# Patient Record
Sex: FEMALE | Race: WHITE | Employment: OTHER | URBAN - METROPOLITAN AREA
[De-identification: names, ages, dates, MRNs, and addresses within clinical notes are randomized per-mention and may not be internally consistent; named-entity substitution may affect disease eponyms.]

---

## 2018-11-13 ENCOUNTER — HOSPITAL ENCOUNTER (OUTPATIENT)
Dept: MAMMOGRAPHY | Age: 73
Discharge: HOME | End: 2018-11-13
Attending: INTERNAL MEDICINE
Payer: MEDICARE

## 2018-11-13 DIAGNOSIS — Z78.0 MENOPAUSE: ICD-10-CM

## 2018-11-13 PROCEDURE — 77080 DXA BONE DENSITY AXIAL: CPT

## 2018-11-14 NOTE — PROGRESS NOTES
Your bone density test showed mild thinning, but a low risk of fracturing a bone, thus not enough to start any bone forming medication. Good news!

## 2019-08-27 ENCOUNTER — HOSPITAL ENCOUNTER (OUTPATIENT)
Dept: LAB | Age: 74
Discharge: HOME | End: 2019-08-27
Payer: MEDICARE

## 2019-08-27 LAB — TSH SERPL DL<=0.005 MIU/L-ACNC: 1.13 UIU/ML (ref 0.36–3.74)

## 2019-08-27 PROCEDURE — 84443 ASSAY THYROID STIM HORMONE: CPT

## 2019-09-16 ENCOUNTER — HOSPITAL ENCOUNTER (OUTPATIENT)
Dept: MRI IMAGING | Age: 74
Discharge: HOME | End: 2019-09-16
Attending: INTERNAL MEDICINE
Payer: MEDICARE

## 2019-09-16 DIAGNOSIS — M54.17 LUMBOSACRAL RADICULOPATHY: ICD-10-CM

## 2019-09-16 DIAGNOSIS — R29.898 WEAKNESS OF FOOT, LEFT: ICD-10-CM

## 2019-09-16 PROCEDURE — 72148 MRI LUMBAR SPINE W/O DYE: CPT

## 2019-09-22 NOTE — PROGRESS NOTES
Your MRI does show degenerative changes in the bones and discs that DO put pressure on your nerves. This is likely the cause for the damage to the left 5th lumbar nerve that Dr Daniel Kevin saw on the nerve/muscle tests--this nerve goes from your low back to outer thigh and calf. The nerve/muscle tests also showed damage to the small nerves in both feet. We have already checked your blood for any cause for small nerve damage, and did not find any specific cause--so it appears you have the very common unexplained nerve damage. As for the L5 damage, this can be improved by physical therapy and spine injections. I'll put in a referral for PT, expect a call to schedule. If this isn't helpful, we can move on to a spine injection. Let me know if you have any questions, or if you prefer a different approach.

## 2019-10-03 ENCOUNTER — HOSPITAL ENCOUNTER (OUTPATIENT)
Dept: PHYSICAL THERAPY | Age: 74
Discharge: HOME | End: 2019-10-03
Attending: INTERNAL MEDICINE
Payer: MEDICARE

## 2019-10-03 DIAGNOSIS — M54.17 LUMBOSACRAL RADICULOPATHY: ICD-10-CM

## 2019-10-03 DIAGNOSIS — G62.9 SENSORY NEUROPATHY: ICD-10-CM

## 2019-10-03 PROCEDURE — 97140 MANUAL THERAPY 1/> REGIONS: CPT

## 2019-10-03 PROCEDURE — 97014 ELECTRIC STIMULATION THERAPY: CPT

## 2019-10-03 PROCEDURE — 97162 PT EVAL MOD COMPLEX 30 MIN: CPT

## 2019-10-03 NOTE — THERAPY EVALUATION
Marianela Mcrae  : 1945  [place]  [place] [address] [place] [address] at 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 Frequency/Duration: 2 times a week for 90 Day(s)    PAIN/SUBJECTIVE:   Initial:   numbness and occasional radiating pain down left leg 5/10 and underlying sensory neuropathy in background . Post Session:     numbness and occasional radiating pain down left leg 4/10 post treatment  and underlying sensory neuropathy in background . MEDICAL/REFERRING DIAGNOSIS:  Sensory neuropathy [G62.9]  Lumbosacral radiculopathy [M54.17]   DATE OF ONSET: 2019   REFERRING PHYSICIAN: Summer Estrella MD MD Orders: PT evaluation and treatment   Return MD Appointment:   Future Appointments   Date Time Provider Cynthia Bolden   10/7/2019  2:30 PM Jerman Mohan [facility] SFD   10/10/2019  1:45 PM Hugo Christy [facility] SFD   10/14/2019  3:15 PM Jerman Mohan [facility] SFDORPT D   10/16/2019  2:30 [facility]   2020  8:00 AM [facility] LAB [place] [address] [facility]   3/9/2020  9:20 AM Summer Estrella MD [facility]          INITIAL ASSESSMENT:  Ms. Dyanna Merlin    presents with decreased dynamic and static balance SLS associated with latent sensory neuropathy and distal LE weakness of hips and tibialis anterior.    There is a left superimposed L5 radiculopathy for the left LE that is transient for about 3 months. The patient has reduced anticipatory postural reactions of ankle, hip and stepping strategies as well as impaired autonomic postural reactions along with piriformis and left L5 lumbar radiculopathy. She may benefit from advanced pain management and TRUPTI for this issue concurrent with PT. Skilled PT is indicated for her functional mobility deficits. ?????? ? ? This section established at most recent assessment??????????      PROBLEM LIST (Impairments causing functional limitations):  1. Decreased Strength affecting function  2. Decreased ADL/Functional Activities  3. Decreased transfer abilities  4. Decreased Activity tolerance   5. Other Decrease Balance   6. Other Decreased LE sensation  7. Lumbar Radiculopathy   GOALS: (Goals have been discussed and agreed upon with patient.)  1. SHORT-TERM FUNCTIONAL GOALS: Time Frame: 45 days   2. The patient will be compliant with HEP  3. The patient will improve score on LEFS from 53/80 to 60/80  4. The patient will be able to perform SLS for >5 seconds bilaterally  5. The patient will increase stance time on L LE to improve gait speed focused on midstance progression  DISCHARGE GOALS: Time Frame: 90 day     1. The patient will be compliant with HEP  2. The patient will score on LEFS from 53/80 to 65/80 for increased LE function. 3. The patient will perform dynamic gait tasks in order to increase participation in community activities  4. The patient will present with typical pattern while ambulating on treadmill without UE support for balance for 1/2 mile to improve participation impact center at [facility].            REHABILITATION POTENTIAL FOR STATED GOALS: FAIR  PLAN OF CARE:  INTERVENTIONS PLANNED: (Benefits and precautions of physical therapy have been discussed with the patient.)  1. balance exercise  2. gait training  3. home exercise program (HEP)  4. neuromuscular re-education/strengthening  5. therapeutic activities  6. Unattended estim           OUTCOME MEASURE:   Tool Used: Lower Extremity Functional Scale (LEFS)  Score:  Initial:  53/80 Most Recent: X/80 (Date: -- )   Interpretation of Score: 20 questions each scored on a 5 point scale with 0 representing \"extreme difficulty or unable to perform\" and 4 representing \"no difficulty\". The lower the score, the greater the functional disability. 80/80 represents no disability. Minimal detectable change is 9 points. MEDICAL NECESSITY:   · Patient demonstrates fair rehab potential due to higher previous functional level. REASON FOR SERVICES/OTHER COMMENTS:  · Patient continues to require skilled intervention due to decreased mobility, pain and transfers. .  Total Duration:  PT Patient Time In/Time Out  Time In: 1576  Time Out: 4472    Rehabilitation Potential For Stated Goals: Fair  Regarding Rangel Killian's therapy, I certify that the treatment plan above will be carried out by a therapist or under their direction. Thank you for this referral,  Savanna Vale, PT     Referring Physician Signature: Sarahy Holt MD _______________________________ Date _____________            HISTORY:   History of Injury/Illness (Reason for Referral):    PER PT:   She states she has latent sensory distal neuropathy greater than one year, now with left sided L5 radiculopathy confirmed by MRI for foraminal stenosis. May need TRUPTI left but is not interested in lumbar surgery. She reports decreased SLS but is still able to ambulate and perform therapeutic exercise. She is concerned about the lumbar radiculopathy. There is loss of sensation, (+) MRI and (+) NCV and EMG. We contacted Jose Suárez today regarding tandem PT and spine injections per research:  Synergy of the two are most effective. She states she has learned to live with sensory neuropathy but cannot bear the lumbar radiculopathy, but is not interested in lumbar surgery.        PER MD H&P  : \"Your MRI does show degenerative changes in the bones and discs that DO put pressure on your nerves. This is likely the cause for the damage to the left 5th lumbar nerve that Dr Mela Grajeda saw on the nerve/muscle tests--this nerve goes from your low back to outer thigh and calf. The nerve/muscle tests also showed damage to the small nerves in both feet. We have already checked your blood for any cause for small nerve damage, and did not find any specific cause--so it appears you have the very common unexplained nerve damage. As for the L5 damage, this can be improved by physical therapy and spine injections. I'll put in a referral for PT, expect a call to schedule. If this isn't helpful, we can move on to a spine injection. Let me know if you have any questions, or if you prefer a different approach. \"    PER  MRI :      IMPRESSION  IMPRESSION:   1.  Multilevel disc herniations as described above. Mild central canal stenosis  is seen at L4-5. Additional mild neural foraminal stenosis is seen at L3-4, and  L4-5 as described above. Sensory neuropathy, lumbar radiculopathy  New concern  Around April or so, 2018, burning in feet, painful for covers to touch toes, followed by a more persistent numbness and tingling in both 4-5 toes. 8/18 b12 8/18 181, mma 437 hi, rpr neg, spe/paula nl  4/19 taking b12, >2000, hep panel neg, metal screen neg, margareth neg  Now has burning in left toes 3-4-5 and right toes 4-5  Tomeka causes confusion at 600 mg, and sedation on 300 mg, can't wake up in am when takes at 8 pm, or even at 6 pm, and no effect at 150 mg.  9/19 feels fine in day when up, but when lies down, notes pain in left thigh, lateral foot, burning in both lateral toes, worse on right. Burning not noted in daytime if wears socks and tennis shoes, but can't stand sheet at night. No worse on B12 but no better.   B12 1877, on weekly b12 injection.     Back pain  Left lower back, just above waistline, now spreading to right side  No pain seated  Painful when lies down on side or back  Will finally resolve if she holds perfectly still  nontender to her touch  Will rad to thighs and calves, sensation of cramping, but no mm tightness, ppt by lying flat  No injury   Past Medical History/Comorbidities:   Ms. Krish Raymond  has a past medical history of Allergic eye reaction (6/19/2013), Arthritis, Claustrophobia, Elevated blood pressure (not hypertension) (4/30/2015), Environmental allergies, FH: breast cancer (6/19/2013), FH: colon cancer (6/19/2013), Hyperlipidemia (6/19/2013), Left shoulder pain (6/19/2013), Menopause (6/19/2013), Trigger finger (6/19/2013), Unspecified adverse effect of anesthesia, Vertigo (6/19/2013), and Vitamin D deficiency (8/9/2016). She also has no past medical history of Aneurysm ([address] Arrhythmia, Asthma, Autoimmune disease ([address] CAD (coronary artery disease), Cancer ([address] Chronic kidney disease, Chronic obstructive pulmonary disease ([address] Chronic pain, Coagulation disorder ([address] Diabetes ([address] Difficult intubation, GERD (gastroesophageal reflux disease), Heart failure ([address] Ill-defined condition, Liver disease, Malignant hyperthermia due to anesthesia, Morbid obesity ([address] Nausea & vomiting, Pseudocholinesterase deficiency, Psychiatric disorder, PUD (peptic ulcer disease), Seizures ([address] Stroke ([address] Thromboembolus ([address] Thyroid disease, or Unspecified sleep apnea. Ms. Krish Raymond  has a past surgical history that includes hx appendectomy; hx cholecystectomy; hx hysterectomy; hx tubal ligation (1971); hx gi (last 2012); and hx mohs procedure (1982). Social History/Living Environment:     lives with her spouse at home   Prior Level of Function/Work/Activity:  Had been independent with all functional mobility. Dominant Side:         RIGHT  Personal Factors:          Sex:  female        Age:  69 y.o.    Ambulatory/Rehab Services H2 Model Falls Risk Assessment   Risk Factors:       No Risk Factors Identified Ability to Rise from Chair:       (0)  Ability to rise in a single movement   Falls Prevention Plan:       No modifications necessary   Total: (5 or greater = High Risk): 0   ©2010 [facility] of [facility] Patent #[id number]. Federal Law prohibits the replication, distribution or use without written permission from [facility] of [employer]   Current Medications:       Current Outpatient Medications:     magnesium chloride (SLOW MAG) 71.5 mg tablet, Take 2-3 tab by mouth daily at bedtime, Disp: 30 Tab, Rfl: 0    ALPRAZolam (XANAX) 0.5 mg tablet, Take 1 to 2 tablets by mouth 15 minutes before your MRI, but only after you sign any important paperwork. You will need a  to take you home after the exam., Disp: 2 Tab, Rfl: 0    cyanocobalamin, vitamin B-12, 2,000 mcg tab, Take 1 Tab by mouth daily. , Disp: , Rfl:     TURMERIC PO, Take 500 mg by mouth., Disp: , Rfl:     syringe with needle, safety 5 mL 25 gauge x 5/8\" syrg, Use one daily or as directed, Disp: 50 Syringe, Rfl: 1    cholecalciferol (VITAMIN D3) 1,000 unit cap, Take 2 Caps by mouth daily. , Disp: , Rfl:    Date Last Reviewed:  10/3/2019   Number of Personal Factors/Comorbidities that affect the Plan of Care: 1-2: MODERATE COMPLEXITY   EXAMINATION:   GROSS PRESENTATION/POSTURE:   The patient presents with FHP and thoracic kyphosis  MENTAL STATUS:  A&O x 3   VISION:  WNL  PERCEPTION: WNL  FUNCTIONAL MOBILITY:  · Bed Mobility: WNL    Transfers:  Relatively normal   RANGE OF MOTION Left  Right Comments:   Lower Extremity  WNL WNL End range pain left hip and lumbar region with radiculopathy. L5 .                    STRENGTH Left Right Comments:   Lower Extremity            Hip flexors 4 4      Knee flexors 4 4      Knee extensors 4 4      DF 4 4      PF 3+ 4      SENSATION:   · Dermatomes- intact    QUALITY OF GAIT:  The patient presents with antalgic gait pattern.  Reduced SLS time on the R LE with L genus varum and L ankle supination lacking EV to pronate during midstance. The patient presents with decreased gait speed is 0.8 m/sec. BALANCE:  The patient present with impaired balance secondary to peripheral neuropathy   Palpation:   Left piriformis pain and tenderness with left LE guarding.    (+) GRACIELA       Body Structures Involved:  1. Bones  2. Joints  3. Muscles  4. Ligaments Body Functions Affected:  1. Neuromusculoskeletal  2. Movement Related  3. Skin Related  4. Metobolic/Endocrine Activities and Participation Affected:  1. General Tasks and Demands  2. Communication  3. Mobility  4. Self Care  5.  Community, Social and [facility] [address]   Number of elements (examined above) that affect the Plan of Care: 3: MODERATE COMPLEXITY   CLINICAL PRESENTATION:   Presentation: Evolving clinical presentation with changing clinical characteristics: MODERATE COMPLEXITY   CLINICAL DECISION MAKING:   Use of outcome tool(s) and clinical judgement create a POC that gives a: Questionable prediction of patient's progress: MODERATE COMPLEXITY

## 2019-10-03 NOTE — PROGRESS NOTES
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 Frequency/Duration: 2 times a week for 90 Day(s)    PAIN/SUBJECTIVE:   Initial:   numbness and occasional radiating pain down left leg 5/10 and underlying sensory neuropathy in background . Post Session:     numbness and occasional radiating pain down left leg 4/10 post treatment  and underlying sensory neuropathy in background . Pre-treatment Symptoms/Complaints:  Sensory neuropathy with superimposed L5 left radiculopathy, transient. Medications Last Reviewed:  10/3/2019  Updated Objective Findings:  See evaluation note from today  TREATMENT:     MANUAL THERAPY: (10 minutes): Joint mobilization and Soft tissue mobilization was utilized and necessary because of the patient's restricted joint motion, painful spasm, loss of articular motion and restricted motion of soft tissue. MODALITIES: (15 minutes):     Electrical Stimulation Therapy ( hertz IFC quadrapolar to the left piriformis region layered with moist hot pack to increase blood flow and modulate patient pain in left hip and LE.   ) was provided with intensity adjusted throughout treatment to patient tolerance. sidelying right side with pillow between knees.        [employer] Portal  Treatment/Session Summary: · Response to Treatment:  decreased radiating pain in right sidelying . .  · Communication/Consultation:  regarding sensory neuropathy and radiating pain and review of MRI of the LB region and EMG/NCV. Contacted MD office regarding pain management referral.  · Equipment provided today:  None today  · Recommendations/Intent for next treatment session: Next visit will focus on left piriformis and gluteal region with increased mobility to modulate radiating pain.      .    Total Treatment Billable Duration:  25 minutes  PT Patient Time In/Time Out  Time In: 1289  Time Out: [address], [place]    Future Appointments   Date Time Provider Cynthia Bolden   10/7/2019  2:30 PM Herberth Clements PTA [facility] SFD   10/10/2019  1:45 PM Marianela Moeller [facility] SFD   10/14/2019  3:15 PM Herberth Clements PTA SFDORPT SFD   10/16/2019  2:30 PM Luisa Mena, RAMIRO [facility] SFD   2/26/2020  8:00 AM [facility] LAB [facility]   3/9/2020  9:20 AM Ton Funes MD [facility]

## 2019-10-07 ENCOUNTER — HOSPITAL ENCOUNTER (OUTPATIENT)
Dept: PHYSICAL THERAPY | Age: 74
Discharge: HOME | End: 2019-10-07
Attending: INTERNAL MEDICINE
Payer: MEDICARE

## 2019-10-07 NOTE — PROGRESS NOTES
Adolfo Maegan  : 1945  [place]  Secondary: [address], 101 [facility]", [address]  [phone]   [phone]      10/7/2019  Patient called to cancel this appointment due getting stuck at the doctor's office. Will follow up with the patient at the next visit.   Marcial Matthews, PTA

## 2019-10-10 ENCOUNTER — HOSPITAL ENCOUNTER (OUTPATIENT)
Dept: PHYSICAL THERAPY | Age: 74
Discharge: HOME | End: 2019-10-10
Attending: INTERNAL MEDICINE
Payer: MEDICARE

## 2019-10-10 PROCEDURE — 97140 MANUAL THERAPY 1/> REGIONS: CPT

## 2019-10-10 PROCEDURE — 97110 THERAPEUTIC EXERCISES: CPT

## 2019-10-10 NOTE — PROGRESS NOTES
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 Frequency/Duration: 2 times a week for 90 Day(s)    PAIN/SUBJECTIVE:   Initial:   numbness and occasional radiating pain down left leg 5/10 and underlying sensory neuropathy in background . Post Session:     numbness and occasional radiating pain down left leg 4/10 post treatment  and underlying sensory neuropathy in background . Pre-treatment Symptoms/Complaints: Patient reports continued numbness/tingling down left LE from knee down. It feels like it wants to go up higher into my thigh at times. Patient states that following last visit she had increased pain in left lower leg and states she does not want to have the electrical stimulation treatment today. Patient states she had difficulty sleeping that night due to pain and tingling in left LE. Medications Last Reviewed:  10/10/2019  Updated Objective Findings:  None today. TREATMENT:     MANUAL THERAPY: ( 25 minutes): Joint mobilization and Soft tissue mobilization was utilized and necessary because of the patient's restricted joint motion, painful spasm, loss of articular motion and restricted motion of soft tissue.  Patient prone for STM to left more than right low back, upper glut, and piriformis to help decrease tightness and pain. MODALITIES: (10 minutes): ( Patient declined IFES today. ) Patient prone for ice pack to left low back, upper glut and piriformis to help decrease inflammation and pain. Therapeutic Exercise: ( 15 minutes):  Exercises per grid below to improve mobility, strength and balance. Required minimal verbal and tactile cues to promote proper body alignment, promote proper body posture, promote proper body mechanics and promote proper body breathing techniques. Progressed resistance, range, repetitions and complexity of movement as indicated. Date:  10/10/19 Date:   Date:     Activity/Exercise Parameters Parameters Parameters   Single knee to chest    3 x 15 second hold B     LTR 5 x 5 second hold B     Piriformis stretch 3 x 30 second hold                                            [employer] Portal  Treatment/Session Summary:    · Response to Treatment:  decreased tightness in low back and left buttocks. Patient toleranted new exercises well and reports she is doing some of these at her exercise class in sitting. .  · Communication/Consultation:  None today  · Equipment provided today:  None today  · Recommendations/Intent for next treatment session: Next visit will focus on left piriformis and gluteal region with increased mobility to modulate radiating pain.      .    Total Treatment Billable Duration:  40   minutes  PT Patient Time In/Time Out  Time In: 1345  Time Out: [address], PTA    Future Appointments   Date Time Provider Cynthia Bolden   10/14/2019  3:15 PM Charly Spencer PTA [facility] SFWIL   10/16/2019  2:30 PM Promise Wright PT [facility]   2/26/2020  8:00 AM [facility] LAB [facility]   3/9/2020  9:20 AM Brittany Nix MD [facility]

## 2019-10-14 ENCOUNTER — HOSPITAL ENCOUNTER (OUTPATIENT)
Dept: PHYSICAL THERAPY | Age: 74
Discharge: HOME | End: 2019-10-14
Attending: INTERNAL MEDICINE
Payer: MEDICARE

## 2019-10-14 NOTE — PROGRESS NOTES
[facility] [address] [facility]  : 1945  [place]  Secondary: [facility] [facility] at [facility] 68, 101 [facility], [address]  [phone]   [phone]      10/14/2019  Pt called in to state that she is having car problems, so she could not make it to today's appointment. Will follow up with patient at next visit.      Rachel Levy, SPTA  Felix Canada, PTA

## 2019-10-16 ENCOUNTER — HOSPITAL ENCOUNTER (OUTPATIENT)
Dept: PHYSICAL THERAPY | Age: 74
Discharge: HOME | End: 2019-10-16
Attending: INTERNAL MEDICINE
Payer: MEDICARE

## 2019-10-16 NOTE — THERAPY DISCHARGE
Gianna Montano has been seen in physical therapy from 10/3/2019  to 10/10/2019 for 2 visits. Treatment has been discontinued at this time due to patient reporting irritable, severe, and inflammatory pain symptoms post activities. Some goals were not met due to high level of irritability. Recommend return to MD and consideration of further pain modulation including possible TRUPTI. Thank you for this referral.     Alexis Hoover, PT, DPT, OCS.

## 2019-12-18 ENCOUNTER — HOSPITAL ENCOUNTER (OUTPATIENT)
Dept: LAB | Age: 74
Discharge: HOME | End: 2019-12-18

## 2019-12-18 PROCEDURE — 88305 TISSUE EXAM BY PATHOLOGIST: CPT

## 2020-09-09 PROBLEM — E53.8 B12 DEFICIENCY: Status: ACTIVE | Noted: 2020-09-09

## 2020-09-09 PROBLEM — G60.9 IDIOPATHIC PERIPHERAL NEUROPATHY: Status: ACTIVE | Noted: 2020-09-09

## 2020-09-09 PROBLEM — M85.89 OSTEOPENIA OF MULTIPLE SITES: Status: ACTIVE | Noted: 2020-09-09

## 2020-09-29 LAB — MAMMOGRAPHY, EXTERNAL: NORMAL

## 2020-12-02 ENCOUNTER — HOSPITAL ENCOUNTER (OUTPATIENT)
Dept: MRI IMAGING | Age: 75
Discharge: HOME | End: 2020-12-02
Attending: PHYSICIAN ASSISTANT
Payer: MEDICARE

## 2020-12-02 PROCEDURE — 70553 MRI BRAIN STEM W/O & W/DYE: CPT

## 2020-12-02 PROCEDURE — 74011250636 HC RX REV CODE- 250/636: Performed by: PHYSICIAN ASSISTANT

## 2020-12-02 PROCEDURE — A9575 INJ GADOTERATE MEGLUMI 0.1ML: HCPCS | Performed by: PHYSICIAN ASSISTANT

## 2020-12-02 RX ORDER — GADOTERATE MEGLUMINE 376.9 MG/ML
12 INJECTION INTRAVENOUS
Status: COMPLETED | OUTPATIENT
Start: 2020-12-02 | End: 2020-12-02

## 2020-12-02 RX ORDER — SODIUM CHLORIDE 0.9 % (FLUSH) 0.9 %
10 SYRINGE (ML) INJECTION
Status: COMPLETED | OUTPATIENT
Start: 2020-12-02 | End: 2020-12-02

## 2020-12-02 RX ADMIN — GADOTERATE MEGLUMINE 12 ML: 376.9 INJECTION INTRAVENOUS at 14:15

## 2020-12-02 RX ADMIN — Medication 10 ML: at 14:15

## 2020-12-29 ENCOUNTER — HOSPITAL ENCOUNTER (OUTPATIENT)
Dept: MRI IMAGING | Age: 75
Discharge: HOME | End: 2020-12-29
Attending: INTERNAL MEDICINE
Payer: MEDICARE

## 2020-12-29 DIAGNOSIS — M54.2 NECK PAIN: ICD-10-CM

## 2020-12-29 DIAGNOSIS — R93.7 ABNORMAL MRI, CERVICAL SPINE: ICD-10-CM

## 2020-12-29 PROCEDURE — 74011250636 HC RX REV CODE- 250/636: Performed by: INTERNAL MEDICINE

## 2020-12-29 PROCEDURE — 72156 MRI NECK SPINE W/O & W/DYE: CPT

## 2020-12-29 PROCEDURE — A9575 INJ GADOTERATE MEGLUMI 0.1ML: HCPCS | Performed by: INTERNAL MEDICINE

## 2020-12-29 RX ORDER — GADOTERATE MEGLUMINE 376.9 MG/ML
13 INJECTION INTRAVENOUS
Status: COMPLETED | OUTPATIENT
Start: 2020-12-29 | End: 2020-12-29

## 2020-12-29 RX ORDER — SODIUM CHLORIDE 0.9 % (FLUSH) 0.9 %
10 SYRINGE (ML) INJECTION
Status: COMPLETED | OUTPATIENT
Start: 2020-12-29 | End: 2020-12-29

## 2020-12-29 RX ADMIN — GADOTERATE MEGLUMINE 13 ML: 376.9 INJECTION INTRAVENOUS at 16:31

## 2020-12-29 RX ADMIN — Medication 10 ML: at 16:31

## 2021-03-19 ENCOUNTER — HOSPITAL ENCOUNTER (OUTPATIENT)
Dept: PHYSICAL THERAPY | Age: 76
Discharge: HOME | End: 2021-03-19
Payer: MEDICARE

## 2021-03-19 PROCEDURE — 97140 MANUAL THERAPY 1/> REGIONS: CPT

## 2021-03-19 PROCEDURE — 97110 THERAPEUTIC EXERCISES: CPT

## 2021-03-19 PROCEDURE — 97161 PT EVAL LOW COMPLEX 20 MIN: CPT

## 2021-03-19 NOTE — THERAPY EVALUATION
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 Frequency/Duration: 2 times a week for 60 Day(s) MEDICAL/REFERRING DIAGNOSIS:  Cervicalgia [M54.2]  Spondylosis without myelopathy or radiculopathy, cervical region [M47.812]  Connective tissue and disc stenosis of intervertebral foramina of upper extremity [M99.77]   DATE OF ONSET: Patient reports her neck pain starting back in 2020 after doing a lot of mulch/ yard work. REFERRING PHYSICIAN: Chris Herrera MD MD Orders: Evaluate and Treat   Return MD Appointment: none scheduled     INITIAL ASSESSMENT:  Ms. Edel Vinson is a 77 y.o. female presenting to physical therapy with complaints of neck pain which began back in 2020. She reports her pain starting after working in her yard spreading mulch. Patient states the pain has gotten some better over time, but continues to be an annoyance and limits her ROM/ mobility. She was given some ROM exercises by the doctor and reports continuing with those daily. She was having some hearing problems and had an MRI for assessment where they looked at her cervical spine as well.  Per chart review the MRI demonstrated spondylosis and degenerative changes. She is scheduled to have another MRI in 6 months for re-assessment. She reports one fall about 6 months ago where she tripped over a curb at the grocery store, but no injury, no other falls, and no feelings of unsteadiness. Her main complaint with her neck pain is her loss of ROM and pain with cervical rotations and a few headaches a week. Patient is eager to improve her pain, ROM, and activity tolerance in order to return to her prior level of independence and function. Patient presents with increased pain, decreased strength, decreased ROM, decreased flexibility, impaired posture, impaired transfer ability, decreased activity tolerance, and overall impaired functional mobility. Patient is a good candidate for skilled physical therapy interventions to include manual therapy, therapeutic exercise, transfer training, postural re-education, body mechanics training, and pain modalities as needed. PROBLEM LIST (Impacting functional limitations):  1. Decreased Strength  2. Decreased ADL/Functional Activities  3. Decreased Transfer Abilities  4. Increased Pain  5. Decreased Activity Tolerance  6. Decreased Pacing Skills  7. Decreased Work Simplification/Energy Conservation Techniques  8. Decreased Flexibility/Joint Mobility INTERVENTIONS PLANNED: (Treatment may consist of any combination of the following)  1. Bed Mobility  2. Cold  3. Family Education  4. Heat  5. Home Exercise Program (HEP)  6. Manual Therapy  7. Neuromuscular Re-education/Strengthening  8. Range of Motion (ROM)  9. Therapeutic Activites  10. Therapeutic Exercise/Strengthening  11. Transfer Training  12. Ultrasound (US)     GOALS: (Goals have been discussed and agreed upon with patient.)  Short-Term Functional Goals: Time Frame: 3/19/2021 to 4/16/2021  1.  Patient demonstrates independence with home exercise program without verbal cueing provided by therapist.   2. Patient will report no more than 3/10 neck pain at rest in order to demonstrate improved self pain control and tolerance. 3. Patient will be educated in and demonstrate improved upright posture including decreased anterior head and thoracic kyphosis to decrease strain on the cervical spine and improve mobility. 4. Patient will improve bilateral cervical lateral flexion ROM to at least 25 degrees in order to demonstrate improve joint and tissue mobility. 5. Patient will report less than 2 headaches a week in order to demonstrate decreased tension from the cervical spine causes headaches. Discharge Goals: Time Frame: 3/19/2021 to 5/18/2021  1. Patient will improve bilateral cervical rotation ROM to at least 45 degrees with minimal to no complaints of pain in order to improve ability to look over shoulder while driving. 2. Patient will be able to sleep through the night without waking due to neck pain in order to improve sleeping pattern for overall health and wellness. 3. Patient will report 1 headache or less during the week in order to demonstrate decreased tension and return to normal headache frequency prior to neck pain. 4. Patient will be able to perform work inside and outside of her home with minimal to no complaints of neck pain in order to return to prior level of activities. 5. Patient will improve Neck Disability Index Score to 12/50 from 15/50. Outcome Measure: Tool Used: Neck Disability Index (NDI)  Score:  Initial: 15/50  Most Recent: X/50 (Date: -- )   Interpretation of Score: The Neck Disability Index is a revised form of the Oswestry Low Back Pain Index and is designed to measure the activities of daily living in person's with neck pain. Each section is scored on a 0-5 scale, 5 representing the greatest disability. The scores of each section are added together for a total score of 50.        Medical Necessity:   · Patient is expected to demonstrate progress in strength, range of motion, coordination and functional technique to improve safety during driving, lifting, reaching, and home activities. · Skilled intervention continues to be required due to increased pain with decreased ROM hindering independence and function. Reason for Services/Other Comments:  · Patient continues to require skilled intervention due to neck pain hindering return to prior level of function and impacting quality of life. Total Evaluation Duration: 25 minutes    Rehabilitation Potential For Stated Goals: Good  Regarding Severiano England Owen's therapy, I certify that the treatment plan above will be carried out by a therapist or under their direction. Thank you for this referral,  Lauren Rhodes PT     Referring Physician Signature: Tawanna Hawk MD              Date                     PAIN/SUBJECTIVE:    Initial: Pain Intensity 1: 5  Pain Location 1: Neck  Pain Orientation 1: Posterior, Left, Right  Post Session:  5/10    HISTORY:    History of Injury/Illness (Reason for Referral):  Ms. Angeline Umanzor is a 77 y.o. female presenting to physical therapy with complaints of neck pain which began back in August 2020. She reports her pain starting after working in her yard [employer]. Patient states the pain has gotten some better over time, but continues to be an annoyance and limits her ROM/ mobility. She was given some ROM exercises by the doctor and reports continuing with those daily. She was having some hearing problems and had an MRI for assessment where they looked at her cervical spine as well. Per chart review the MRI demonstrated spondylosis and degenerative changes. She is scheduled to have another MRI in 6 months for re-assessment. She reports one fall about 6 months ago where she tripped over a curb at the grocery store, but no injury, no other falls, and no feelings of unsteadiness. Her main complaint with her neck pain is her loss of ROM and pain with cervical rotations and a few headaches a week.  Patient is eager to improve her pain, ROM, and activity tolerance in order to return to her prior level of independence and function. Patient presents with increased pain, decreased strength, decreased ROM, decreased flexibility, impaired posture, impaired transfer ability, decreased activity tolerance, and overall impaired functional mobility. Past Medical History/Comorbidities:   Ms. Tk Whittaker  has a past medical history of Allergic eye reaction (6/19/2013), Arthritis, Claustrophobia, Elevated blood pressure (not hypertension) (4/30/2015), Environmental allergies, FH: breast cancer (6/19/2013), FH: colon cancer (6/19/2013), Hyperlipidemia (6/19/2013), Left shoulder pain (6/19/2013), Menopause (6/19/2013), Trigger finger (6/19/2013), Unspecified adverse effect of anesthesia, Vertigo (6/19/2013), and Vitamin D deficiency (8/9/2016). Ms. Tk Whittaker  has a past surgical history that includes hx appendectomy; hx cholecystectomy; hx hysterectomy; hx tubal ligation (1971); hx gi (last 2012); and hx mohs procedure (1982). Social History/Living Environment:     Patient lives in a private residence with her . Prior Level of Function/Work/Activity:  Retired. Dominant Side:         RIGHT    Ambulatory/Rehab Services H2 Model Falls Risk Assessment    Risk Factors:       No Risk Factors Identified Ability to Rise from Chair:       (1)  Pushes up, successful in one attempt    Falls Prevention Plan:       No modifications necessary    Total: (5 or greater = High Risk): 1    ©2010 [facility] [employer]. All Rights Reserved. [facility] Patent #[id number]. Federal Law prohibits the replication, distribution or use without written permission from [facility] [employer]    Current Medications:        Current Outpatient Medications:     MAGNESIUM PO, Take 3 Tabs by mouth daily. , Disp: , Rfl:     cholecalciferol, vitamin D3, (VITAMIN D3 PO), Take 1 Tab by mouth daily. , Disp: , Rfl:     melatonin 3 mg tablet, Take 3 mg by mouth nightly., Disp: , Rfl:     cyanocobalamin, vitamin B-12, 1,000 mcg cap, Take 1 Tab by mouth daily. , Disp: , Rfl:     TURMERIC PO, Take 500 mg by mouth., Disp: , Rfl:     Date Last Reviewed:  3/19/2021    Number of Personal Factors/Comorbidities that affect the Plan of Care:  Patient is in good health overall with minimal co-morbidities 1-2: MODERATE COMPLEXITY    EXAMINATION:    Patient denies any changes in vision, dizziness, vertigo, nausea, drop attacks, black outs, tinnitus, dysphagia, dysarthria, LE symptoms or bowel/bladder dysfunction. Observation/Orthostatic Postural Assessment:          Patient with significant forward head posturing, forward and rounded shoulders, flattened lumbar lordosis. Palpation:          Significant tenderness to palpation of cervical musculature, especially bilateral suboccipitals, R paraspinals, and R upper trapezius insertion. Significant tightness and pain with palpation of lateral cervical spine and side glides. Prominent lower cervical spinous processes. Significant tightness in bilateral cervical suboccipitals. Vertebral-Basilar Screen: Positional Provocation testing is negative per ROM allowance. ROM:          Pain reported in R side of neck with R cervical rotation and L side of neck with L cervical rotation  AROM/ PROM Degrees   Cervical Flexion 75   Cervical Extension 20   Right Rotation 40   Left Rotation 35   Right Lateral Flexion 17   Left Lateral Flexion 22     Strength:          Gross bilateral UE strength 4+/5 to 5/5. Deep cervical flexor strength grossly decreased per posturing and transfers. Special Tests:          Ligament stress tests performed through upper cervical spine for transverse ligament including Sharp-Poli test is negative, and Wales-Axis shear test is negative. Wales-Axis side flexion test for integrity of alar ligament is negative. Spurling test was not tested. Cervical distraction \"feels good\" with anterior head posturing, but causes discomfort when put in a more neutral spinal posturing.      Passive Accessory Motion:         Significant limitation with cervical side glides both directions, worse L to R with more tenderness reported on L. Significant limitations with posterior to anterior mobilizations of lower cervical spine.  Neurological Screen:              Myotomes: Key muscle strength testing through bilateral UE is WFL.   Dermatomes: Sensation to light touch for bilateral UE is intact from C4 to T2.   Reflexes: Biceps (C5): 1+ bilaterally         Brachioradialis (C6): 1+ bilaterally        Triceps (C7): 1+ bilaterally  Functional Mobility:         Patient reports 1-2 headaches a week coming from her neck up the back of her head. Waking 2-3 times a night with neck pain. Increased pain with decreased activity tolerance with housework, yard work, and overhead reach.  Balance:          Sitting and standing balance grossly intact.      Body Structures Involved:  1. Bones  2. Joints  3. Muscles  4. Ligaments Body Functions Affected:  1. Sensory/Pain  2. Neuromusculoskeletal  3. Movement Related Activities and Participation Affected:  1. General Tasks and Demands  2. Mobility  3. Self Care  4. Domestic Life  5. Interpersonal Interactions and Relationships  6. Community, Social and Civic Life    Number of elements (examined above) that affect the Plan of Care: 1-2: LOW COMPLEXITY    CLINICAL PRESENTATION:    Presentation:   Stable and uncomplicated: LOW COMPLEXITY    CLINICAL DECISION MAKING:    Use of outcome tool(s) and clinical judgement create a POC that gives a: Questionable prediction of patient's progress: MODERATE COMPLEXITY

## 2021-03-19 NOTE — PROGRESS NOTES
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 Frequency/Duration: 2 times a week for 60 Day(s) MEDICAL/REFERRING DIAGNOSIS:  Cervicalgia [M54.2]  Spondylosis without myelopathy or radiculopathy, cervical region [M47.812]  Connective tissue and disc stenosis of intervertebral foramina of upper extremity [M99.77]   DATE OF ONSET: Patient reports her neck pain starting back in 2020 after doing a lot of mulch/ yard work. REFERRING PHYSICIAN: Tawanna Hawk MD MD Orders: Evaluate and Treat   Return MD Appointment: none scheduled     Pre-treatment Symptoms/Complaints:  Patient reports her neck starting to bother her after working in her yard back in 2020. Some improvements since then, but still having pain and limitations with cervical motions and activity tolerance.     Pain: Initial: Pain Intensity 1: 5  Pain Location 1: Neck  Pain Orientation 1: Posterior, Left, Right  Post Session:  5/10   Medications Last Reviewed:  3/19/2021  Updated Objective Findings:  See evaluation note from today   TREATMENT:   THERAPEUTIC EXERCISE: (10 minutes):  Exercises per grid below to improve mobility, strength, coordination and dynamic movement of neck - generalized to improve functional lifting, carrying, reaching and overhead activites. Required moderate visual, verbal and manual cues to promote proper body alignment, promote proper body posture and promote proper body mechanics. Progressed resistance, range, repetitions and complexity of movement as indicated. Date:  3/19/2021 Date:   Date:     Activity/Exercise Parameters Parameters Parameters   AROM cervical rotation X 10 bilaterally     AROM cervical lateral flexion X 10 bilaterally     Scapular retractions X 10     Backwards shoulder rolls X 10                         Time spent with patient reviewing proper muscle recruitment and technique with exercises. MANUAL THERAPY: (15 minutes): Joint mobilization and Soft tissue mobilization was utilized and necessary because of the patient's restricted joint motion, painful spasm, loss of articular motion and restricted motion of soft tissue   Gentle cervical traction to improve relaxation   Gentle suboccipital release to decrease spasms, tightness, and headache   Gentle cervical side glides bilaterally, grades II-III, to improve pain and mobility   Gentle trigger point release to R upper trapezius to decrease tightness and spasm    MODALITIES: (0 minutes):      none today     HEP: As above; handouts given to patient for all exercises. Treatment/Session Summary:    · Response to Treatment:  Patient with decreased tightness and reported feeling \"more loose\" at end of session, but no change in pain rating. Good understanding of HEP. Destinee Santiago · Communication/Consultation:  Spoke at length with patient about plan of care, progression of therapy session, and home program  · Equipment provided today:  None today  · Recommendations/Intent for next treatment session: Next visit will focus on pain control, manual therapy and modalities as needed, progression of postural education and exercises as tolerated.     Total Treatment Billable Duration: 48 minutes: 25 evaluation, 13 manual, 10 therapeutic exercise  PT Patient Time In/Time Out  Time In: 1005  Time Out: [address], PT

## 2021-03-23 ENCOUNTER — HOSPITAL ENCOUNTER (OUTPATIENT)
Dept: PHYSICAL THERAPY | Age: 76
Discharge: HOME | End: 2021-03-23
Payer: MEDICARE

## 2021-03-23 PROCEDURE — 97035 APP MDLTY 1+ULTRASOUND EA 15: CPT

## 2021-03-23 PROCEDURE — 97140 MANUAL THERAPY 1/> REGIONS: CPT

## 2021-03-23 NOTE — PROGRESS NOTES
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    Frequency/Duration: 2 times a week for 60 Day(s) MEDICAL/REFERRING DIAGNOSIS:  Cervicalgia [M54.2]  Spondylosis without myelopathy or radiculopathy, cervical region [M47.812]  Connective tissue and disc stenosis of intervertebral foramina of upper extremity [M99.77]   DATE OF ONSET: Patient reports her neck pain starting back in 2020 after doing a lot of mulch/ yard work.  REFERRING PHYSICIAN: Lonnie Walters MD MD Orders: Evaluate and Treat   Return MD Appointment: none scheduled     Pre-treatment Symptoms/Complaints:  Patient reports feeling good after the initial session, but having some increased pain doing her exercises last night and needing tylenol to get to sleep.    Pain: Initial: Pain Intensity 1: 6  Pain Location 1: Neck  Pain Orientation 1: Posterior, Left  Post Session:  2/10   Medications Last Reviewed:  3/23/2021  Updated Objective Findings:  trigger points in bilateral upper and middle trapezius today    TREATMENT:   THERAPEUTIC EXERCISE: (0 minutes):  Exercises per grid below to improve mobility, strength, coordination and dynamic movement of neck - generalized to  improve functional lifting, carrying, reaching and overhead activites. Required moderate visual, verbal and manual cues to promote proper body alignment, promote proper body posture and promote proper body mechanics. Progressed resistance, range, repetitions and complexity of movement as indicated. Date:  3/19/2021 Date:   Date:     Activity/Exercise Parameters Parameters Parameters   AROM cervical rotation X 10 bilaterally     AROM cervical lateral flexion X 10 bilaterally     Scapular retractions X 10     Backwards shoulder rolls X 10                         Time spent with patient reviewing proper muscle recruitment and technique with exercises. MANUAL THERAPY: (35 minutes): Joint mobilization and Soft tissue mobilization was utilized and necessary because of the patient's restricted joint motion, painful spasm, loss of articular motion and restricted motion of soft tissue   Gentle cervical traction to improve relaxation   Gentle suboccipital release to decrease spasms, tightness, and headache   Gentle cervical side glides bilaterally, grades II-III, to improve pain and mobility   Gentle trigger point release to R upper trapezius to decrease tightness and spasm   Seated soft tissue mobilization with deep pressure to bilateral upper trapezius, levator scapula, middle trapezius, and rhomboids    MODALITIES: (20 minutes): *  Ultrasound was used today secondary to the patient having tightened structures limiting joint motion that require an increase in extensibility. Ultrasound was used today to reduce pain, reduce spasm, reduce joint stiffness and increase muscle flexibility. Patient in sitting. Patient with heat packs to bilateral shoulders/ cervical region in sitting for 10 minutes at start of session. HEP: As above; handouts given to patient for all exercises. Treatment/Session Summary:    · Response to Treatment:  Patient with decreased tightness and pain at end of session.  Some limitations with manual cervical PROM due to pain. Plan to progress postural education and exercises next session as tolerated. · Communication/Consultation:  None today  · Equipment provided today:  None today  · Recommendations/Intent for next treatment session: Next visit will focus on pain control, manual therapy and modalities as needed, progression of postural education and exercises as tolerated.     Total Treatment Billable Duration:  45 minutes  PT Patient Time In/Time Out  Time In: 1327  Time Out: [address], PT

## 2021-03-26 ENCOUNTER — HOSPITAL ENCOUNTER (OUTPATIENT)
Dept: PHYSICAL THERAPY | Age: 76
Discharge: HOME | End: 2021-03-26
Payer: MEDICARE

## 2021-03-26 PROCEDURE — 97140 MANUAL THERAPY 1/> REGIONS: CPT

## 2021-03-26 PROCEDURE — 97035 APP MDLTY 1+ULTRASOUND EA 15: CPT

## 2021-03-26 NOTE — PROGRESS NOTES
Madison Christensen  : 1945  [place]  Secondary: [address] at [facility]  [address] [place], [address] [place] [address], [place], [address] [place] [address]  [phone]   [phone]      OUTPATIENT PHYSICAL THERAPY: Daily Treatment Note 3/26/2021    ICD-10: Treatment Diagnosis: cervicalgia (M54.2)                Treatment Diagnosis 2: spondylosis without myelopathy or radiculopathy, cervical region (M47.812)                Treatment Diagnosis 3: 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 Frequency/Duration: 2 times a week for 60 Day(s) MEDICAL/REFERRING DIAGNOSIS:  Cervicalgia [M54.2]  Spondylosis without myelopathy or radiculopathy, cervical region [M47.812]  Connective tissue and disc stenosis of intervertebral foramina of upper extremity [M99.77]   DATE OF ONSET: Patient reports her neck pain starting back in 2020 after doing a lot of mulch/ yard work. REFERRING PHYSICIAN: Jamie Fajardo MD MD Orders: Evaluate and Treat   Return MD Appointment: none scheduled     Pre-treatment Symptoms/Complaints:  Patient reports feeling much better after last session with less pain and tightness and sleeping much better. Pain: Initial: Pain Intensity 1: 4  Pain Location 1: Neck  Pain Orientation 1: Posterior, Left  Post Session:  2/10   Medications Last Reviewed:  3/26/2021  Updated Objective Findings:  Less tightness with manual therapy    TREATMENT:   THERAPEUTIC EXERCISE: (0 minutes):  Exercises per grid below to improve mobility, strength, coordination and dynamic movement of neck - generalized to improve functional lifting, carrying, reaching and overhead activites. Required moderate visual, verbal and manual cues to promote proper body alignment, promote proper body posture and promote proper body mechanics. Progressed resistance, range, repetitions and complexity of movement as indicated. Date:  3/19/2021 Date:   Date:     Activity/Exercise Parameters Parameters Parameters   AROM cervical rotation X 10 bilaterally     AROM cervical lateral flexion X 10 bilaterally     Scapular retractions X 10     Backwards shoulder rolls X 10                         Time spent with patient reviewing proper muscle recruitment and technique with exercises. MANUAL THERAPY: (35 minutes): Joint mobilization and Soft tissue mobilization was utilized and necessary because of the patient's restricted joint motion, painful spasm, loss of articular motion and restricted motion of soft tissue   Gentle cervical traction to improve relaxation   Gentle suboccipital release to decrease spasms, tightness, and headache   Gentle cervical side glides bilaterally, grades II-III, to improve pain and mobility   Gentle trigger point release to R upper trapezius to decrease tightness and spasm   Seated soft tissue mobilization with deep pressure to bilateral upper trapezius, levator scapula, middle trapezius, and rhomboids    MODALITIES: (20 minutes): *  Ultrasound was used today secondary to the patient having tightened structures limiting joint motion that require an increase in extensibility. Ultrasound was used today to reduce pain, reduce spasm, reduce joint stiffness and increase muscle flexibility. Patient in sitting. Patient with heat packs to bilateral shoulders/ cervical region in sitting for 10 minutes at start of session. HEP: As above; handouts given to patient for all exercises. Treatment/Session Summary:    · Response to Treatment:  Less tightness noted today. Good tolerance for treatment with minimal to no pain reported at end of session.  Advised patient we will progress postural exercises next session as tolerated. · Communication/Consultation:  None today  · Equipment provided today:  None today  · Recommendations/Intent for next treatment session: Next visit will focus on pain control, manual therapy and modalities as needed, progression of postural education and exercises as tolerated.     Total Treatment Billable Duration:  45 minutes  PT Patient Time In/Time Out  Time In: 0905  Time Out: 1000 [facility],

## 2021-03-31 ENCOUNTER — HOSPITAL ENCOUNTER (OUTPATIENT)
Dept: PHYSICAL THERAPY | Age: 76
Discharge: HOME | End: 2021-03-31
Payer: MEDICARE

## 2021-03-31 PROCEDURE — 97140 MANUAL THERAPY 1/> REGIONS: CPT

## 2021-03-31 PROCEDURE — 97035 APP MDLTY 1+ULTRASOUND EA 15: CPT

## 2021-03-31 NOTE — PROGRESS NOTES
Sushant Fox  : 1945  [place]  Secondary: [address] at 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 Frequency/Duration: 2 times a week for 60 Day(s) MEDICAL/REFERRING DIAGNOSIS:  Cervicalgia [M54.2]  Spondylosis without myelopathy or radiculopathy, cervical region [M47.812]  Connective tissue and disc stenosis of intervertebral foramina of upper extremity [M99.77]   DATE OF ONSET: Patient reports her neck pain starting back in 2020 after doing a lot of mulch/ yard work. REFERRING PHYSICIAN: Deborah Hitchcock MD MD Orders: Evaluate and Treat   Return MD Appointment: none scheduled     Pre-treatment Symptoms/Complaints:  Patient reports some aching and tightness in her neck today. States she over did yesterday with moving some stuff out of her uncles home.      Pain: Initial: Pain Intensity 1: 4  Pain Location 1: Neck  Pain Orientation 1: Posterior, Left  Post Session:  2/10   Medications Last Reviewed:  3/31/2021  Updated Objective Findings:  trigger points bilateral upper trapezius    TREATMENT:   THERAPEUTIC EXERCISE: (0 minutes):  Exercises per grid below to improve mobility, strength, coordination and dynamic movement of neck - generalized to improve functional lifting, carrying, reaching and overhead activites. Required moderate visual, verbal and manual cues to promote proper body alignment, promote proper body posture and promote proper body mechanics. Progressed resistance, range, repetitions and complexity of movement as indicated. Date:  3/19/2021 Date:   Date:     Activity/Exercise Parameters Parameters Parameters   AROM cervical rotation X 10 bilaterally     AROM cervical lateral flexion X 10 bilaterally     Scapular retractions X 10     Backwards shoulder rolls X 10                         Time spent with patient reviewing proper muscle recruitment and technique with exercises. MANUAL THERAPY: (35 minutes): Joint mobilization and Soft tissue mobilization was utilized and necessary because of the patient's restricted joint motion, painful spasm, loss of articular motion and restricted motion of soft tissue   Gentle cervical traction to improve relaxation   Gentle suboccipital release to decrease spasms, tightness, and headache   Gentle cervical side glides bilaterally, grades II-III, to improve pain and mobility   Gentle trigger point release to R upper trapezius to decrease tightness and spasm   Seated soft tissue mobilization with deep pressure to bilateral upper trapezius, levator scapula, middle trapezius, and rhomboids    MODALITIES: (20 minutes): *  Ultrasound was used today secondary to the patient having tightened structures limiting joint motion that require an increase in extensibility. Ultrasound was used today to reduce pain, reduce spasm, reduce joint stiffness and increase muscle flexibility. Patient in sitting. Patient with heat packs to bilateral shoulders/ cervical region in sitting for 10 minutes at start of session. HEP: As above; handouts given to patient for all exercises. Treatment/Session Summary:    · Response to Treatment:  Good tolerance for manual therapy today.  Progress postural exercises next session. .  · Communication/Consultation:  None today  · Equipment provided today:  None today  · Recommendations/Intent for next treatment session: Next visit will focus on pain control, manual therapy and modalities as needed, progression of postural education and exercises as tolerated.     Total Treatment Billable Duration:  45 minutes  PT Patient Time In/Time Out  Time In: 6038  Time Out: 5295 [facility]

## 2021-04-01 ENCOUNTER — HOSPITAL ENCOUNTER (OUTPATIENT)
Dept: PHYSICAL THERAPY | Age: 76
Discharge: HOME | End: 2021-04-01
Payer: MEDICARE

## 2021-04-01 PROCEDURE — 97140 MANUAL THERAPY 1/> REGIONS: CPT

## 2021-04-01 PROCEDURE — 97035 APP MDLTY 1+ULTRASOUND EA 15: CPT

## 2021-04-01 NOTE — PROGRESS NOTES
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 Frequency/Duration: 2 times a week for 60 Day(s) MEDICAL/REFERRING DIAGNOSIS:  Cervicalgia [M54.2]  Spondylosis without myelopathy or radiculopathy, cervical region [M47.812]  Connective tissue and disc stenosis of intervertebral foramina of upper extremity [M99.77]   DATE OF ONSET: Patient reports her neck pain starting back in 2020 after doing a lot of mulch/ yard work. REFERRING PHYSICIAN: Silvino Gil MD MD Orders: Evaluate and Treat   Return MD Appointment: none scheduled     Pre-treatment Symptoms/Complaints:  Patient reports feeling better after yesterday's session. Some tightness in her R suboccipitals and with R rotation driving to clinic today.     Pain: Initial: Pain Intensity 1: 2  Pain Location 1: Neck  Pain Orientation 1: Posterior  Post Session:  2/10   Medications Last Reviewed:  2021  Updated Objective Findings:  trigger points bilateral suboccipitals, right worse than L    TREATMENT:   THERAPEUTIC EXERCISE: (5 minutes):  Exercises per grid below to improve mobility, strength, coordination and dynamic movement of neck - generalized to improve functional lifting, carrying, reaching and overhead activites. Required moderate visual, verbal and manual cues to promote proper body alignment, promote proper body posture and promote proper body mechanics. Progressed resistance, range, repetitions and complexity of movement as indicated. Date:  3/19/2021 Date:  4/1/2021 Date:     Activity/Exercise Parameters Parameters Parameters   AROM cervical rotation X 10 bilaterally     AROM cervical lateral flexion X 10 bilaterally     Scapular retractions X 10 2 x 10    Backwards shoulder rolls X 10 2 x 10    Bilateral external rotation --- Please add next session    Bilateral horizontal abduction --- Please add next session    Chin tucks --- Please try supine next session      Time spent with patient reviewing proper muscle recruitment and technique with exercises. MANUAL THERAPY: (35 minutes): Joint mobilization and Soft tissue mobilization was utilized and necessary because of the patient's restricted joint motion, painful spasm, loss of articular motion and restricted motion of soft tissue   Gentle cervical traction to improve relaxation   Gentle suboccipital release to decrease spasms, tightness, and headache   Gentle cervical side glides bilaterally, grades II-III, to improve pain and mobility   Gentle trigger point release to R upper trapezius to decrease tightness and spasm   Seated soft tissue mobilization with deep pressure to bilateral upper trapezius, levator scapula, middle trapezius, and rhomboids    MODALITIES: (10 minutes): *  Ultrasound was used today secondary to the patient having tightened structures limiting joint motion that require an increase in extensibility. Ultrasound was used today to reduce pain, reduce spasm, reduce joint stiffness and increase muscle flexibility. Patient in sitting. HEP: As above; handouts given to patient for all exercises.     Treatment/Session Summary:    · Response to Treatment:  Patient with decreased muscle tightness and pain at end of session. Spoke with patient about plan to progress postural exercises in next few sessions. .  · Communication/Consultation:  None today  · Equipment provided today:  None today  · Recommendations/Intent for next treatment session: Next visit will focus on pain control, manual therapy and modalities as needed, progression of postural education and exercises as tolerated.     Total Treatment Billable Duration:  45 minutes  PT Patient Time In/Time Out  Time In: 0907  Time Out: 1000 [facility]

## 2021-04-02 ENCOUNTER — HOSPITAL ENCOUNTER (OUTPATIENT)
Dept: ULTRASOUND IMAGING | Age: 76
Discharge: HOME | End: 2021-04-02
Attending: INTERNAL MEDICINE
Payer: MEDICARE

## 2021-04-02 ENCOUNTER — HOSPITAL ENCOUNTER (OUTPATIENT)
Dept: MAMMOGRAPHY | Age: 76
Discharge: HOME | End: 2021-04-02
Attending: INTERNAL MEDICINE
Payer: MEDICARE

## 2021-04-02 DIAGNOSIS — Z78.0 POSTMENOPAUSAL: ICD-10-CM

## 2021-04-02 DIAGNOSIS — M85.80 OSTEOPENIA, UNSPECIFIED LOCATION: ICD-10-CM

## 2021-04-02 DIAGNOSIS — R09.89 BRUIT OF RIGHT CAROTID ARTERY: ICD-10-CM

## 2021-04-02 PROCEDURE — 77080 DXA BONE DENSITY AXIAL: CPT

## 2021-04-02 PROCEDURE — 93880 EXTRACRANIAL BILAT STUDY: CPT

## 2021-04-02 NOTE — PROGRESS NOTES
DEXA bone density scan shows weak bones or osteopenia that are getting worse with high risk of fracture. Recommend she begin either oral fosamax weekly if she has never tried it or Prolia injections every 6 months.  Please inform the patient

## 2021-04-02 NOTE — PROGRESS NOTES
I called and notified the pt of these results/recommendations. The pt verbalized understanding and would like to do the Prolia injection. An order has been placed and faxed to the [facility].

## 2021-04-07 ENCOUNTER — HOSPITAL ENCOUNTER (OUTPATIENT)
Dept: PHYSICAL THERAPY | Age: 76
Discharge: HOME | End: 2021-04-07
Payer: MEDICARE

## 2021-04-07 PROCEDURE — 97110 THERAPEUTIC EXERCISES: CPT

## 2021-04-07 PROCEDURE — 97140 MANUAL THERAPY 1/> REGIONS: CPT

## 2021-04-07 NOTE — PROGRESS NOTES
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 Frequency/Duration: 2 times a week for 60 Day(s) MEDICAL/REFERRING DIAGNOSIS:  Cervicalgia [M54.2]  Spondylosis without myelopathy or radiculopathy, cervical region [M47.812]  Connective tissue and disc stenosis of intervertebral foramina of upper extremity [M99.77]   DATE OF ONSET: Patient reports her neck pain starting back in 2020 after doing a lot of mulch/ yard work. REFERRING PHYSICIAN: Jamie Calhoun MD MD Orders: Evaluate and Treat   Return MD Appointment: none scheduled     Pre-treatment Symptoms/Complaints:  See Progress note dated 2021    Pain: Initial: Pain Intensity 1: 3  Pain Location 1: Neck  Pain Orientation 1: Lateral, Right  Post Session:  2/10   Medications Last Reviewed:  2021  Updated Objective Findings:  See Progress note dated 2021    TREATMENT:   THERAPEUTIC EXERCISE: (30 minutes):  Exercises per grid below to improve mobility, strength, coordination and dynamic movement of neck - generalized to improve functional lifting, carrying, reaching and overhead activites.   Required moderate visual, verbal and manual cues to promote proper body alignment, promote proper body posture and promote proper body mechanics. Progressed resistance, range, repetitions and complexity of movement as indicated. Date:  3/19/2021 Date:  4/1/2021 Date:  4/7/2021   Activity/Exercise Parameters Parameters Parameters   TRX shoulder walk thru Flexion, Horizontal Abduction   [id number] each   AROM cervical rotation X 10 bilaterally  X 15 bilaterally   AROM cervical lateral flexion X 10 bilaterally  X 15 bilaterally   Scapular retractions X 10 2 x 10 2x10   Backwards shoulder rolls X 10 2 x 10 2x10   Bilateral external rotation --- Please add next session Supine 2x10 yellow   Bilateral horizontal abduction --- Please add next session Supine 2x10 yellow   Chin tucks --- Please try supine next session Supine 15x   Open Book  R/L   10x each     Time spent with patient reviewing proper muscle recruitment and technique with exercises. MANUAL THERAPY: (23 minutes): Joint mobilization and Soft tissue mobilization was utilized and necessary because of the patient's restricted joint motion, painful spasm, loss of articular motion and restricted motion of soft tissue   Gentle cervical traction to improve relaxation   Gentle suboccipital release to decrease spasms, tightness, and headache   Gentle cervical side glides bilaterally, grades II-III, to improve pain and mobility   Gentle trigger point release to R upper trapezius to decrease tightness and spasm   Seated soft tissue mobilization with deep pressure to bilateral upper trapezius, levator scapula, middle trapezius, and rhomboids    MODALITIES: (0 minutes):      Not today      HEP: As above; handouts given to patient for all exercises. Treatment/Session Summary:    · Response to Treatment:  Good tolerance to increased postural exercises, reviewed posture with activity, pt required VC's for correct form.   · Communication/Consultation:  None today  · Equipment provided today:  None today  · Recommendations/Intent for next treatment session: Next visit will focus on pain control, manual therapy and modalities as needed, progression of postural education and exercises as tolerated.     Total Treatment Billable Duration:  53 minutes  PT Patient Time In/Time Out  Time In: 1058  Time Out: Edward 38, PT

## 2021-04-07 NOTE — PROGRESS NOTES
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 Frequency/Duration: 2 times a week for 60 Day(s) MEDICAL/REFERRING DIAGNOSIS:  Cervicalgia [M54.2]  Spondylosis without myelopathy or radiculopathy, cervical region [M47.812]  Connective tissue and disc stenosis of intervertebral foramina of upper extremity [M99.77]   DATE OF ONSET: Patient reports her neck pain starting back in 2020 after doing a lot of mulch/ yard work. REFERRING PHYSICIAN: Elba Perez MD MD Orders: Evaluate and Treat   Return MD Appointment: none scheduled     INITIAL ASSESSMENT:  Ms. Adin Mead is a 77 y.o. female presenting to physical therapy with complaints of neck pain which began back in 2020. She reports her pain starting after working in her yard spreading mulch. Patient states the pain has gotten some better over time, but continues to be an annoyance and limits her ROM/ mobility. She was given some ROM exercises by the doctor and reports continuing with those daily. She was having some hearing problems and had an MRI for assessment where they looked at her cervical spine as well.  Per chart review the MRI demonstrated spondylosis and degenerative changes. She is scheduled to have another MRI in 6 months for re-assessment. She reports one fall about 6 months ago where she tripped over a curb at the grocery store, but no injury, no other falls, and no feelings of unsteadiness. Her main complaint with her neck pain is her loss of ROM and pain with cervical rotations and a few headaches a week. Patient is eager to improve her pain, ROM, and activity tolerance in order to return to her prior level of independence and function. Patient presents with increased pain, decreased strength, decreased ROM, decreased flexibility, impaired posture, impaired transfer ability, decreased activity tolerance, and overall impaired functional mobility. Patient is a good candidate for skilled physical therapy interventions to include manual therapy, therapeutic exercise, transfer training, postural re-education, body mechanics training, and pain modalities as needed. PROGRESS NOTE (4/7/2021):  Patient has been seen for 6 sessions of physical therapy from 3/19/2021 to 4/7/2021. Patient reports feeling 10% better with mobility and pain. She remains limited with cervical ROM for driving Patient will benefit from continued skilled physical therapy to address remaining goals and deficits. PROBLEM LIST (Impacting functional limitations):  1. Decreased Strength  2. Decreased ADL/Functional Activities  3. Decreased Transfer Abilities  4. Increased Pain  5. Decreased Activity Tolerance  6. Decreased Pacing Skills  7. Decreased Work Simplification/Energy Conservation Techniques  8. Decreased Flexibility/Joint Mobility INTERVENTIONS PLANNED: (Treatment may consist of any combination of the following)  1. Bed Mobility  2. Cold  3. Family Education  4. Heat  5. Home Exercise Program (HEP)  6. Manual Therapy  7. Neuromuscular Re-education/Strengthening  8. Range of Motion (ROM)  9. Therapeutic Activites  10. Therapeutic Exercise/Strengthening  11. Transfer Training  12.  Ultrasound (US)     GOALS: (Goals have been discussed and agreed upon with patient.)  Short-Term Functional Goals: Time Frame: 3/19/2021 to 4/16/2021  1. Patient demonstrates independence with home exercise program without verbal cueing provided by therapist. Maya Ybarra MET 4/7/2021  2. Patient will report no more than 3/10 neck pain at rest in order to demonstrate improved self pain control and tolerance. GOAL MET 4/7/2021  3. Patient will be educated in and demonstrate improved upright posture including decreased anterior head and thoracic kyphosis to decrease strain on the cervical spine and improve mobility. GOAL MET 4/7/2021  4. Patient will improve bilateral cervical lateral flexion ROM to at least 25 degrees in order to demonstrate improve joint and tissue mobility. 5. Patient will report less than 2 headaches a week in order to demonstrate decreased tension from the cervical spine causes headaches. GOAL MET 4/7/2021  Discharge Goals: Time Frame: 3/19/2021 to 5/18/2021  1. Patient will improve bilateral cervical rotation ROM to at least 45 degrees with minimal to no complaints of pain in order to improve ability to look over shoulder while driving. 2. Patient will be able to sleep through the night without waking due to neck pain in order to improve sleeping pattern for overall health and wellness. GOAL MET 4/7/2021  3. Patient will report 1 headache or less during the week in order to demonstrate decreased tension and return to normal headache frequency prior to neck pain. 4. Patient will be able to perform work inside and outside of her home with minimal to no complaints of neck pain in order to return to prior level of activities. 5. Patient will improve Neck Disability Index Score to 12/50 from 15/50. Outcome Measure: Tool Used: Neck Disability Index (NDI)  Score:  Initial: 15/50  Most Recent: X/50 (Date: -- )   Interpretation of Score:  The Neck Disability Index is a revised form of the Oswestry Low Back Pain Index and is designed to measure the activities of daily living in person's with neck pain. Each section is scored on a 0-5 scale, 5 representing the greatest disability. The scores of each section are added together for a total score of 50. Patient denies any changes in vision, dizziness, vertigo, nausea, drop attacks, black outs, tinnitus, dysphagia, dysarthria, LE symptoms or bowel/bladder dysfunction. Observation/Orthostatic Postural Assessment:          Patient with significant forward head posturing, forward and rounded shoulders, flattened lumbar lordosis. Palpation:          Significant tenderness to palpation of cervical musculature, especially bilateral suboccipitals, R paraspinals, and R upper trapezius insertion. Significant tightness and pain with palpation of lateral cervical spine and side glides. Prominent lower cervical spinous processes. Significant tightness in bilateral cervical suboccipitals. Vertebral-Basilar Screen: Positional Provocation testing is negative per ROM allowance. ROM:          Pain reported in R side of neck with R cervical rotation and L side of neck with L cervical rotation  AROM/ PROM Degrees Degrees 4/7/2021   Cervical Flexion 75 75   Cervical Extension 20 20   Right Rotation 40 22   Left Rotation 35 34   Right Lateral Flexion 17 15   Left Lateral Flexion 22 23     Strength:          Gross bilateral UE strength 4+/5 to 5/5. Deep cervical flexor strength grossly decreased per posturing and transfers. Special Tests:          Ligament stress tests performed through upper cervical spine for transverse ligament including Sharp-Poli test is negative, and [place]-Axis shear test is negative. [place]-Axis side flexion test for integrity of alar ligament is negative. Spurling test was not tested. Cervical distraction \"feels good\" with anterior head posturing, but causes discomfort when put in a more neutral spinal posturing.      Passive Accessory Motion: Significant limitation with cervical side glides both directions, worse L to R with more tenderness reported on L. Significant limitations with posterior to anterior mobilizations of lower cervical spine. Neurological Screen:              Myotomes: Key muscle strength testing through bilateral UE is [facility]. Dermatomes: Sensation to light touch for bilateral UE is intact from C4 to T2. Reflexes: Biceps (C5): 1+ bilaterally         Brachioradialis (C6): 1+ bilaterally        Triceps (C7): 1+ bilaterally  Functional Mobility:         Patient reports only one headache and no difficulty sleeping at night with neck pain. Increased pain mainly with driving now. Medical Necessity:   · Patient is expected to demonstrate progress in strength, range of motion, coordination and functional technique to improve safety during driving, lifting, reaching, and home activities. · Skilled intervention continues to be required due to increased pain with decreased ROM hindering independence and function. Reason for Services/Other Comments:  · Patient continues to require skilled intervention due to neck pain hindering return to prior level of function and impacting quality of life. Rehabilitation Potential For Stated Goals: Good  Regarding Heather Killian's therapy, I certify that the treatment plan above will be carried out by a therapist or under their direction. Thank you for this referral,  Sheron Calhoun PT     Referring Physician Signature:  Michael Barron MD   Signature not required

## 2021-04-08 ENCOUNTER — HOSPITAL ENCOUNTER (OUTPATIENT)
Dept: PHYSICAL THERAPY | Age: 76
Discharge: HOME | End: 2021-04-08
Payer: MEDICARE

## 2021-04-08 PROCEDURE — 97110 THERAPEUTIC EXERCISES: CPT

## 2021-04-08 PROCEDURE — 97140 MANUAL THERAPY 1/> REGIONS: CPT

## 2021-04-08 NOTE — PROGRESS NOTES
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 Frequency/Duration: 2 times a week for 60 Day(s) MEDICAL/REFERRING DIAGNOSIS:  Cervicalgia [M54.2]  Spondylosis without myelopathy or radiculopathy, cervical region [M47.812]  Connective tissue and disc stenosis of intervertebral foramina of upper extremity [M99.77]   DATE OF ONSET: Patient reports her neck pain starting back in 2020 after doing a lot of mulch/ yard work. REFERRING PHYSICIAN: Mishel Christina MD MD Orders: Evaluate and Treat   Return MD Appointment: none scheduled     Pre-treatment Symptoms/Complaints:  Pt. Reported being sore after last session. Pain: Initial: Pain Intensity 1: 6  Pain Location 1: Neck  Pain Orientation 1: Lateral, Right  Post Session:  2/10   Medications Last Reviewed:  2021  Updated Objective Findings:  right cervical spine musculature tight and sore    TREATMENT:   THERAPEUTIC EXERCISE: (30 minutes):  Exercises per grid below to improve mobility, strength, coordination and dynamic movement of neck - generalized to improve functional lifting, carrying, reaching and overhead activites.   Required moderate visual, verbal and manual cues to promote proper body alignment, promote proper body posture and promote proper body mechanics. Progressed resistance, range, repetitions and complexity of movement as indicated. Date:  4/8/21 Date:  4/1/2021 Date:  4/7/2021   Activity/Exercise Parameters Parameters Parameters   TRX shoulder walk thru Flexion, Horizontal Abduction X 10 reps 5 sec hold   [id number] each   AROM cervical rotation X 1 bilaterally  X 15 bilaterally   AROM cervical lateral flexion X 15 bilaterally  X 15 bilaterally   Scapular retractions 2X 10 2 x 10 2x10   Backwards shoulder rolls 2X 10 2 x 10 2x10   Bilateral external rotation X 15 reps yellow  Please add next session Supine 2x10 yellow   Bilateral horizontal abduction X 15 reps yellow Please add next session Supine 2x10 yellow   Chin tucks x 15 reps Please try supine next session Supine 15x   Open Book  R/L X 10 reps each  10x each     Time spent with patient reviewing proper muscle recruitment and technique with exercises. MANUAL THERAPY: (23 minutes): Joint mobilization and Soft tissue mobilization was utilized and necessary because of the patient's restricted joint motion, painful spasm, loss of articular motion and restricted motion of soft tissue   Gentle cervical traction to improve relaxatio   Gentle trigger point release to R upper trapezius to decrease tightness and spasm   Seated soft tissue mobilization with deep pressure to bilateral upper trapezius, levator scapula, middle trapezius, and rhomboids    MODALITIES: (16  minutes):      Pt. received moist hot pack x 8 mins initially and cold pack x 8 mins at the end session      HEP: As above; handouts given to patient for all exercises. Treatment/Session Summary:    · Response to Treatment:  Good tolerance to increased postural exercises, reviewed posture with activity, pt required VC's for correct form.   · Communication/Consultation:  None today  · Equipment provided today:  None today  · Recommendations/Intent for next treatment session: Next visit will focus on pain control, manual therapy and modalities as needed, progression of postural education and exercises as tolerated.     Total Treatment Billable Duration:  53 minutes  PT Patient Time In/Time Out  Time In: 6480  Time Out: 1409 [facility]

## 2021-04-09 ENCOUNTER — HOSPITAL ENCOUNTER (OUTPATIENT)
Dept: PHYSICAL THERAPY | Age: 76
Discharge: HOME | End: 2021-04-09
Payer: MEDICARE

## 2021-04-14 ENCOUNTER — HOSPITAL ENCOUNTER (OUTPATIENT)
Dept: PHYSICAL THERAPY | Age: 76
Discharge: HOME | End: 2021-04-14
Payer: MEDICARE

## 2021-04-14 PROCEDURE — 97140 MANUAL THERAPY 1/> REGIONS: CPT

## 2021-04-14 PROCEDURE — 97110 THERAPEUTIC EXERCISES: CPT

## 2021-04-14 NOTE — PROGRESS NOTES
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 Frequency/Duration: 2 times a week for 60 Day(s) MEDICAL/REFERRING DIAGNOSIS:  Cervicalgia [M54.2]  Spondylosis without myelopathy or radiculopathy, cervical region [M47.812]  Connective tissue and disc stenosis of intervertebral foramina of upper extremity [M99.77]   DATE OF ONSET: Patient reports her neck pain starting back in 2020 after doing a lot of mulch/ yard work. REFERRING PHYSICIAN: Darell Cuello MD MD Orders: Evaluate and Treat   Return MD Appointment: none scheduled     Pre-treatment Symptoms/Complaints:  Patient reports that she slept wrong last night and her left side of her neck is really tight and bothering her today in addition today.     Pain: Initial: Pain Intensity 1: 4  Pain Location 1: Neck  Pain Orientation 1: Lateral, Left, Right  Post Session:  2/10   Medications Last Reviewed:  2021  Updated Objective Findings:  Palpation: Bilateral Upper Trapezius, Levator Scapula, Suboccipitals, Cervical/Thoracic Paraspinal    TREATMENT:   THERAPEUTIC EXERCISE: (30 minutes):  Exercises per grid below to improve mobility, strength, coordination and dynamic movement of neck - generalized to improve functional lifting, carrying, reaching and overhead activites. Required moderate visual, verbal and manual cues to promote proper body alignment, promote proper body posture and promote proper body mechanics. Progressed resistance, range, repetitions and complexity of movement as indicated. Date:  4/8/21 Date:  4/14/2021 Date:  4/7/2021   Activity/Exercise Parameters Parameters Parameters   TRX  X 10 reps 5 sec hold  Flexion 3 x 10     Latissimus Dorsi 3 x 10 [id number] each   AROM cervical rotation X 1 bilaterally Supine 2 x 10 X 15 bilaterally   AROM cervical lateral flexion X 15 bilaterally  X 15 bilaterally   Scapular retractions 2X 10  2x10   Backwards shoulder rolls 2X 10  2x10   Bilateral external rotation X 15 reps yellow  Supine 2x10 yellow Supine 2x10 yellow   Bilateral horizontal abduction X 15 reps yellow Supine 2x10 yellow Supine 2x10 yellow   Chin tucks x 15 reps Supine 2 x 10 Supine 15x   Open Book  R/L X 10 reps each  10x each   Rows  3 x 10 Red    Shoulder Extension  3 x 10 Red                  Time spent with patient reviewing proper muscle recruitment and technique with exercises. MANUAL THERAPY: (23 minutes): Joint mobilization and Soft tissue mobilization was utilized and necessary because of the patient's restricted joint motion, painful spasm, loss of articular motion and restricted motion of soft tissue   Gentle cervical traction to improve relaxatio   Gentle trigger point release to right and left upper trapezius to decrease tightness and spasm   Seated soft tissue mobilization with deep pressure to bilateral upper trapezius, levator scapula, middle trapezius, and rhomboids    MODALITIES: (0 minutes):      None Today      HEP: As above; handouts given to patient for all exercises. Treatment/Session Summary:    · Response to Treatment:  Patient reported decreased tightness and pain with treatment. She demonstrated significant soft tissue tightness. She continues to have forward head posture.  She has significant difficulty with ability to achieve correct chin tuck both from a flexibility standpoint and motor control standpoint. She would benefit from continued progression of mobility, flexibility, and strength, coupled with postural awareness to progress functionally. · Communication/Consultation:  None today  · Equipment provided today:  None today  · Recommendations/Intent for next treatment session: Next visit will focus on pain control, manual therapy and modalities as needed, progression of postural education and exercises as tolerated.     Total Treatment Billable Duration:  53 minutes  PT Patient Time In/Time Out  Time In: 1122  Time Out: 7954 Fidel Elam, PT

## 2021-04-15 ENCOUNTER — HOSPITAL ENCOUNTER (OUTPATIENT)
Dept: PHYSICAL THERAPY | Age: 76
Discharge: HOME | End: 2021-04-15
Payer: MEDICARE

## 2021-04-15 PROCEDURE — 97110 THERAPEUTIC EXERCISES: CPT

## 2021-04-15 PROCEDURE — 97140 MANUAL THERAPY 1/> REGIONS: CPT

## 2021-04-15 NOTE — PROGRESS NOTES
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 Frequency/Duration: 2 times a week for 60 Day(s) MEDICAL/REFERRING DIAGNOSIS:  Cervicalgia [M54.2]  Spondylosis without myelopathy or radiculopathy, cervical region [M47.812]  Connective tissue and disc stenosis of intervertebral foramina of upper extremity [M99.77]   DATE OF ONSET: Patient reports her neck pain starting back in 2020 after doing a lot of mulch/ yard work. REFERRING PHYSICIAN: Luis Garcia MD MD Orders: Evaluate and Treat   Return MD Appointment: none scheduled     Pre-treatment Symptoms/Complaints:  Patient reports feeling pain primarily on R side of neck today, pt felt pain when turning to look over shoulder while driving. Pain: Initial: Pain Intensity 1: 5  Pain Location 1: Neck  Pain Orientation 1: Lateral, Right  Post Session:  -2/10   Medications Last Reviewed:  4/15/2021  Updated Objective Findings:  none today   TREATMENT:   THERAPEUTIC EXERCISE: (15 minutes):  Exercises per grid below to improve mobility, strength, coordination and dynamic movement of neck - generalized to improve functional lifting, carrying, reaching and overhead activites.   Required moderate visual, verbal and manual cues to promote proper body alignment, promote proper body posture and promote proper body mechanics. Progressed resistance, range, repetitions and complexity of movement as indicated. Date:  4/8/21 Date:  4/14/2021 Date:  4/7/2021 Date:  4/15/2021   Activity/Exercise Parameters Parameters Parameters    TRX  X 10 reps 5 sec hold  Flexion 3 x 10     Latissimus Dorsi 3 x 10 [id number] each Not today. AROM cervical rotation X 1 bilaterally Supine 2 x 10 X 15 bilaterally x15 bilaterally, supine. AROM cervical lateral flexion X 15 bilaterally  X 15 bilaterally x15 bilaterally   Scapular retractions 2X 10  2x10 x15   Backwards shoulder rolls 2X 10  2x10 x15   Bilateral external rotation X 15 reps yellow  Supine 2x10 yellow Supine 2x10 yellow    Bilateral horizontal abduction X 15 reps yellow Supine 2x10 yellow Supine 2x10 yellow    Chin tucks x 15 reps Supine 2 x 10 Supine 15x Supine 2x10   Open Book  R/L X 10 reps each  10x each 10x each   Rows  3 x 10 Red     Shoulder Extension  3 x 10 Red     Pec stretch    Supine, 3x30s   Thoracic extension SB    Seated stability ball behind back, 2x10     Time spent with patient reviewing proper muscle recruitment and technique with exercises. MANUAL THERAPY: (38 minutes): Joint mobilization and Soft tissue mobilization was utilized and necessary because of the patient's restricted joint motion, painful spasm, loss of articular motion and restricted motion of soft tissue   Gentle cervical traction to improve relaxation   Gentle trigger point release to right and left upper trapezius to decrease tightness and spasm   Supine soft tissue mobilization to bilateral upper trapezius, levator scapula, middle trapezius, and rhomboids.  UPA mobs R and L side to lower cervical/CT junction to address observed hypomobility.  PROM Rotation R and L to improve mobility.  Manual stretching B UT/LS muscle groups to address observed muscle tightness. MODALITIES: (8 minutes):       *  Hot Pack Therapy in order to relieve muscle spasm and increase mobility. .      HEP: As above; handouts given to patient for all exercises. Treatment/Session Summary:    · Response to Treatment:  Patient tolerated new exercises well, increased C spine active rotation observed post manual interventions. · Communication/Consultation:  None today  · Equipment provided today:  None today  · Recommendations/Intent for next treatment session: Next visit will focus on pain control, manual therapy and modalities as needed, progression of postural education and exercises as tolerated, include thoracic mobility exercise to address upper T spine hypomobility.     Total Treatment Billable Duration:  53 minutes  PT Patient Time In/Time Out  Time In: 1232  Time Out: 31 [facility] Chelsie, RAMIRO

## 2021-04-19 ENCOUNTER — HOSPITAL ENCOUNTER (OUTPATIENT)
Dept: PHYSICAL THERAPY | Age: 76
Discharge: HOME | End: 2021-04-19
Payer: MEDICARE

## 2021-04-19 PROCEDURE — 97110 THERAPEUTIC EXERCISES: CPT

## 2021-04-19 PROCEDURE — 97140 MANUAL THERAPY 1/> REGIONS: CPT

## 2021-04-19 NOTE — PROGRESS NOTES
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 Frequency/Duration: 2 times a week for 60 Day(s) MEDICAL/REFERRING DIAGNOSIS:  Cervicalgia [M54.2]  Spondylosis without myelopathy or radiculopathy, cervical region [M47.812]  Connective tissue and disc stenosis of intervertebral foramina of upper extremity [M99.77]   DATE OF ONSET: Patient reports her neck pain starting back in 2020 after doing a lot of mulch/ yard work. REFERRING PHYSICIAN: Elba Perez MD MD Orders: Evaluate and Treat   Return MD Appointment: none scheduled     INITIAL ASSESSMENT:  Ms. Adin Mead is a 77 y.o. female presenting to physical therapy with complaints of neck pain which began back in 2020. She reports her pain starting after working in her yard spreading mulch. Patient states the pain has gotten some better over time, but continues to be an annoyance and limits her ROM/ mobility. She was given some ROM exercises by the doctor and reports continuing with those daily. She was having some hearing problems and had an MRI for assessment where they looked at her cervical spine as well.  Per chart review the MRI demonstrated spondylosis and degenerative changes. She is scheduled to have another MRI in 6 months for re-assessment. She reports one fall about 6 months ago where she tripped over a curb at the grocery store, but no injury, no other falls, and no feelings of unsteadiness. Her main complaint with her neck pain is her loss of ROM and pain with cervical rotations and a few headaches a week. Patient is eager to improve her pain, ROM, and activity tolerance in order to return to her prior level of independence and function. Patient presents with increased pain, decreased strength, decreased ROM, decreased flexibility, impaired posture, impaired transfer ability, decreased activity tolerance, and overall impaired functional mobility. Patient is a good candidate for skilled physical therapy interventions to include manual therapy, therapeutic exercise, transfer training, postural re-education, body mechanics training, and pain modalities as needed. PROGRESS NOTE (4/19/2021):  Patient has been seen for 10 sessions of physical therapy from 3/19/2021 to 4/19/2021. Patient verbalized feeling significantly improved since starting PT treatment; pt reports feeling 50-75% better with mobility and pain. All short term PT goals have been met at this time and 3/5 long term goals have been met. Pt continues to demonstrate limitation primarily with cervical rotation R>L making checking blind spots while driving difficult. Patient will benefit from continued skilled physical therapy to address remaining goals and deficits. PROBLEM LIST (Impacting functional limitations):  1. Decreased Strength  2. Decreased ADL/Functional Activities  3. Decreased Transfer Abilities  4. Increased Pain  5. Decreased Activity Tolerance  6. Decreased Pacing Skills  7. Decreased Work Simplification/Energy Conservation Techniques  8. Decreased Flexibility/Joint Mobility INTERVENTIONS PLANNED: (Treatment may consist of any combination of the following)  1. Bed Mobility  2. Cold  3.  Family Education  4. Heat  5. Home Exercise Program (HEP)  6. Manual Therapy  7. Neuromuscular Re-education/Strengthening  8. Range of Motion (ROM)  9. Therapeutic Activites  10. Therapeutic Exercise/Strengthening  11. Transfer Training  12. Ultrasound (US)     GOALS: (Goals have been discussed and agreed upon with patient.)  Short-Term Functional Goals: Time Frame: 3/19/2021 to 4/16/2021  1. Patient demonstrates independence with home exercise program without verbal cueing provided by therapist. Beth Lion MET 4/7/2021  2. Patient will report no more than 3/10 neck pain at rest in order to demonstrate improved self pain control and tolerance. GOAL MET 4/7/2021  3. Patient will be educated in and demonstrate improved upright posture including decreased anterior head and thoracic kyphosis to decrease strain on the cervical spine and improve mobility. GOAL MET 4/7/2021  4. Patient will improve bilateral cervical lateral flexion ROM to at least 25 degrees in order to demonstrate improve joint and tissue mobility. GOAL MET 4/19/2021  5. Patient will report less than 2 headaches a week in order to demonstrate decreased tension from the cervical spine causes headaches. GOAL MET 4/7/2021  Discharge Goals: Time Frame: 3/19/2021 to 5/18/2021  9. Patient will improve bilateral cervical rotation ROM to at least 45 degrees with minimal to no complaints of pain in order to improve ability to look over shoulder while driving. ONGOING   10. Patient will be able to sleep through the night without waking due to neck pain in order to improve sleeping pattern for overall health and wellness. GOAL MET 4/7/2021  1. Patient will report 1 headache or less during the week in order to demonstrate decreased tension and return to normal headache frequency prior to neck pain. GOAL MET 4/19/21  2. Patient will be able to perform work inside and outside of her home with minimal to no complaints of neck pain in order to return to prior level of activities. ONGOING  3. Patient will improve Neck Disability Index Score to 12/50 from 15/50. GOAL MET 4/19/2021    Outcome Measure: Tool Used: Neck Disability Index (NDI)  Score:  Initial: 15/50  Most Recent: 11/50 (Date:4/19/2021)   Interpretation of Score: The Neck Disability Index is a revised form of the Oswestry Low Back Pain Index and is designed to measure the activities of daily living in person's with neck pain. Each section is scored on a 0-5 scale, 5 representing the greatest disability. The scores of each section are added together for a total score of 50. Patient denies any changes in vision, dizziness, vertigo, nausea, drop attacks, black outs, tinnitus, dysphagia, dysarthria, LE symptoms or bowel/bladder dysfunction. Observation/Orthostatic Postural Assessment:          Patient with moderate forward head posturing and thoracic kyphosis. Palpation:          Mild to moderate tenderness to palpation of cervical musculature including B UT/LS R>L and suboccipitals muscles. Vertebral-Basilar Screen: Positional Provocation testing is negative per ROM allowance. ROM:          Pain reported in R side of neck with R cervical rotation and L side of neck with L cervical rotation  AROM/ PROM Degrees Degrees 4/7/2021 Degrees  4/19/21   Cervical Flexion 75 75 75   Cervical Extension 20 20 28   Right Rotation 40 22 32*   Left Rotation 35 34 46   Right Lateral Flexion 17 15 25   Left Lateral Flexion 22 23 26     Strength:          Gross bilateral UE strength 4+/5 to 5/5. Deep cervical flexor strength grossly decreased per posturing and transfers. Special Tests:          Ligament stress tests performed through upper cervical spine for transverse ligament including Sharp-Poli test is negative, and [place]-Axis shear test is negative. [place]-Axis side flexion test for integrity of alar ligament is negative. Spurling test was not tested.  Cervical distraction \"feels good\" with anterior head posturing, but causes discomfort when put in a more neutral spinal posturing. Passive Accessory Motion:         C spine: Moderate to severe hypomobility lower cervical spine C5-7 and CT junction. T spine: Severe hypomobility upper thoracic spine T1-5. Neurological Screen:              Myotomes: Key muscle strength testing through bilateral UE is [facility]. Dermatomes: Sensation to light touch for bilateral UE is intact from C4 to T2. Reflexes: Biceps (C5): 1+ bilaterally         Brachioradialis (C6): 1+ bilaterally        Triceps (C7): 1+ bilaterally  Functional Mobility:         No HA symptoms reported in previous 1 week. Medical Necessity:   · Patient is expected to demonstrate progress in strength, range of motion, coordination and functional technique to improve safety during driving, lifting, reaching, and home activities. · Skilled intervention continues to be required due to increased pain with decreased ROM hindering independence and function. Reason for Services/Other Comments:  · Patient continues to require skilled intervention due to neck pain hindering return to prior level of function and impacting quality of life. Rehabilitation Potential For Stated Goals: Good  Regarding Arthur Killian's therapy, I certify that the treatment plan above will be carried out by a therapist or under their direction. Thank you for this referral,  Philipp Ríos PT     Referring Physician Signature:  Lizzy Gonzalez MD   Signature not required

## 2021-04-21 ENCOUNTER — HOSPITAL ENCOUNTER (OUTPATIENT)
Dept: PHYSICAL THERAPY | Age: 76
Discharge: HOME | End: 2021-04-21
Payer: MEDICARE

## 2021-04-21 PROCEDURE — 97110 THERAPEUTIC EXERCISES: CPT

## 2021-04-21 PROCEDURE — 97140 MANUAL THERAPY 1/> REGIONS: CPT

## 2021-04-21 NOTE — PROGRESS NOTES
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 Frequency/Duration: 2 times a week for 60 Day(s) MEDICAL/REFERRING DIAGNOSIS:  Cervicalgia [M54.2]  Spondylosis without myelopathy or radiculopathy, cervical region [M47.812]  Connective tissue and disc stenosis of intervertebral foramina of upper extremity [M99.77]   DATE OF ONSET: Patient reports her neck pain starting back in 2020 after doing a lot of mulch/ yard work. REFERRING PHYSICIAN: Denisha Soto MD MD Orders: Evaluate and Treat   Return MD Appointment: none scheduled     Pre-treatment Symptoms/Complaints:  Pt. States her neck always hurts but she keeps moving and exercising    Pain: Initial: Pain Intensity 1: 3  Pain Location 1: Neck  Pain Orientation 1: Left, Right  Post Session:  0/10   Medications Last Reviewed:  2021  Updated Objective Findings:  None Today    TREATMENT:   THERAPEUTIC EXERCISE: (30 minutes):  Exercises per grid below to improve mobility, strength, coordination and dynamic movement of neck - generalized to improve functional lifting, carrying, reaching and overhead activites.   Required moderate visual, verbal and manual cues to promote proper body alignment, promote proper body posture and promote proper body mechanics. Progressed resistance, range, repetitions and complexity of movement as indicated. Date:  4/21/21 Date:  4/1/2021 Date:  4/7/2021 Date:  4/19/2021   Activity/Exercise Parameters Parameters Parameters Parameters   TRX shoulder walk thru Flexion,  X 10 reps 5 sec flexion only   [id number] each --   AROM cervical rotation X 15 each  X 15 bilaterally Supine x15 each   AROM cervical lateral flexion 10 x 10 sec each  X 15 bilaterally --   Scapular retractions 3X 10 2 x 10 2x10 20x   Backwards shoulder rolls 3X 10 2 x 10 2x10 20x   Bilateral external rotation Red 3 x 10 Please add next session Supine 2x10 yellow Supine 20x, yellow   Bilateral horizontal abduction Red 3 x 10 Please add next session Supine 2x10 yellow Supine 20x, yellow   Chin tucks x 20 reps Please try supine next session Supine 15x Supine 15x   Open Book  R/L   10x each 15x each   Thoracic extension with SB At wall 1 x 20   x15      Time spent with patient reviewing proper muscle recruitment and technique with exercises. MANUAL THERAPY: (24 minutes): Joint mobilization and Soft tissue mobilization was utilized and necessary because of the patient's restricted joint motion, painful spasm, loss of articular motion and restricted motion of soft tissue   Gentle cervical traction to improve relaxation not today   Gentle trigger point release to L/R upper trapezius to decrease tightness and spasm seated   Soft tissue mobilization to bilateral upper trapezius, levator scapula, middle trapezius, and rhomboids seated   C6/7 and CT junction upglides R/L to address observed segmental hypomobility. not today    MODALITIES: (0  minutes): Moist heat for muscle relaxation, increased mobility, and to reduce pain. HEP: As above; handouts given to patient for all exercises. Treatment/Session Summary:    · Response to Treatment:  Improved exercise technique and decreased cervical pain after session.  Patient requires frequent cues for tecnique and sky with exercises  · Communication/Consultation:  None today  · Equipment provided today:  None today  · Recommendations/Intent for next treatment session: Next visit will focus on pain control, manual therapy and modalities as needed, progression of postural education and exercises as tolerated.     Total Treatment Billable Duration:  54 minutes  PT Patient Time In/Time Out  Time In: 0901  Time Out: 59 Pritesh Holguin, KAREN

## 2021-04-26 ENCOUNTER — HOSPITAL ENCOUNTER (OUTPATIENT)
Dept: PHYSICAL THERAPY | Age: 76
Discharge: HOME | End: 2021-04-26
Payer: MEDICARE

## 2021-04-26 NOTE — PROGRESS NOTES
Patient cancelled appointment due to going out of town for . Will be unable to attend therapy all week.               Maricruz Gupta, PTA

## 2021-04-28 ENCOUNTER — APPOINTMENT (OUTPATIENT)
Dept: PHYSICAL THERAPY | Age: 76
End: 2021-04-28
Payer: MEDICARE

## 2021-05-03 ENCOUNTER — HOSPITAL ENCOUNTER (OUTPATIENT)
Dept: PHYSICAL THERAPY | Age: 76
Discharge: HOME | End: 2021-05-03
Payer: MEDICARE

## 2021-05-03 PROCEDURE — 97140 MANUAL THERAPY 1/> REGIONS: CPT

## 2021-05-03 PROCEDURE — 97110 THERAPEUTIC EXERCISES: CPT

## 2021-05-03 NOTE — PROGRESS NOTES
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 Frequency/Duration: 2 times a week for 60 Day(s) MEDICAL/REFERRING DIAGNOSIS:  Cervicalgia [M54.2]  Spondylosis without myelopathy or radiculopathy, cervical region [M47.812]  Connective tissue and disc stenosis of intervertebral foramina of upper extremity [M99.77]   DATE OF ONSET: Patient reports her neck pain starting back in 2020 after doing a lot of mulch/ yard work. REFERRING PHYSICIAN: Kirit Alex MD MD Orders: Evaluate and Treat   Return MD Appointment: none scheduled     Pre-treatment Symptoms/Complaints:  Pt. Reports feeling better today. States going on trip to [facility] [place] seemed to help her relax. Pain: Initial: Pain Intensity 1: 2  Pain Location 1: Neck  Pain Orientation 1: Left, Right  Post Session:  0/10   Medications Last Reviewed:  5/3/2021  Updated Objective Findings:  Improved cevical ROM. Decreased spasms cervical region    TREATMENT:   THERAPEUTIC EXERCISE: (30 minutes):  Exercises per grid below to improve mobility, strength, coordination and dynamic movement of neck - generalized to improve functional lifting, carrying, reaching and overhead activites.   Required moderate visual, verbal and manual cues to promote proper body alignment, promote proper body posture and promote proper body mechanics. Progressed resistance, range, repetitions and complexity of movement as indicated. Date:  4/21/21 Date:  5-3-21 Date:  4/7/2021 Date:  4/19/2021   Activity/Exercise Parameters Parameters Parameters Parameters   TRX shoulder walk thru Flexion,  X 10 reps 5 sec flexion only   [id number] each --   AROM cervical rotation X 15 each 2 x 10 each X 15 bilaterally Supine x15 each   AROM cervical lateral flexion 10 x 10 sec each 2 x 10 each X 15 bilaterally --   Scapular retractions 3X 10 3 x 10 with heat 2x10 20x   Backwards shoulder rolls 3X 10 3 x 10 2x10 20x   Bilateral external rotation Red 3 x 10 Red 3 x 10 Supine 2x10 yellow Supine 20x, yellow   Bilateral horizontal abduction Red 3 x 10 Red 2 x 20 Supine 2x10 yellow Supine 20x, yellow   Chin tucks x 20 reps 2 x 10 Supine 15x Supine 15x   Open Book  R/L   10x each 15x each   Thoracic extension with SB At wall 1 x 20 Seated 2 x 10  x15      Time spent with patient reviewing proper muscle recruitment and technique with exercises. MANUAL THERAPY: (23 minutes): Joint mobilization and Soft tissue mobilization was utilized and necessary because of the patient's restricted joint motion, painful spasm, loss of articular motion and restricted motion of soft tissue   Gentle cervical traction to improve relaxation    Gentle trigger point release to L/R upper trapezius to decrease tightness and spasm seated   Soft tissue mobilization to bilateral upper trapezius, levator scapula, middle trapezius, and rhomboids seated   C6/7 and CT junction upglides R/L to address observed segmental hypomobility. not today    MODALITIES: (8  minutes): Moist heat for muscle relaxation, increased mobility, and to reduce pain. Performed some exercises with heat    HEP: As above; handouts given to patient for all exercises.     Treatment/Session Summary:    · Response to Treatment:  No pain after treatment. requires cues for exercise technique  · Communication/Consultation:  None today  · Equipment provided today:  None today  · Recommendations/Intent for next treatment session: Next visit will focus on pain control, manual therapy and modalities as needed, progression of postural education and exercises as tolerated.     Total Treatment Billable Duration:  53 minutes  PT Patient Time In/Time Out  Time In: 0900  Time Out: 380 [facility]

## 2021-05-05 ENCOUNTER — HOSPITAL ENCOUNTER (OUTPATIENT)
Dept: PHYSICAL THERAPY | Age: 76
Discharge: HOME | End: 2021-05-05
Payer: MEDICARE

## 2021-05-05 PROCEDURE — 97110 THERAPEUTIC EXERCISES: CPT

## 2021-05-05 PROCEDURE — 97140 MANUAL THERAPY 1/> REGIONS: CPT

## 2021-05-05 NOTE — PROGRESS NOTES
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 Frequency/Duration: 2 times a week for 60 Day(s) MEDICAL/REFERRING DIAGNOSIS:  Cervicalgia [M54.2]  Spondylosis without myelopathy or radiculopathy, cervical region [M47.812]  Connective tissue and disc stenosis of intervertebral foramina of upper extremity [M99.77]   DATE OF ONSET: Patient reports her neck pain starting back in 2020 after doing a lot of mulch/ yard work. REFERRING PHYSICIAN: Clinton Calix MD MD Orders: Evaluate and Treat   Return MD Appointment: none scheduled     Pre-treatment Symptoms/Complaints:  Patient reports that still has pain its about a 2/10. Pain: Initial: Pain Intensity 1: 2  Pain Location 1: Neck  Pain Orientation 1: Left, Right  Post Session:  0/10   Medications Last Reviewed:  2021  Updated Objective Findings:  Palpation: Bilateral Upper Trapezius Tightness, Tenderness, Trigger Points. TREATMENT:   THERAPEUTIC EXERCISE: (30 minutes):  Exercises per grid below to improve mobility, strength, coordination and dynamic movement of neck - generalized to improve functional lifting, carrying, reaching and overhead activites.   Required moderate visual, verbal and manual cues to promote proper body alignment, promote proper body posture and promote proper body mechanics. Progressed resistance, range, repetitions and complexity of movement as indicated. Date:  4/21/21 Date:  5-3-21 Date:  5/5/2021 Date:  4/19/2021   Activity/Exercise Parameters Parameters Parameters Parameters   TRX shoulder walk thru Flexion,  X 10 reps 5 sec flexion only    --   AROM cervical rotation X 15 each 2 x 10 each 2 x 10 each Supine x15 each   AROM cervical lateral flexion 10 x 10 sec each 2 x 10 each 2 x 10 each --   Scapular retractions 3X 10 3 x 10 with heat 3x10 20x   Backwards shoulder rolls 3X 10 3 x 10 2x10 20x   Bilateral external rotation Red 3 x 10 Red 3 x 10 Red 3 x 10 Supine 20x, yellow   Bilateral horizontal abduction Red 3 x 10 Red 2 x 20 Red 3 x 10 Supine 20x, yellow   Chin tucks x 20 reps 2 x 10 2 x 10 Supine 15x   Open Book  R/L    15x each   Thoracic extension with SB At wall 1 x 20 Seated 2 x 10  x15      Time spent with patient reviewing proper muscle recruitment and technique with exercises. MANUAL THERAPY: (25 minutes): Joint mobilization and Soft tissue mobilization was utilized and necessary because of the patient's restricted joint motion, painful spasm, loss of articular motion and restricted motion of soft tissue   Gentle cervical traction to improve relaxation    Gentle trigger point release to L/R upper trapezius to decrease tightness and spasm supine   Soft tissue mobilization to bilateral upper trapezius, levator scapula, middle trapezius, and rhomboids Supine    MODALITIES: (0 minutes):      None Today      HEP: As above; handouts given to patient for all exercises. Treatment/Session Summary:    · Response to Treatment:  Patient reported decreased tightness and pain with treatment. She has demonstrated centralization of symptoms. She continues to have significant soft tissue tightness and trigger points. She continues to have postural deficits.  She would benefit from continued progression of mobility and postural awareness/strength to facilitate progress back to prior level of function. · Communication/Consultation:  None today  · Equipment provided today:  None today  · Recommendations/Intent for next treatment session: Next visit will focus on pain control, manual therapy and modalities as needed, progression of postural education and exercises as tolerated.     Total Treatment Billable Duration:  55 minutes  PT Patient Time In/Time Out  Time In: 2762  Time Out: Gurdeep Wooten 87, PT

## 2021-05-06 ENCOUNTER — APPOINTMENT (OUTPATIENT)
Dept: PHYSICAL THERAPY | Age: 76
End: 2021-05-06
Payer: MEDICARE

## 2021-05-10 ENCOUNTER — HOSPITAL ENCOUNTER (OUTPATIENT)
Dept: PHYSICAL THERAPY | Age: 76
Discharge: HOME | End: 2021-05-10
Payer: MEDICARE

## 2021-05-10 PROCEDURE — 97110 THERAPEUTIC EXERCISES: CPT

## 2021-05-10 PROCEDURE — 97140 MANUAL THERAPY 1/> REGIONS: CPT

## 2021-05-10 NOTE — PROGRESS NOTES
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 Frequency/Duration: 2 times a week for 60 Day(s) MEDICAL/REFERRING DIAGNOSIS:  Cervicalgia [M54.2]  Spondylosis without myelopathy or radiculopathy, cervical region [M47.812]  Connective tissue and disc stenosis of intervertebral foramina of upper extremity [M99.77]   DATE OF ONSET: Patient reports her neck pain starting back in 2020 after doing a lot of mulch/ yard work. REFERRING PHYSICIAN: Kaleigh Davison MD MD Orders: Evaluate and Treat   Return MD Appointment: none scheduled     Pre-treatment Symptoms/Complaints:  Patient reports increased neck pain past few days. .    Pain: Initial: Pain Intensity 1: 4  Pain Location 1: Neck  Pain Orientation 1: Left, Right  Post Session:  2/10   Medications Last Reviewed:  5/10/2021  Updated Objective Findings:  Palpation: Bilateral Upper Trapezius Tightness, Tenderness, Trigger Points. TREATMENT:   THERAPEUTIC EXERCISE: (30 minutes):  Exercises per grid below to improve mobility, strength, coordination and dynamic movement of neck - generalized to improve functional lifting, carrying, reaching and overhead activites.   Required moderate visual, verbal and manual cues to promote proper body alignment, promote proper body posture and promote proper body mechanics. Progressed resistance, range, repetitions and complexity of movement as indicated. Date:  4/21/21 Date:  5-3-21 Date:  5/5/2021 Date:  5-10-21   Activity/Exercise Parameters Parameters Parameters Parameters   TRX shoulder walk thru Flexion,  X 10 reps 5 sec flexion only    --   AROM cervical rotation X 15 each 2 x 10 each 2 x 10 each 2 x 10   AROM cervical lateral flexion 10 x 10 sec each 2 x 10 each 2 x 10 each 2 x 10   Scapular retractions 3X 10 3 x 10 with heat 3x10 3 x 15   Backwards shoulder rolls 3X 10 3 x 10 2x10 3 x 15   Bilateral external rotation Red 3 x 10 Red 3 x 10 Red 3 x 10 Red 3 x 10   Bilateral horizontal abduction Red 3 x 10 Red 2 x 20 Red 3 x 10 Red 3 x 10   Chin tucks x 20 reps 2 x 10 2 x 10 2 x 10   Open Book  R/L       Thoracic extension with SB At wall 1 x 20 Seated 2 x 10       Time spent with patient reviewing proper muscle recruitment and technique with exercises. MANUAL THERAPY: (23 minutes): Joint mobilization and Soft tissue mobilization was utilized and necessary because of the patient's restricted joint motion, painful spasm, loss of articular motion and restricted motion of soft tissue   Gentle cervical traction to improve relaxation    Gentle trigger point release to L/R upper trapezius to decrease tightness and spasm seated   Soft tissue mobilization to bilateral upper trapezius, levator scapula, middle trapezius, and rhomboids seated    MODALITIES: (0 minutes):      None Today      HEP: As above; handouts given to patient for all exercises. Treatment/Session Summary:    · Response to Treatment:  Patient reports feeling significantly better after treatment. Continues to require verbal cues for proper technique with exercises.   · Communication/Consultation:  None today  · Equipment provided today:  None today  · Recommendations/Intent for next treatment session: Next visit will focus on pain control, manual therapy and modalities as needed, progression of postural education and exercises as tolerated.     Total Treatment Billable Duration:  53 minutes  PT Patient Time In/Time Out  Time In: 0800  Time Out: 0900  Davie Albert PTA

## 2021-05-12 ENCOUNTER — HOSPITAL ENCOUNTER (OUTPATIENT)
Dept: PHYSICAL THERAPY | Age: 76
Discharge: HOME | End: 2021-05-12
Payer: MEDICARE

## 2021-05-12 PROCEDURE — 97140 MANUAL THERAPY 1/> REGIONS: CPT

## 2021-05-12 PROCEDURE — 97110 THERAPEUTIC EXERCISES: CPT

## 2021-05-12 NOTE — PROGRESS NOTES
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 Frequency/Duration: 2 times a week for 60 Day(s) MEDICAL/REFERRING DIAGNOSIS:  Cervicalgia [M54.2]  Spondylosis without myelopathy or radiculopathy, cervical region [M47.812]  Connective tissue and disc stenosis of intervertebral foramina of upper extremity [M99.77]   DATE OF ONSET: Patient reports her neck pain starting back in 2020 after doing a lot of mulch/ yard work. REFERRING PHYSICIAN: Nat Schirmer, MD MD Orders: Evaluate and Treat   Return MD Appointment: none scheduled     Pre-treatment Symptoms/Complaints:  Patient reports that her neck hurts on and off and comes and goes. She reports having some pain today, just enough that she knows that it is there. Pain: Initial: Pain Intensity 1: 2  Pain Location 1: Neck  Pain Orientation 1: Left, Right  Post Session:  2/10   Medications Last Reviewed:  2021  Updated Objective Findings:  Palpation: Bilateral Upper Trapezius Tightness, Tenderness, Trigger Points.      TREATMENT:   THERAPEUTIC EXERCISE: (30 minutes):  Exercises per grid below to improve mobility, strength, coordination and dynamic movement of neck - generalized to improve functional lifting, carrying, reaching and overhead activites. Required moderate visual, verbal and manual cues to promote proper body alignment, promote proper body posture and promote proper body mechanics. Progressed resistance, range, repetitions and complexity of movement as indicated. Date:  5/12/2021 Date:  5-3-21 Date:  5/5/2021 Date:  5-10-21   Activity/Exercise Parameters Parameters Parameters Parameters   UBE 8 minutes, 4 minutes FWD, 4 minutes BWD   --   AROM cervical rotation 2 x 10 2 x 10 each 2 x 10 each 2 x 10   AROM cervical lateral flexion 2 x 10 2 x 10 each 2 x 10 each 2 x 10   Scapular retractions  3 x 10 with heat 3x10 3 x 15   Backwards shoulder rolls  3 x 10 2x10 3 x 15   Bilateral external rotation Red 3 x 10 Red 3 x 10 Red 3 x 10 Red 3 x 10   Bilateral horizontal abduction Red 3 x 10 Red 2 x 20 Red 3 x 10 Red 3 x 10   Chin tucks 2 x 10 2 x 10 2 x 10 2 x 10   Open Book  R/L 3 x 10 Each Side      Thoracic extension with SB  Seated 2 x 10     Trunk Rotation 3 x 10        Time spent with patient reviewing proper muscle recruitment and technique with exercises. MANUAL THERAPY: (25 minutes): Joint mobilization and Soft tissue mobilization was utilized and necessary because of the patient's restricted joint motion, painful spasm, loss of articular motion and restricted motion of soft tissue   Gentle cervical traction to improve relaxation    Gentle trigger point release to L/R upper trapezius to decrease tightness and spasm    Soft tissue mobilization to bilateral upper trapezius, levator scapula, middle trapezius, and rhomboids     MODALITIES: (0 minutes):      None Today      HEP: As above; handouts given to patient for all exercises. Treatment/Session Summary:    · Response to Treatment:  Patient continues to have right upper trapezius tightness, trigger points, tightness and severe tenderness noted right suboccipitals. Patient required verbal cues for posture, form, and mechanics.  Patient would benefit from continued progression of thoracic mobility, postural awareness and strength to continue return to all daily activities. · Communication/Consultation:  None today  · Equipment provided today:  None today  · Recommendations/Intent for next treatment session: Next visit will focus on pain control, manual therapy and modalities as needed, progression of postural education and exercises as tolerated.     Total Treatment Billable Duration:  55 minutes  PT Patient Time In/Time Out  Time In: 0900  Time Out: [address], PT

## 2021-05-17 ENCOUNTER — HOSPITAL ENCOUNTER (OUTPATIENT)
Dept: PHYSICAL THERAPY | Age: 76
Discharge: HOME | End: 2021-05-17
Payer: MEDICARE

## 2021-05-17 PROCEDURE — 97110 THERAPEUTIC EXERCISES: CPT

## 2021-05-17 PROCEDURE — 97140 MANUAL THERAPY 1/> REGIONS: CPT

## 2021-05-17 NOTE — PROGRESS NOTES
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 Frequency/Duration: 2 times a week for 60 Day(s) MEDICAL/REFERRING DIAGNOSIS:  Cervicalgia [M54.2]  Spondylosis without myelopathy or radiculopathy, cervical region [M47.812]  Connective tissue and disc stenosis of intervertebral foramina of upper extremity [M99.77]   DATE OF ONSET: Patient reports her neck pain starting back in 2020 after doing a lot of mulch/ yard work. REFERRING PHYSICIAN: Ligia Argueta MD MD Orders: Evaluate and Treat   Return MD Appointment: none scheduled     Pre-treatment Symptoms/Complaints:  Patient reports more left sided neck tightness this morning; no pain reported on right side, pt reports slept with head elevated . Pain: Initial: Pain Intensity 1: 4  Pain Location 1: Neck  Pain Orientation 1: Left  Post Session:  2/10   Medications Last Reviewed:  2021  Updated Objective Findings:  Palpation: Bilateral Upper Trapezius Tightness, Tenderness, Trigger Points. Improved post manual intervention left cervical rotation AROM. 1st rib elevated L side.    TREATMENT:   THERAPEUTIC EXERCISE: (25 minutes):  Exercises per grid below to improve mobility, strength, coordination and dynamic movement of neck - generalized to improve functional lifting, carrying, reaching and overhead activites. Required moderate visual, verbal and manual cues to promote proper body alignment, promote proper body posture and promote proper body mechanics. Progressed resistance, range, repetitions and complexity of movement as indicated. Date:  5/12/2021 Date:  5-17-21 Date:  5-10-21   Activity/Exercise Parameters Parameters Parameters   UBE 8 minutes, 4 minutes FWD, 4 minutes BWD -- --   AROM cervical rotation 2 x 10 2 x 10 each 2 x 10   AROM cervical lateral flexion 2 x 10 2 x 10 each 2 x 10   Scapular retractions  3 x 10 3 x 15   Backwards shoulder rolls  3 x 10 3 x 15   Bilateral external rotation Red 3 x 10 Red 3 x 10 Red 3 x 10   Bilateral horizontal abduction Red 3 x 10 -- Red 3 x 10   Thera band Row  Red 2x10    Chin tucks 2 x 10 3 x 10 2 x 10   Open Book  R/L 3 x 10 Each Side 3x10 each    Thoracic extension with SB  Seated 2 x 10    Trunk Rotation 3 x 10 --    Supine pec stretch  3x30 sec      Time spent with patient reviewing proper muscle recruitment and technique with exercises. MANUAL THERAPY: (28 minutes): Joint mobilization and Soft tissue mobilization was utilized and necessary because of the patient's restricted joint motion, painful spasm, loss of articular motion and restricted motion of soft tissue   Gentle cervical traction to improve relaxation    Gentle trigger point release to L/R upper trapezius to decrease tightness and spasm    Soft tissue mobilization to bilateral upper trapezius, levator scapula, middle trapezius ( not today), and rhomboids (not today), suboccipitals   Joint mobs: lower cervical spine upglides, 1st rib left side only. MODALITIES: (0 minutes):      None Today      HEP: As above; handouts given to patient for all exercises.     Treatment/Session Summary:    · Response to Treatment:  Patient demonstrates fair to good tolerance to manual intervention and therex program during today's visit. Active cervical rotation left side improved significantly post manual interventions. · Communication/Consultation:  None today  · Equipment provided today:  None today  · Recommendations/Intent for next treatment session: Next visit will focus on pain control, manual therapy and modalities as needed, progression of postural education and exercises as tolerated.     Total Treatment Billable Duration:  55 minutes  PT Patient Time In/Time Out  Time In: 1004  Time Out: [address], PT single

## 2021-05-18 ENCOUNTER — HOSPITAL ENCOUNTER (OUTPATIENT)
Dept: PHYSICAL THERAPY | Age: 76
Discharge: HOME | End: 2021-05-18
Payer: MEDICARE

## 2021-05-18 PROCEDURE — 97110 THERAPEUTIC EXERCISES: CPT

## 2021-05-18 PROCEDURE — 97140 MANUAL THERAPY 1/> REGIONS: CPT

## 2021-05-18 NOTE — PROGRESS NOTES
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 Frequency/Duration: 1-2 times a week for 4 weeks  MEDICAL/REFERRING DIAGNOSIS:  Cervicalgia [M54.2]  Spondylosis without myelopathy or radiculopathy, cervical region [M47.812]  Connective tissue and disc stenosis of intervertebral foramina of upper extremity [M99.77]   DATE OF ONSET: Patient reports her neck pain starting back in 2020 after doing a lot of mulch/ yard work. REFERRING PHYSICIAN: Isabella Burr MD MD Orders: Evaluate and Treat   Return MD Appointment: 2021     Pre-treatment Symptoms/Complaints:  Patient reports left sided neck pain is minimal today, increased right sided neck pain today unsure of cause. Pain: Initial: Pain Intensity 1: 2  Pain Location 1: Neck  Pain Orientation 1: Right  Post Session:  1/10   Medications Last Reviewed:  2021  Updated Objective Findings:  See recertification note dated 2021. TREATMENT:   THERAPEUTIC EXERCISE: (30 minutes):  Exercises per grid below to improve mobility, strength, coordination and dynamic movement of neck - generalized to improve functional lifting, carrying, reaching and overhead activites.   Required moderate visual, verbal and manual cues to promote proper body alignment, promote proper body posture and promote proper body mechanics. Progressed resistance, range, repetitions and complexity of movement as indicated. Date:  5/12/2021 Date:  5-17-21 Date:  5-18-21   Activity/Exercise Parameters Parameters Parameters   UBE 8 minutes, 4 minutes FWD, 4 minutes BWD -- --   AROM cervical rotation 2 x 10 2 x 10 each 3 x 10   AROM cervical lateral flexion 2 x 10 2 x 10 each 3 x 10   Scapular retractions  3 x 10 3 x 15   Backwards shoulder rolls  3 x 10 3 x 15   Bilateral external rotation Red 3 x 10 Red 3 x 10 Red 3 x 10   Bilateral horizontal abduction Red 3 x 10 -- Red 3 x 10   Thera band Row  Red 2x10 Red 2x10   Chin tucks 2 x 10 3 x 10 3 x 10   Open Book  R/L 3 x 10 Each Side 3 x 10 each 3 x 10 each   Thoracic extension with SB  Seated 2 x 10 3x10   Trunk Rotation 3 x 10 -- --   Supine pec stretch  3x30 sec [id number]   Snag rotation right with towel   x10     Time spent with patient reviewing proper muscle recruitment and technique with exercises. MANUAL THERAPY: (25 minutes): Joint mobilization and Soft tissue mobilization was utilized and necessary because of the patient's restricted joint motion, painful spasm, loss of articular motion and restricted motion of soft tissue   Gentle cervical traction to improve relaxation    Gentle trigger point release to L/R upper trapezius to decrease tightness and spasm    Soft tissue mobilization to bilateral upper trapezius, levator scapula, middle trapezius ( not today), and rhomboids (not today), suboccipitals   Joint mobs: lower cervical spine upglides, 1st rib left side only. MODALITIES: (0 minutes):      None Today      HEP: As above; handouts given to patient for all exercises. Treatment/Session Summary:    · Response to Treatment:  Improve tolerance to manual and therex interventions observed today compared to previous treatment. Verbal cues to reinforce upright seated posture during exercise performance. · Communication/Consultation:  None today  · Equipment provided today:  None today  · Recommendations/Intent for next treatment session: Next visit will focus on pain control, manual therapy and modalities as needed, progression of postural education and exercises as tolerated.     Total Treatment Billable Duration:  55 minutes  PT Patient Time In/Time Out  Time In: 0905  Time Out: [address] [facility],

## 2021-05-24 ENCOUNTER — HOSPITAL ENCOUNTER (OUTPATIENT)
Dept: PHYSICAL THERAPY | Age: 76
Discharge: HOME | End: 2021-05-24
Payer: MEDICARE

## 2021-05-24 PROCEDURE — 97140 MANUAL THERAPY 1/> REGIONS: CPT

## 2021-05-24 PROCEDURE — 97110 THERAPEUTIC EXERCISES: CPT

## 2021-05-24 NOTE — PROGRESS NOTES
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 Frequency/Duration: 1-2 times a week for 4 weeks  MEDICAL/REFERRING DIAGNOSIS:  Cervicalgia [M54.2]  Spondylosis without myelopathy or radiculopathy, cervical region [M47.812]  Connective tissue and disc stenosis of intervertebral foramina of upper extremity [M99.77]   DATE OF ONSET: Patient reports her neck pain starting back in 2020 after doing a lot of mulch/ yard work. REFERRING PHYSICIAN: Kirit Alex MD MD Orders: Evaluate and Treat   Return MD Appointment: 2021     Pre-treatment Symptoms/Complaints:  Patient reports \"I doing pretty good today\". Neck pain at start of treatment mild. Pain: Initial: Pain Intensity 1: 1  Pain Location 1: Neck  Pain Orientation 1: Right  Post Session:  0/10   Medications Last Reviewed:  2021  Updated Objective Findings:  Posture: fair, moderate thoracic kyphosis; pt able to partially correct with verbal cues. TREATMENT:   THERAPEUTIC EXERCISE: (30 minutes):  Exercises per grid below to improve mobility, strength, coordination and dynamic movement of neck - generalized to improve functional lifting, carrying, reaching and overhead activites.   Required moderate visual, verbal and manual cues to promote proper body alignment, promote proper body posture and promote proper body mechanics. Progressed resistance, range, repetitions and complexity of movement as indicated. Date:  5/24/2021 Date:  5-17-21 Date:  5-18-21   Activity/Exercise Parameters Parameters Parameters   UBE 8 minutes, 4 minutes FWD, 4 minutes BWD -- --   AROM cervical rotation 3 x 10 2 x 10 each 3 x 10   AROM cervical lateral flexion 3 x 10 2 x 10 each 3 x 10   Scapular retractions 3x10 3 x 10 3 x 15   Backwards shoulder rolls 3x10 3 x 10 3 x 15   Bilateral external rotation Red 2 x 10 Red 3 x 10 Red 3 x 10   Bilateral horizontal abduction Red 2 x 10 -- Red 3 x 10   Thera band Row Green 2x10 Red 2x10 Red 2x10   Chin tucks 2 x 10 3 x 10 3 x 10   Open Book  R/L 3 x 10 Each Side 3 x 10 each 3 x 10 each   Thoracic extension with SB Seated 3x10 Seated 2 x 10 3x10   Trunk Rotation -- -- --   Supine pec stretch [id number] 3x30 sec [id number]   Snag rotation right with towel x10  x10   Upper trap stretch 3x30 sec             Time spent with patient reviewing proper muscle recruitment and technique with exercises. MANUAL THERAPY: (25 minutes): Joint mobilization and Soft tissue mobilization was utilized and necessary because of the patient's restricted joint motion, painful spasm, loss of articular motion and restricted motion of soft tissue   Gentle cervical traction to improve relaxation     Gentle trigger point release to L/R upper trapezius to decrease tightness and spasm    Soft tissue mobilization to bilateral upper trapezius, levator scapula, middle trapezius, and rhomboids, suboccipitals   Joint mobs: lower cervical spine upglides, 1st rib bilateral.    MODALITIES: (0 minutes):      None Today      HEP: As above; handouts given to patient for all exercises. Treatment/Session Summary:    · Response to Treatment:  Good tolerance to manual and exercise interventions today.     · Communication/Consultation:  None today  · Equipment provided today:  None today  · Recommendations/Intent for next treatment session: Next visit will focus on pain control, manual therapy and modalities as needed, progression of postural education and exercises as tolerated.     Total Treatment Billable Duration:  55 minutes  PT Patient Time In/Time Out  Time In: 1230  Time Out: [address], PT

## 2021-05-26 ENCOUNTER — HOSPITAL ENCOUNTER (INPATIENT)
Age: 76
LOS: 5 days | Discharge: NURSING, REHAB OR LONG-TERM CARE | DRG: 522 | End: 2021-05-31
Attending: EMERGENCY MEDICINE | Admitting: INTERNAL MEDICINE
Payer: MEDICARE

## 2021-05-26 ENCOUNTER — ANESTHESIA (OUTPATIENT)
Dept: SURGERY | Age: 76
DRG: 522 | End: 2021-05-26
Payer: MEDICARE

## 2021-05-26 ENCOUNTER — APPOINTMENT (OUTPATIENT)
Dept: GENERAL RADIOLOGY | Age: 76
DRG: 522 | End: 2021-05-26
Attending: ORTHOPAEDIC SURGERY
Payer: MEDICARE

## 2021-05-26 ENCOUNTER — ANESTHESIA EVENT (OUTPATIENT)
Dept: SURGERY | Age: 76
DRG: 522 | End: 2021-05-26
Payer: MEDICARE

## 2021-05-26 ENCOUNTER — APPOINTMENT (OUTPATIENT)
Dept: GENERAL RADIOLOGY | Age: 76
DRG: 522 | End: 2021-05-26
Attending: EMERGENCY MEDICINE
Payer: MEDICARE

## 2021-05-26 DIAGNOSIS — S72.001A CLOSED DISPLACED FRACTURE OF RIGHT FEMORAL NECK (HCC): Primary | ICD-10-CM

## 2021-05-26 PROBLEM — S72.009A HIP FRACTURE REQUIRING OPERATIVE REPAIR (HCC): Status: ACTIVE | Noted: 2021-05-26

## 2021-05-26 LAB
ABO + RH BLD: NORMAL
ANION GAP SERPL CALC-SCNC: 8 MMOL/L (ref 7–16)
APTT PPP: 22.4 SEC (ref 24.1–35.1)
ATRIAL RATE: 86 BPM
BASOPHILS # BLD: 0.1 K/UL (ref 0–0.2)
BASOPHILS NFR BLD: 0 % (ref 0–2)
BLOOD GROUP ANTIBODIES SERPL: NORMAL
BUN SERPL-MCNC: 17 MG/DL (ref 8–23)
CALCIUM SERPL-MCNC: 8.9 MG/DL (ref 8.3–10.4)
CALCULATED P AXIS, ECG09: 83 DEGREES
CALCULATED R AXIS, ECG10: 55 DEGREES
CALCULATED T AXIS, ECG11: 67 DEGREES
CHLORIDE SERPL-SCNC: 106 MMOL/L (ref 98–107)
CO2 SERPL-SCNC: 25 MMOL/L (ref 21–32)
CREAT SERPL-MCNC: 0.76 MG/DL (ref 0.6–1)
DIAGNOSIS, 93000: NORMAL
DIFFERENTIAL METHOD BLD: ABNORMAL
EOSINOPHIL # BLD: 0.2 K/UL (ref 0–0.8)
EOSINOPHIL NFR BLD: 2 % (ref 0.5–7.8)
ERYTHROCYTE [DISTWIDTH] IN BLOOD BY AUTOMATED COUNT: 13.1 % (ref 11.9–14.6)
GLUCOSE SERPL-MCNC: 122 MG/DL (ref 65–100)
HCT VFR BLD AUTO: 37.1 % (ref 35.8–46.3)
HGB BLD-MCNC: 12.2 G/DL (ref 11.7–15.4)
IMM GRANULOCYTES # BLD AUTO: 0.1 K/UL (ref 0–0.5)
IMM GRANULOCYTES NFR BLD AUTO: 0 % (ref 0–5)
INR PPP: 1
LYMPHOCYTES # BLD: 1.8 K/UL (ref 0.5–4.6)
LYMPHOCYTES NFR BLD: 14 % (ref 13–44)
MCH RBC QN AUTO: 32 PG (ref 26.1–32.9)
MCHC RBC AUTO-ENTMCNC: 32.9 G/DL (ref 31.4–35)
MCV RBC AUTO: 97.4 FL (ref 79.6–97.8)
MONOCYTES # BLD: 0.7 K/UL (ref 0.1–1.3)
MONOCYTES NFR BLD: 6 % (ref 4–12)
NEUTS SEG # BLD: 9.6 K/UL (ref 1.7–8.2)
NEUTS SEG NFR BLD: 78 % (ref 43–78)
NRBC # BLD: 0 K/UL (ref 0–0.2)
P-R INTERVAL, ECG05: 184 MS
PLATELET # BLD AUTO: 260 K/UL (ref 150–450)
PMV BLD AUTO: 10.1 FL (ref 9.4–12.3)
POTASSIUM SERPL-SCNC: 4 MMOL/L (ref 3.5–5.1)
PROTHROMBIN TIME: 12.9 SEC (ref 12.5–14.7)
Q-T INTERVAL, ECG07: 346 MS
QRS DURATION, ECG06: 72 MS
QTC CALCULATION (BEZET), ECG08: 414 MS
RBC # BLD AUTO: 3.81 M/UL (ref 4.05–5.2)
SODIUM SERPL-SCNC: 139 MMOL/L (ref 136–145)
SPECIMEN EXP DATE BLD: NORMAL
VENTRICULAR RATE, ECG03: 86 BPM
WBC # BLD AUTO: 12.4 K/UL (ref 4.3–11.1)

## 2021-05-26 PROCEDURE — 74011000302 HC RX REV CODE- 302: Performed by: INTERNAL MEDICINE

## 2021-05-26 PROCEDURE — 0SRR0JZ REPLACEMENT OF RIGHT HIP JOINT, FEMORAL SURFACE WITH SYNTHETIC SUBSTITUTE, OPEN APPROACH: ICD-10-PCS | Performed by: ORTHOPAEDIC SURGERY

## 2021-05-26 PROCEDURE — 77030031139 HC SUT VCRL2 J&J -A: Performed by: ORTHOPAEDIC SURGERY

## 2021-05-26 PROCEDURE — 74011250636 HC RX REV CODE- 250/636: Performed by: INTERNAL MEDICINE

## 2021-05-26 PROCEDURE — 77030040361 HC SLV COMPR DVT MDII -B

## 2021-05-26 PROCEDURE — 74011250636 HC RX REV CODE- 250/636: Performed by: ORTHOPAEDIC SURGERY

## 2021-05-26 PROCEDURE — 2709999900 HC NON-CHARGEABLE SUPPLY: Performed by: ORTHOPAEDIC SURGERY

## 2021-05-26 PROCEDURE — 76060000033 HC ANESTHESIA 1 TO 1.5 HR: Performed by: ORTHOPAEDIC SURGERY

## 2021-05-26 PROCEDURE — 73502 X-RAY EXAM HIP UNI 2-3 VIEWS: CPT

## 2021-05-26 PROCEDURE — C1776 JOINT DEVICE (IMPLANTABLE): HCPCS | Performed by: ORTHOPAEDIC SURGERY

## 2021-05-26 PROCEDURE — 74011250637 HC RX REV CODE- 250/637: Performed by: INTERNAL MEDICINE

## 2021-05-26 PROCEDURE — 2709999900 HC NON-CHARGEABLE SUPPLY

## 2021-05-26 PROCEDURE — 77030006835 HC BLD SAW SAG STRY -B: Performed by: ORTHOPAEDIC SURGERY

## 2021-05-26 PROCEDURE — 77030018836 HC SOL IRR NACL ICUM -A: Performed by: ORTHOPAEDIC SURGERY

## 2021-05-26 PROCEDURE — 96375 TX/PRO/DX INJ NEW DRUG ADDON: CPT

## 2021-05-26 PROCEDURE — 74011000250 HC RX REV CODE- 250: Performed by: NURSE ANESTHETIST, CERTIFIED REGISTERED

## 2021-05-26 PROCEDURE — 96374 THER/PROPH/DIAG INJ IV PUSH: CPT

## 2021-05-26 PROCEDURE — 77030033067 HC SUT PDO STRATFX SPIR J&J -B: Performed by: ORTHOPAEDIC SURGERY

## 2021-05-26 PROCEDURE — 85025 COMPLETE CBC W/AUTO DIFF WBC: CPT

## 2021-05-26 PROCEDURE — 86580 TB INTRADERMAL TEST: CPT | Performed by: INTERNAL MEDICINE

## 2021-05-26 PROCEDURE — 86901 BLOOD TYPING SEROLOGIC RH(D): CPT

## 2021-05-26 PROCEDURE — 93005 ELECTROCARDIOGRAM TRACING: CPT | Performed by: EMERGENCY MEDICINE

## 2021-05-26 PROCEDURE — 77030017016 HC DSG ANTIMIC BARR2 S&N -B: Performed by: ORTHOPAEDIC SURGERY

## 2021-05-26 PROCEDURE — 76010000161 HC OR TIME 1 TO 1.5 HR INTENSV-TIER 1: Performed by: ORTHOPAEDIC SURGERY

## 2021-05-26 PROCEDURE — 77030018673: Performed by: ORTHOPAEDIC SURGERY

## 2021-05-26 PROCEDURE — 76210000006 HC OR PH I REC 0.5 TO 1 HR: Performed by: ORTHOPAEDIC SURGERY

## 2021-05-26 PROCEDURE — 74011250636 HC RX REV CODE- 250/636: Performed by: STUDENT IN AN ORGANIZED HEALTH CARE EDUCATION/TRAINING PROGRAM

## 2021-05-26 PROCEDURE — 77030013708 HC HNDPC SUC IRR PULS STRY –B: Performed by: ORTHOPAEDIC SURGERY

## 2021-05-26 PROCEDURE — 65270000029 HC RM PRIVATE

## 2021-05-26 PROCEDURE — 99232 SBSQ HOSP IP/OBS MODERATE 35: CPT | Performed by: ORTHOPAEDIC SURGERY

## 2021-05-26 PROCEDURE — 74011250637 HC RX REV CODE- 250/637: Performed by: ORTHOPAEDIC SURGERY

## 2021-05-26 PROCEDURE — 74011250636 HC RX REV CODE- 250/636: Performed by: ANESTHESIOLOGY

## 2021-05-26 PROCEDURE — 71045 X-RAY EXAM CHEST 1 VIEW: CPT

## 2021-05-26 PROCEDURE — 99284 EMERGENCY DEPT VISIT MOD MDM: CPT

## 2021-05-26 PROCEDURE — 74011250636 HC RX REV CODE- 250/636: Performed by: NURSE ANESTHETIST, CERTIFIED REGISTERED

## 2021-05-26 PROCEDURE — 74011000258 HC RX REV CODE- 258: Performed by: ORTHOPAEDIC SURGERY

## 2021-05-26 PROCEDURE — 27236 TREAT THIGH FRACTURE: CPT | Performed by: ORTHOPAEDIC SURGERY

## 2021-05-26 PROCEDURE — 73552 X-RAY EXAM OF FEMUR 2/>: CPT

## 2021-05-26 PROCEDURE — 77030008462 HC STPLR SKN PROX J&J -A: Performed by: ORTHOPAEDIC SURGERY

## 2021-05-26 PROCEDURE — 96376 TX/PRO/DX INJ SAME DRUG ADON: CPT

## 2021-05-26 PROCEDURE — 74011000250 HC RX REV CODE- 250: Performed by: STUDENT IN AN ORGANIZED HEALTH CARE EDUCATION/TRAINING PROGRAM

## 2021-05-26 PROCEDURE — 85610 PROTHROMBIN TIME: CPT

## 2021-05-26 PROCEDURE — 80048 BASIC METABOLIC PNL TOTAL CA: CPT

## 2021-05-26 PROCEDURE — 85730 THROMBOPLASTIN TIME PARTIAL: CPT

## 2021-05-26 PROCEDURE — 74011250636 HC RX REV CODE- 250/636: Performed by: EMERGENCY MEDICINE

## 2021-05-26 DEVICE — HIP H4 HEMI UNI BIPLR -- IMPL CAPPED H4: Type: IMPLANTABLE DEVICE | Status: FUNCTIONAL

## 2021-05-26 DEVICE — LOW FRICTION ION TREATMENT
Type: IMPLANTABLE DEVICE | Site: HIP | Status: FUNCTIONAL
Brand: C-TAPER HEAD

## 2021-05-26 DEVICE — 132 DEGREE CEMENTLESS HIP STEM
Type: IMPLANTABLE DEVICE | Site: HIP | Status: FUNCTIONAL
Brand: SECUR-FIT

## 2021-05-26 DEVICE — BIPOLAR COMPONENT
Type: IMPLANTABLE DEVICE | Site: HIP | Status: FUNCTIONAL
Brand: UHR

## 2021-05-26 RX ORDER — LIDOCAINE HYDROCHLORIDE 10 MG/ML
0.1 INJECTION INFILTRATION; PERINEURAL AS NEEDED
Status: DISCONTINUED | OUTPATIENT
Start: 2021-05-26 | End: 2021-05-26 | Stop reason: HOSPADM

## 2021-05-26 RX ORDER — ASPIRIN 325 MG
325 TABLET, DELAYED RELEASE (ENTERIC COATED) ORAL DAILY
Status: DISCONTINUED | OUTPATIENT
Start: 2021-05-27 | End: 2021-05-31 | Stop reason: HOSPADM

## 2021-05-26 RX ORDER — BUPIVACAINE HYDROCHLORIDE 7.5 MG/ML
INJECTION, SOLUTION INTRASPINAL AS NEEDED
Status: DISCONTINUED | OUTPATIENT
Start: 2021-05-26 | End: 2021-05-26 | Stop reason: HOSPADM

## 2021-05-26 RX ORDER — FENTANYL CITRATE 50 UG/ML
50 INJECTION, SOLUTION INTRAMUSCULAR; INTRAVENOUS ONCE
Status: COMPLETED | OUTPATIENT
Start: 2021-05-26 | End: 2021-05-26

## 2021-05-26 RX ORDER — SODIUM CHLORIDE 0.9 % (FLUSH) 0.9 %
5-40 SYRINGE (ML) INJECTION EVERY 8 HOURS
Status: DISCONTINUED | OUTPATIENT
Start: 2021-05-26 | End: 2021-05-27

## 2021-05-26 RX ORDER — CEFAZOLIN SODIUM/WATER 2 G/20 ML
2 SYRINGE (ML) INTRAVENOUS
Status: COMPLETED | OUTPATIENT
Start: 2021-05-26 | End: 2021-05-26

## 2021-05-26 RX ORDER — EPHEDRINE SULFATE/0.9% NACL/PF 50 MG/5 ML
SYRINGE (ML) INTRAVENOUS AS NEEDED
Status: DISCONTINUED | OUTPATIENT
Start: 2021-05-26 | End: 2021-05-26 | Stop reason: HOSPADM

## 2021-05-26 RX ORDER — FENTANYL CITRATE 50 UG/ML
100 INJECTION, SOLUTION INTRAMUSCULAR; INTRAVENOUS ONCE
Status: DISCONTINUED | OUTPATIENT
Start: 2021-05-26 | End: 2021-05-26 | Stop reason: HOSPADM

## 2021-05-26 RX ORDER — SODIUM CHLORIDE 0.9 % (FLUSH) 0.9 %
5-40 SYRINGE (ML) INJECTION AS NEEDED
Status: DISCONTINUED | OUTPATIENT
Start: 2021-05-26 | End: 2021-05-31 | Stop reason: HOSPADM

## 2021-05-26 RX ORDER — VANCOMYCIN HYDROCHLORIDE 1 G/20ML
INJECTION, POWDER, LYOPHILIZED, FOR SOLUTION INTRAVENOUS AS NEEDED
Status: DISCONTINUED | OUTPATIENT
Start: 2021-05-26 | End: 2021-05-26 | Stop reason: HOSPADM

## 2021-05-26 RX ORDER — HYDROMORPHONE HYDROCHLORIDE 2 MG/ML
0.25 INJECTION, SOLUTION INTRAMUSCULAR; INTRAVENOUS; SUBCUTANEOUS
Status: DISCONTINUED | OUTPATIENT
Start: 2021-05-26 | End: 2021-05-26

## 2021-05-26 RX ORDER — FERROUS SULFATE, DRIED 160(50) MG
1 TABLET, EXTENDED RELEASE ORAL
Status: DISCONTINUED | OUTPATIENT
Start: 2021-05-26 | End: 2021-05-31 | Stop reason: HOSPADM

## 2021-05-26 RX ORDER — OXYCODONE HYDROCHLORIDE 5 MG/1
5 TABLET ORAL
Status: DISCONTINUED | OUTPATIENT
Start: 2021-05-26 | End: 2021-05-26 | Stop reason: HOSPADM

## 2021-05-26 RX ORDER — SODIUM CHLORIDE 0.9 % (FLUSH) 0.9 %
5-40 SYRINGE (ML) INJECTION AS NEEDED
Status: DISCONTINUED | OUTPATIENT
Start: 2021-05-26 | End: 2021-05-28

## 2021-05-26 RX ORDER — ACETAMINOPHEN 325 MG/1
650 TABLET ORAL EVERY 6 HOURS
Status: COMPLETED | OUTPATIENT
Start: 2021-05-26 | End: 2021-05-29

## 2021-05-26 RX ORDER — PROPOFOL 10 MG/ML
INJECTION, EMULSION INTRAVENOUS
Status: DISCONTINUED | OUTPATIENT
Start: 2021-05-26 | End: 2021-05-26 | Stop reason: HOSPADM

## 2021-05-26 RX ORDER — ONDANSETRON 2 MG/ML
4 INJECTION INTRAMUSCULAR; INTRAVENOUS
Status: COMPLETED | OUTPATIENT
Start: 2021-05-26 | End: 2021-05-26

## 2021-05-26 RX ORDER — SODIUM CHLORIDE 0.9 % (FLUSH) 0.9 %
5-40 SYRINGE (ML) INJECTION EVERY 8 HOURS
Status: DISCONTINUED | OUTPATIENT
Start: 2021-05-26 | End: 2021-05-28

## 2021-05-26 RX ORDER — SODIUM CHLORIDE, SODIUM LACTATE, POTASSIUM CHLORIDE, CALCIUM CHLORIDE 600; 310; 30; 20 MG/100ML; MG/100ML; MG/100ML; MG/100ML
100 INJECTION, SOLUTION INTRAVENOUS CONTINUOUS
Status: DISCONTINUED | OUTPATIENT
Start: 2021-05-26 | End: 2021-05-26 | Stop reason: HOSPADM

## 2021-05-26 RX ORDER — OXYCODONE HYDROCHLORIDE 5 MG/1
10 TABLET ORAL
Status: DISCONTINUED | OUTPATIENT
Start: 2021-05-26 | End: 2021-05-31 | Stop reason: HOSPADM

## 2021-05-26 RX ORDER — HYDROMORPHONE HYDROCHLORIDE 1 MG/ML
0.2 INJECTION, SOLUTION INTRAMUSCULAR; INTRAVENOUS; SUBCUTANEOUS
Status: DISCONTINUED | OUTPATIENT
Start: 2021-05-26 | End: 2021-05-26 | Stop reason: HOSPADM

## 2021-05-26 RX ORDER — SODIUM CHLORIDE, SODIUM LACTATE, POTASSIUM CHLORIDE, CALCIUM CHLORIDE 600; 310; 30; 20 MG/100ML; MG/100ML; MG/100ML; MG/100ML
125 INJECTION, SOLUTION INTRAVENOUS CONTINUOUS
Status: DISCONTINUED | OUTPATIENT
Start: 2021-05-26 | End: 2021-05-30

## 2021-05-26 RX ORDER — NALOXONE HYDROCHLORIDE 0.4 MG/ML
0.04 INJECTION, SOLUTION INTRAMUSCULAR; INTRAVENOUS; SUBCUTANEOUS
Status: DISCONTINUED | OUTPATIENT
Start: 2021-05-26 | End: 2021-05-26 | Stop reason: HOSPADM

## 2021-05-26 RX ORDER — EPHEDRINE SULFATE/0.9% NACL/PF 50 MG/5 ML
SYRINGE (ML) INTRAVENOUS AS NEEDED
Status: DISCONTINUED | OUTPATIENT
Start: 2021-05-26 | End: 2021-05-26

## 2021-05-26 RX ORDER — ONDANSETRON 2 MG/ML
4 INJECTION INTRAMUSCULAR; INTRAVENOUS
Status: DISCONTINUED | OUTPATIENT
Start: 2021-05-26 | End: 2021-05-31 | Stop reason: HOSPADM

## 2021-05-26 RX ORDER — PROPOFOL 10 MG/ML
INJECTION, EMULSION INTRAVENOUS AS NEEDED
Status: DISCONTINUED | OUTPATIENT
Start: 2021-05-26 | End: 2021-05-26 | Stop reason: HOSPADM

## 2021-05-26 RX ORDER — MIDAZOLAM HYDROCHLORIDE 1 MG/ML
2 INJECTION, SOLUTION INTRAMUSCULAR; INTRAVENOUS ONCE
Status: DISCONTINUED | OUTPATIENT
Start: 2021-05-26 | End: 2021-05-26 | Stop reason: HOSPADM

## 2021-05-26 RX ORDER — HYDROMORPHONE HYDROCHLORIDE 1 MG/ML
1 INJECTION, SOLUTION INTRAMUSCULAR; INTRAVENOUS; SUBCUTANEOUS
Status: DISCONTINUED | OUTPATIENT
Start: 2021-05-26 | End: 2021-05-31 | Stop reason: HOSPADM

## 2021-05-26 RX ORDER — SODIUM CHLORIDE 0.9 % (FLUSH) 0.9 %
5-40 SYRINGE (ML) INJECTION EVERY 8 HOURS
Status: DISCONTINUED | OUTPATIENT
Start: 2021-05-26 | End: 2021-05-31 | Stop reason: HOSPADM

## 2021-05-26 RX ORDER — HYDROMORPHONE HYDROCHLORIDE 1 MG/ML
0.5 INJECTION, SOLUTION INTRAMUSCULAR; INTRAVENOUS; SUBCUTANEOUS
Status: DISCONTINUED | OUTPATIENT
Start: 2021-05-26 | End: 2021-05-26

## 2021-05-26 RX ORDER — MAG HYDROX/ALUMINUM HYD/SIMETH 200-200-20
30 SUSPENSION, ORAL (FINAL DOSE FORM) ORAL
Status: DISCONTINUED | OUTPATIENT
Start: 2021-05-26 | End: 2021-05-31 | Stop reason: HOSPADM

## 2021-05-26 RX ORDER — MIDAZOLAM HYDROCHLORIDE 1 MG/ML
2 INJECTION, SOLUTION INTRAMUSCULAR; INTRAVENOUS
Status: DISCONTINUED | OUTPATIENT
Start: 2021-05-26 | End: 2021-05-26 | Stop reason: HOSPADM

## 2021-05-26 RX ADMIN — TUBERCULIN PURIFIED PROTEIN DERIVATIVE 5 UNITS: 5 INJECTION, SOLUTION INTRADERMAL at 18:09

## 2021-05-26 RX ADMIN — ONDANSETRON 4 MG: 2 INJECTION INTRAMUSCULAR; INTRAVENOUS at 11:33

## 2021-05-26 RX ADMIN — BUPIVACAINE HYDROCHLORIDE IN DEXTROSE 1.6 MG: 7.5 INJECTION, SOLUTION SUBARACHNOID at 14:55

## 2021-05-26 RX ADMIN — Medication 5 MG: at 15:43

## 2021-05-26 RX ADMIN — Medication 10 ML: at 22:35

## 2021-05-26 RX ADMIN — HYDROMORPHONE HYDROCHLORIDE 0.26 MG: 2 INJECTION INTRAMUSCULAR; INTRAVENOUS; SUBCUTANEOUS at 13:37

## 2021-05-26 RX ADMIN — Medication 1 TABLET: at 18:10

## 2021-05-26 RX ADMIN — SODIUM CHLORIDE, SODIUM LACTATE, POTASSIUM CHLORIDE, AND CALCIUM CHLORIDE 100 ML/HR: 600; 310; 30; 20 INJECTION, SOLUTION INTRAVENOUS at 13:00

## 2021-05-26 RX ADMIN — Medication 10 MG: at 15:08

## 2021-05-26 RX ADMIN — PROPOFOL 120 MCG/KG/MIN: 10 INJECTION, EMULSION INTRAVENOUS at 14:45

## 2021-05-26 RX ADMIN — PHENYLEPHRINE HYDROCHLORIDE 100 MCG: 10 INJECTION INTRAVENOUS at 15:38

## 2021-05-26 RX ADMIN — FENTANYL CITRATE 50 MCG: 50 INJECTION, SOLUTION INTRAMUSCULAR; INTRAVENOUS at 11:33

## 2021-05-26 RX ADMIN — CEFAZOLIN SODIUM 1 G: 1 INJECTION, POWDER, FOR SOLUTION INTRAMUSCULAR; INTRAVENOUS at 23:30

## 2021-05-26 RX ADMIN — PROPOFOL 50 MG: 10 INJECTION, EMULSION INTRAVENOUS at 14:45

## 2021-05-26 RX ADMIN — Medication 15 MG: at 14:58

## 2021-05-26 RX ADMIN — Medication 10 ML: at 22:00

## 2021-05-26 RX ADMIN — FENTANYL CITRATE 50 MCG: 50 INJECTION, SOLUTION INTRAMUSCULAR; INTRAVENOUS at 10:16

## 2021-05-26 RX ADMIN — PHENYLEPHRINE HYDROCHLORIDE 50 MCG: 10 INJECTION INTRAVENOUS at 15:43

## 2021-05-26 RX ADMIN — SODIUM CHLORIDE, SODIUM LACTATE, POTASSIUM CHLORIDE, AND CALCIUM CHLORIDE 125 ML/HR: 600; 310; 30; 20 INJECTION, SOLUTION INTRAVENOUS at 11:49

## 2021-05-26 RX ADMIN — OXYCODONE 10 MG: 5 TABLET ORAL at 18:10

## 2021-05-26 RX ADMIN — CEFAZOLIN 2 G: 1 INJECTION, POWDER, FOR SOLUTION INTRAVENOUS at 15:12

## 2021-05-26 RX ADMIN — SODIUM CHLORIDE, SODIUM LACTATE, POTASSIUM CHLORIDE, AND CALCIUM CHLORIDE 1000 ML: 600; 310; 30; 20 INJECTION, SOLUTION INTRAVENOUS at 10:16

## 2021-05-26 RX ADMIN — Medication 10 ML: at 18:22

## 2021-05-26 RX ADMIN — ONDANSETRON 4 MG: 2 INJECTION INTRAMUSCULAR; INTRAVENOUS at 18:16

## 2021-05-26 RX ADMIN — OXYCODONE 10 MG: 5 TABLET ORAL at 22:29

## 2021-05-26 RX ADMIN — SODIUM CHLORIDE, SODIUM LACTATE, POTASSIUM CHLORIDE, AND CALCIUM CHLORIDE 125 ML/HR: 600; 310; 30; 20 INJECTION, SOLUTION INTRAVENOUS at 20:11

## 2021-05-26 RX ADMIN — ACETAMINOPHEN 650 MG: 325 TABLET ORAL at 18:10

## 2021-05-26 NOTE — ED NOTES
Pt arrives via [facility] with CC of Fall from standing. Pt found on the ground, no head impact, no LOC. Pt received 100mcg Fentanyl en route.  Pain radiating from R hip to LRE.     142/60  82 NSR  97%

## 2021-05-26 NOTE — ANESTHESIA POSTPROCEDURE EVALUATION
Procedure(s):  Right HIP HEMIARTHROPLASTY. spinal    Anesthesia Post Evaluation      Multimodal analgesia: multimodal analgesia used between 6 hours prior to anesthesia start to PACU discharge  Patient location during evaluation: bedside  Patient participation: complete - patient participated  Level of consciousness: awake and alert  Pain score: 1  Pain management: adequate  Airway patency: patent  Anesthetic complications: no  Cardiovascular status: acceptable  Respiratory status: acceptable  Hydration status: acceptable  Comments: Patient doing well. Continue care on floor. Post anesthesia nausea and vomiting:  none  Final Post Anesthesia Temperature Assessment:  Normothermia (36.0-37.5 degrees C)      INITIAL Post-op Vital signs:   Vitals Value Taken Time   BP 93/55 05/26/21 1618   Temp 36.3 °C (97.4 °F) 05/26/21 1607   Pulse 71 05/26/21 1619   Resp 11 05/26/21 1607   SpO2 100 % 05/26/21 1619   Vitals shown include unvalidated device data.

## 2021-05-26 NOTE — H&P
[facility] History and Physical       Name:  Jacquie Smith  Age:75 y.o. Sex:female   :  1945    MRN:  [id number]   PCP:  May Pineda MD      Admit Date:  2021 10:00 AM   Chief Complaint: right hip pain    Reason for Admission:   No admission diagnoses are documented for this encounter. Assessment & Plan:     Displaced Right Femoral Neck fracture s/p Fall  X-ray finding is below.  -Orthopedics consulted.  -N.p.o.  -DVT prophylaxis as per orthopedics   -PT OT after procedure    Patient's Revised Cardiac Risk Index is 0 based on her medical history. She is at class I risk for required OR procedure. Diet: NPO  VTE ppx: per Ortho  Code status: FULL      History of Presenting Illness:     Jacquie Smith is a 77 y.o. female with no significant past medical history and not on any home medications who presented to ED due to mechanical fall at home. Patient reports that she was making her bed this morning when she tripped and fell and landed on her right hip. Patient denies any head trauma or loss of consciousness. Complains of right hip pain that is 12 out of 10 and sharp. Patient reports that she only takes multivitamins and not on any medications at home. Patient denies any fever, chills, shortness of breath, chest pain, abdominal pain, nausea, vomiting. Patient was in normal state of health until this morning before she fell. In the ED, patient is hemodynamically stable and saturating well on room air. EKG shows normal sinus rhythm. Laboratory work-up is remarkable for leukocytosis with WBC of 12.4. No other abnormalities in her lab. Hip x-ray with displaced fracture of right femoral neck. Orthopedics was consulted by ED and plan for surgery today. Review of Systems:  A 14 point review of systems was taken and pertinent positive as per HPI.         Past Medical History:   Diagnosis Date    Allergic eye reaction 2013    Arthritis     shoulders    Claustrophobia     Elevated blood pressure (not hypertension) 4/30/2015    Environmental allergies     FH: breast cancer 6/19/2013    FH: colon cancer 6/19/2013    Hyperlipidemia 6/19/2013    diet controlled    Left shoulder pain 6/19/2013    Menopause 6/19/2013    Trigger finger 6/19/2013    left thumb    Unspecified adverse effect of anesthesia     usually takes until next day to wake fully, OK with last procedure (colonoscopy 2012)    Vertigo 6/19/2013    Vitamin D deficiency 8/9/2016       Past Surgical History:   Procedure Laterality Date    HX APPENDECTOMY      HX CHOLECYSTECTOMY      HX GI  last 2012    colonoscopy (at least 5-6)    HX HYSTERECTOMY      no bso    HX MOHS PROCEDURES  1982    right arm    HX TUBAL LIGATION  1971       Family History : reviewed  Family History   Problem Relation Age of Onset    Cancer Mother 68        colon    Colon Cancer Mother     Heart Disease Father     Heart Attack Father     Lung Disease Father            Gulshan Tomlinson Sister 44        breast cancer, spread to lungs    Breast Cancer Sister     Cancer Brother         prostate cancer    Other Brother         HCV + Staph (hosp acquired)        Social History     Tobacco Use    Smoking status: Never Smoker    Smokeless tobacco: Never Used   Substance Use Topics    Alcohol use: No       Allergies   Allergen Reactions    Levaquin [Levofloxacin] Nausea Only    Morphine Atopic Dermatitis    Pcn [Penicillins] Hives    Sulfa (Sulfonamide Antibiotics) Hives       Immunization History   Administered Date(s) Administered    Influenza High Dose Vaccine PF 11/08/2011, 08/24/2017    Influenza Vaccine 09/18/2013, 11/21/2014, 10/13/2020    Influenza Vaccine (Tri) Adjuvanted (>65 Yrs FLUAD TRI 30728) 10/23/2018    Influenza Vaccine PF 10/07/2015    Pneumococcal Conjugate (PCV-13) 06/29/2015    Pneumococcal Polysaccharide (PPSV-23) 06/19/2013    Tdap 06/25/2014, 04/24/2015    Zoster Vaccine, Live 07/08/2016         PTA Medications:  Current Outpatient Medications   Medication Instructions    cholecalciferol, vitamin D3, (VITAMIN D3 PO) 1 Tablet, Oral, DAILY    cyanocobalamin, vitamin B-12, 1,000 mcg cap 1 Tablet, Oral, DAILY    MAGNESIUM PO 3 Tablets, Oral, DAILY    melatonin 3 mg, Oral, EVERY BEDTIME    TURMERIC  mg, Oral       Objective:     Patient Vitals for the past 24 hrs:   Temp Pulse BP SpO2   05/26/21 1029   (!) 150/72 96 %   05/26/21 1006  88 (!) 143/67 95 %   05/26/21 1003 98.4 °F (36.9 °C) 82 (!) 142/60 95 %       Oxygen Therapy  O2 Sat (%): 96 % (05/26/21 1029)  Pulse via Oximetry: 85 beats per minute (05/26/21 1029)  O2 Device: None (Room air) (05/26/21 1003)    Body mass index is 22.14 kg/m². Physical Exam:    General:  Alert and awake.  nO acute distress, speaking in full sentences  HEENT:  Pupils equal and reactive to light and accommodation, oropharynx is clear   Neck:   Supple, no lymphadenopathy, no JVD   Lungs:  Clear to auscultation bilaterally   CV:   Regular rate and rhythm with normal S1 and S2   Abdomen:  Soft, nontender, nondistended, normoactive bowel sounds   Extremities:  No cyanosis clubbing or edema , right lower extremity shortened. Decreased ROM due to pain  Neuro:  Nonfocal, A&O x3   Psych:  Normal mood and affect       Data Reviewed: I have reviewed all labs, meds, and studies. Recent Results (from the past 24 hour(s))   EKG, 12 LEAD, INITIAL    Collection Time: 05/26/21 10:23 AM   Result Value Ref Range    Ventricular Rate 86 BPM    Atrial Rate 86 BPM    P-R Interval 184 ms    QRS Duration 72 ms    Q-T Interval 346 ms    QTC Calculation (Bezet) 414 ms    Calculated P Axis 83 degrees    Calculated R Axis 55 degrees    Calculated T Axis 67 degrees    Diagnosis       !! AGE AND GENDER SPECIFIC ECG ANALYSIS !!   Normal sinus rhythm  Normal ECG  When compared with ECG of 17-NOV-2014 22:11,  Questionable change in QRS axis  T wave amplitude has increased in Anterior leads     CBC WITH AUTOMATED DIFF    Collection Time: 05/26/21 10:24 AM   Result Value Ref Range    WBC 12.4 (H) 4.3 - 11.1 K/uL    RBC 3.81 (L) 4.05 - 5.2 M/uL    HGB 12.2 11.7 - 15.4 g/dL    HCT 37.1 35.8 - 46.3 %    MCV 97.4 79.6 - 97.8 FL    MCH 32.0 26.1 - 32.9 PG    MCHC 32.9 31.4 - 35.0 g/dL    RDW 13.1 11.9 - 14.6 %    PLATELET 851 907 - 891 K/uL    MPV 10.1 9.4 - 12.3 FL    ABSOLUTE NRBC 0.00 0.0 - 0.2 K/uL    DF AUTOMATED      NEUTROPHILS 78 43 - 78 %    LYMPHOCYTES 14 13 - 44 %    MONOCYTES 6 4.0 - 12.0 %    EOSINOPHILS 2 0.5 - 7.8 %    BASOPHILS 0 0.0 - 2.0 %    IMMATURE GRANULOCYTES 0 0.0 - 5.0 %    ABS. NEUTROPHILS 9.6 (H) 1.7 - 8.2 K/UL    ABS. LYMPHOCYTES 1.8 0.5 - 4.6 K/UL    ABS. MONOCYTES 0.7 0.1 - 1.3 K/UL    ABS. EOSINOPHILS 0.2 0.0 - 0.8 K/UL    ABS. BASOPHILS 0.1 0.0 - 0.2 K/UL    ABS. IMM.  GRANS. 0.1 0.0 - 0.5 K/UL   METABOLIC PANEL, BASIC    Collection Time: 05/26/21 10:24 AM   Result Value Ref Range    Sodium 139 136 - 145 mmol/L    Potassium 4.0 3.5 - 5.1 mmol/L    Chloride 106 98 - 107 mmol/L    CO2 25 21 - 32 mmol/L    Anion gap 8 7 - 16 mmol/L    Glucose 122 (H) 65 - 100 mg/dL    BUN 17 8 - 23 MG/DL    Creatinine 0.76 0.6 - 1.0 MG/DL    GFR est AA >60 >60 ml/min/1.73m2    GFR est non-AA >60 >60 ml/min/1.73m2    Calcium 8.9 8.3 - 10.4 MG/DL   PROTHROMBIN TIME + INR    Collection Time: 05/26/21 10:24 AM   Result Value Ref Range    Prothrombin time 12.9 12.5 - 14.7 sec    INR 1.0     PTT    Collection Time: 05/26/21 10:24 AM   Result Value Ref Range    aPTT 22.4 (L) 24.1 - 35.1 SEC   TYPE & SCREEN    Collection Time: 05/26/21 10:25 AM   Result Value Ref Range    Crossmatch Expiration 05/29/2021,2359     ABO/Rh(D) Elly Kida POSITIVE     Antibody screen NEG        EKG Results     Procedure 720 Value Units Date/Time    EKG, 12 LEAD, INITIAL [[id number]] Collected: 05/26/21 1023    Order Status: Completed Updated: 05/26/21 1118     Ventricular Rate 86 BPM      Atrial Rate 86 BPM P-R Interval 184 ms      QRS Duration 72 ms      Q-T Interval 346 ms      QTC Calculation (Bezet) 414 ms      Calculated P Axis 83 degrees      Calculated R Axis 55 degrees      Calculated T Axis 67 degrees      Diagnosis --     !! AGE AND GENDER SPECIFIC ECG ANALYSIS !! Normal sinus rhythm  Normal ECG  When compared with ECG of 17-NOV-2014 22:11,  Questionable change in QRS axis  T wave amplitude has increased in Anterior leads            All Micro Results     None          Other Studies:  XR CHEST SNGL V    Result Date: 5/26/2021  History: Preoperative evaluation for hip fracture Exam: portable chest Comparison: None Findings: There is elevation of the right hemidiaphragm. The lungs are clear. The mediastinal contour and osseous structures are normal. IMPRESSIONs: Elevation of the right hemidiaphragm. No additional abnormality. XR HIP RT W OR WO PELV 2-3 VWS    Result Date: 5/26/2021  History: Fall with right hip and thigh pain EXAM: Right hip series, right femur series FINDINGS: Right hip: There is a subcapital right femoral neck fracture with displacement. The SI joints are patent. No additional fracture demonstrated. Right femur: There is a subcapital fracture without additional bony abnormality. Subcapital right femoral neck fracture, displaced. XR FEMUR RT 2 VS    Result Date: 5/26/2021  History: Fall with right hip and thigh pain EXAM: Right hip series, right femur series FINDINGS: Right hip: There is a subcapital right femoral neck fracture with displacement. The SI joints are patent. No additional fracture demonstrated. Right femur: There is a subcapital fracture without additional bony abnormality. Subcapital right femoral neck fracture, displaced.         Medications:  Medications Administered      Medications Administered     fentaNYL citrate (PF) injection 50 mcg     Admin Date  05/26/2021 Action  Given Dose  50 mcg Route  IntraVENous Administered By  Maura Nix RN lactated ringers bolus infusion 1,000 mL     Admin Date  05/26/2021 Action  New Bag Dose  1,000 mL Rate  1,333.3 mL/hr Route  IntraVENous Administered By  Lanre Stephenson RN                      Problem List:     Hospital Problems as of 5/26/2021 Date Reviewed: 12/22/2020        Codes Class Noted - Resolved POA    Fracture of neck of right femur ([address] ICD-10-CM: S72.001A  ICD-9-CM: 820.8  5/26/2021 - Present Unknown               Signed By: Cecilia Link MD   [facility]    May 26, 2021  4:22 PM

## 2021-05-26 NOTE — OP NOTES
Operative Report    Patient: Jennifer Alonzo MRN: [id number]  SSN: [id number]    YOB: 1945  Age: 77 y.o. Sex: female       Date of Surgery: 5/26/2021     History:  Jennifer Alonzo is a 77 y.o. female fell from a standing height and injured her right hip. She was seen in the emergency room and found to have a displaced right femoral neck fracture. She is a rather healthy individual with very few medications. She has very little past medical history. I talked to her about the need for operative fixation for this it explained that I thought the most reasonable thing would be treatment with right hip hemiarthroplasty. I showed her and her family her x-rays and explained exactly what this would involve and use administrations to help him understand this. .      I talked to the patient and/or their representative and explained the exact nature the procedure. I also went through a detailed list of the material risks associated with  the procedure which included risk of bleeding, infection, injury to nearby structures, worsening the situation, as well as the risks associate with anesthesia and finally death. Also talked with him regarding the benefits and alternatives to the procedure. Preoperative Diagnosis: Right Hip Fracture     Postoperative Diagnosis: Closed displaced right femoral neck fracture Surgeon(s) and Role:     * Blake Doran MD - Primary    Anesthesia: Regional     Procedure: Procedure(s):  Right HIP HEMIARTHROPLASTY for right femoral neck fracture/open treatment of right femoral neck fracture with prosthetic replacement    Procedure in Detail: After successful induction of spinal anesthesia patient was placed in the left lateral decubitus position the right hip was prepped draped usual sterile fashion I then utilized a standard anterolateral approach to the femoral neck.   The femoral neck was exposed and I made a provisional cut with an oscillating saw I then used a power corkscrew to remove the femoral head from the acetabulum. After this was done we sized this for a 44 mm femoral head. I then began the process of preparing the proximal femur using first a box osteotome then a canal finder and then starting with a #5 broach and ending with a #8 broach. The #8 broach appeared to fill the canal very well. I then remove the broach and irrigated with a copious amount of normal saline for both the proximal femur as well as the acetabulum and then placed the actual stem corresponding with the size of the final broach. Once the stem was in place I placed a 28 mm head which was a +0 head as well as a 44 mm bipolar component and then reduced the hip and took the hip through full range of motion making sure that it was very stable. Once I was assured of this I then irrigated once more and reattached the gluteus minimus and medius tendons using #1 Vicryl in figure-of-eight fashion to suture the tendons directly back to the area of the proximal femur near the area of the greater trochanter. After this was done I closed the deep fascia with #2 strata fix suture and then the subcutaneous tissue with 2.0 strata fix suture and the skin was closed with staples. Dressings were applied. The patient was awakened and taken to PACU in stable condition        Estimated Blood Loss: 180 cc    Tourniquet Time: * No tourniquets in log *      Implants:   Implant Name Type Inv.  Item Serial No.  Lot No. LRB No. Used Action   HEAD FEM [id number] +0MM OFFSET C TAPR CO CHROM MTL ON POLY - [id number]  HEAD FEM [id number] +0MM OFFSET C TAPR CO CHROM MTL ON POLY  [employer] [facility] [id number] Right 1 Implanted   HEAD BPLR OD44MM ID28MM UNIV CO CHROM POLY FEM HIP - [id number]  HEAD BPLR OD44MM ID28MM UNIV CO CHROM POLY FEM HIP  [employer] [facility]_ [id number] Right 1 Implanted   STEM FEM SZ 8 L140MM NK L30MM 132DEG TI HA HIP C TAPR - [id number]  STEM FEM SZ 8 L140MM NK L30MM 132DEG TI HA HIP C TAPR  [employer] HOWM_WD [id number] Right 1 Implanted               Specimens: * No specimens in log *        Drains: None                Complications: None    Counts: Sponge and needle counts were correct times two.     Signed By:  Dede Cespedes MD     May 26, 2021

## 2021-05-26 NOTE — ANESTHESIA PREPROCEDURE EVALUATION
Relevant Problems   No relevant active problems       Anesthetic History   No history of anesthetic complications            Review of Systems / Medical History  Patient summary reviewed and pertinent labs reviewed    Pulmonary  Within defined limits                 Neuro/Psych   Within defined limits           Cardiovascular    Hypertension (No meds)          Hyperlipidemia    Exercise tolerance: >4 METS  Comments: Denies CP, SOB or changes in functional status   GI/Hepatic/Renal  Within defined limits              Endo/Other        Arthritis     Other Findings              Physical Exam    Airway  Mallampati: II  TM Distance: 4 - 6 cm  Neck ROM: normal range of motion   Mouth opening: Normal     Cardiovascular    Rhythm: regular  Rate: normal         Dental    Dentition: Edentulous     Pulmonary  Breath sounds clear to auscultation               Abdominal  GI exam deferred       Other Findings            Anesthetic Plan    ASA: 2  Anesthesia type: spinal          Induction: Intravenous  Anesthetic plan and risks discussed with: Patient and Spouse

## 2021-05-26 NOTE — ED PROVIDER NOTES
55-year-old lady with no current medications presents with concerns about pain in her right hip. She said that she was making her bed this morning and as she tried to walk around the bed she tripped and fell and landed on her hip. He denies hitting her head or neck. She has no headache or neck pain. No loss of consciousness. She has severe pain in her right hip. Any attempt at movement makes the pain worse. No other associated symptoms. Elements of this note were created using speech recognition software. As such, errors of speech recognition may be present.            Past Medical History:   Diagnosis Date    Allergic eye reaction 6/19/2013    Arthritis     shoulders    Claustrophobia     Elevated blood pressure (not hypertension) 4/30/2015    Environmental allergies     FH: breast cancer 6/19/2013    FH: colon cancer 6/19/2013    Hyperlipidemia 6/19/2013    diet controlled    Left shoulder pain 6/19/2013    Menopause 6/19/2013    Trigger finger 6/19/2013    left thumb    Unspecified adverse effect of anesthesia     usually takes until next day to wake fully, OK with last procedure (colonoscopy 2012)    Vertigo 6/19/2013    Vitamin D deficiency 8/9/2016       Past Surgical History:   Procedure Laterality Date    HX APPENDECTOMY      HX CHOLECYSTECTOMY      HX GI  last 2012    colonoscopy (at least 5-6)    HX HYSTERECTOMY      no bso    HX MOHS PROCEDURES  1982    right arm    HX TUBAL LIGATION  1971         Family History:   Problem Relation Age of Onset    Cancer Mother 68        colon    Colon Cancer Mother     Heart Disease Father     Heart Attack Father     Lung Disease Father            Ancel Olive Sister 44        breast cancer, spread to lungs    Breast Cancer Sister     Cancer Brother         prostate cancer    Other Brother         HCV + Staph (hosp acquired)       Social History     Socioeconomic History    Marital status:      Spouse name: Not on file    Number of children: Not on file    Years of education: Not on file    Highest education level: Not on file   Occupational History    Not on file   Tobacco Use    Smoking status: Never Smoker    Smokeless tobacco: Never Used   Vaping Use    Vaping Use: Never used   Substance and Sexual Activity    Alcohol use: No    Drug use: No    Sexual activity: Yes     Partners: Male     Birth control/protection: Surgical     Comment: tvh   Other Topics Concern    Not on file   Social History Narrative    Not on file     Social Determinants of Health     Financial Resource Strain:     Difficulty of Paying Living Expenses:    Food Insecurity:     Worried About Running Out of Food in the Last Year:     [address] in the Last Year:    Transportation Needs:     Lack of Transportation (Medical):  Lack of Transportation (Non-Medical):    Physical Activity:     Days of Exercise per Week:     Minutes of Exercise per Session:    Stress:     Feeling of Stress :    Social Connections:     Frequency of Communication with Friends and Family:     Frequency of Social Gatherings with Friends and Family:     Attends Sabianist Services:     Active Member of Clubs or Organizations:     Attends Club or Organization Meetings:     Marital Status:    Intimate Partner Violence:     Fear of Current or Ex-Partner:     Emotionally Abused:     Physically Abused:     Sexually Abused: ALLERGIES: Levaquin [levofloxacin], Morphine, Pcn [penicillins], and Sulfa (sulfonamide antibiotics)    Review of Systems   Constitutional: Negative for chills, diaphoresis and fever. HENT: Negative for congestion, rhinorrhea and sore throat. Eyes: Negative for redness and visual disturbance. Respiratory: Negative for cough, chest tightness, shortness of breath and wheezing. Cardiovascular: Negative for chest pain and palpitations.    Gastrointestinal: Negative for abdominal pain, blood in stool, diarrhea, nausea and vomiting. Genitourinary: Negative for dysuria and hematuria. Musculoskeletal: Positive for arthralgias, gait problem and myalgias. Negative for neck stiffness. Skin: Negative for wound. Neurological: Negative for dizziness, weakness and headaches. Hematological: Negative for adenopathy. Does not bruise/bleed easily. Psychiatric/Behavioral: Negative for confusion. Vitals:    05/26/21 1003 05/26/21 1006   BP: (!) 142/60 (!) 143/67   Pulse: 82 88   Temp: 98.4 °F (36.9 °C)    SpO2: 95% 95%   Weight: 56.7 kg (125 lb)    Height: 5' 3\" (1.6 m)             Physical Exam  Vitals and nursing note reviewed. Constitutional:       Appearance: Normal appearance. HENT:      Head: Normocephalic and atraumatic. Cardiovascular:      Rate and Rhythm: Normal rate and regular rhythm. Pulmonary:      Effort: Pulmonary effort is normal.      Breath sounds: Normal breath sounds. Abdominal:      General: Bowel sounds are normal.      Palpations: Abdomen is soft. Musculoskeletal:      Comments: Swelling over area of right hip   Skin:     General: Skin is warm and dry. Neurological:      General: No focal deficit present. Mental Status: She is alert and oriented to person, place, and time. Motor: No weakness. MDM  Number of Diagnoses or Management Options  Diagnosis management comments: We will do a hip fracture evaluation. I will treat her pain with IV fentanyl. ED Course as of May 26 1310   Wed May 26, 2021   1058 EKG review by ER doctor:Normal sinus rhythmNo acute ischemic ST segment changesNo QTC prolongationRate of: 86    [AC]   1119 X-ray reveals right femoral neck fracture.   I will discussed the case with Ortho and the hospitalist.    Luz Elena Carpio      ED Course User Index  [AC] Sara Mclain MD       Procedures

## 2021-05-26 NOTE — CONSULTS
Consult    Patient: Srinivasa Weeks MRN: [id number]  SSN: [id number]    YOB: 1945  Age: 77 y.o. Sex: female      Subjective:      Srinivasa Weeks is a 77 y.o. female who basically just lost her balance and tripped and fell onto her right hip. She has been seen in the emergency room and was found to have a right displaced femoral neck fracture and the orthopedic team has been consulted. I talked to her regarding her injury she does not have any complaints with her bilateral upper extremities or her left lower extremity. She has no history of having any problems with her right hip before this fall. She normally walks and is healthy and walks without any assistive devices. She did have a bowl of cereal at 630 this morning and not take any blood thinners. She says in general she is a healthy individual who gets around very well.     Past Medical History:   Diagnosis Date    Allergic eye reaction 6/19/2013    Arthritis     shoulders    Claustrophobia     Elevated blood pressure (not hypertension) 4/30/2015    Environmental allergies     FH: breast cancer 6/19/2013    FH: colon cancer 6/19/2013    Hyperlipidemia 6/19/2013    diet controlled    Left shoulder pain 6/19/2013    Menopause 6/19/2013    Trigger finger 6/19/2013    left thumb    Unspecified adverse effect of anesthesia     usually takes until next day to wake fully, OK with last procedure (colonoscopy 2012)    Vertigo 6/19/2013    Vitamin D deficiency 8/9/2016     Past Surgical History:   Procedure Laterality Date    HX APPENDECTOMY      HX CHOLECYSTECTOMY      HX GI  last 2012    colonoscopy (at least 5-6)    HX HYSTERECTOMY      no bso    HX MOHS PROCEDURES  1982    right arm    HX TUBAL LIGATION  1971      FAMHX -No history of inflammatory arthritis   Social History     Tobacco Use    Smoking status: Never Smoker    Smokeless tobacco: Never Used   Substance Use Topics    Alcohol use: No      Current Facility-Administered Medications   Medication Dose Route Frequency Provider Last Rate Last Admin    sodium chloride (NS) flush 5-40 mL  5-40 mL IntraVENous Q8H Ray Baxter MD        sodium chloride (NS) flush 5-40 mL  5-40 mL IntraVENous PRN Ray Baxter MD        lactated Ringers infusion  125 mL/hr IntraVENous CONTINUOUS Ray Baxter  mL/hr at 05/26/21 1149 125 mL/hr at 05/26/21 1149     Current Outpatient Medications   Medication Sig Dispense Refill    MAGNESIUM PO Take 3 Tabs by mouth daily.  cholecalciferol, vitamin D3, (VITAMIN D3 PO) Take 1 Tab by mouth daily.  melatonin 3 mg tablet Take 3 mg by mouth nightly.  cyanocobalamin, vitamin B-12, 1,000 mcg cap Take 1 Tab by mouth daily.  TURMERIC PO Take 500 mg by mouth. Allergies   Allergen Reactions    Levaquin [Levofloxacin] Nausea Only    Morphine Atopic Dermatitis    Pcn [Penicillins] Hives    Sulfa (Sulfonamide Antibiotics) Hives       Review of Systems:  A comprehensive review of systems was negative except for that written in the History of Present Illness. Objective:     Vitals:    05/26/21 1003 05/26/21 1006 05/26/21 1029   BP: (!) 142/60 (!) 143/67 (!) 150/72   Pulse: 82 88    Temp: 98.4 °F (36.9 °C)     SpO2: 95% 95% 96%   Weight: 56.7 kg (125 lb)     Height: 5' 3\" (1.6 m)          Physical Exam:  Physical Exam:  General:  Alert, cooperative, no distress, appears stated age. Orientation she is alert and oriented person place time and situation   Eyes:  Conjunctivae/corneas clear. PERRL, EOMs intact. Fundi benign   Ears:  Normal TMs and external ear canals both ears. Nose: Nares normal. Septum midline. Mucosa normal. No drainage or sinus tenderness. Mouth/Throat: Lips, mucosa, and tongue normal. Teeth and gums normal.   Neck: Supple, symmetrical, trachea midline, no adenopathy, thyroid: no enlargment/tenderness/nodules, no carotid bruit and no JVD. Back:   Symmetric, no curvature.  ROM normal. No CVA tenderness. Lungs:   Clear to auscultation bilaterally. Heart:  Regular rate and rhythm, S1, S2 normal, no murmur, click, rub or gallop. Abdomen:   Soft, non-tender. Bowel sounds normal. No masses,  No organomegaly. No lymphadenopathy in all 4 extremities  Alignment- pt has some obvious deformity of the right hip  Range of motion- with any range of motion right hip  Vascular-distal pulses palpable in right lower extremity  Sensory/motor-deep tendon reflexes normal right lower extremity. Motor and sensory function intact. Stability- is difficult to assess stability because of the presence of the fracture  Tenderness to palpation over the right greater trochanter area  Skin- no rashes ulcerations or open wounds right lower extremity  Gait-pt cannot put any weight on the right lower extremity      Assessment:     Hospital Problems  Date Reviewed: 12/22/2020        Codes Class Noted POA    Fracture of neck of right femur [facility]) ICD-10-CM: S72.001A  ICD-9-CM: 820.8  5/26/2021 Unknown            Closed displaced right femoral neck fracture    Xrays and or studies:    I have reviewed x-rays which show a displaced right femoral neck fracture  Plan:     I have spoke with the patient regarding the fact that she does have a completely displaced right femoral neck fracture. I think that she would likely best be served by right hip hemiarthroplasty. I try to explain to her exactly what this would involve. I have discussed with her where her incision would be and what the nature of the procedure would be as well as a detailed list material was associated with this. The hospitalist team evaluation is pending. I have reviewed her labs and these all appear to be doing very well so I am hopeful that we can try to take this to the operating room today and treat this with formal right hip hemiarthroplasty.   She would very much like this done today if at all possible so she is on board with going ahead and proceeding with right hip hemiarthroplasty.   We will wait and make sure that she is evaluated by the medical team and then go from there but hopefully we can proceed with right hip hemiarthroplasty for right femoral neck fracture today in the operating room    Signed By: Karen Kevin MD

## 2021-05-26 NOTE — PROGRESS NOTES
TRANSFER - IN REPORT:    Verbal report received from BEENA Noel(name) on Parisa Hew  being received from [employer]) for routine progression of care      Report consisted of patients Situation, Background, Assessment and   Recommendations(SBAR). Information from the following report(s) SBAR, MAR and Recent Results was reviewed with the receiving nurse. Opportunity for questions and clarification was provided. Assessment completed upon patients arrival to unit and care assumed.

## 2021-05-26 NOTE — PROGRESS NOTES
TRANSFER - IN REPORT:    Verbal report received from Gris Leonard RN (name) on Jacquie Smith  being received from ER (unit) for routine progression of care      Report consisted of patients Situation, Background, Assessment and   Recommendations(SBAR). Information from the following report(s) SBAR, ED Summary, Intake/Output, MAR and Recent Results was reviewed with the receiving nurse. Opportunity for questions and clarification was provided. Assessment completed upon patients arrival to unit and care assumed.            Transport put in for patient

## 2021-05-26 NOTE — PERIOP NOTES
TRANSFER - OUT REPORT:    Verbal report given to Joie Rothman RN on Althea Doss  being transferred to [phone] for routine post - op       Report consisted of patients Situation, Background, Assessment and   Recommendations(SBAR). Information from the following report(s) SBAR, OR Summary, Procedure Summary, Intake/Output and Recent Results was reviewed with the receiving nurse. Lines:   Peripheral IV 05/26/21 Anterior;Proximal;Right Forearm (Active)   Site Assessment Clean, dry, & intact 05/26/21 1607   Phlebitis Assessment 0 05/26/21 1607   Infiltration Assessment 0 05/26/21 1607   Dressing Status Clean, dry, & intact 05/26/21 1607   Dressing Type Tape;Transparent 05/26/21 1607   Hub Color/Line Status Patent 05/26/21 1607        Opportunity for questions and clarification was provided. Patient transported with:   Tech    VTE prophylaxis orders have not been written for Althea Doss. Patient and family given floor number and nurses name. Family updated re: pt status after security code verified.

## 2021-05-26 NOTE — PROGRESS NOTES
Chart review complete, CM met with pt at bedside, pt found laying on stretcher alert and oriented x4, states fell this am c/o lt hip/leg pain pending xrays. Pt states she lives with spouse in mobile home with ramp to enter, states no DME in home for use, states prior to fall she was independent with ADLS and drives, affords medications without difficulty. Pt is currently attending outpt PT at [facility]. Made pt aware CM staff will remain available to assist with dc needs as needed. Care Management Interventions  PCP Verified by CM:  Yes (Dr. Jennifer Ryder lst visit 4/7/2021)  Discharge Durable Medical Equipment: No  Physical Therapy Consult: No  Occupational Therapy Consult: No  Speech Therapy Consult: No  Current Support Network: Own Home, Lives with Spouse  Confirm Follow Up Transport: Family  Discharge Location  Discharge Placement: Unable to determine at this time

## 2021-05-27 ENCOUNTER — APPOINTMENT (OUTPATIENT)
Dept: GENERAL RADIOLOGY | Age: 76
DRG: 522 | End: 2021-05-27
Attending: ORTHOPAEDIC SURGERY
Payer: MEDICARE

## 2021-05-27 LAB
ANION GAP SERPL CALC-SCNC: 12 MMOL/L (ref 7–16)
BUN SERPL-MCNC: 10 MG/DL (ref 8–23)
CALCIUM SERPL-MCNC: 7.9 MG/DL (ref 8.3–10.4)
CHLORIDE SERPL-SCNC: 108 MMOL/L (ref 98–107)
CO2 SERPL-SCNC: 21 MMOL/L (ref 21–32)
CREAT SERPL-MCNC: 0.68 MG/DL (ref 0.6–1)
ERYTHROCYTE [DISTWIDTH] IN BLOOD BY AUTOMATED COUNT: 13.1 % (ref 11.9–14.6)
GLUCOSE SERPL-MCNC: 87 MG/DL (ref 65–100)
HCT VFR BLD AUTO: 32.6 % (ref 35.8–46.3)
HGB BLD-MCNC: 10.6 G/DL (ref 11.7–15.4)
MCH RBC QN AUTO: 32 PG (ref 26.1–32.9)
MCHC RBC AUTO-ENTMCNC: 32.5 G/DL (ref 31.4–35)
MCV RBC AUTO: 98.5 FL (ref 79.6–97.8)
MM INDURATION POC: 0 MM (ref 0–5)
NRBC # BLD: 0 K/UL (ref 0–0.2)
PLATELET # BLD AUTO: 192 K/UL (ref 150–450)
PMV BLD AUTO: 10.2 FL (ref 9.4–12.3)
POTASSIUM SERPL-SCNC: 4.3 MMOL/L (ref 3.5–5.1)
PPD POC: NEGATIVE NEGATIVE
RBC # BLD AUTO: 3.31 M/UL (ref 4.05–5.2)
SODIUM SERPL-SCNC: 141 MMOL/L (ref 136–145)
WBC # BLD AUTO: 5.3 K/UL (ref 4.3–11.1)

## 2021-05-27 PROCEDURE — 74011000258 HC RX REV CODE- 258: Performed by: ORTHOPAEDIC SURGERY

## 2021-05-27 PROCEDURE — 97535 SELF CARE MNGMENT TRAINING: CPT

## 2021-05-27 PROCEDURE — 97530 THERAPEUTIC ACTIVITIES: CPT | Performed by: PHYSICAL THERAPIST

## 2021-05-27 PROCEDURE — 74011250636 HC RX REV CODE- 250/636: Performed by: INTERNAL MEDICINE

## 2021-05-27 PROCEDURE — 36415 COLL VENOUS BLD VENIPUNCTURE: CPT

## 2021-05-27 PROCEDURE — 74011250636 HC RX REV CODE- 250/636: Performed by: ORTHOPAEDIC SURGERY

## 2021-05-27 PROCEDURE — 74011250637 HC RX REV CODE- 250/637: Performed by: ORTHOPAEDIC SURGERY

## 2021-05-27 PROCEDURE — 97165 OT EVAL LOW COMPLEX 30 MIN: CPT

## 2021-05-27 PROCEDURE — 77030038269 HC DRN EXT URIN PURWCK BARD -A

## 2021-05-27 PROCEDURE — 97161 PT EVAL LOW COMPLEX 20 MIN: CPT | Performed by: PHYSICAL THERAPIST

## 2021-05-27 PROCEDURE — 80048 BASIC METABOLIC PNL TOTAL CA: CPT

## 2021-05-27 PROCEDURE — 74011250637 HC RX REV CODE- 250/637: Performed by: INTERNAL MEDICINE

## 2021-05-27 PROCEDURE — 65270000029 HC RM PRIVATE

## 2021-05-27 PROCEDURE — 97116 GAIT TRAINING THERAPY: CPT | Performed by: PHYSICAL THERAPIST

## 2021-05-27 PROCEDURE — 2709999900 HC NON-CHARGEABLE SUPPLY

## 2021-05-27 PROCEDURE — 85027 COMPLETE CBC AUTOMATED: CPT

## 2021-05-27 PROCEDURE — 73502 X-RAY EXAM HIP UNI 2-3 VIEWS: CPT

## 2021-05-27 RX ADMIN — OXYCODONE 10 MG: 5 TABLET ORAL at 21:24

## 2021-05-27 RX ADMIN — OXYCODONE 10 MG: 5 TABLET ORAL at 07:45

## 2021-05-27 RX ADMIN — Medication 5 ML: at 13:26

## 2021-05-27 RX ADMIN — Medication 10 ML: at 21:24

## 2021-05-27 RX ADMIN — ONDANSETRON 4 MG: 2 INJECTION INTRAMUSCULAR; INTRAVENOUS at 11:36

## 2021-05-27 RX ADMIN — ACETAMINOPHEN 650 MG: 325 TABLET ORAL at 11:36

## 2021-05-27 RX ADMIN — SODIUM CHLORIDE, SODIUM LACTATE, POTASSIUM CHLORIDE, AND CALCIUM CHLORIDE 125 ML/HR: 600; 310; 30; 20 INJECTION, SOLUTION INTRAVENOUS at 03:54

## 2021-05-27 RX ADMIN — Medication 1 TABLET: at 07:44

## 2021-05-27 RX ADMIN — ACETAMINOPHEN 650 MG: 325 TABLET ORAL at 23:11

## 2021-05-27 RX ADMIN — ASPIRIN 325 MG: 325 TABLET, COATED ORAL at 07:44

## 2021-05-27 RX ADMIN — CEFAZOLIN SODIUM 1 G: 1 INJECTION, POWDER, FOR SOLUTION INTRAMUSCULAR; INTRAVENOUS at 07:45

## 2021-05-27 RX ADMIN — ONDANSETRON 4 MG: 2 INJECTION INTRAMUSCULAR; INTRAVENOUS at 21:24

## 2021-05-27 RX ADMIN — ACETAMINOPHEN 650 MG: 325 TABLET ORAL at 06:04

## 2021-05-27 RX ADMIN — Medication 10 ML: at 06:11

## 2021-05-27 RX ADMIN — OXYCODONE 10 MG: 5 TABLET ORAL at 03:51

## 2021-05-27 RX ADMIN — Medication 5 ML: at 13:27

## 2021-05-27 RX ADMIN — Medication 10 ML: at 06:10

## 2021-05-27 RX ADMIN — Medication 1 TABLET: at 16:43

## 2021-05-27 RX ADMIN — OXYCODONE 10 MG: 5 TABLET ORAL at 11:36

## 2021-05-27 RX ADMIN — Medication 1 TABLET: at 11:36

## 2021-05-27 RX ADMIN — ACETAMINOPHEN 650 MG: 325 TABLET ORAL at 16:43

## 2021-05-27 RX ADMIN — ACETAMINOPHEN 650 MG: 325 TABLET ORAL at 00:04

## 2021-05-27 NOTE — PROGRESS NOTES
Problem: Falls - Risk of  Goal: *Absence of Falls  Description: Document Selina Solorio Fall Risk and appropriate interventions in the flowsheet.   Outcome: Progressing Towards Goal  Note: Fall Risk Interventions:  Mobility Interventions: Bed/chair exit alarm, Communicate number of staff needed for ambulation/transfer, Patient to call before getting OOB         Medication Interventions: Bed/chair exit alarm, Patient to call before getting OOB, Teach patient to arise slowly    Elimination Interventions: Bed/chair exit alarm, Call light in reach, Patient to call for help with toileting needs, Toilet paper/wipes in reach, Stay With Me (per policy), Toileting schedule/hourly rounds    History of Falls Interventions: Bed/chair exit alarm, Consult care management for discharge planning, Door open when patient unattended, Investigate reason for fall, Room close to nurse's station

## 2021-05-27 NOTE — PROGRESS NOTES
ACUTE OT GOALS:  (Developed with and agreed upon by patient and/or caregiver.)  1. Patient will complete functional transfers with supervision. 2. Patient will bathe and dress lower body with supervision and adaptive device as needed  3. Patient will toilet with supervision and adaptive device as needed. 4. Patient will tolerate 25 minutes of OT treatment with up to 2 rest breaks to increase endurance for ADLs. 5. Patient will demonstrate modified independence with therapeutic exercise HEP to increase strength in BUEs for increased safety and independence with functional tasks. 6. Patient will groom in standing with supervision and adaptive device as needed. 7. Patient will complete functional mobility for ADLs with supervision and adaptive device as needed.     Timeframe: 7 visits WBAT RLE         OCCUPATIONAL THERAPY ASSESSMENT: Initial Assessment OT Treatment Day # 1    Deepak Reyes is a 77 y.o. female   PRIMARY DIAGNOSIS: <principal problem not specified>  [facility] [S72.009A]  Procedure(s) (LRB):  Right HIP HEMIARTHROPLASTY (Right)  1 Day Post-Op  Reason for Referral:  Weakness following R hip hemiarthroplasty impacting ADL skills  ICD-10: Treatment Diagnosis: Pain in Right Hip (M25.551)  [place] / [place] / Product Type: Medicare /   ASSESSMENT:     REHAB RECOMMENDATIONS:   Recommendation to date pending progress:  Setting:   Outpatient Therapy  Equipment:    To Be Determined     PRIOR LEVEL OF FUNCTION:  (Prior to Hospitalization)  INITIAL/CURRENT LEVEL OF FUNCTION:  (Based on today's evaluation)   Bathing:   Independent  Dressing:   Independent  Feeding/Grooming:   Independent  Toileting:   Independent  Functional Mobility:   Independent Bathing:   Maximal Assistance  Dressing:   Maximal Assistance  Feeding/Grooming:   Set Up  Toileting:   Not tested  Functional Mobility:   Maximal Assistance ASSESSMENT:  Ms. Nito Brown is a 78 yo female who was admitted after a fall with a R hip hemiarthroplasty. At baseline pt performs ADLs with independence and ambulates without a device. Pt is very active and completes all IADLs independently as well. Pt now ambulates with max assist and use of RW due to decreased balance, coordination, and RLE weakness/pain. Pt will benefit from skilled OT services to address these deficits and return back to baseline. SUBJECTIVE:   Ms. Nito Brown states, \"I was going to head to a gym class but then I fell. \"    SOCIAL HISTORY/LIVING ENVIRONMENT: Pt lives with her  and has a walk in shower without a chair nor grab bars.    Home Environment: Trailer/mobile home  # Steps to Enter: 1  One/Two Story Residence: One story  Living Alone: No  Support Systems: Spouse/Significant Other/Partner    OBJECTIVE:     PAIN: VITAL SIGNS: LINES/DRAINS:   Pre Treatment:    Post Treatment: 9 out of 10   IV and Purewick  O2 Device: None (Room air)     GROSS EVALUATION:  WFL Within Functional Limits Abnormal/ Functional Abnormal/ Non-Functional (see comments) Not Tested Comments:   AROM [x] [] [] []    PROM [x] [] [] []    Strength [] [x] [] []    Balance [] [x] [] []    Posture [x] [] [] []    Sensation [x] [] [] []    Coordination [x] [] [] []    Tone [x] [] [] []    Edema [x] [] [] []    Activity Tolerance [] [x] [] []     [] [] [] []      COGNITION/  PERCEPTION: Intact Impaired   (see comments) Comments:   Orientation [x] []    Vision [x] []    Hearing [x] []    Judgment/ Insight [x] []    Attention [x] []    Memory [x] []    Command Following [x] []    Emotional Regulation [x] []     [] []      ACTIVITIES OF DAILY LIVING: I Mod I S SBA CGA Min Mod Max Total NT Comments   BASIC ADLs:              Bathing/ Showering [] [] [] [] [] [] [] [x] [] [] Assist to wash BLEs assist to wash bottom in stance   Toileting [] [] [] [] [] [] [] [] [] [x]    Dressing [] [] [] [] [] [] [] [x] [] [] UB - setup, LB max assist   Feeding [x] [] [] [] [] [] [] [] [] []    Grooming [] [] [x] [] [] [] [] [] [] []    Personal Device Care [] [] [] [] [] [] [] [] [] [x]    Functional Mobility [] [] [] [] [] [] [x] [x] [] []    I=Independent, Mod I=Modified Independent, S=Supervision, SBA=Standby Assistance, CGA=Contact Guard Assistance,   Min=Minimal Assistance, Mod=Moderate Assistance, Max=Maximal Assistance, Total=Total Assistance, NT=Not Tested    MOBILITY: I Mod I S SBA CGA Min Mod Max Total  NT x2 Comments:   Supine to sit [] [] [] [] [] [] [x] [x] [] [] []    Sit to supine [] [] [] [] [] [] [] [x] [] [] []    Sit to stand [] [] [] [] [] [] [x] [] [] [] []    Bed to chair [] [] [] [] [] [] [] [x] [] [] []    I=Independent, Mod I=Modified Independent, S=Supervision, SBA=Standby Assistance, CGA=Contact Guard Assistance,   Min=Minimal Assistance, Mod=Moderate Assistance, Max=Maximal Assistance, Total=Total Assistance, NT=Not Tested    [employer] AM-PAC 6 Clicks   Daily Activity Inpatient Short Form        How much help from another person does the patient currently need. .. Total A Lot A Little None   1. Putting on and taking off regular lower body clothing? [] 1   [x] 2   [] 3   [] 4   2. Bathing (including washing, rinsing, drying)? [] 1   [x] 2   [] 3   [] 4   3. Toileting, which includes using toilet, bedpan or urinal?   [] 1   [x] 2   [] 3   [] 4   4. Putting on and taking off regular upper body clothing? [] 1   [] 2   [x] 3   [] 4   5. Taking care of personal grooming such as brushing teeth? [] 1   [] 2   [] 3   [x] 4   6. Eating meals? [] 1   [] 2   [] 3   [x] 4   © 2007, Trustees of [facility] [employer], under license to [employer]. All rights reserved     Score:  Initial: 17 Most Recent: X (Date: -- )   Interpretation of Tool:  Represents activities that are increasingly more difficult (i.e. Bed mobility, Transfers, Gait).     PLAN:   FREQUENCY/DURATION: OT Plan of Care: 4 times/week for duration of hospital stay or until stated goals are met, whichever comes first.    PROBLEM LIST:   (Skilled intervention is medically necessary to address:)  1. Decreased ADL/Functional Activities  2. Decreased Activity Tolerance  3. Decreased Balance  4. Decreased Coordination  5. Decreased Gait Ability  6. Decreased Strength  7. Decreased Transfer Abilities  8. Increased Pain   INTERVENTIONS PLANNED:   (Benefits and precautions of occupational therapy have been discussed with the patient.)  1. Self Care Training  2. Therapeutic Activity  3. Therapeutic Exercise/HEP  4. Neuromuscular Re-education  5. Education     TREATMENT:     EVALUATION: Low Complexity : (Untimed Charge)    TREATMENT:   ($$ Self Care/Home Management: 8-22 mins    )  Self Care (20 Minutes): Self care including Upper Body Bathing, Lower Body Bathing and Upper Body Dressing to increase independence and decrease level of assistance required.     TREATMENT GRID:  N/A    AFTER TREATMENT POSITION/PRECAUTIONS:  Bed and Transporter taking pt off floor    INTERDISCIPLINARY COLLABORATION:  MD/PA/NP and RN/PCT    TOTAL TREATMENT DURATION:  OT Patient Time In/Time Out  Time In: 7951  Time Out: 0828    Estevan Perez OTR/L

## 2021-05-27 NOTE — PROGRESS NOTES
Progress Note    Patient: Jennifer Alonzo MRN: [id number]  SSN: [id number]    YOB: 1945  Age: 77 y.o. Sex: female      Admit Date: 5/26/2021    LOS: 1 day     Assessment and Plan:   77 y.o. female with no significant past medical history and not on any home medications who presented to ED due to mechanical fall at home. Displaced Right Femoral Neck fracture s/p Fall  - S/p right hip hemiarthroplasty  - Pain management  - Ortho recs  - PT / OT eval    Acute blood loss anemia likely postop  - Monitor H&H  - Transfuse if Hb <7    DVT ppx on ASA    Subjective:   77 y.o. female with no significant past medical history and not on any home medications who presented to ED due to mechanical fall at home. Patient seen and examined at bedside. This morning having right hip pain. Denies chest pain, no SOB, no nausea. Objective:     Vitals:    05/27/21 0044 05/27/21 0436 05/27/21 0742 05/27/21 1136   BP: 95/60 99/61 (!) 126/59 106/68   Pulse: 96 95 86 86   Resp: 16 16 17 18   Temp: 98.4 °F (36.9 °C) 98.1 °F (36.7 °C) 98.6 °F (37 °C) 98 °F (36.7 °C)   SpO2: 90% 90% 92% 93%   Weight:       Height:            Intake and Output:  Current Shift: No intake/output data recorded.   Last three shifts: 05/25 1901 - 05/27 0700  In: 2362.5 [I.V.:2362.5]  Out: 525 [Urine:500]    ROS  10 ROS negative except from stated on subjective    Physical Exam:   General: Alert, oriented, NAD  HEENT: NC/AT, EOM are intact  Neck: supple, no JVD  Cardiovascular: RRR, S1, S2, no murmurs  Respiratory: Lungs are clear, no wheezes or rales  Abdomen: Soft, NT, ND  Back: No CVA tenderness, no paraspinal tenderness  Extremities: LE without pedal edema, no erythema  Neuro: A&O, CN are intact, no focal deficits  Skin: no rash or ulcers  Psych: good mood and affect    Lab/Data Review:  I have personally reviewed patients laboratory data showing  Recent Results (from the past 24 hour(s))   METABOLIC PANEL, BASIC    Collection Time: 05/27/21 4:59 AM   Result Value Ref Range    Sodium 141 136 - 145 mmol/L    Potassium 4.3 3.5 - 5.1 mmol/L    Chloride 108 (H) 98 - 107 mmol/L    CO2 21 21 - 32 mmol/L    Anion gap 12 7 - 16 mmol/L    Glucose 87 65 - 100 mg/dL    BUN 10 8 - 23 MG/DL    Creatinine 0.68 0.6 - 1.0 MG/DL    GFR est AA >60 >60 ml/min/1.73m2    GFR est non-AA >60 >60 ml/min/1.73m2    Calcium 7.9 (L) 8.3 - 10.4 MG/DL   CBC W/O DIFF    Collection Time: 05/27/21  7:22 AM   Result Value Ref Range    WBC 5.3 4.3 - 11.1 K/uL    RBC 3.31 (L) 4.05 - 5.2 M/uL    HGB 10.6 (L) 11.7 - 15.4 g/dL    HCT 32.6 (L) 35.8 - 46.3 %    MCV 98.5 (H) 79.6 - 97.8 FL    MCH 32.0 26.1 - 32.9 PG    MCHC 32.5 31.4 - 35.0 g/dL    RDW 13.1 11.9 - 14.6 %    PLATELET 983 130 - 775 K/uL    MPV 10.2 9.4 - 12.3 FL    ABSOLUTE NRBC 0.00 0.0 - 0.2 K/uL      All Micro Results     Procedure Component Value Units Date/Time    MSSA/MRSA SC BY PCR, NASAL SWAB [[id number]]     Order Status: Sent Specimen: Nasal swab            Image:  I have personally reviewed patients imaging showing  XR HIP RT W OR WO PELV 2-3 VWS   Final Result      Immediate postoperative changes status post right hip arthroplasty, including   subcutaneous emphysema and soft tissue swelling. Unremarkable immediate   postoperative appearance of the hardware. XR HIP RT W OR WO PELV 2-3 VWS   Final Result   Subcapital right femoral neck fracture, displaced. XR FEMUR RT 2 VS   Final Result   Subcapital right femoral neck fracture, displaced.       XR CHEST SNGL V   Final Result           Hospital problems     Active Problems:    Fracture of neck of right femur ([address] (5/26/2021)      Hip fracture requiring operative repair [facility]) (5/26/2021)          Signed By: Joseline White MD     May 27, 2021

## 2021-05-27 NOTE — PROGRESS NOTES
Progress Note    Patient: Mary Alice Wallis MRN: [id number]  SSN: [id number]    YOB: 1945  Age: 77 y.o. Sex: female      Admit Date: 5/26/2021    LOS: 1 day     Subjective:     Patient reports she is having some pain but the pain medication is definitely helping    Objective:     Vitals:    05/26/21 1722 05/26/21 1957 05/27/21 0044 05/27/21 0436   BP: 107/61 124/61 95/60 99/61   Pulse: 63 87 96 95   Resp: 16 16 16 16   Temp: 97.8 °F (36.6 °C) 98.1 °F (36.7 °C) 98.4 °F (36.9 °C) 98.1 °F (36.7 °C)   SpO2: 94% 93% 90% 90%   Weight:       Height:            Intake and Output:  Current Shift: No intake/output data recorded. Last three shifts: 05/25 1901 - 05/27 0700  In: 2362.5 [I.V.:2362.5]  Out: 26 [Urine:500]    alert and oriented to person place time and situation  Skin - dressing clean dry and intact  Motor and sensory function intact in RIGHT LOWER extremity  Pulses palpable in RIGHT LOWER extremity       Lab/Data Review:  CBC:   Lab Results   Component Value Date/Time    WBC 12.4 (H) 05/26/2021 10:24 AM    HGB 12.2 05/26/2021 10:24 AM    HCT 37.1 05/26/2021 10:24 AM     05/26/2021 10:24 AM          Assessment:     Acute postop blood loss anemia with hemoglobin stable at 12.2, POD #1 from right hip hemiarthroplasty for right femoral neck fracture    Plan:     Did not have any postoperative films yesterday/still have no documentation that her right hip is in excellent position and I have reordered those to be done today    Patient may be weightbearing as tolerated on the right lower extremity. They can be full active and passive range of motion of the right hip. They can have dry dressing change starting on postoperative day 2 and then after that daily and as needed.   They will need aspirin 325 mg 1 p.o. daily for 4 weeks as DVT prophylaxis as well as SCDs while hospitalized unless they are on a preoperative anticoagulant chronically and in this case they can restart this on postoperative day 2 and use this instead of the aspirin. They will need to have orthopedic follow-up approximately 2 weeks after discharge.     Signed By: Annie Artis MD     May 27, 2021

## 2021-05-27 NOTE — PROGRESS NOTES
ACUTE PHYSICAL THERAPY GOALS:  (Developed with and agreed upon by patient and/or caregiver. )    LTG:  (1.)Ms. Kiera Mullins will move from supine to sit and sit to supine  and scoot up and down in bed with INDEPENDENT within 7 treatment day(s). (2.)Ms. Kiera Mullins will transfer from bed to chair and chair to bed with MODIFIED INDEPENDENCE using the least restrictive device within 7 treatment day(s). (3.)Ms. Kiera Mullins will ambulate with MODIFIED INDEPENDENCE for 150 feet with the least restrictive device within 7 treatment day(s). (4.)Ms. Kiera Mullins will tolerate at least 23 min of dynamic standing activity to assist standing ADLs with the least restrictive device within 7 treatment days.       _______________________________________________________________________________________________        PHYSICAL THERAPY ASSESSMENT: Initial Assessment, Daily Note and AM PT Treatment Day # 1 WBAT, HIP PRECAUTIONS      Pravin Bunch is a 77 y.o. female   PRIMARY DIAGNOSIS: <principal problem not specified>  Hip fracture requiring operative repair ([facility] [S72.009A]  Procedure(s) (LRB):  Right HIP HEMIARTHROPLASTY (Right)  1 Day Post-Op  Reason for Referral:    ICD-10: Treatment Diagnosis: Generalized Muscle Weakness (M62.81)  Other lack of cordination (R27.8)  Difficulty in walking, Not elsewhere classified (R26.2)  Other abnormalities of gait and mobility (R26.89)  [place]PAT[place] / [place] / Product Type: Medicare /     ASSESSMENT:     REHAB RECOMMENDATIONS:   Recommendation to date pending progress:  [address] [place] [address]  Equipment:    already has RW     PRIOR LEVEL OF FUNCTION:  (Prior to Hospitalization) INITIAL/CURRENT LEVEL OF FUNCTION:  (Most Recently Demonstrated)   Bed Mobility:   Independent  Sit to Stand:   Independent  Transfers:   Independent  Gait/Mobility:   Independent Bed Mobility:   Moderate Assistance  Sit to Stand:   Minimal Assistance  Transfers:   Minimal Assistance  Gait/Mobility:   Minimal Assistance     ASSESSMENT:  Ms. Anjelica Camarillo presents in supine, A&Ox4. Upon entering, Pnt is agreeable to PT treatment. she reports 5/10 pain in her hip at rest. Pnt performed supine > sit with mod A and extra time, sitting EOB with fair sitting balance control. Sit > stand with min A using RW. Gait x 8 ft with min A and RW, cues for step clearance and posture. Gait is noted to be very slow. Stand > sit with min A, followed by positioning for comfort. At end of session pt up in bedside chair  with all needs within reach, alarm activated for safety, RN notified. Overall, Pnt  presents as functioning below her baseline, with deficits in mobility including transfers, gait, balance, and activity tolerance. Pt will continue to benefit from skilled therapy services to address stated deficits to promote return to highest level of function, independence, and safety. Will continue to follow.          SUBJECTIVE:   Ms. Anjelica Camarillo states, \"this leg is so heavy\"    SOCIAL HISTORY/LIVING ENVIRONMENT: ramp to enter  Home Environment: Trailer/mobile home  # Steps to Enter: 1  One/Two Story Residence: One story  Living Alone: No  Support Systems: Spouse/Significant Other/Partner  OBJECTIVE:     PAIN: VITAL SIGNS: LINES/DRAINS:   Pre Treatment:  5/10  Post Treatment: 5/10   IV and Purewick  O2 Device: None (Room air)     GROSS EVALUATION:   Within Functional Limits Abnormal/ Functional Abnormal/ Non-Functional (see comments) Not Tested Comments:   AROM [] [] [x] [] Hip precautions   PROM [] [] [x] []    Strength [] [x] [] []    Balance [] [x] [] []    Posture [] [x] [] []    Sensation [x] [] [] []    Coordination [x] [] [] []    Tone [x] [] [] []    Edema [x] [] [] []    Activity Tolerance [] [x] [] [] Decreased standing tolerance    [] [] [] []      COGNITION/  PERCEPTION: Intact Impaired   (see comments) Comments:   Orientation [x] []    Vision [x] []    Hearing [x] []    Command Following [x] [] Safety Awareness [x] []     [] []      MOBILITY: I Mod I S SBA CGA Min Mod Max Total  NT x2 Comments:   Bed Mobility    Rolling [] [] [] [] [] [] [x] [] [] [] []    Supine to Sit [] [] [] [] [] [] [x] [] [] [] []    Scooting [] [] [] [] [] [] [x] [] [] [] []    Sit to Supine [] [] [] [] [] [] [] [] [] [x] []    Transfers    Sit to Stand [] [] [] [] [] [] [x] [] [] [] []    Bed to Chair [] [] [] [] [] [x] [] [] [] [] []    Stand to Sit [] [] [] [] [] [] [x] [] [] [] []    I=Independent, Mod I=Modified Independent, S=Supervision, SBA=Standby Assistance, CGA=Contact Guard Assistance,   Min=Minimal Assistance, Mod=Moderate Assistance, Max=Maximal Assistance, Total=Total Assistance, NT=Not Tested  GAIT: I Mod I S SBA CGA Min Mod Max Total  NT x2 Comments:   Level of Assistance [] [] [] [] [] [x] [] [] [] [] []    Distance 8'    DME Rolling Walker and Gait Belt    Gait Quality Shuffled, slow    Weightbearing Status WBAT     I=Independent, Mod I=Modified Independent, S=Supervision, SBA=Standby Assistance, CGA=Contact Guard Assistance,   Min=Minimal Assistance, Mod=Moderate Assistance, Max=Maximal Assistance, Total=Total Assistance, NT=Not Tested    [facility] [facility] 6 Clicks   Basic Mobility Inpatient Short Form       How much difficulty does the patient currently have. .. Unable A Lot A Little None   1. Turning over in bed (including adjusting bedclothes, sheets and blankets)? [] 1   [] 2   [x] 3   [] 4   2. Sitting down on and standing up from a chair with arms ( e.g., wheelchair, bedside commode, etc.)   [] 1   [] 2   [x] 3   [] 4   3. Moving from lying on back to sitting on the side of the bed? [] 1   [] 2   [x] 3   [] 4   How much help from another person does the patient currently need. .. Total A Lot A Little None   4. Moving to and from a bed to a chair (including a wheelchair)? [] 1   [] 2   [x] 3   [] 4   5. Need to walk in hospital room? [] 1   [x] 2   [] 3   [] 4   6.   Climbing 3-5 steps with a railing? [] 1   [x] 2   [] 3   [] 4   © 2007, Trustees of [facility] [employer], under license to [employer]. All rights reserved     Score:  Initial: 16 Most Recent: X (Date: -- )    Interpretation of Tool:  Represents activities that are increasingly more difficult (i.e. Bed mobility, Transfers, Gait). PLAN:   FREQUENCY/DURATION: PT Plan of Care: BID for duration of hospital stay or until stated goals are met, whichever comes first.    PROBLEM LIST:   (Skilled intervention is medically necessary to address:)  1. Decreased ADL/Functional Activities  2. Decreased Activity Tolerance  3. Decreased AROM/PROM  4. Decreased Cognition  5. Decreased Coordination  6. Decreased Gait Ability  7. Decreased Strength  8. Decreased Transfer Abilities   INTERVENTIONS PLANNED:   (Benefits and precautions of physical therapy have been discussed with the patient.)  1. Therapeutic Activity  2. Therapeutic Exercise/HEP  3. Neuromuscular Re-education  4. Gait Training  5. Manual Therapy  6. Education     TREATMENT:     EVALUATION: Low Complexity : (Untimed Charge)    TREATMENT:   ($$ Therapeutic Activity: 8-22 mins    )  Therapeutic Activity (15 Minutes): Therapeutic activity included Rolling, Supine to Sit, Scooting, Lateral Scooting, Transfer Training, Ambulation on level ground, Sitting balance  and Standing balance to improve functional Mobility, Strength, ROM and Activity tolerance.     TREATMENT GRID:  N/A    AFTER TREATMENT POSITION/PRECAUTIONS:  Chair, Needs within reach, RN notified and Visitors at bedside    INTERDISCIPLINARY COLLABORATION:  RN/PCT and PT/PTA    TOTAL TREATMENT DURATION:  PT Patient Time In/Time Out  Time In: 1004  Time Out: Samra Beauchamp

## 2021-05-27 NOTE — PROGRESS NOTES
ACUTE PHYSICAL THERAPY GOALS:  (Developed with and agreed upon by patient and/or caregiver. )  LTG:  (1.)Ms. Isaías Perez will move from supine to sit and sit to supine  and scoot up and down in bed with INDEPENDENT within 7 treatment day(s). (2.)Ms. Isaías Perez will transfer from bed to chair and chair to bed with MODIFIED INDEPENDENCE using the least restrictive device within 7 treatment day(s). (3.)Ms. Isaías Perez will ambulate with MODIFIED INDEPENDENCE for 150 feet with the least restrictive device within 7 treatment day(s). (4.)Ms. Isaías Perez will tolerate at least 23 min of dynamic standing activity to assist standing ADLs with the least restrictive device within 7 treatment days. PHYSICAL THERAPY: Daily Note and PM Treatment Day # 1    Erick Garcia is a 77 y.o. female   PRIMARY DIAGNOSIS: Fracture of neck of right femur ([address]  Hip fracture requiring operative repair ([facility] [S72.009A]  Procedure(s) (LRB):  Right HIP HEMIARTHROPLASTY (Right)  1 Day Post-Op    ASSESSMENT:     REHAB RECOMMENDATIONS: CURRENT LEVEL OF FUNCTION:  (Most Recently Demonstrated)   Recommendation to date pending progress:  [address] [place] [address]  Equipment:    Rolling Walker Bed Mobility:   Minimal Assistance x 2  Sit to Stand:   Minimal Assistance  Transfers:   Minimal Assistance  Gait/Mobility:   Minimal Assistance     ASSESSMENT:  Ms. Isaías Perez presents in supine. Upon entering, Pnt is agreeable to PT treatment. she reports no pain in her hip at rest. Pnt performed supine > sit with min Ax2, sitting EOB with good static sitting balance control. Sit > stand with min Ax2 using RW. Gait 2x 35 ft with min A, RW, and chair follow, cues for step clearance and posture. Gait is noted to be very slow, but steadier, w/ verbal cues to increased step length. Stand > sit with min A, followed by positioning for comfort. At end of session pt up in bedside chair with all needs within reach, alarm activated for safety, RN notified.  Overall, great progress today as pnt improved in ambulation distance. Pnt continues to present as functioning below her baseline, with deficits in mobility including transfers, gait, balance, and activity tolerance. Pt will continue to benefit from skilled therapy services to address stated deficits to promote return to highest level of function, independence, and safety. Will continue to follow.          SUBJECTIVE:   Ms. Sun Matos states, \"I know it's good for me to walk\"    SOCIAL HISTORY/ LIVING ENVIRONMENT:   Home Environment: Trailer/mobile home  # Steps to Enter: 1  One/Two Story Residence: One story  Living Alone: No  Support Systems: Spouse/Significant Other/Partner  OBJECTIVE:     PAIN: VITAL SIGNS: LINES/DRAINS:   Pre Treatment: Pain Screen  Pain Scale 1: Numeric (0 - 10)  Pain Intensity 1: 0  Post Treatment: 0   Hemovac  O2 Device: None (Room air)     MOBILITY: I Mod I S SBA CGA Min Mod Max Total  NT x2 Comments:   Bed Mobility    Rolling [] [] [] [] [] [x] [] [] [] [] [x]    Supine to Sit [] [] [] [] [] [x] [] [] [] [] [x]    Scooting [] [] [] [] [] [x] [] [] [] [] [x]    Sit to Supine [] [] [] [] [] [x] [] [] [] [] [x]    Transfers    Sit to Stand [] [] [] [] [] [x] [] [] [] [] []    Bed to Chair [] [] [] [] [] [] [] [] [] [x] []    Stand to Sit [] [] [] [] [] [x] [] [] [] [] []    I=Independent, Mod I=Modified Independent, S=Supervision, SBA=Standby Assistance, CGA=Contact Guard Assistance,   Min=Minimal Assistance, Mod=Moderate Assistance, Max=Maximal Assistance, Total=Total Assistance, NT=Not Tested    BALANCE: Good Fair+ Fair Fair- Poor NT Comments   Sitting Static [] [x] [] [] [] []    Sitting Dynamic [] [x] [] [] [] []              Standing Static [] [] [x] [] [] []    Standing Dynamic [] [] [x] [] [] []      GAIT: I Mod I S SBA CGA Min Mod Max Total  NT x2 Comments:   Level of Assistance [] [] [] [] [] [x] [] [] [] [] []    Distance 2 x 35'    DME Rolling Walker, Gait Belt and chair follow    Gait Quality Slow, shuffled, decreased stance time    Weightbearing  Status WBAT     I=Independent, Mod I=Modified Independent, S=Supervision, SBA=Standby Assistance, CGA=Contact Guard Assistance,   Min=Minimal Assistance, Mod=Moderate Assistance, Max=Maximal Assistance, Total=Total Assistance, NT=Not Tested    PLAN:   FREQUENCY/DURATION: PT Plan of Care: BID for duration of hospital stay or until stated goals are met, whichever comes first.  TREATMENT:     TREATMENT:   ($$ Gait Trainin-37 mins  $$ Therapeutic Activity: 8-22 mins    )  Gait Training (27 Minutes): Gait training for 2 x 35 feet utilizing 5 [facility], Gait Belt and chair follow. Patient required Manual, Tactile, Verbal and Visual cueing to improve Activity Pacing, Assistive Device Utilization, Dynamic Standing Balance and Gait Mechanics.      TREATMENT GRID:  N/A    AFTER TREATMENT POSITION/PRECAUTIONS:  Bed, Needs within reach and RN notified    INTERDISCIPLINARY COLLABORATION:  RN/PCT, PT/PTA and Rehab Attendant     TOTAL TREATMENT DURATION:  PT Patient Time In/Time Out  Time In: Kateryna  Time Out: [address]

## 2021-05-28 LAB
BASOPHILS # BLD: 0 K/UL (ref 0–0.2)
BASOPHILS NFR BLD: 1 % (ref 0–2)
COVID-19 RAPID TEST, COVR: NOT DETECTED
DIFFERENTIAL METHOD BLD: ABNORMAL
EOSINOPHIL # BLD: 0.5 K/UL (ref 0–0.8)
EOSINOPHIL NFR BLD: 6 % (ref 0.5–7.8)
ERYTHROCYTE [DISTWIDTH] IN BLOOD BY AUTOMATED COUNT: 13.1 % (ref 11.9–14.6)
HCT VFR BLD AUTO: 28.8 % (ref 35.8–46.3)
HGB BLD-MCNC: 9.5 G/DL (ref 11.7–15.4)
IMM GRANULOCYTES # BLD AUTO: 0 K/UL (ref 0–0.5)
IMM GRANULOCYTES NFR BLD AUTO: 0 % (ref 0–5)
LYMPHOCYTES # BLD: 1.4 K/UL (ref 0.5–4.6)
LYMPHOCYTES NFR BLD: 19 % (ref 13–44)
MCH RBC QN AUTO: 31.9 PG (ref 26.1–32.9)
MCHC RBC AUTO-ENTMCNC: 33 G/DL (ref 31.4–35)
MCV RBC AUTO: 96.6 FL (ref 79.6–97.8)
MONOCYTES # BLD: 0.7 K/UL (ref 0.1–1.3)
MONOCYTES NFR BLD: 9 % (ref 4–12)
NEUTS SEG # BLD: 5.1 K/UL (ref 1.7–8.2)
NEUTS SEG NFR BLD: 66 % (ref 43–78)
NRBC # BLD: 0 K/UL (ref 0–0.2)
PLATELET # BLD AUTO: 179 K/UL (ref 150–450)
PMV BLD AUTO: 10.3 FL (ref 9.4–12.3)
RBC # BLD AUTO: 2.98 M/UL (ref 4.05–5.2)
SARS-COV-2, COV2: NORMAL
SOURCE, COVRS: NORMAL
WBC # BLD AUTO: 7.7 K/UL (ref 4.3–11.1)

## 2021-05-28 PROCEDURE — 36415 COLL VENOUS BLD VENIPUNCTURE: CPT

## 2021-05-28 PROCEDURE — 97110 THERAPEUTIC EXERCISES: CPT

## 2021-05-28 PROCEDURE — 74011250636 HC RX REV CODE- 250/636: Performed by: INTERNAL MEDICINE

## 2021-05-28 PROCEDURE — 97530 THERAPEUTIC ACTIVITIES: CPT

## 2021-05-28 PROCEDURE — 85025 COMPLETE CBC W/AUTO DIFF WBC: CPT

## 2021-05-28 PROCEDURE — 74011250637 HC RX REV CODE- 250/637: Performed by: INTERNAL MEDICINE

## 2021-05-28 PROCEDURE — U0005 INFEC AGEN DETEC AMPLI PROBE: HCPCS

## 2021-05-28 PROCEDURE — 2709999900 HC NON-CHARGEABLE SUPPLY

## 2021-05-28 PROCEDURE — 65270000029 HC RM PRIVATE

## 2021-05-28 PROCEDURE — 87635 SARS-COV-2 COVID-19 AMP PRB: CPT

## 2021-05-28 PROCEDURE — 74011250637 HC RX REV CODE- 250/637: Performed by: ORTHOPAEDIC SURGERY

## 2021-05-28 RX ORDER — POLYETHYLENE GLYCOL 3350 17 G/17G
17 POWDER, FOR SOLUTION ORAL DAILY
Status: DISCONTINUED | OUTPATIENT
Start: 2021-05-28 | End: 2021-05-31 | Stop reason: HOSPADM

## 2021-05-28 RX ADMIN — ACETAMINOPHEN 650 MG: 325 TABLET ORAL at 17:51

## 2021-05-28 RX ADMIN — Medication 1 TABLET: at 11:01

## 2021-05-28 RX ADMIN — Medication 10 ML: at 13:09

## 2021-05-28 RX ADMIN — Medication 10 ML: at 06:10

## 2021-05-28 RX ADMIN — Medication 10 ML: at 21:16

## 2021-05-28 RX ADMIN — POLYETHYLENE GLYCOL 3350 17 G: 17 POWDER, FOR SOLUTION ORAL at 11:01

## 2021-05-28 RX ADMIN — OXYCODONE 10 MG: 5 TABLET ORAL at 11:01

## 2021-05-28 RX ADMIN — ACETAMINOPHEN 650 MG: 325 TABLET ORAL at 11:01

## 2021-05-28 RX ADMIN — Medication 1 TABLET: at 07:55

## 2021-05-28 RX ADMIN — Medication 1 TABLET: at 17:51

## 2021-05-28 RX ADMIN — ACETAMINOPHEN 650 MG: 325 TABLET ORAL at 06:10

## 2021-05-28 RX ADMIN — ASPIRIN 325 MG: 325 TABLET, COATED ORAL at 07:55

## 2021-05-28 RX ADMIN — OXYCODONE 10 MG: 5 TABLET ORAL at 20:00

## 2021-05-28 RX ADMIN — ONDANSETRON 4 MG: 2 INJECTION INTRAMUSCULAR; INTRAVENOUS at 13:07

## 2021-05-28 NOTE — PROGRESS NOTES
Problem: Falls - Risk of  Goal: *Absence of Falls  Description: Document Shawnee Roblero Fall Risk and appropriate interventions in the flowsheet.   Outcome: Progressing Towards Goal  Note: Fall Risk Interventions:  Mobility Interventions: Bed/chair exit alarm, Patient to call before getting OOB         Medication Interventions: Bed/chair exit alarm    Elimination Interventions: Bed/chair exit alarm, Call light in reach    History of Falls Interventions: Bed/chair exit alarm         Problem: Patient Education: Go to Patient Education Activity  Goal: Patient/Family Education  Outcome: Progressing Towards Goal     Problem: Patient Education: Go to Patient Education Activity  Goal: Patient/Family Education  Outcome: Progressing Towards Goal     Problem: Patient Education: Go to Patient Education Activity  Goal: Patient/Family Education  Outcome: Progressing Towards Goal

## 2021-05-28 NOTE — PROGRESS NOTES
ACUTE PHYSICAL THERAPY GOALS:  (Developed with and agreed upon by patient and/or caregiver. )  LTG:  (1.)Ms. Tolu Howard will move from supine to sit and sit to supine  and scoot up and down in bed with INDEPENDENT within 7 treatment day(s). (2.)Ms. Tolu Howard will transfer from bed to chair and chair to bed with MODIFIED INDEPENDENCE using the least restrictive device within 7 treatment day(s). (3.)Ms. Tolu Howard will ambulate with MODIFIED INDEPENDENCE for 150 feet with the least restrictive device within 7 treatment day(s). (4.)Ms. Tolu Howard will tolerate at least 23 min of dynamic standing activity to assist standing ADLs with the least restrictive device within 7 treatment days. PHYSICAL THERAPY: Daily Note and AM Treatment Day # 2    Gaby Krause is a 77 y.o. female   PRIMARY DIAGNOSIS: Fracture of neck of right femur ([facility]  Hip fracture requiring operative repair ([facility] [S72.009A]  Procedure(s) (LRB):  Right HIP HEMIARTHROPLASTY (Right)  2 Days Post-Op    ASSESSMENT:     REHAB RECOMMENDATIONS: CURRENT LEVEL OF FUNCTION:  (Most Recently Demonstrated)   Recommendation to date pending progress:  Setting:   Short-term Rehab  Equipment:    To Be Determined Bed Mobility:   Modified Independent  Sit to Stand:   Contact Guard Assistance  Transfers:   Contact Guard Assistance  Gait/Mobility:   Contact Guard Assistance     ASSESSMENT:  Ms. Tolu Howard presents in supine and ready to participate. She performed supine exercises below with a little help with her R leg. She sat up without my help with head elevated and heavy use of the railing. She walked about the same distance with walker and CGA. She would benefit from a short rehab stay before returning home.        SUBJECTIVE:   Ms. Tolu Howard states, \"I can't go home yet, I'm not ready\"    SOCIAL HISTORY/ LIVING ENVIRONMENT:   Home Environment: Trailer/mobile home  # Steps to Enter: 1  One/Two Story Residence: One story  Living Alone: No  Support Systems: Spouse/Significant Other/Partner  OBJECTIVE:     PAIN: VITAL SIGNS: LINES/DRAINS:   Pre Treatment: Pain Screen  Pain Scale 1: FLACC  Pain Intensity 1: 5  Post Treatment: 0     O2 Device: None (Room air)     MOBILITY: I Mod I S SBA CGA Min Mod Max Total  NT x2 Comments:   Bed Mobility    Rolling [] [] [] [] [] [] [] [] [] [] []    Supine to Sit [] [x] [] [] [] [] [] [] [] [] []    Scooting [] [] [] [x] [] [] [] [] [] [] []    Sit to Supine [] [] [] [] [] [] [] [] [] [] []    Transfers    Sit to Stand [] [] [] [] [x] [] [] [] [] [] []    Bed to Chair [] [] [] [] [x] [] [] [] [] [] []    Stand to Sit [] [] [] [] [x] [] [] [] [] [] []    I=Independent, Mod I=Modified Independent, S=Supervision, SBA=Standby Assistance, CGA=Contact Guard Assistance,   Min=Minimal Assistance, Mod=Moderate Assistance, Max=Maximal Assistance, Total=Total Assistance, NT=Not Tested    BALANCE: Good Fair+ Fair Fair- Poor NT Comments   Sitting Static [x] [] [] [] [] []    Sitting Dynamic [x] [] [] [] [] []              Standing Static [] [] [x] [] [] []    Standing Dynamic [] [] [x] [] [] []      GAIT: I Mod I S SBA CGA Min Mod Max Total  NT x2 Comments:   Level of Assistance [] [] [] [] [x] [] [] [] [] [] []    Distance 40'    DME Rolling Walker and Gait Belt    Gait Quality Slow, shuffled, decreased stance time    Weightbearing  Status WBAT     I=Independent, Mod I=Modified Independent, S=Supervision, SBA=Standby Assistance, CGA=Contact Guard Assistance,   Min=Minimal Assistance, Mod=Moderate Assistance, Max=Maximal Assistance, Total=Total Assistance, NT=Not Tested    PLAN:   FREQUENCY/DURATION: PT Plan of Care: BID for duration of hospital stay or until stated goals are met, whichever comes first.  TREATMENT:     TREATMENT:   ($$ Therapeutic Activity: 8-22 mins  $$ Therapeutic Exercises: 8-22 mins    )  Therapeutic Activity (15 Minutes):  Therapeutic activity included Supine to Sit, Scooting, Transfer Training and Ambulation on level ground to improve functional Mobility, Strength and Activity tolerance. Therapeutic Exercise (10 Minutes): Therapeutic exercises noted below to improve functional AROM, strength and mobility.      TREATMENT GRID:   Date:  5/28/21 Date:   Date:     Activity/Exercise Parameters Parameters Parameters   Supine AP 20x B     Supine heel slides 10x B AAR     Supine SAQ 10x B     Supine hip abd 10x B AAR                             AFTER TREATMENT POSITION/PRECAUTIONS:  Chair, Needs within reach and RN notified    INTERDISCIPLINARY COLLABORATION:  RN/PCT and PT/PTA    TOTAL TREATMENT DURATION:  PT Patient Time In/Time Out  Time In: 1020  Time Out: [id number] John Giordano Bljeremy, PTA

## 2021-05-28 NOTE — PROGRESS NOTES
Progress Note    Patient: Dean Ngo MRN: [id number]  SSN: [id number]    YOB: 1945  Age: 77 y.o. Sex: female      Admit Date: 5/26/2021    LOS: 2 days     Assessment and Plan:   77 y.o. female with no significant past medical history and not on any home medications who presented to ED due to mechanical fall at home. Displaced Right Femoral Neck fracture s/p Fall  - S/p right hip hemiarthroplasty  - Pain management  - Ortho recs  - PT / OT eval    Acute blood loss anemia likely postop  - Monitor H&H  - Transfuse if Hb <7    DVT ppx on ASA    Subjective:   77 y.o. female with no significant past medical history and not on any home medications who presented to ED due to mechanical fall at home. Patient seen and examined at bedside. This morning still having right hip pain. Denies chest pain, no SOB, no nausea.      Objective:     Vitals:    05/28/21 0033 05/28/21 0451 05/28/21 0745 05/28/21 1215   BP: 95/61 (!) 92/51 (!) 102/56 104/62   Pulse: 98 94 (!) 102 78   Resp: 18 18 18 18   Temp: 99.9 °F (37.7 °C) 98.9 °F (37.2 °C) 98.8 °F (37.1 °C) 98.5 °F (36.9 °C)   SpO2: 90% 90% 90% 94%   Weight:       Height:            Intake and Output:  Current Shift: 05/28 0701 - 05/28 1900  In: 500 [P.O.:500]  Out: -   Last three shifts: 05/26 1901 - 05/28 0700  In: 2362.5 [I.V.:2362.5]  Out: 1700 [Urine:1700]    ROS  10 ROS negative except from stated on subjective    Physical Exam:   General: Alert, oriented, NAD  HEENT: NC/AT, EOM are intact  Neck: supple, no JVD  Cardiovascular: RRR, S1, S2, no murmurs  Respiratory: Lungs are clear, no wheezes or rales  Abdomen: Soft, NT, ND  Back: No CVA tenderness, no paraspinal tenderness  Extremities: LE without pedal edema, no erythema  Neuro: A&O, CN are intact, no focal deficits  Skin: no rash or ulcers  Psych: good mood and affect    Lab/Data Review:  I have personally reviewed patients laboratory data showing  Recent Results (from the past 24 hour(s))   PLEASE READ & DOCUMENT PPD TEST IN 24 HRS    Collection Time: 05/27/21  5:49 PM   Result Value Ref Range    PPD Negative Negative    mm Induration 0 0 - 5 mm   CBC WITH AUTOMATED DIFF    Collection Time: 05/28/21  5:20 AM   Result Value Ref Range    WBC 7.7 4.3 - 11.1 K/uL    RBC 2.98 (L) 4.05 - 5.2 M/uL    HGB 9.5 (L) 11.7 - 15.4 g/dL    HCT 28.8 (L) 35.8 - 46.3 %    MCV 96.6 79.6 - 97.8 FL    MCH 31.9 26.1 - 32.9 PG    MCHC 33.0 31.4 - 35.0 g/dL    RDW 13.1 11.9 - 14.6 %    PLATELET 503 992 - 915 K/uL    MPV 10.3 9.4 - 12.3 FL    ABSOLUTE NRBC 0.00 0.0 - 0.2 K/uL    DF AUTOMATED      NEUTROPHILS 66 43 - 78 %    LYMPHOCYTES 19 13 - 44 %    MONOCYTES 9 4.0 - 12.0 %    EOSINOPHILS 6 0.5 - 7.8 %    BASOPHILS 1 0.0 - 2.0 %    IMMATURE GRANULOCYTES 0 0.0 - 5.0 %    ABS. NEUTROPHILS 5.1 1.7 - 8.2 K/UL    ABS. LYMPHOCYTES 1.4 0.5 - 4.6 K/UL    ABS. MONOCYTES 0.7 0.1 - 1.3 K/UL    ABS. EOSINOPHILS 0.5 0.0 - 0.8 K/UL    ABS. BASOPHILS 0.0 0.0 - 0.2 K/UL    ABS. IMM. GRANS. 0.0 0.0 - 0.5 K/UL   SARS-COV-2    Collection Time: 05/28/21 11:06 AM   Result Value Ref Range    SARS-CoV-2 Please find results under separate order     COVID-19 RAPID TEST    Collection Time: 05/28/21 11:06 AM   Result Value Ref Range    Specimen source NASAL      COVID-19 rapid test Not detected NOTD        All Micro Results     Procedure Component Value Units Date/Time    SARS-COV-2, PCR [[id number]] Collected: 05/28/21 1106    Order Status: Completed Updated: 05/28/21 1156    COVID-19 RAPID TEST [[id number]] Collected: 05/28/21 1106    Order Status: Completed Specimen: Nasopharyngeal Updated: 05/28/21 1156     Specimen source NASAL        COVID-19 rapid test Not detected        Comment:      The specimen is NEGATIVE for SARS-CoV-2, the novel coronavirus associated with COVID-19. A negative result does not rule out COVID-19. This test has been authorized by the FDA under an Emergency Use Authorization (EUA) for use by authorized laboratories. Fact sheet for Healthcare Providers: ConventionUpdate.co.nz  Fact sheet for Patients: ConventionUpdate.co.nz       Methodology: Isothermal Nucleic Acid Amplification         COVID-19 RAPID TEST [[id number]] Collected: 05/28/21 1106    Order Status: Canceled     MSSA/MRSA SC BY PCR, NASAL SWAB [[id number]]     Order Status: Sent Specimen: Nasal swab            Image:  I have personally reviewed patients imaging showing  XR HIP RT W OR WO PELV 2-3 VWS   Final Result      Immediate postoperative changes status post right hip arthroplasty, including   subcutaneous emphysema and soft tissue swelling. Unremarkable immediate   postoperative appearance of the hardware. XR HIP RT W OR WO PELV 2-3 VWS   Final Result   Subcapital right femoral neck fracture, displaced. XR FEMUR RT 2 VS   Final Result   Subcapital right femoral neck fracture, displaced.       XR CHEST SNGL V   Final Result           Hospital problems     Principal Problem:    Fracture of neck of right femur ([address] (5/26/2021)    Active Problems:    Hip fracture requiring operative repair [facility]) (5/26/2021)          Signed By: Arturo Siu MD     May 28, 2021

## 2021-05-28 NOTE — PROGRESS NOTES
CM reviewed chart for ongoing discharge planning. Patient's status discussed in IDT rounds. Patient will need STR at discharge and is agreeable per therapist.  Jaelyn Raymundoe in to speak with patient. Demographics, PCP and insurance verified. Patient is agreeable to STR. Patient would like referrals sent to [facility] and [facility]. PPD is in process and Covid testing ordered. Care Management Interventions  PCP Verified by CM: Yes Aron Anne MD)  Mode of Transport at Discharge: BLS  Transition of Care Consult (CM Consult): SNF  Partner SNF: No  Reason Why Partner SNF Not Chosen: Location  Discharge Durable Medical Equipment: No  Physical Therapy Consult: Yes  Occupational Therapy Consult: Yes  Speech Therapy Consult: No  Current Support Network: Own Home, Lives with Spouse  Confirm Follow Up Transport: Family  The Plan for Transition of Care is Related to the Following Treatment Goals : Patient will participate in STR therapies in order to return to baseline independence in ADLs.    The Patient and/or Patient Representative was Provided with a Choice of Provider and Agrees with the Discharge Plan?: Yes  Freedom of Choice List was Provided with Basic Dialogue that Supports the Patient's Individualized Plan of Care/Goals, Treatment Preferences and Shares the Quality Data Associated with the Providers?: Yes  Discharge Location  Discharge Placement: Skilled nursing facility

## 2021-05-28 NOTE — PROGRESS NOTES
Progress Note    Patient: Gaby Krause MRN: [id number]  SSN: [id number]    YOB: 1945  Age: 77 y.o. Sex: female      Admit Date: 5/26/2021    LOS: 2 days     Subjective:     Patient reports her pain is well controlled    Objective:     Vitals:    05/28/21 0033 05/28/21 0451 05/28/21 0745 05/28/21 1215   BP: 95/61 (!) 92/51 (!) 102/56 104/62   Pulse: 98 94 (!) 102 78   Resp: 18 18 18 18   Temp: 99.9 °F (37.7 °C) 98.9 °F (37.2 °C) 98.8 °F (37.1 °C) 98.5 °F (36.9 °C)   SpO2: 90% 90% 90% 94%   Weight:       Height:            Intake and Output:  Current Shift: 05/28 0701 - 05/28 1900  In: 500 [P.O.:500]  Out: -   Last three shifts: 05/26 1901 - 05/28 0700  In: 2362.5 [I.V.:2362.5]  Out: 1700 [Urine:1700]    alert and oriented to person place time and situation  Skin - dressing clean dry and intact  Motor and sensory function intact in RIGHT LOWER extremity  Pulses palpable in RIGHT LOWER extremity       Lab/Data Review:  CBC:   Lab Results   Component Value Date/Time    WBC 7.7 05/28/2021 05:20 AM    HGB 9.5 (L) 05/28/2021 05:20 AM    HCT 28.8 (L) 05/28/2021 05:20 AM     05/28/2021 05:20 AM          Assessment:     Acute postop blood loss anemia with hemoglobin at 9.5, POD #1 from right hip hemiarthroplasty    Plan:     Patient may be weightbearing as tolerated on the right lower extremity. They can be full active and passive range of motion of the right hip. They can have dry dressing change starting on postoperative day 2 and then after that daily and as needed. They will need aspirin 325 mg 1 p.o. daily for 4 weeks as DVT prophylaxis as well as SCDs while hospitalized unless they are on a preoperative anticoagulant chronically and in this case they can restart this on postoperative day 2 and use this instead of the aspirin. They will need to have orthopedic follow-up approximately 2 weeks after discharge.       Signed By: Tyler Chris MD     May 28, 2021

## 2021-05-28 NOTE — PROGRESS NOTES
Patient accepted and selected for [facility] and can admit to them on Monday. Covid testing started and patient will have her family bring her vaccine record to send to facility.

## 2021-05-29 LAB
BASOPHILS # BLD: 0 K/UL (ref 0–0.2)
BASOPHILS NFR BLD: 0 % (ref 0–2)
DIFFERENTIAL METHOD BLD: ABNORMAL
EOSINOPHIL # BLD: 0.5 K/UL (ref 0–0.8)
EOSINOPHIL NFR BLD: 7 % (ref 0.5–7.8)
ERYTHROCYTE [DISTWIDTH] IN BLOOD BY AUTOMATED COUNT: 13.2 % (ref 11.9–14.6)
HCT VFR BLD AUTO: 29.6 % (ref 35.8–46.3)
HGB BLD-MCNC: 9.9 G/DL (ref 11.7–15.4)
IMM GRANULOCYTES # BLD AUTO: 0 K/UL (ref 0–0.5)
IMM GRANULOCYTES NFR BLD AUTO: 0 % (ref 0–5)
LYMPHOCYTES # BLD: 1.5 K/UL (ref 0.5–4.6)
LYMPHOCYTES NFR BLD: 21 % (ref 13–44)
MCH RBC QN AUTO: 31.9 PG (ref 26.1–32.9)
MCHC RBC AUTO-ENTMCNC: 33.4 G/DL (ref 31.4–35)
MCV RBC AUTO: 95.5 FL (ref 79.6–97.8)
MONOCYTES # BLD: 0.6 K/UL (ref 0.1–1.3)
MONOCYTES NFR BLD: 8 % (ref 4–12)
NEUTS SEG # BLD: 4.8 K/UL (ref 1.7–8.2)
NEUTS SEG NFR BLD: 64 % (ref 43–78)
NRBC # BLD: 0 K/UL (ref 0–0.2)
PLATELET # BLD AUTO: 197 K/UL (ref 150–450)
PMV BLD AUTO: 10.7 FL (ref 9.4–12.3)
RBC # BLD AUTO: 3.1 M/UL (ref 4.05–5.2)
SARS-COV-2, COV2: NOT DETECTED
SPECIMEN SOURCE, FCOV2M: NORMAL
WBC # BLD AUTO: 7.4 K/UL (ref 4.3–11.1)

## 2021-05-29 PROCEDURE — 97110 THERAPEUTIC EXERCISES: CPT

## 2021-05-29 PROCEDURE — 74011250637 HC RX REV CODE- 250/637: Performed by: INTERNAL MEDICINE

## 2021-05-29 PROCEDURE — 65270000029 HC RM PRIVATE

## 2021-05-29 PROCEDURE — 36415 COLL VENOUS BLD VENIPUNCTURE: CPT

## 2021-05-29 PROCEDURE — 97535 SELF CARE MNGMENT TRAINING: CPT

## 2021-05-29 PROCEDURE — 85025 COMPLETE CBC W/AUTO DIFF WBC: CPT

## 2021-05-29 PROCEDURE — 77030038269 HC DRN EXT URIN PURWCK BARD -A

## 2021-05-29 PROCEDURE — 97530 THERAPEUTIC ACTIVITIES: CPT

## 2021-05-29 PROCEDURE — 74011250637 HC RX REV CODE- 250/637: Performed by: ORTHOPAEDIC SURGERY

## 2021-05-29 RX ADMIN — OXYCODONE 10 MG: 5 TABLET ORAL at 20:19

## 2021-05-29 RX ADMIN — Medication 10 ML: at 05:21

## 2021-05-29 RX ADMIN — OXYCODONE 10 MG: 5 TABLET ORAL at 10:20

## 2021-05-29 RX ADMIN — Medication 5 ML: at 14:07

## 2021-05-29 RX ADMIN — ASPIRIN 325 MG: 325 TABLET, COATED ORAL at 09:00

## 2021-05-29 RX ADMIN — Medication 1 TABLET: at 16:57

## 2021-05-29 RX ADMIN — ACETAMINOPHEN 650 MG: 325 TABLET ORAL at 05:17

## 2021-05-29 RX ADMIN — Medication 10 ML: at 21:52

## 2021-05-29 RX ADMIN — ACETAMINOPHEN 650 MG: 325 TABLET ORAL at 12:09

## 2021-05-29 RX ADMIN — Medication 1 TABLET: at 09:01

## 2021-05-29 RX ADMIN — ACETAMINOPHEN 650 MG: 325 TABLET ORAL at 00:20

## 2021-05-29 RX ADMIN — Medication 1 TABLET: at 12:09

## 2021-05-29 NOTE — PROGRESS NOTES
Problem: Falls - Risk of  Goal: *Absence of Falls  Description: Document Babs Woodard Fall Risk and appropriate interventions in the flowsheet.   Outcome: Progressing Towards Goal  Note: Fall Risk Interventions:  Mobility Interventions: Bed/chair exit alarm, Patient to call before getting OOB         Medication Interventions: Bed/chair exit alarm, Patient to call before getting OOB, Teach patient to arise slowly    Elimination Interventions: Bed/chair exit alarm, Call light in reach, Patient to call for help with toileting needs    History of Falls Interventions: Bed/chair exit alarm, Door open when patient unattended         Problem: Patient Education: Go to Patient Education Activity  Goal: Patient/Family Education  Outcome: Progressing Towards Goal     Problem: Patient Education: Go to Patient Education Activity  Goal: Patient/Family Education  Outcome: Progressing Towards Goal     Problem: Patient Education: Go to Patient Education Activity  Goal: Patient/Family Education  Outcome: Progressing Towards Goal

## 2021-05-29 NOTE — PROGRESS NOTES
Progress Note    Patient: Gaby Krause MRN: [id number]  SSN: [id number]    YOB: 1945  Age: 77 y.o. Sex: female      Admit Date: 5/26/2021    LOS: 3 days     Assessment and Plan:   77 y.o. female with no significant past medical history and not on any home medications who presented to ED due to mechanical fall at home. Displaced Right Femoral Neck fracture s/p Fall  - S/p right hip hemiarthroplasty  - Pain management  - Ortho recs  - PT / OT eval    Acute blood loss anemia likely postop  - Monitor H&H  - Transfuse if Hb <7    DVT ppx on ASA    Subjective:   77 y.o. female with no significant past medical history and not on any home medications who presented to ED due to mechanical fall at home. Patient seen and examined at bedside. This morning still having right hip pain. Denies chest pain, no SOB, no nausea. Objective:     Vitals:    05/28/21 2347 05/29/21 0401 05/29/21 0831 05/29/21 1155   BP: 97/60 99/60 112/66 (!) 100/58   Pulse: 86 86 96 90   Resp: 18 18 18 18   Temp: 99.5 °F (37.5 °C) 99.2 °F (37.3 °C) 98.9 °F (37.2 °C) 98.6 °F (37 °C)   SpO2: 92% 93% 92% 96%   Weight:       Height:            Intake and Output:  Current Shift: No intake/output data recorded.   Last three shifts: 05/27 1901 - 05/29 0700  In: 500 [P.O.:500]  Out: 1900 [Urine:1900]    ROS  10 ROS negative except from stated on subjective    Physical Exam:   General: Alert, oriented, NAD  HEENT: NC/AT, EOM are intact  Neck: supple, no JVD  Cardiovascular: RRR, S1, S2, no murmurs  Respiratory: Lungs are clear, no wheezes or rales  Abdomen: Soft, NT, ND  Back: No CVA tenderness, no paraspinal tenderness  Extremities: LE without pedal edema, no erythema  Neuro: A&O, CN are intact, no focal deficits  Skin: no rash or ulcers  Psych: good mood and affect    Lab/Data Review:  I have personally reviewed patients laboratory data showing  Recent Results (from the past 24 hour(s))   CBC WITH AUTOMATED DIFF    Collection Time: 05/29/21  5:50 AM   Result Value Ref Range    WBC 7.4 4.3 - 11.1 K/uL    RBC 3.10 (L) 4.05 - 5.2 M/uL    HGB 9.9 (L) 11.7 - 15.4 g/dL    HCT 29.6 (L) 35.8 - 46.3 %    MCV 95.5 79.6 - 97.8 FL    MCH 31.9 26.1 - 32.9 PG    MCHC 33.4 31.4 - 35.0 g/dL    RDW 13.2 11.9 - 14.6 %    PLATELET 847 909 - 764 K/uL    MPV 10.7 9.4 - 12.3 FL    ABSOLUTE NRBC 0.00 0.0 - 0.2 K/uL    DF AUTOMATED      NEUTROPHILS 64 43 - 78 %    LYMPHOCYTES 21 13 - 44 %    MONOCYTES 8 4.0 - 12.0 %    EOSINOPHILS 7 0.5 - 7.8 %    BASOPHILS 0 0.0 - 2.0 %    IMMATURE GRANULOCYTES 0 0.0 - 5.0 %    ABS. NEUTROPHILS 4.8 1.7 - 8.2 K/UL    ABS. LYMPHOCYTES 1.5 0.5 - 4.6 K/UL    ABS. MONOCYTES 0.6 0.1 - 1.3 K/UL    ABS. EOSINOPHILS 0.5 0.0 - 0.8 K/UL    ABS. BASOPHILS 0.0 0.0 - 0.2 K/UL    ABS. IMM. GRANS. 0.0 0.0 - 0.5 K/UL      All Micro Results     Procedure Component Value Units Date/Time    SARS-COV-2, PCR [[id number]] Collected: 05/28/21 1106    Order Status: Completed Specimen: Nasopharyngeal Updated: 05/29/21 0813     Specimen source Nasopharyngeal        SARS-CoV-2 Not detected        Comment:      The specimen is NEGATIVE for SARS-CoV-2, the novel coronavirus associated with COVID-19. This test has been authorized by the FDA under an Emergency Use Authorization (EUA) for use by authorized laboratories. Fact sheet for Healthcare Providers: ConventionUpdate.co.nz       Fact sheet for Patients: ConventionUpdate.co.nz       Methodology: RT-PCR         COVID-19 RAPID TEST [[id number]] Collected: 05/28/21 1106    Order Status: Completed Specimen: Nasopharyngeal Updated: 05/28/21 1156     Specimen source NASAL        COVID-19 rapid test Not detected        Comment:      The specimen is NEGATIVE for SARS-CoV-2, the novel coronavirus associated with COVID-19. A negative result does not rule out COVID-19.        This test has been authorized by the FDA under an Emergency Use Authorization (EUA) for use by authorized laboratories. Fact sheet for Healthcare Providers: ConventionUpdate.co.nz  Fact sheet for Patients: ConventionUpdate.co.nz       Methodology: Isothermal Nucleic Acid Amplification         COVID-19 RAPID TEST [[id number]] Collected: 05/28/21 1106    Order Status: Canceled     MSSA/MRSA SC BY PCR, NASAL SWAB [[id number]] Collected: 05/26/21 1730    Order Status: Canceled Specimen: Nasal swab            Image:  I have personally reviewed patients imaging showing  XR HIP RT W OR WO PELV 2-3 VWS   Final Result      Immediate postoperative changes status post right hip arthroplasty, including   subcutaneous emphysema and soft tissue swelling. Unremarkable immediate   postoperative appearance of the hardware. XR HIP RT W OR WO PELV 2-3 VWS   Final Result   Subcapital right femoral neck fracture, displaced. XR FEMUR RT 2 VS   Final Result   Subcapital right femoral neck fracture, displaced.       XR CHEST SNGL V   Final Result           Hospital problems     Principal Problem:    Fracture of neck of right femur ([address] (5/26/2021)    Active Problems:    Hip fracture requiring operative repair [facility]) (5/26/2021)          Signed By: Keira López MD     May 29, 2021

## 2021-05-29 NOTE — PROGRESS NOTES
ACUTE OT GOALS:  (Developed with and agreed upon by patient and/or caregiver.)    1. Patient will complete functional transfers with supervision. 2. Patient will bathe and dress lower body with supervision and adaptive device as needed  3. Patient will toilet with supervision and adaptive device as needed. 4. Patient will tolerate 25 minutes of OT treatment with up to 2 rest breaks to increase endurance for ADLs. 5. Patient will demonstrate modified independence with therapeutic exercise HEP to increase strength in BUEs for increased safety and independence with functional tasks. 6. Patient will groom in standing with supervision and adaptive device as needed. 7. Patient will complete functional mobility for ADLs with supervision and adaptive device as needed  OCCUPATIONAL THERAPY: Daily Note OT Treatment Day # 2    Srinivasa Weeks is a 77 y.o. female   PRIMARY DIAGNOSIS: Fracture of neck of right femur (HCC)  Hip fracture requiring operative repair ([facility] [address] [S72.009A]  Procedure(s) (LRB):  Right HIP HEMIARTHROPLASTY (Right)  3 Days Post-Op  [place] / Plan[place] PART [place] B / Product Type: Medicare /   ASSESSMENT:     REHAB RECOMMENDATIONS: CURRENT LEVEL OF FUNCTION:  (Most Recently Demonstrated)   Recommendation to date pending progress:  Setting:   Short-term Rehab  Equipment:    Rolling Walker Bathing:   Standby Assistance  Dressing:   Contact Guard Assistance  Feeding/Grooming:   Set Up  Toileting:   Not tested  Functional Mobility:   Not tested     ASSESSMENT:  Ms. Renee Crawford presents up in the chair upon arrival. Pt recently worked with PT but agreeable to ADL's and exercises. Pt completed ADL's with SBA/CGA and exercises listed below. Good effort. Continue POC. SUBJECTIVE:   Ms. Renee Crawford states, \"It felt good to get cleaned up. \"    SOCIAL HISTORY/LIVING ENVIRONMENT:   Home Environment: Trailer/mobile home  # Steps to Enter: 1  One/Two Story Residence: One story  Living Alone: No  Support Systems: Spouse/Significant Other/Partner    OBJECTIVE:     PAIN: VITAL SIGNS: LINES/DRAINS:   Pre Treatment: Pain Screen  Pain Scale 1: Visual  Pain Location 1: Hip  Pain Orientation 1: Right  Pain Description 1: Aching  Pain Intervention(s) 1:  (RN administered pain medication)  Post Treatment: same   none  O2 Device: None (Room air)     ACTIVITIES OF DAILY LIVING: I Mod I S SBA CGA Min Mod Max Total NT Comments   BASIC ADLs:              Bathing/ Showering [] [] [] [x] [] [] [] [] [] []    Toileting [] [] [] [] [] [] [] [] [] [x]    Dressing [] [] [] [] [x] [] [] [] [] []    Feeding [] [] [] [] [] [] [] [] [] [x]    Grooming [] [] [x] [] [] [] [] [] [] []    Personal Device Care [] [] [] [] [] [] [] [] [] [x]    Functional Mobility [] [] [] [] [] [] [] [] [] [x]    I=Independent, Mod I=Modified Independent, S=Supervision, SBA=Standby Assistance, CGA=Contact Guard Assistance,   Min=Minimal Assistance, Mod=Moderate Assistance, Max=Maximal Assistance, Total=Total Assistance, NT=Not Tested    MOBILITY: I Mod I S SBA CGA Min Mod Max Total  NT x2 Comments:   Supine to sit [] [] [] [] [] [] [] [] [] [x] []    Sit to supine [] [] [] [] [] [] [] [] [] [x] []    Sit to stand [] [] [] [] [] [] [] [] [] [x] []    Bed to chair [] [] [] [] [] [] [] [] [] [x] []    I=Independent, Mod I=Modified Independent, S=Supervision, SBA=Standby Assistance, CGA=Contact Guard Assistance,   Min=Minimal Assistance, Mod=Moderate Assistance, Max=Maximal Assistance, Total=Total Assistance, NT=Not Tested    BALANCE: Good Fair+ Fair Fair- Poor NT Comments   Sitting Static [x] [] [] [] [] []    Sitting Dynamic [x] [] [] [] [] []              Standing Static [] [] [] [] [] [x]    Standing Dynamic [] [] [] [] [] [x]      PLAN:   FREQUENCY/DURATION: OT Plan of Care: 4 times/week for duration of hospital stay or until stated goals are met, whichever comes first.    TREATMENT:   TREATMENT:   ($$ Self Care/Home Management: 8-22 mins$$ Therapeutic Exercises: 8-22 mins   )  Therapeutic Exercise (8 Minutes): Therapeutic exercises noted below to improve functional activity tolerance, AROM, strength and mobility. Self Care (16 Minutes): Self care including Upper Body Bathing, Lower Body Bathing, Upper Body Dressing and Grooming to increase independence and decrease level of assistance required.     TREATMENT GRID:   Date:  5/29 Date:   Date:     Activity/Exercise Parameters Parameters Parameters   Shoulder flexion 2 sets of 10 reps with red theraband     Elbow flexion      Tricep extensions      Horizontal add/abd 2 sets of 10 reps with red theraband                             AFTER TREATMENT POSITION/PRECAUTIONS:  Chair, Needs within reach and RN notified    INTERDISCIPLINARY COLLABORATION:  RN/PCT and OT/LOVING    TOTAL TREATMENT DURATION:  OT Patient Time In/Time Out  Time In: 1000  Time Out: [address]

## 2021-05-29 NOTE — PROGRESS NOTES
ACUTE PHYSICAL THERAPY GOALS:  (Developed with and agreed upon by patient and/or caregiver. )  LTG:  (1.)Ms. Philly Dempsey will move from supine to sit and sit to supine  and scoot up and down in bed with INDEPENDENT within 7 treatment day(s). (2.)Ms. Philly Dempsey will transfer from bed to chair and chair to bed with MODIFIED INDEPENDENCE using the least restrictive device within 7 treatment day(s). (3.)Ms. Philly Dempsey will ambulate with MODIFIED INDEPENDENCE for 150 feet with the least restrictive device within 7 treatment day(s). (4.)Ms. Philly Dempsey will tolerate at least 23 min of dynamic standing activity to assist standing ADLs with the least restrictive device within 7 treatment days. PHYSICAL THERAPY: Daily Note and AM Treatment Day # 3    Radha Thomas is a 77 y.o. female   PRIMARY DIAGNOSIS: Fracture of neck of right femur ([facility] [address]  Hip fracture requiring operative repair ([facility] [address] [S72.009A]  Procedure(s) (LRB):  Right HIP HEMIARTHROPLASTY (Right)  3 Days Post-Op    ASSESSMENT:     REHAB RECOMMENDATIONS: CURRENT LEVEL OF FUNCTION:  (Most Recently Demonstrated)   Recommendation to date pending progress:  Setting:   Short-term Rehab  Equipment:    To Be Determined Bed Mobility:   Minimal Assistance  Sit to Stand:   Contact Guard Assistance  Transfers:   Contact Guard Assistance  Gait/Mobility:   Contact Guard Assistance     ASSESSMENT:  Ms. Philly Dempsey presents in supine and ready to participate. She performed supine exercises below with a little help with her R leg. She sat up with very little assistance and increased her gait distance a good bit today.   Steady progress     SUBJECTIVE:   Ms. Philly Dempsey states, \"ok\"    SOCIAL HISTORY/ LIVING ENVIRONMENT:   Home Environment: Trailer/mobile home  # Steps to Enter: 1  One/Two Story Residence: One story  Living Alone: No  Support Systems: Spouse/Significant Other/Partner  OBJECTIVE:     PAIN: VITAL SIGNS: LINES/DRAINS:   Pre Treatment: Pain Screen  Pain Scale 1: FLACC  Pain Intensity 1: 3  Post Treatment: 0     O2 Device: None (Room air)     MOBILITY: I Mod I S SBA CGA Min Mod Max Total  NT x2 Comments:   Bed Mobility    Rolling [] [] [] [] [] [] [] [] [] [] []    Supine to Sit [] [] [] [] [] [x] [] [] [] [] []    Scooting [] [] [] [x] [] [] [] [] [] [] []    Sit to Supine [] [] [] [] [] [] [] [] [] [] []    Transfers    Sit to Stand [] [] [] [] [x] [] [] [] [] [] []    Bed to Chair [] [] [] [] [x] [] [] [] [] [] []    Stand to Sit [] [] [] [] [x] [] [] [] [] [] []    I=Independent, Mod I=Modified Independent, S=Supervision, SBA=Standby Assistance, CGA=Contact Guard Assistance,   Min=Minimal Assistance, Mod=Moderate Assistance, Max=Maximal Assistance, Total=Total Assistance, NT=Not Tested    BALANCE: Good Fair+ Fair Fair- Poor NT Comments   Sitting Static [x] [] [] [] [] []    Sitting Dynamic [x] [] [] [] [] []              Standing Static [] [] [x] [] [] []    Standing Dynamic [] [] [x] [] [] []      GAIT: I Mod I S SBA CGA Min Mod Max Total  NT x2 Comments:   Level of Assistance [] [] [] [] [x] [] [] [] [] [] []    Distance 100'    DME Rolling Walker and Gait Belt    Gait Quality Slow, shuffled, decreased stance time    Weightbearing  Status WBAT     I=Independent, Mod I=Modified Independent, S=Supervision, SBA=Standby Assistance, CGA=Contact Guard Assistance,   Min=Minimal Assistance, Mod=Moderate Assistance, Max=Maximal Assistance, Total=Total Assistance, NT=Not Tested    PLAN:   FREQUENCY/DURATION: PT Plan of Care: BID for duration of hospital stay or until stated goals are met, whichever comes first.  TREATMENT:     TREATMENT:   ($$ Therapeutic Activity: 8-22 mins  $$ Therapeutic Exercises: 8-22 mins    )  Therapeutic Activity (15 Minutes): Therapeutic activity included Supine to Sit, Scooting, Transfer Training and Ambulation on level ground to improve functional Mobility, Strength and Activity tolerance. Therapeutic Exercise (10 Minutes):  Therapeutic exercises noted below to improve functional AROM, strength and mobility.      TREATMENT GRID:   Date:  5/28/21 Date:  5/29/21 Date:     Activity/Exercise Parameters Parameters Parameters   Supine AP 20x B 10x B    Supine heel slides 10x B AAR 10x B    Supine SAQ 10x B 10x B    Supine hip abd 10x B AAR 10x B AAR                            AFTER TREATMENT POSITION/PRECAUTIONS:  Chair, Needs within reach and RN notified    INTERDISCIPLINARY COLLABORATION:  RN/PCT and PT/PTA    TOTAL TREATMENT DURATION:  PT Patient Time In/Time Out  Time In: 0920  Time Out: 0945    Pete Dean PTA

## 2021-05-29 NOTE — PROGRESS NOTES
ACUTE PHYSICAL THERAPY GOALS:  (Developed with and agreed upon by patient and/or caregiver. )  LTG:  (1.)Ms. Isaías Perez will move from supine to sit and sit to supine  and scoot up and down in bed with INDEPENDENT within 7 treatment day(s). (2.)Ms. Isaías Perez will transfer from bed to chair and chair to bed with MODIFIED INDEPENDENCE using the least restrictive device within 7 treatment day(s). (3.)Ms. Isaías Perez will ambulate with MODIFIED INDEPENDENCE for 150 feet with the least restrictive device within 7 treatment day(s). (4.)Ms. Isaías Perez will tolerate at least 23 min of dynamic standing activity to assist standing ADLs with the least restrictive device within 7 treatment days. PHYSICAL THERAPY: Daily Note and PM Treatment Day # 3    Erick Garcia is a 77 y.o. female   PRIMARY DIAGNOSIS: Fracture of neck of right femur ([facility] [address]  Hip fracture requiring operative repair ([facility] [address] [S72.009A]  Procedure(s) (LRB):  Right HIP HEMIARTHROPLASTY (Right)  3 Days Post-Op    ASSESSMENT:     REHAB RECOMMENDATIONS: CURRENT LEVEL OF FUNCTION:  (Most Recently Demonstrated)   Recommendation to date pending progress:  Setting:   Short-term Rehab  Equipment:    To Be Determined Bed Mobility:   Minimal Assistance  Sit to Stand:   Contact Guard Assistance  Transfers:   Contact Guard Assistance  Gait/Mobility:   Contact Guard Assistance     ASSESSMENT:  Ms. Isaías Perez presents in supine and ready to participate. She performed supine exercises below with a little help with her R leg. She sat up with very little assistance and increased her gait distance a good bit today. Steady progress    PM:  Patient continues to need some assist with bed mobility especially with getting her legs back onto the bed. She increases her gait distance every visit.   Steady progress     SUBJECTIVE:   Ms. Isaías Perez states, \"ok\"    SOCIAL HISTORY/ LIVING ENVIRONMENT:   Home Environment: Trailer/mobile home  # Steps to Enter: 1  One/Two Story Residence: One story  Living Alone: No  Support Systems: Spouse/Significant Other/Partner  OBJECTIVE:     PAIN: VITAL SIGNS: LINES/DRAINS:   Pre Treatment: Pain Screen  Pain Scale 1: FLACC  Pain Intensity 1: 3  Post Treatment: 0     O2 Device: None (Room air)     MOBILITY: I Mod I S SBA CGA Min Mod Max Total  NT x2 Comments:   Bed Mobility    Rolling [] [] [] [] [] [] [] [] [] [] []    Supine to Sit [] [] [] [] [] [x] [] [] [] [] []    Scooting [] [] [] [x] [] [] [] [] [] [] []    Sit to Supine [] [] [] [] [] [x] [] [] [] [] []    Transfers    Sit to Stand [] [] [] [] [x] [] [] [] [] [] []    Bed to Chair [] [] [] [] [x] [] [] [] [] [] []    Stand to Sit [] [] [] [] [x] [] [] [] [] [] []    I=Independent, Mod I=Modified Independent, S=Supervision, SBA=Standby Assistance, CGA=Contact Guard Assistance,   Min=Minimal Assistance, Mod=Moderate Assistance, Max=Maximal Assistance, Total=Total Assistance, NT=Not Tested    BALANCE: Good Fair+ Fair Fair- Poor NT Comments   Sitting Static [x] [] [] [] [] []    Sitting Dynamic [x] [] [] [] [] []              Standing Static [] [] [x] [] [] []    Standing Dynamic [] [] [x] [] [] []      GAIT: I Mod I S SBA CGA Min Mod Max Total  NT x2 Comments:   Level of Assistance [] [] [] [] [x] [] [] [] [] [] []    Distance 120'    DME Rolling Walker and Gait Belt    Gait Quality Slow, shuffled, decreased stance time    Weightbearing  Status WBAT     I=Independent, Mod I=Modified Independent, S=Supervision, SBA=Standby Assistance, CGA=Contact Guard Assistance,   Min=Minimal Assistance, Mod=Moderate Assistance, Max=Maximal Assistance, Total=Total Assistance, NT=Not Tested    PLAN:   FREQUENCY/DURATION: PT Plan of Care: BID for duration of hospital stay or until stated goals are met, whichever comes first.  TREATMENT:     TREATMENT:   ($$ Therapeutic Activity: 23-37 mins  $$ Therapeutic Exercises: 8-22 mins    )  Therapeutic Activity (23 Minutes):  Therapeutic activity included Sit to Supine, Scooting, Transfer Training and Ambulation on level ground to improve functional Mobility, Strength and Activity tolerance.     TREATMENT GRID:   Date:  5/28/21 Date:  5/29/21 Date:     Activity/Exercise Parameters Parameters Parameters   Supine AP 20x B 10x B    Supine heel slides 10x B AAR 10x B    Supine SAQ 10x B 10x B    Supine hip abd 10x B AAR 10x B AAR                            AFTER TREATMENT POSITION/PRECAUTIONS:  Bed, Needs within reach and RN notified    INTERDISCIPLINARY COLLABORATION:  RN/PCT and PT/PTA    TOTAL TREATMENT DURATION:  PT Patient Time In/Time Out  Time In: 1500  Time Out: 1523    Indio Adams PTA

## 2021-05-29 NOTE — PROGRESS NOTES
Problem: Falls - Risk of  Goal: *Absence of Falls  Description: Document Vale Martini Fall Risk and appropriate interventions in the flowsheet.   Outcome: Progressing Towards Goal  Note: Fall Risk Interventions:  Mobility Interventions: Patient to call before getting OOB         Medication Interventions: Patient to call before getting OOB    Elimination Interventions: Call light in reach    History of Falls Interventions: Bed/chair exit alarm         Problem: Patient Education: Go to Patient Education Activity  Goal: Patient/Family Education  Outcome: Progressing Towards Goal     Problem: Patient Education: Go to Patient Education Activity  Goal: Patient/Family Education  Outcome: Progressing Towards Goal     Problem: Patient Education: Go to Patient Education Activity  Goal: Patient/Family Education  Outcome: Progressing Towards Goal

## 2021-05-30 LAB
ANION GAP SERPL CALC-SCNC: 5 MMOL/L (ref 7–16)
BASOPHILS # BLD: 0 K/UL (ref 0–0.2)
BASOPHILS NFR BLD: 1 % (ref 0–2)
BUN SERPL-MCNC: 13 MG/DL (ref 8–23)
CALCIUM SERPL-MCNC: 8.4 MG/DL (ref 8.3–10.4)
CHLORIDE SERPL-SCNC: 102 MMOL/L (ref 98–107)
CO2 SERPL-SCNC: 30 MMOL/L (ref 21–32)
CREAT SERPL-MCNC: 0.69 MG/DL (ref 0.6–1)
DIFFERENTIAL METHOD BLD: ABNORMAL
EOSINOPHIL # BLD: 0.4 K/UL (ref 0–0.8)
EOSINOPHIL NFR BLD: 6 % (ref 0.5–7.8)
ERYTHROCYTE [DISTWIDTH] IN BLOOD BY AUTOMATED COUNT: 13.2 % (ref 11.9–14.6)
GLUCOSE SERPL-MCNC: 87 MG/DL (ref 65–100)
HCT VFR BLD AUTO: 28.9 % (ref 35.8–46.3)
HGB BLD-MCNC: 9.8 G/DL (ref 11.7–15.4)
IMM GRANULOCYTES # BLD AUTO: 0 K/UL (ref 0–0.5)
IMM GRANULOCYTES NFR BLD AUTO: 0 % (ref 0–5)
LYMPHOCYTES # BLD: 1.4 K/UL (ref 0.5–4.6)
LYMPHOCYTES NFR BLD: 19 % (ref 13–44)
MCH RBC QN AUTO: 32.6 PG (ref 26.1–32.9)
MCHC RBC AUTO-ENTMCNC: 33.9 G/DL (ref 31.4–35)
MCV RBC AUTO: 96 FL (ref 79.6–97.8)
MONOCYTES # BLD: 0.6 K/UL (ref 0.1–1.3)
MONOCYTES NFR BLD: 8 % (ref 4–12)
NEUTS SEG # BLD: 5 K/UL (ref 1.7–8.2)
NEUTS SEG NFR BLD: 66 % (ref 43–78)
NRBC # BLD: 0 K/UL (ref 0–0.2)
PLATELET # BLD AUTO: 223 K/UL (ref 150–450)
PMV BLD AUTO: 10.1 FL (ref 9.4–12.3)
POTASSIUM SERPL-SCNC: 4.1 MMOL/L (ref 3.5–5.1)
RBC # BLD AUTO: 3.01 M/UL (ref 4.05–5.2)
SODIUM SERPL-SCNC: 137 MMOL/L (ref 136–145)
WBC # BLD AUTO: 7.6 K/UL (ref 4.3–11.1)

## 2021-05-30 PROCEDURE — 80048 BASIC METABOLIC PNL TOTAL CA: CPT

## 2021-05-30 PROCEDURE — 65270000029 HC RM PRIVATE

## 2021-05-30 PROCEDURE — 97110 THERAPEUTIC EXERCISES: CPT

## 2021-05-30 PROCEDURE — 74011250637 HC RX REV CODE- 250/637: Performed by: INTERNAL MEDICINE

## 2021-05-30 PROCEDURE — 85025 COMPLETE CBC W/AUTO DIFF WBC: CPT

## 2021-05-30 PROCEDURE — 97530 THERAPEUTIC ACTIVITIES: CPT

## 2021-05-30 PROCEDURE — 2709999900 HC NON-CHARGEABLE SUPPLY

## 2021-05-30 PROCEDURE — 36415 COLL VENOUS BLD VENIPUNCTURE: CPT

## 2021-05-30 PROCEDURE — 74011250637 HC RX REV CODE- 250/637: Performed by: ORTHOPAEDIC SURGERY

## 2021-05-30 RX ORDER — ACETAMINOPHEN 500 MG
500 TABLET ORAL
Status: DISCONTINUED | OUTPATIENT
Start: 2021-05-30 | End: 2021-05-31 | Stop reason: HOSPADM

## 2021-05-30 RX ADMIN — Medication 10 ML: at 05:37

## 2021-05-30 RX ADMIN — Medication 10 ML: at 22:25

## 2021-05-30 RX ADMIN — Medication 1 TABLET: at 11:48

## 2021-05-30 RX ADMIN — Medication 1 TABLET: at 17:23

## 2021-05-30 RX ADMIN — ASPIRIN 325 MG: 325 TABLET, COATED ORAL at 09:50

## 2021-05-30 RX ADMIN — Medication 1 TABLET: at 09:50

## 2021-05-30 RX ADMIN — OXYCODONE 10 MG: 5 TABLET ORAL at 09:50

## 2021-05-30 RX ADMIN — POLYETHYLENE GLYCOL 3350 17 G: 17 POWDER, FOR SOLUTION ORAL at 09:50

## 2021-05-30 RX ADMIN — Medication 5 ML: at 14:21

## 2021-05-30 NOTE — PROGRESS NOTES
Received call back from Dave Swenson in admissions from [facility] [phone].  reports that she plans to accept pt for admission tomorrow 5/31/2021. Family are to come to facility Monday am to complete admission paperwork once completed she will confer with nabor PADRON to finalize pt transition to rehab.

## 2021-05-30 NOTE — PROGRESS NOTES
Attempted to contact [facility] to see if pt was still able to be admitted on Monday, spoke with 2nd  Saranya Briggs that they do not have any record of pt coming to them tomorrow. Contact information left to return call if able to accept or not. MD updated as well, completed paperwork placed inpts chart.

## 2021-05-30 NOTE — PROGRESS NOTES
ACUTE PHYSICAL THERAPY GOALS:  (Developed with and agreed upon by patient and/or caregiver. )  LTG:  (1.)Ms. Noel Pena will move from supine to sit and sit to supine  and scoot up and down in bed with INDEPENDENT within 7 treatment day(s). (2.)Ms. Noel Pena will transfer from bed to chair and chair to bed with MODIFIED INDEPENDENCE using the least restrictive device within 7 treatment day(s). (3.)Ms. Noel Pena will ambulate with MODIFIED INDEPENDENCE for 150 feet with the least restrictive device within 7 treatment day(s). GOAL MET 5/30/2021    (4.)Ms. Noel Pena will tolerate at least 23 min of dynamic standing activity to assist standing ADLs with the least restrictive device within 7 treatment days. PHYSICAL THERAPY: Daily Note and PM Treatment Day # 4    Joes Torres is a 77 y.o. female   PRIMARY DIAGNOSIS: Fracture of neck of right femur ([facility] [address]  Hip fracture requiring operative repair ([facility] [address] [S72.009A]  Procedure(s) (LRB):  Right HIP HEMIARTHROPLASTY (Right)  4 Days Post-Op    ASSESSMENT:     REHAB RECOMMENDATIONS: CURRENT LEVEL OF FUNCTION:  (Most Recently Demonstrated)   Recommendation to date pending progress:  Setting:   Short-term Rehab  Equipment:    To Be Determined Bed Mobility:   Minimal Assistance  Sit to Stand:   Contact Guard Assistance  Transfers:  [address]:   Standby Assistance     ASSESSMENT:  Ms. Noel Pena presents in supine and ready to participate. She got herself out of bed with improving ease and performed seated exercises below with good ability. She donned her gown and robe and walked with improved gait sequencing about the same distance. Steady progress    PM:  Patient walked a little further this afternoon as she does every visit. Worked on proper sequencing, step through and stride length this afternoon.   Awaiting rehab approval.     SUBJECTIVE:   Ms. Noel Pena states, \"ok\"    SOCIAL HISTORY/ LIVING ENVIRONMENT:   Home Environment: Trailer/mobile home  # Steps to Enter: 1  One/Two Story Residence: One story  Living Alone: No  Support Systems: Spouse/Significant Other/Partner  OBJECTIVE:     PAIN: VITAL SIGNS: LINES/DRAINS:   Pre Treatment: Pain Screen  Pain Scale 1: FLACC  Pain Intensity 1: 2  Post Treatment: 0     O2 Device: None (Room air)     MOBILITY: I Mod I S SBA CGA Min Mod Max Total  NT x2 Comments:   Bed Mobility    Rolling [] [] [] [] [] [] [] [] [] [] []    Supine to Sit [] [] [] [] [] [x] [] [] [] [] []    Scooting [] [] [] [x] [] [] [] [] [] [] []    Sit to Supine [] [] [] [] [] [x] [] [] [] [] []    Transfers    Sit to Stand [] [] [] [] [x] [] [] [] [] [] []    Bed to Chair [] [] [] [] [x] [] [] [] [] [] []    Stand to Sit [] [] [] [] [x] [] [] [] [] [] []    I=Independent, Mod I=Modified Independent, S=Supervision, SBA=Standby Assistance, CGA=Contact Guard Assistance,   Min=Minimal Assistance, Mod=Moderate Assistance, Max=Maximal Assistance, Total=Total Assistance, NT=Not Tested    BALANCE: Good Fair+ Fair Fair- Poor NT Comments   Sitting Static [x] [] [] [] [] []    Sitting Dynamic [x] [] [] [] [] []              Standing Static [] [x] [] [] [] []    Standing Dynamic [] [x] [] [] [] []      GAIT: I Mod I S SBA CGA Min Mod Max Total  NT x2 Comments:   Level of Assistance [] [] [] [x] [x] [] [] [] [] [] []    Distance 170'    DME Rolling Walker and Gait Belt    Gait Quality Slow, antalgic    Weightbearing  Status WBAT     I=Independent, Mod I=Modified Independent, S=Supervision, SBA=Standby Assistance, CGA=Contact Guard Assistance,   Min=Minimal Assistance, Mod=Moderate Assistance, Max=Maximal Assistance, Total=Total Assistance, NT=Not Tested    PLAN:   FREQUENCY/DURATION: PT Plan of Care: BID for duration of hospital stay or until stated goals are met, whichever comes first.  TREATMENT:     TREATMENT:   ($$ Therapeutic Activity: 8-22 mins  $$ Therapeutic Exercises: 8-22 mins    )  Therapeutic Activity (15 Minutes):  Therapeutic activity included Supine to Sit, Sit to Supine, Scooting, Transfer Training and Ambulation on level ground to improve functional Mobility, Strength and Activity tolerance.     TREATMENT GRID:   Date:  5/28/21 Date:  5/29/21 Date:  5/30/21      Activity/Exercise Parameters Parameters Parameters      Supine AP 20x B 10x B       Supine heel slides 10x B AAR 10x B       Supine SAQ 10x B 10x B       Supine hip abd 10x B AAR 10x B AAR       Seated TKE   10x2 L  10,5x R      Seated AP   10x B      Seated marching   10x B      Seated hip abd   10x B                     AFTER TREATMENT POSITION/PRECAUTIONS:  Bed, Needs within reach and RN notified    INTERDISCIPLINARY COLLABORATION:  RN/PCT and PT/PTA    TOTAL TREATMENT DURATION:  PT Patient Time In/Time Out  Time In: 1400  Time Out: 1415    Yang Charles PTA

## 2021-05-30 NOTE — PROGRESS NOTES
Progress Note    Patient: Boris Leyva MRN: [id number]  SSN: [id number]    YOB: 1945  Age: 77 y.o. Sex: female      Admit Date: 5/26/2021    LOS: 4 days     Assessment and Plan:   77 y.o. female with no significant past medical history and not on any home medications who presented to ED due to mechanical fall at home. Displaced Right Femoral Neck fracture s/p Fall  - S/p right hip hemiarthroplasty  - Pain management  - Ortho recs  - PT / OT eval    Acute blood loss anemia likely postop  - Monitor H&H  - Transfuse if Hb <7    DVT ppx on ASA    Subjective:   77 y.o. female with no significant past medical history and not on any home medications who presented to ED due to mechanical fall at home. Patient seen and examined at bedside. This morning feeling better. Denies chest pain, no SOB, no nausea. Objective:     Vitals:    05/29/21 2338 05/30/21 0404 05/30/21 0811 05/30/21 1146   BP: 112/65 114/69 108/63 114/67   Pulse: 90 84 91 76   Resp: 18 18 18 18   Temp: 99 °F (37.2 °C) 98.9 °F (37.2 °C) 98 °F (36.7 °C) 98.7 °F (37.1 °C)   SpO2: 92% 93% 96% 95%   Weight:       Height:            Intake and Output:  Current Shift: No intake/output data recorded.   Last three shifts: 05/28 1901 - 05/30 0700  In: -   Out: 1250 [Urine:1250]    ROS  10 ROS negative except from stated on subjective    Physical Exam:   General: Alert, oriented, NAD  HEENT: NC/AT, EOM are intact  Neck: supple, no JVD  Cardiovascular: RRR, S1, S2, no murmurs  Respiratory: Lungs are clear, no wheezes or rales  Abdomen: Soft, NT, ND  Back: No CVA tenderness, no paraspinal tenderness  Extremities: LE without pedal edema, no erythema  Neuro: A&O, CN are intact, no focal deficits  Skin: no rash or ulcers  Psych: good mood and affect    Lab/Data Review:  I have personally reviewed patients laboratory data showing  Recent Results (from the past 24 hour(s))   CBC WITH AUTOMATED DIFF    Collection Time: 05/30/21  5:50 AM   Result Value Ref Range    WBC 7.6 4.3 - 11.1 K/uL    RBC 3.01 (L) 4.05 - 5.2 M/uL    HGB 9.8 (L) 11.7 - 15.4 g/dL    HCT 28.9 (L) 35.8 - 46.3 %    MCV 96.0 79.6 - 97.8 FL    MCH 32.6 26.1 - 32.9 PG    MCHC 33.9 31.4 - 35.0 g/dL    RDW 13.2 11.9 - 14.6 %    PLATELET 129 848 - 948 K/uL    MPV 10.1 9.4 - 12.3 FL    ABSOLUTE NRBC 0.00 0.0 - 0.2 K/uL    DF AUTOMATED      NEUTROPHILS 66 43 - 78 %    LYMPHOCYTES 19 13 - 44 %    MONOCYTES 8 4.0 - 12.0 %    EOSINOPHILS 6 0.5 - 7.8 %    BASOPHILS 1 0.0 - 2.0 %    IMMATURE GRANULOCYTES 0 0.0 - 5.0 %    ABS. NEUTROPHILS 5.0 1.7 - 8.2 K/UL    ABS. LYMPHOCYTES 1.4 0.5 - 4.6 K/UL    ABS. MONOCYTES 0.6 0.1 - 1.3 K/UL    ABS. EOSINOPHILS 0.4 0.0 - 0.8 K/UL    ABS. BASOPHILS 0.0 0.0 - 0.2 K/UL    ABS. IMM. GRANS. 0.0 0.0 - 0.5 K/UL   METABOLIC PANEL, BASIC    Collection Time: 05/30/21  5:50 AM   Result Value Ref Range    Sodium 137 136 - 145 mmol/L    Potassium 4.1 3.5 - 5.1 mmol/L    Chloride 102 98 - 107 mmol/L    CO2 30 21 - 32 mmol/L    Anion gap 5 (L) 7 - 16 mmol/L    Glucose 87 65 - 100 mg/dL    BUN 13 8 - 23 MG/DL    Creatinine 0.69 0.6 - 1.0 MG/DL    GFR est AA >60 >60 ml/min/1.73m2    GFR est non-AA >60 >60 ml/min/1.73m2    Calcium 8.4 8.3 - 10.4 MG/DL      All Micro Results     Procedure Component Value Units Date/Time    SARS-COV-2, PCR [[id number]] Collected: 05/28/21 1106    Order Status: Completed Specimen: Nasopharyngeal Updated: 05/29/21 0864     Specimen source Nasopharyngeal        SARS-CoV-2 Not detected        Comment:      The specimen is NEGATIVE for SARS-CoV-2, the novel coronavirus associated with COVID-19. This test has been authorized by the FDA under an Emergency Use Authorization (EUA) for use by authorized laboratories.         Fact sheet for Healthcare Providers: ConventionUpdate.co.nz       Fact sheet for Patients: ConventionUpdate.co.nz       Methodology: RT-PCR         COVID-19 RAPID TEST [[id number]] Collected: 05/28/21 1106    Order Status: Completed Specimen: Nasopharyngeal Updated: 05/28/21 1156     Specimen source NASAL        COVID-19 rapid test Not detected        Comment:      The specimen is NEGATIVE for SARS-CoV-2, the novel coronavirus associated with COVID-19. A negative result does not rule out COVID-19. This test has been authorized by the FDA under an Emergency Use Authorization (EUA) for use by authorized laboratories. Fact sheet for Healthcare Providers: TelemetryWebco.nz  Fact sheet for Patients: Bubbl.nz       Methodology: Isothermal Nucleic Acid Amplification         COVID-19 RAPID TEST [[id number]] Collected: 05/28/21 1106    Order Status: Canceled     MSSA/MRSA SC BY PCR, NASAL SWAB [[id number]] Collected: 05/26/21 1730    Order Status: Canceled Specimen: Nasal swab            Image:  I have personally reviewed patients imaging showing  XR HIP RT W OR WO PELV 2-3 VWS   Final Result      Immediate postoperative changes status post right hip arthroplasty, including   subcutaneous emphysema and soft tissue swelling. Unremarkable immediate   postoperative appearance of the hardware. XR HIP RT W OR WO PELV 2-3 VWS   Final Result   Subcapital right femoral neck fracture, displaced. XR FEMUR RT 2 VS   Final Result   Subcapital right femoral neck fracture, displaced.       XR CHEST SNGL V   Final Result           Hospital problems     Principal Problem:    Fracture of neck of right femur ([address] (5/26/2021)    Active Problems:    Hip fracture requiring operative repair [facility]) (5/26/2021)        Signed By: Jeff Ramos MD     May 30, 2021

## 2021-05-30 NOTE — PROGRESS NOTES
ACUTE PHYSICAL THERAPY GOALS:  (Developed with and agreed upon by patient and/or caregiver. )  LTG:  (1.)Ms. Philly Dempsey will move from supine to sit and sit to supine  and scoot up and down in bed with INDEPENDENT within 7 treatment day(s). (2.)Ms. Philly Dempsey will transfer from bed to chair and chair to bed with MODIFIED INDEPENDENCE using the least restrictive device within 7 treatment day(s). (3.)Ms. Philly Dempsey will ambulate with MODIFIED INDEPENDENCE for 150 feet with the least restrictive device within 7 treatment day(s). GOAL MET 5/30/2021    (4.)Ms. Philly Dempsey will tolerate at least 23 min of dynamic standing activity to assist standing ADLs with the least restrictive device within 7 treatment days. PHYSICAL THERAPY: Daily Note and AM Treatment Day # 4    Radha Thomas is a 77 y.o. female   PRIMARY DIAGNOSIS: Fracture of neck of right femur ([address]  Hip fracture requiring operative repair ([facility] [address] [S72.009A]  Procedure(s) (LRB):  Right HIP HEMIARTHROPLASTY (Right)  4 Days Post-Op    ASSESSMENT:     REHAB RECOMMENDATIONS: CURRENT LEVEL OF FUNCTION:  (Most Recently Demonstrated)   Recommendation to date pending progress:  Setting:   Short-term Rehab  Equipment:    To Be Determined Bed Mobility:   Minimal Assistance  Sit to Stand:   Contact Guard Assistance  Transfers:  [address]:   Standby Assistance     ASSESSMENT:  Ms. Philly Dempsey presents in supine and ready to participate. She got herself out of bed with improving ease and performed seated exercises below with good ability. She donned her gown and robe and walked with improved gait sequencing about the same distance.   Steady progress     SUBJECTIVE:   Ms. Philly Dempsey states, \"ok\"    SOCIAL HISTORY/ LIVING ENVIRONMENT:   Home Environment: Trailer/mobile home  # Steps to Enter: 1  One/Two Story Residence: One story  Living Alone: No  Support Systems: Spouse/Significant Other/Partner  OBJECTIVE:     PAIN: VITAL SIGNS: LINES/DRAINS:   Pre Treatment: Pain Screen  Pain Scale 1: FLACC  Pain Intensity 1: 3  Post Treatment: 0     O2 Device: None (Room air)     MOBILITY: I Mod I S SBA CGA Min Mod Max Total  NT x2 Comments:   Bed Mobility    Rolling [] [] [] [] [] [] [] [] [] [] []    Supine to Sit [] [] [] [] [] [x] [] [] [] [] []    Scooting [] [] [] [x] [] [] [] [] [] [] []    Sit to Supine [] [] [] [] [] [x] [] [] [] [] []    Transfers    Sit to Stand [] [] [] [] [x] [] [] [] [] [] []    Bed to Chair [] [] [] [] [x] [] [] [] [] [] []    Stand to Sit [] [] [] [] [x] [] [] [] [] [] []    I=Independent, Mod I=Modified Independent, S=Supervision, SBA=Standby Assistance, CGA=Contact Guard Assistance,   Min=Minimal Assistance, Mod=Moderate Assistance, Max=Maximal Assistance, Total=Total Assistance, NT=Not Tested    BALANCE: Good Fair+ Fair Fair- Poor NT Comments   Sitting Static [x] [] [] [] [] []    Sitting Dynamic [x] [] [] [] [] []              Standing Static [] [x] [] [] [] []    Standing Dynamic [] [x] [] [] [] []      GAIT: I Mod I S SBA CGA Min Mod Max Total  NT x2 Comments:   Level of Assistance [] [] [] [] [x] [] [] [] [] [] []    Distance 150'    DME Rolling Walker and Gait Belt    Gait Quality Slow, antalgic    Weightbearing  Status WBAT     I=Independent, Mod I=Modified Independent, S=Supervision, SBA=Standby Assistance, CGA=Contact Guard Assistance,   Min=Minimal Assistance, Mod=Moderate Assistance, Max=Maximal Assistance, Total=Total Assistance, NT=Not Tested    PLAN:   FREQUENCY/DURATION: PT Plan of Care: BID for duration of hospital stay or until stated goals are met, whichever comes first.  TREATMENT:     TREATMENT:   ($$ Therapeutic Activity: 8-22 mins  $$ Therapeutic Exercises: 8-22 mins    )  Therapeutic Activity (15 Minutes): Therapeutic activity included Supine to Sit, Sit to Supine, Scooting, Transfer Training and Ambulation on level ground to improve functional Mobility, Strength and Activity tolerance. Therapeutic Exercise (10 Minutes):  Therapeutic exercises noted below to improve functional AROM, strength and mobility.      TREATMENT GRID:   Date:  5/28/21 Date:  5/29/21 Date:  5/30/21      Activity/Exercise Parameters Parameters Parameters      Supine AP 20x B 10x B       Supine heel slides 10x B AAR 10x B       Supine SAQ 10x B 10x B       Supine hip abd 10x B AAR 10x B AAR       Seated TKE   10x2 L  10,5x R      Seated AP   10x B      Seated marching   10x B      Seated hip abd   10x B                     AFTER TREATMENT POSITION/PRECAUTIONS:  Bed, Needs within reach and RN notified    INTERDISCIPLINARY COLLABORATION:  RN/PCT and PT/PTA    TOTAL TREATMENT DURATION:  PT Patient Time In/Time Out  Time In: 0900  Time Out: 0925    Pete Dean PTA

## 2021-05-30 NOTE — PROGRESS NOTES
Problem: Falls - Risk of  Goal: *Absence of Falls  Description: Document Nubia Alatorre Fall Risk and appropriate interventions in the flowsheet.   Outcome: Progressing Towards Goal  Note: Fall Risk Interventions:  Mobility Interventions: Patient to call before getting OOB         Medication Interventions: Patient to call before getting OOB    Elimination Interventions: Call light in reach    History of Falls Interventions: Bed/chair exit alarm         Problem: Patient Education: Go to Patient Education Activity  Goal: Patient/Family Education  Outcome: Progressing Towards Goal     Problem: Patient Education: Go to Patient Education Activity  Goal: Patient/Family Education  Outcome: Progressing Towards Goal     Problem: Patient Education: Go to Patient Education Activity  Goal: Patient/Family Education  Outcome: Progressing Towards Goal     Problem: Hip Fracture: Post-Op Day 4  Goal: Activity/Safety  Outcome: Progressing Towards Goal  Goal: Diagnostic Test/Procedures  Outcome: Progressing Towards Goal  Goal: Nutrition/Diet  Outcome: Progressing Towards Goal  Goal: Medications  Outcome: Progressing Towards Goal  Goal: Respiratory  Outcome: Progressing Towards Goal  Goal: Treatments/Interventions/Procedures  Outcome: Progressing Towards Goal  Goal: Psychosocial  Outcome: Progressing Towards Goal  Goal: Discharge Planning  Outcome: Progressing Towards Goal  Goal: *Met physical therapy criteria for discharge to next level of care  Outcome: Progressing Towards Goal  Goal: *Optimal pain control at patient's stated goal  Outcome: Progressing Towards Goal  Goal: *Hemodynamically stable  Outcome: Progressing Towards Goal  Goal: *Tolerating diet  Outcome: Progressing Towards Goal  Goal: *Active bowel function  Outcome: Progressing Towards Goal  Goal: *Adequate urinary output  Outcome: Progressing Towards Goal  Goal: *Absence of skin breakdown  Outcome: Progressing Towards Goal  Goal: *Patient verbalizes understanding of discharge instructions  Outcome: Progressing Towards Goal

## 2021-05-31 VITALS
WEIGHT: 125 LBS | RESPIRATION RATE: 18 BRPM | OXYGEN SATURATION: 95 % | DIASTOLIC BLOOD PRESSURE: 63 MMHG | HEART RATE: 93 BPM | TEMPERATURE: 98.3 F | BODY MASS INDEX: 22.15 KG/M2 | SYSTOLIC BLOOD PRESSURE: 114 MMHG | HEIGHT: 63 IN

## 2021-05-31 LAB
BASOPHILS # BLD: 0.1 K/UL (ref 0–0.2)
BASOPHILS NFR BLD: 1 % (ref 0–2)
DIFFERENTIAL METHOD BLD: ABNORMAL
EOSINOPHIL # BLD: 0.5 K/UL (ref 0–0.8)
EOSINOPHIL NFR BLD: 7 % (ref 0.5–7.8)
ERYTHROCYTE [DISTWIDTH] IN BLOOD BY AUTOMATED COUNT: 13.1 % (ref 11.9–14.6)
HCT VFR BLD AUTO: 30.5 % (ref 35.8–46.3)
HGB BLD-MCNC: 10.2 G/DL (ref 11.7–15.4)
IMM GRANULOCYTES # BLD AUTO: 0 K/UL (ref 0–0.5)
IMM GRANULOCYTES NFR BLD AUTO: 0 % (ref 0–5)
LYMPHOCYTES # BLD: 1.8 K/UL (ref 0.5–4.6)
LYMPHOCYTES NFR BLD: 26 % (ref 13–44)
MCH RBC QN AUTO: 31.7 PG (ref 26.1–32.9)
MCHC RBC AUTO-ENTMCNC: 33.4 G/DL (ref 31.4–35)
MCV RBC AUTO: 94.7 FL (ref 79.6–97.8)
MM INDURATION POC: 0 MM (ref 0–5)
MM INDURATION POC: 0 MM (ref 0–5)
MONOCYTES # BLD: 0.7 K/UL (ref 0.1–1.3)
MONOCYTES NFR BLD: 11 % (ref 4–12)
NEUTS SEG # BLD: 3.7 K/UL (ref 1.7–8.2)
NEUTS SEG NFR BLD: 55 % (ref 43–78)
NRBC # BLD: 0 K/UL (ref 0–0.2)
PLATELET # BLD AUTO: 254 K/UL (ref 150–450)
PMV BLD AUTO: 9.9 FL (ref 9.4–12.3)
PPD POC: NEGATIVE NEGATIVE
PPD POC: NEGATIVE NEGATIVE
RBC # BLD AUTO: 3.22 M/UL (ref 4.05–5.2)
WBC # BLD AUTO: 6.8 K/UL (ref 4.3–11.1)

## 2021-05-31 PROCEDURE — 85025 COMPLETE CBC W/AUTO DIFF WBC: CPT

## 2021-05-31 PROCEDURE — 74011250636 HC RX REV CODE- 250/636: Performed by: INTERNAL MEDICINE

## 2021-05-31 PROCEDURE — 2709999900 HC NON-CHARGEABLE SUPPLY

## 2021-05-31 PROCEDURE — 74011250637 HC RX REV CODE- 250/637: Performed by: INTERNAL MEDICINE

## 2021-05-31 PROCEDURE — 97535 SELF CARE MNGMENT TRAINING: CPT

## 2021-05-31 PROCEDURE — 36415 COLL VENOUS BLD VENIPUNCTURE: CPT

## 2021-05-31 PROCEDURE — 74011250637 HC RX REV CODE- 250/637: Performed by: ORTHOPAEDIC SURGERY

## 2021-05-31 RX ORDER — ASPIRIN 325 MG
325 TABLET, DELAYED RELEASE (ENTERIC COATED) ORAL DAILY
Qty: 28 TABLET | Refills: 0 | Status: SHIPPED
Start: 2021-06-01 | End: 2021-06-29

## 2021-05-31 RX ORDER — ACETAMINOPHEN 500 MG
500 TABLET ORAL
Qty: 12 TABLET | Refills: 0 | Status: SHIPPED
Start: 2021-05-31 | End: 2021-12-06

## 2021-05-31 RX ADMIN — ASPIRIN 325 MG: 325 TABLET, COATED ORAL at 10:02

## 2021-05-31 RX ADMIN — OXYCODONE 10 MG: 5 TABLET ORAL at 01:03

## 2021-05-31 RX ADMIN — POLYETHYLENE GLYCOL 3350 17 G: 17 POWDER, FOR SOLUTION ORAL at 10:02

## 2021-05-31 RX ADMIN — Medication 10 ML: at 05:33

## 2021-05-31 RX ADMIN — OXYCODONE 10 MG: 5 TABLET ORAL at 12:55

## 2021-05-31 RX ADMIN — ONDANSETRON 4 MG: 2 INJECTION INTRAMUSCULAR; INTRAVENOUS at 13:01

## 2021-05-31 RX ADMIN — Medication 1 TABLET: at 10:02

## 2021-05-31 NOTE — PROGRESS NOTES
May 31, 2021         Post Op day: 5 Days Post-Op Procedure(s) (LRB):  Right HIP HEMIARTHROPLASTY (Right)      Admit Date: 2021  Admit Diagnosis: Hip fracture requiring operative repair [facility]) [facility]       Principle Problem: Fracture of neck of right femur ([address] Subjective: Doing well, No complaints, No SOB, No Chest Pain, No Nausea or Vomiting     Objective:   Vital Signs are Stable, No Acute Distress, Alert,  Dressing is Dry,  Neurovascular exam is normal.     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    Elevated blood pressure (not hypertension) R03.0    Vitamin D deficiency E55.9    Osteopenia of multiple sites M85.89    B12 deficiency E53.8    Idiopathic peripheral neuropathy G60.9    Fracture of neck of right femur (HCC) S72.001A    Hip fracture requiring operative repair [facility]) S72.009A      Patient Vitals for the past 8 hrs:   BP Temp Pulse Resp SpO2   21 0708 113/66 98.1 °F (36.7 °C) 87 18 92 %   21 0357 (!) 98/57 98.1 °F (36.7 °C) 76 18 94 %    Temp (24hrs), Av.4 °F (36.9 °C), Min:98.1 °F (36.7 °C), Max:98.7 °F (37.1 °C)    Body mass index is 22.14 kg/m².     Lab Results   Component Value Date/Time    HGB 10.2 (L) 2021 05:29 AM          S/P Procedure(s) (LRB):  Right HIP HEMIARTHROPLASTY (Right)       Medical Mgmt per hospitalist  Anticoagulation plan: ASA 325mg daily  Continue PT  Fall Precautions  DC disp: rehab placement  F/U: 2 weeks postop for wound check and staple removal        Signed By: ANEL Foy  2021,  8:39 AM

## 2021-05-31 NOTE — PROGRESS NOTES
ACUTE OT GOALS:  (Developed with and agreed upon by patient and/or caregiver.)    1. Patient will complete functional transfers with supervision. 2. Patient will bathe and dress lower body with supervision and adaptive device as needed  3. Patient will toilet with supervision and adaptive device as needed. 4. Patient will tolerate 25 minutes of OT treatment with up to 2 rest breaks to increase endurance for ADLs. 5. Patient will demonstrate modified independence with therapeutic exercise HEP to increase strength in BUEs for increased safety and independence with functional tasks. 6. Patient will groom in standing with supervision and adaptive device as needed. 7. Patient will complete functional mobility for ADLs with supervision and adaptive device as needed  OCCUPATIONAL THERAPY: Daily Note OT Treatment Day # 3    Jennifer Alonzo is a 77 y.o. female   PRIMARY DIAGNOSIS: Fracture of neck of right femur ([facility]  Hip fracture requiring operative repair ([facility] [S72.009A]  Procedure(s) (LRB):  Right HIP HEMIARTHROPLASTY (Right)  5 Days Post-Op  [place] / [place] / Product Type: Medicare /   ASSESSMENT:     REHAB RECOMMENDATIONS: CURRENT LEVEL OF FUNCTION:  (Most Recently Demonstrated)   Recommendation to date pending progress:  Setting:   Short-term Rehab  Equipment:    Rolling Walker Bathing:   Standby Assistance  Dressin [address]  Feeding/Grooming:   Set Up  Toileting:   Contact Guard Assistance  Functional Mobility:   Contact Guard Assistance     ASSESSMENT:  Ms. Brandon Baldwin was greeted up in the bathroom attempting to have a BM. Pt completed toileting with CGA, ADLs at sink with CGA. Pt introduced to AE for LB dressing and donned underwear with CGA using reacher, changed button down gown with set up. Pt c/o increased stiffness and pain after sitting on the toilet for a long time, required min A to stand from various surfaces and CGA overall for mobility. Pt is progressing towards goals, continue POC. SUBJECTIVE:   Ms. Lisseth Anaya states, \"I'm going to rehab today. \"    SOCIAL HISTORY/LIVING ENVIRONMENT:   Home Environment: Trailer/mobile home  # Steps to Enter: 1  One/Two Story Residence: One story  Living Alone: No  Support Systems: Spouse/Significant Other/Partner    OBJECTIVE:     PAIN: VITAL SIGNS: LINES/DRAINS:   Pre Treatment: Pain Screen  Pain Scale 1: Numeric (0 - 10)  Pain Intensity 1: 6  Pain Location 1: Hip  Pain Orientation 1: Right  Pain Intervention(s) 1: Declines  Post Treatment: same   none  O2 Device: None (Room air)     ACTIVITIES OF DAILY LIVING: I Mod I S SBA CGA Min Mod Max Total NT Comments   BASIC ADLs:              Bathing/ Showering [] [] [] [] [] [] [] [] [] []    Toileting [] [] [] [] [x] [] [] [] [] []    Dressing [] [] [] [] [x] [] [] [] [] []    Feeding [] [] [] [] [] [] [] [] [] []    Grooming [] [] [] [] [x] [] [] [] [] []    Personal Device Care [] [] [] [] [] [] [] [] [] [x]    Functional Mobility [] [] [] [] [x] [] [] [] [] []    I=Independent, Mod I=Modified Independent, S=Supervision, SBA=Standby Assistance, CGA=Contact Guard Assistance,   Min=Minimal Assistance, Mod=Moderate Assistance, Max=Maximal Assistance, Total=Total Assistance, NT=Not Tested    MOBILITY: I Mod I S SBA CGA Min Mod Max Total  NT x2 Comments:   Supine to sit [] [] [] [] [] [] [] [] [] [] []    Sit to supine [] [] [] [] [] [] [] [] [] [] []    Sit to stand [] [] [] [] [] [x] [] [] [] [] []    Bed to chair [] [] [] [] [x] [] [] [] [] [] []    I=Independent, Mod I=Modified Independent, S=Supervision, SBA=Standby Assistance, CGA=Contact Guard Assistance,   Min=Minimal Assistance, Mod=Moderate Assistance, Max=Maximal Assistance, Total=Total Assistance, NT=Not Tested    BALANCE: Good Fair+ Fair Fair- Poor NT Comments   Sitting Static [x] [] [] [] [] []    Sitting Dynamic [x] [] [] [] [] []              Standing Static [] [x] [] [] [] []    Standing Dynamic [] [x] [] [] [] []      PLAN:   FREQUENCY/DURATION: OT Plan of Care: 4 times/week for duration of hospital stay or until stated goals are met, whichever comes first.    TREATMENT:   TREATMENT:   ($$ Self Care/Home Management: 38-52 mins    )  Self Care (38 Minutes): Self care including Toileting, Upper Body Dressing, Lower Body Dressing, Grooming and ADL Adaptive Equipment Training to increase independence and decrease level of assistance required.     TREATMENT GRID:   Date:  5/29 Date:   Date:     Activity/Exercise Parameters Parameters Parameters   Shoulder flexion 2 sets of 10 reps with red theraband     Elbow flexion      Tricep extensions      Horizontal add/abd 2 sets of 10 reps with red theraband                             AFTER TREATMENT POSITION/PRECAUTIONS:  Chair, Needs within reach, RN notified and Visitors at bedside    INTERDISCIPLINARY COLLABORATION:  RN/PCT    TOTAL TREATMENT DURATION:  OT Patient Time In/Time Out  Time In: 0921  Time Out: Bebe Dumont

## 2021-05-31 NOTE — PROGRESS NOTES
TRANSFER - OUT REPORT:    Verbal report given to [employer] on Meredith Rubinstein  being transferred to [facility] for routine progression of care       Report consisted of patients Situation, Background, Assessment and   Recommendations(SBAR). Information from the following report(s) SBAR was reviewed with the receiving nurse. Opportunity for questions and clarification was provided.       Patient transported with:   [facility]

## 2021-05-31 NOTE — DISCHARGE SUMMARY
Date of Admission: 5/26/2021  Date of Discharge: 5/31/2021    Discharge Diagnoses:  1. Displaced Right Femoral Neck fracture s/p Fall  2. Acute blood loss anemia    Active Hospital Problems    Diagnosis Date Noted    Fracture of neck of right femur ([facility] 05/26/2021    Hip fracture requiring operative repair ([facility] 05/26/2021        Discharge Medications:  Current Discharge Medication List      START taking these medications    Details   acetaminophen (TYLENOL) 500 mg tablet Take 1 Tablet by mouth every six (6) hours as needed for Pain. Qty: 12 Tablet, Refills: 0  Start date: 5/31/2021      aspirin delayed-release 325 mg tablet Take 1 Tablet by mouth daily for 28 days. Qty: 28 Tablet, Refills: 0  Start date: 6/1/2021, End date: 6/29/2021         CONTINUE these medications which have NOT CHANGED    Details   cholecalciferol, vitamin D3, (VITAMIN D3 PO) Take 1 Tab by mouth daily. melatonin 3 mg tablet Take 3 mg by mouth nightly. cyanocobalamin, vitamin B-12, 1,000 mcg cap Take 1 Tab by mouth daily. Pending Labs:  None    Follow-up (including scheduled tests): Follow-up Information     Follow up With Specialties Details Why Contact Info    [address] [place] [address] Orthopedic Surgery On 6/10/2021 11:30 AM [address], [place] [id number]  [phone]    Kwame Jeff MD Internal Medicine   [address] [facility] [address] [place] [address]  [phone]             History of Present Illness:  77 y.o. female with no significant past medical history and not on any home medications who presented to ED due to mechanical fall at home. Patient reports that she was making her bed this morning when she tripped and fell and landed on her right hip. Patient denies any head trauma or loss of consciousness.   Complains of right hip pain that is 12 out of 10 and sharp. Patient reports that she only takes multivitamins and not on any medications at home. Patient denies any fever, chills, shortness of breath, chest pain, abdominal pain, nausea, vomiting. Patient was in normal state of health until this morning before she fell. In the ED, patient is hemodynamically stable and saturating well on room air. EKG shows normal sinus rhythm. Laboratory work-up is remarkable for leukocytosis with WBC of 12.4. Past Medical History:  Past Medical History:   Diagnosis Date    Allergic eye reaction 6/19/2013    Arthritis     shoulders    Claustrophobia     Elevated blood pressure (not hypertension) 4/30/2015    Environmental allergies     FH: breast cancer 6/19/2013    FH: colon cancer 6/19/2013    Hyperlipidemia 6/19/2013    diet controlled    Left shoulder pain 6/19/2013    Menopause 6/19/2013    Trigger finger 6/19/2013    left thumb    Unspecified adverse effect of anesthesia     usually takes until next day to wake fully, OK with last procedure (colonoscopy 2012)    Vertigo 6/19/2013    Vitamin D deficiency 8/9/2016       Allergies: Allergies   Allergen Reactions    Levaquin [Levofloxacin] Nausea Only    Morphine Atopic Dermatitis    Pcn [Penicillins] Hives    Sulfa (Sulfonamide Antibiotics) [facility]       Hospital Course:  77 y. o. female with no significant past medical history and not on any home medications who presented to ED due to mechanical fall at home.     Displaced Right Femoral Neck fracture s/p Fall  - Ortho consulted  - S/p right hip hemiarthroplasty  - PT recommended STR     Postop acute blood loss anemia   - Hemoglobin stable   - Hemodynamically stable on discharge     Procedures:  Right hip hemiarthroplasty    Discharge Day Information:  Follow with PMD    Diet: Regular     Activity: As tolerated    Discharge Physical Exam:  General: Alert, oriented, NAD  HEENT: NC/AT, EOM are intact  Neck: supple, no JVD  Cardiovascular: RRR, S1, S2, no murmurs  Respiratory: Lungs are clear, no wheezes or rales  Abdomen: Soft, NT, ND  Back: No CVA tenderness, no paraspinal tenderness  Extremities: LE without pedal edema, no erythema  Neuro: A&O, CN are intact, no focal deficits  Skin: no rash or ulcers  Psych: good mood and affect    Recent Results (from the past 24 hour(s))   CBC WITH AUTOMATED DIFF    Collection Time: 05/31/21  5:29 AM   Result Value Ref Range    WBC 6.8 4.3 - 11.1 K/uL    RBC 3.22 (L) 4.05 - 5.2 M/uL    HGB 10.2 (L) 11.7 - 15.4 g/dL    HCT 30.5 (L) 35.8 - 46.3 %    MCV 94.7 79.6 - 97.8 FL    MCH 31.7 26.1 - 32.9 PG    MCHC 33.4 31.4 - 35.0 g/dL    RDW 13.1 11.9 - 14.6 %    PLATELET 369 698 - 200 K/uL    MPV 9.9 9.4 - 12.3 FL    ABSOLUTE NRBC 0.00 0.0 - 0.2 K/uL    DF AUTOMATED      NEUTROPHILS 55 43 - 78 %    LYMPHOCYTES 26 13 - 44 %    MONOCYTES 11 4.0 - 12.0 %    EOSINOPHILS 7 0.5 - 7.8 %    BASOPHILS 1 0.0 - 2.0 %    IMMATURE GRANULOCYTES 0 0.0 - 5.0 %    ABS. NEUTROPHILS 3.7 1.7 - 8.2 K/UL    ABS. LYMPHOCYTES 1.8 0.5 - 4.6 K/UL    ABS. MONOCYTES 0.7 0.1 - 1.3 K/UL    ABS. EOSINOPHILS 0.5 0.0 - 0.8 K/UL    ABS. BASOPHILS 0.1 0.0 - 0.2 K/UL    ABS. IMM. GRANS. 0.0 0.0 - 0.5 K/UL        XR HIP RT W OR WO PELV 2-3 VWS   Final Result      Immediate postoperative changes status post right hip arthroplasty, including   subcutaneous emphysema and soft tissue swelling. Unremarkable immediate   postoperative appearance of the hardware. XR HIP RT W OR WO PELV 2-3 VWS   Final Result   Subcapital right femoral neck fracture, displaced. XR FEMUR RT 2 VS   Final Result   Subcapital right femoral neck fracture, displaced.       XR CHEST SNGL V   Final Result           Condition: Improved    Disposition: STR    Consultants During This Hospitalization: Ortho

## 2021-05-31 NOTE — PROGRESS NOTES
Pt is for discharged today for [address]: [address]  Transport: Cathy  No further needs/supportive care orders received for CM at this time. Pt will have close follow per [facility] guidelines    Milestones met    Care Management Interventions  PCP Verified by CM: Yes Gustabo Pérez MD)  Mode of Transport at Discharge: BLS  Transition of Care Consult (CM Consult): SNF [facility] room 509W)  Partner SNF: No  Reason Why Partner SNF Not Chosen: Location  Discharge Durable Medical Equipment: No  Physical Therapy Consult: Yes  Occupational Therapy Consult: Yes  Speech Therapy Consult: No  Current Support Network: Own Home, Lives with Spouse  Confirm Follow Up Transport: Family  The Plan for Transition of Care is Related to the Following Treatment Goals : Patient will participate in STR therapies in order to return to baseline independence in ADLs.    The Patient and/or Patient Representative was Provided with a Choice of Provider and Agrees with the Discharge Plan?: Yes  Freedom of Choice List was Provided with Basic Dialogue that Supports the Patient's Individualized Plan of Care/Goals, Treatment Preferences and Shares the Quality Data Associated with the Providers?: Yes  [employer] Information Provided?: No  Discharge Location  Discharge Placement: Skilled nursing facility ([facility])

## 2021-05-31 NOTE — DISCHARGE INSTRUCTIONS
[address]    IF YOU HAVE ANY PROBLEMS ONCE YOU ARE AT HOME CALL THE FOLLOWING NUMBERS:   Main office number: [phone] ask for Munira Verde (medical assistant with Dr. Araseli Tejeda)  Office Address: [facility] [address] [place] [address] [address] [facility], [address] [facility]      Patient Discharge Instructions    Margy Fredy / [id number] : 1945    Admitted 2021 Discharged: 2021         To be given to [address] on Admission         Weight bearing status: As tolerated with walker and assistance    Activity  · Continue Physical Therapy and Occupational Therapy   · Fall precautions     Wound Care   Dry dressing changes using sterile technique every other day or more frequently if needed    Staples are to be left in and removed in our office 2 weeks postop    Diet  · Resume regular or diabetic diet      Medications    · Patient medications are listed on the medication reconciliation sheet. Follow up    Follow up in our office in 2 weeks postop    All patients are to be transported via stretcher unless they are able to independently get out of a chair and stand without assistance. Information obtained by :  I understand that if any problems occur once I am at home I am to contact my physician. I understand and acknowledge receipt of the instructions indicated above.                                                                                                                                            Physician's or R.N.'s Signature                                                                  Date/Time                                                                                                                                              Patient or Representative Signature                                                          Date/Time

## 2021-05-31 NOTE — PROGRESS NOTES
Problem: Falls - Risk of  Goal: *Absence of Falls  Description: Document Xavier Sen Fall Risk and appropriate interventions in the flowsheet.   Outcome: Resolved/Met     Problem: Patient Education: Go to Patient Education Activity  Goal: Patient/Family Education  Outcome: Resolved/Met     Problem: Patient Education: Go to Patient Education Activity  Goal: Patient/Family Education  Outcome: Resolved/Met     Problem: Patient Education: Go to Patient Education Activity  Goal: Patient/Family Education  Outcome: Resolved/Met     Problem: Hip Fracture: Post-Op Day 4  Goal: Off Pathway (Use only if patient is Off Pathway)  Outcome: Resolved/Met  Goal: Activity/Safety  Outcome: Resolved/Met  Goal: Diagnostic Test/Procedures  Outcome: Resolved/Met  Goal: Nutrition/Diet  Outcome: Resolved/Met  Goal: Medications  Outcome: Resolved/Met  Goal: Respiratory  Outcome: Resolved/Met  Goal: Treatments/Interventions/Procedures  Outcome: Resolved/Met  Goal: Psychosocial  Outcome: Resolved/Met  Goal: Discharge Planning  Outcome: Resolved/Met  Goal: *Met physical therapy criteria for discharge to next level of care  Outcome: Resolved/Met  Goal: *Optimal pain control at patient's stated goal  Outcome: Resolved/Met  Goal: *Hemodynamically stable  Outcome: Resolved/Met  Goal: *Tolerating diet  Outcome: Resolved/Met  Goal: *Active bowel function  Outcome: Resolved/Met  Goal: *Adequate urinary output  Outcome: Resolved/Met  Goal: *Absence of skin breakdown  Outcome: Resolved/Met  Goal: *Patient verbalizes understanding of discharge instructions  Outcome: Resolved/Met

## 2021-06-01 ENCOUNTER — HOSPITAL ENCOUNTER (OUTPATIENT)
Dept: PHYSICAL THERAPY | Age: 76
Discharge: HOME | End: 2021-06-01

## 2021-06-01 NOTE — PROGRESS NOTES
Physical Therapy  [address] [place] [address]  Date: 6/1/2021    Patient called and inform PT office staff that she had falls at home resulting in hip fracture and that she will need to cancel remaining appointments. Pt states will call when ready to resume PT treatment.       LOUISA IslasT

## 2021-06-07 ENCOUNTER — APPOINTMENT (OUTPATIENT)
Dept: PHYSICAL THERAPY | Age: 76
End: 2021-06-07

## 2021-06-09 ENCOUNTER — APPOINTMENT (OUTPATIENT)
Dept: PHYSICAL THERAPY | Age: 76
End: 2021-06-09

## 2021-06-14 ENCOUNTER — APPOINTMENT (OUTPATIENT)
Dept: PHYSICAL THERAPY | Age: 76
End: 2021-06-14

## 2021-06-17 ENCOUNTER — APPOINTMENT (OUTPATIENT)
Dept: PHYSICAL THERAPY | Age: 76
End: 2021-06-17

## 2021-07-09 NOTE — THERAPY DISCHARGE
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 Frequency/Duration: 1-2 times a week for 4 weeks  MEDICAL/REFERRING DIAGNOSIS:  Cervicalgia [M54.2]  Spondylosis without myelopathy or radiculopathy, cervical region [M47.812]  Connective tissue and disc stenosis of intervertebral foramina of upper extremity [M99.77]   DATE OF ONSET: Patient reports her neck pain starting back in 2020 after doing a lot of mulch/ yard work. REFERRING PHYSICIAN: Fidencio Ashraf MD MD Orders: Evaluate and Treat   Return MD Appointment: 2021     INITIAL ASSESSMENT:  Ms. Philly Dempsey is a 77 y.o. female presenting to physical therapy with complaints of neck pain which began back in 2020. She reports her pain starting after working in her yard spreading mulch. Patient states the pain has gotten some better over time, but continues to be an annoyance and limits her ROM/ mobility. She was given some ROM exercises by the doctor and reports continuing with those daily. She was having some hearing problems and had an MRI for assessment where they looked at her cervical spine as well. Per chart review the MRI demonstrated spondylosis and degenerative changes. She is scheduled to have another MRI in 6 months for re-assessment. She reports one fall about 6 months ago where she tripped over a curb at the grocery store, but no injury, no other falls, and no feelings of unsteadiness. Her main complaint with her neck pain is her loss of ROM and pain with cervical rotations and a few headaches a week. Patient is eager to improve her pain, ROM, and activity tolerance in order to return to her prior level of independence and function. Patient presents with increased pain, decreased strength, decreased ROM, decreased flexibility, impaired posture, impaired transfer ability, decreased activity tolerance, and overall impaired functional mobility. Patient is a good candidate for skilled physical therapy interventions to include manual therapy, therapeutic exercise, transfer training, postural re-education, body mechanics training, and pain modalities as needed. DISCONTINUATION (7/9/2021):  Patient was seen for 18 sessions of physical therapy from 3/19/2021 to 5/24/2021. Patient reported feeling 75% better on 5/18/2021 and only having difficulty with driving activities and recreational exercise activities. She demonstrated significant improvements in cervical spine AROM and strength, as well as posture and functional activity tolerance. New PT goal was added to progress patient towards return to recreational exercise classes consistent with prior level of function. Patient sustained a fall in her home on 5/26/2021 where she fracture her R hip. Called to cancel remaining appointments for her neck at that time. Patient discharged. PROBLEM LIST (Impacting functional limitations):  1. Decreased Strength  2. Decreased ADL/Functional Activities  3. Decreased Transfer Abilities  4. Increased Pain  5. Decreased Activity Tolerance  6. Decreased Pacing Skills  7. Decreased Work Simplification/Energy Conservation Techniques  8.  Decreased Flexibility/Joint Mobility INTERVENTIONS PLANNED: (Treatment may consist of any combination of the following)  1. Bed Mobility  2. Cold  3. Family Education  4. Heat  5. Home Exercise Program (HEP)  6. Manual Therapy  7. Neuromuscular Re-education/Strengthening  8. Range of Motion (ROM)  9. Therapeutic Activites  10. Therapeutic Exercise/Strengthening  11. Transfer Training  12. Ultrasound (US)     GOALS: (Goals have been discussed and agreed upon with patient.)  Short-Term Functional Goals: Time Frame: 3/19/2021 to 4/16/2021  1. Patient demonstrates independence with home exercise program without verbal cueing provided by therapist. Galdino Lewis MET 4/7/2021  2. Patient will report no more than 3/10 neck pain at rest in order to demonstrate improved self pain control and tolerance. GOAL MET 4/7/2021  3. Patient will be educated in and demonstrate improved upright posture including decreased anterior head and thoracic kyphosis to decrease strain on the cervical spine and improve mobility. GOAL MET 4/7/2021  4. Patient will improve bilateral cervical lateral flexion ROM to at least 25 degrees in order to demonstrate improve joint and tissue mobility. GOAL MET 4/19/2021  5. Patient will report less than 2 headaches a week in order to demonstrate decreased tension from the cervical spine causes headaches. GOAL MET 4/7/2021  Discharge Goals: Time Frame: 3/19/2021 to 5/18/2021  9. Patient will improve bilateral cervical rotation ROM to at least 45 degrees with minimal to no complaints of pain in order to improve ability to look over shoulder while driving. ONGOING   10. Patient will be able to sleep through the night without waking due to neck pain in order to improve sleeping pattern for overall health and wellness. GOAL MET 4/7/2021  1. Patient will report 1 headache or less during the week in order to demonstrate decreased tension and return to normal headache frequency prior to neck pain. GOAL MET 4/19/21  2.  Patient will be able to perform work inside and outside of her home with minimal to no complaints of neck pain in order to return to prior level of activities. GOAL MET 5/18/2021   3. Patient will improve Neck Disability Index Score to 12/50 from 15/50. GOAL MET 4/19/2021  4. NEW GOAL 5/18/2021: Pt will return to unrestricted recreational weekly exercise classes consistent with prior level of function with reports neck pain symptoms 3/10 or less. -NOT MET    Outcome Measure: Tool Used: Neck Disability Index (NDI)  Score:  Initial: 15/50  Most Recent: 11/50 (Date:4/19/2021)   Interpretation of Score: The Neck Disability Index is a revised form of the Oswestry Low Back Pain Index and is designed to measure the activities of daily living in person's with neck pain. Each section is scored on a 0-5 scale, 5 representing the greatest disability. The scores of each section are added together for a total score of 50. Patient denies any changes in vision, dizziness, vertigo, nausea, drop attacks, black outs, tinnitus, dysphagia, dysarthria, LE symptoms or bowel/bladder dysfunction. UPDATED OBJECTIVE FINDINGS:  (from 5/18/2021)    Observation/Orthostatic Postural Assessment:          Patient with moderate forward head posturing and thoracic kyphosis. Palpation:          Mild tenderness to palpation of cervical musculature including B UT/LS R>L and suboccipitals muscles. Vertebral-Basilar Screen: Positional Provocation testing is negative per ROM allowance. ROM:          Pain reported in R side of neck with R cervical rotation and L side of neck with L cervical rotation  AROM/ PROM Degrees Degrees 5/18/2021 Degrees  4/19/21   Cervical Flexion 75 75 75   Cervical Extension 20 35 28   Right Rotation 40 52* 32*   Left Rotation 35 60 46   Right Lateral Flexion 17 26 25   Left Lateral Flexion 22 28 26     Strength:          Gross bilateral UE strength 4+/5 to 5/5. Passive Accessory Motion:         C spine:  Moderate to severe hypomobility lower cervical spine C5-7 and CT junction. T spine: Severe hypomobility upper thoracic spine T1-5. Neurological Screen:              Myotomes: Key muscle strength testing through bilateral UE is [facility]. Dermatomes: Sensation to light touch for bilateral UE is intact from C4 to T2. Reflexes: Biceps (C5): 1+ bilaterally         Brachioradialis (C6): 1+ bilaterally        Triceps (C7): 1+ bilaterally  Functional Mobility:         No HA symptoms reported in previous 1 week. Reason for Services/Other Comments:  · Patient discharged. Rehabilitation Potential For Stated Goals: Good  Regarding Jordyn Killian's therapy, I certify that the treatment plan above will be carried out by a therapist or under their direction.   Thank you for this referral,  Codie Hook, PT

## 2021-07-12 ENCOUNTER — HOSPITAL ENCOUNTER (OUTPATIENT)
Dept: PHYSICAL THERAPY | Age: 76
Discharge: HOME | End: 2021-07-12
Attending: ORTHOPAEDIC SURGERY
Payer: MEDICARE

## 2021-07-12 DIAGNOSIS — S72.001A CLOSED HIP FRACTURE REQUIRING OPERATIVE REPAIR, RIGHT, INITIAL ENCOUNTER (HCC): ICD-10-CM

## 2021-07-12 PROCEDURE — 97161 PT EVAL LOW COMPLEX 20 MIN: CPT

## 2021-07-12 PROCEDURE — 97110 THERAPEUTIC EXERCISES: CPT

## 2021-07-12 PROCEDURE — 97140 MANUAL THERAPY 1/> REGIONS: CPT

## 2021-07-12 NOTE — PROGRESS NOTES
Suzanne Bennett  : 1945  [place]  Secondary: [address] at [facility]  [address] [place], [address] [place] [address], [facility], [address] [place] [address]  [phone]   [phone]      OUTPATIENT PHYSICAL THERAPY: Daily Treatment Note 2021    ICD-10: Treatment Diagnosis: Aftercare following joint replacement surgery [Z47.1]               Treatment Diagnosis 2: Closed hip fracture required operative repair, right, sequela [S72.001S]                Treatment Diagnosis 3: Right hip pain [M25.551]                Treatment Diagnosis 4: Difficulty walking, not elsewhere classified [R26.2]  Precautions: Posterior hip precautions. Allergies: Levaquin [levofloxacin], Morphine, Pcn [penicillins], and Sulfa (sulfonamide antibiotics)   TREATMENT PLAN:  Effective Dates: 2021 TO 2021. Frequency/Duration: 1-2 time a week for 8 weeks MEDICAL/REFERRING DIAGNOSIS:  Closed hip fracture requiring operative repair, right, [facility]   DATE OF ONSET: s/p right hip hemiarthroplasty 2021. REFERRING PHYSICIAN: Anne Thornton MD MD Orders: Evaluate and Treat  Return MD Appointment: TBD by patient. Pre-treatment Symptoms/Complaints: Pt reports mild hip soreness at start of treatment. Pain: Initial: Pain Intensity 1: 5  Pain Location 1: Hip  Pain Orientation 1: Right  Post Session:  4/10   Medications Last Reviewed:  2021  Updated Objective Findings:  See evaluation note from today   TREATMENT:   THERAPEUTIC EXERCISE: (23 minutes):  Exercises per grid below to improve mobility, strength, balance and coordination. Required moderate visual, verbal, manual and tactile cues to promote proper body alignment, promote proper body posture and promote proper body mechanics. Progressed resistance, range, repetitions and complexity of movement as indicated.      Date:  2021 Date:   Date:     Activity/Exercise Parameters Parameters Parameters   Bent knee fallout x10     Heel slides x10     Supine hip abd x10     Bridge -->     Clamshell x10     Sit to stand x5     Standing hip abd x10     Standing hip ext x10     Standing hamstring curl x10     Nustep -->                   Time spent with patient reviewing proper muscle recruitment and technique with exercises. MANUAL THERAPY: (15 minutes): Soft tissue mobilization and PROM, manual stretching was utilized and necessary because of the patient's restricted joint motion, painful spasm, loss of articular motion and restricted motion of soft tissue   PROM: Right hip to tolerance and within posterior hip precautions.  Soft tissue mobilization: TFL, IT band, glutes to patient tolerance.  Manual stretching: hip flexor, hip adductor stretch to tolerance. MODALITIES: (0 minutes):      Pt declined. HEP: As above; handouts given to patient for all exercises. Treatment/Session Summary:    · Response to Treatment:  Fair tolerance to manual interventions observed due to moderate to severe tenderness to palpation noted at TFL and glute region; good tolerance to therex interventions observed. .  · Communication/Consultation:  HEP handout provided. · Equipment provided today:  None today  · Recommendations/Intent for next treatment session: Next visit will focus on pain reduction, improving hip joint mobility and flexibility, improving strength, balance, gait. Total Treatment Billable Duration:  58 minutes: 20 evaluation, 23 mins therex, 15 mins manual therapy.   PT Patient Time In/Time Out  Time In: 5572  Time Out: [facility],

## 2021-07-12 NOTE — THERAPY EVALUATION
Justo Hernández  : 1945  [place]  Secondary: [address] at [address], [place], [address] [place] [address]  [phone]   [phone]       OUTPATIENT PHYSICAL THERAPY:Initial Assessment 2021    ICD-10: Treatment Diagnosis: Aftercare following joint replacement surgery [Z47.1]               Treatment Diagnosis 2: Closed hip fracture required operative repair, right, sequela [S72.001S]                Treatment Diagnosis 3: Right hip pain [M25.551]                Treatment Diagnosis 4: Difficulty walking, not elsewhere classified [R26.2]  Precautions: Posterior hip precautions. Allergies: Levaquin [levofloxacin], Morphine, Pcn [penicillins], and Sulfa (sulfonamide antibiotics)   TREATMENT PLAN:  Effective Dates: 2021 TO 2021. Frequency/Duration: 1-2 time a week for 8 weeks MEDICAL/REFERRING DIAGNOSIS:  Closed hip fracture requiring operative repair, right[facility]   DATE OF ONSET: s/p right hip hemiarthroplasty 2021. REFERRING PHYSICIAN: Salina Wilson MD MD Orders: Evaluate and Treat  Return MD Appointment: TBD by patient. INITIAL ASSESSMENT:  Ms. Dandre Griffin is a 77 y.o. female presenting to physical therapy with complaints of right hip pain s/p right hip hemiarthroplasty 2021. Pt reports injury occurred from fall while changing bed sheets patient tripped over dresser drawer and felt causing hip fracture. After discussion with orthopedic MD it was determined that surgical intervention was indicated. Right hip hemiarthroplasty was performed on 2021. Pt was discharged to rehab facility after short stay in hospital. Pt chose to return home and underwent home health PT treatment. Pt now referred by MD for outpatient PT treatment.  Pt will benefit from skilled PT treatment to address deficits in strength, AROM, balance, gait and functional mobility in order to improve functional independence, improve quality of life and return to prior level of function. PROBLEM LIST (Impacting functional limitations):  1. Decreased Strength  2. Decreased ADL/Functional Activities  3. Decreased Transfer Abilities  4. Decreased Ambulation Ability/Technique  5. Decreased Balance  6. Increased Pain  7. Decreased Activity Tolerance  8. Increased Fatigue  9. Decreased Flexibility/Joint Mobility  10. Edema/Girth  11. Decreased Knowledge of Precautions  12. Decreased Walker with Home Exercise Program INTERVENTIONS PLANNED: (Treatment may consist of any combination of the following)  1. Balance Exercise  2. Bed Mobility  3. Cryotherapy  4. Electrical Stimulation  5. Family Education  6. Gait Training  7. Heat  8. Home Exercise Program (HEP)  9. Manual Therapy  10. Neuromuscular Re-education/Strengthening  11. Range of Motion (ROM)  12. Therapeutic Activites  13. Therapeutic Exercise/Strengthening  14. Transcutaneous Electrical Nerve Stimulation (TENS)  15. Transfer Training  16. Ultrasound (US)     GOALS: (Goals have been discussed and agreed upon with patient.)  Short-Term Functional Goals: Time Frame: 7/12/2021 to 8/9/2021  1. Patient demonstrates independence with home exercise program without verbal cueing provided by therapist.  2. Pt will report worst pain 5/10 or less in order to return to unrestricted sleeping through night. 3. Pt will demonstrate gross right lower extremity strength 4- to 4/5 or greater in order to improve independence with functional transfers. Discharge Goals: Time Frame: 7/12/2021 to 9/6/2021  1. Pt will reports worst pain 3/10 or less in order to return to unrestricted community distance ambulation. 2. Pt will demonstrate gross right lower extremity strength 4 to 4+/5 or greater in order to return to independent stair navigation.   3. Pt will ambulation 600 ft without use of AD on level surface in order to return to unrestricted community distance ambulation without use of assistive device. 4. Pt will perform 5 times sit to  10 sec or less in order to demonstrate improved functional strength. 5. Pt will perform timed up and go test in 14 sec or less in order to demonstrate decreased falls risk. 6. LEFS score of 40/80 or greater in order to demonstrate overall functional improvement. Outcome Measure: Tool Used: Lower Extremity Functional Scale (LEFS)  Score:  Initial: 17/80 Most Recent: X/80 (Date: -- )   Interpretation of Score: 20 questions each scored on a 5 point scale with 0 representing \"extreme difficulty or unable to perform\" and 4 representing \"no difficulty\". The lower the score, the greater the functional disability. 80/80 represents no disability. Minimal detectable change is 9 points. Medical Necessity:   · Patient is expected to demonstrate progress in strength, range of motion, balance, coordination and functional technique to increase independence with community distance ambulation, functional transfers and house work activities. .  · Skilled intervention continues to be required due to decreased strength, decreased AROM, decreased gait, decreased balance and decreased functional mobility. .  Reason for Services/Other Comments:  · Patient continues to require skilled intervention due to incresing complexity of exercise. .  Total Evaluation Duration: 20 minutes    Rehabilitation Potential For Stated Goals: Good  Regarding Monster Killian's therapy, I certify that the treatment plan above will be carried out by a therapist or under their direction. Thank you for this referral,  Eros Quintanilla, PT   DPT  Referring Physician Signature: Rafael Zaman MD _______________________________ Date _____________             PAIN/SUBJECTIVE:    Initial: Pain Intensity 1: 5  Pain Location 1: Hip  Pain Orientation 1: Right  Post Session:  4/10    HISTORY:    History of Injury/Illness (Reason for Referral):  Ms. Sheila Wilkerson is a 77 y.o. female presenting to physical therapy with complaints of right hip pain s/p right hip hemiarthroplasty 5/26/2021. Pt reports injury occurred from fall while changing bed sheets patient tripped over dresser drawer and felt causing hip fracture. After discussion with orthopedic MD it was determined that surgical intervention was indicated. Right hip hemiarthroplasty was performed on 5/26/2021. Pt was discharged to rehab facility after short stay in hospital. Pt chose to return home and underwent home health PT treatment. Pt now referred by MD for outpatient PT treatment. Past Medical History/Comorbidities:   Ms. Dandre Griffin  has a past medical history of Allergic eye reaction (6/19/2013), Arthritis, Claustrophobia, Elevated blood pressure (not hypertension) (4/30/2015), Environmental allergies, FH: breast cancer (6/19/2013), FH: colon cancer (6/19/2013), Hyperlipidemia (6/19/2013), Left shoulder pain (6/19/2013), Menopause (6/19/2013), Trigger finger (6/19/2013), Unspecified adverse effect of anesthesia, Vertigo (6/19/2013), and Vitamin D deficiency (8/9/2016). Ms. Dandre Griffin  has a past surgical history that includes hx appendectomy; hx cholecystectomy; hx hysterectomy; hx tubal ligation (1971); hx gi (last 2012); hx mohs procedure (1982); and hx orthopaedic (Right, 05/26/2021). Social History/Living Environment:    Lives with spouse. Prior Level of Function/Work/Activity:  Prior level of function includes independent community distance ambulation without use of assistive device, independent all ADLs, independent functional transfers and independent house work including sweeping and mopping activities. Ambulatory/Rehab Services H2 Model Falls Risk Assessment    Risk Factors:       (5)  History of Recent Falls [w/in 3 months] Ability to Rise from Chair:       (0)  Ability to rise in a single movement    Falls Prevention Plan:        Mobility Assistance Device (specify):  front wheeled walker    Total: (5 or greater = High Risk): 5    ©2010 [facility] [address] [place], [place] [address] Patent #[id number]. Federal Law prohibits the replication, distribution or use without written permission from [facility] [employer]    Current Medications:        Current Outpatient Medications:     acetaminophen (TYLENOL) 500 mg tablet, Take 1 Tablet by mouth every six (6) hours as needed for Pain., Disp: 12 Tablet, Rfl: 0    cholecalciferol, vitamin D3, (VITAMIN D3 PO), Take 1 Tab by mouth daily. , Disp: , Rfl:     melatonin 3 mg tablet, Take 3 mg by mouth nightly., Disp: , Rfl:     cyanocobalamin, vitamin B-12, 1,000 mcg cap, Take 1 Tab by mouth daily. , Disp: , Rfl:     Date Last Reviewed:  7/12/2021    Number of Personal Factors/Comorbidities that affect the Plan of Care: 1-2: MODERATE COMPLEXITY    EXAMINATION:      Observation/Postural and Gait Assessment: Gait: antalgic pattern right lower extermity with use of front wheel walker, decreased hip extension at terminal stance/toe off; Incision: healed, no signs of infection. Palpation: Severe tenderness to palpation noted at TFL and glute region; moderate tenderness to palpation noted IT band. ROM:     AROM(PROM) Left Right   Hip flexion ° 80°   Hip extension ° NT°   Hip abduction ° 15°     Passive Accessory Mobility Testing: NT due to post op. Strength:       Manual Muscle Test (out of 5) Left Right   Knee extension 5 4+   Knee flexion 4 4-   Hip flexion 4 3+, pain   Hip extension 4 3+   Hip abduction 4- 3+, pain   Ankle DF 5 4+       Special Tests: Not appropriate at this time. Neurological Screen:  Myotomes: Key muscle strength testing for bilateral LE is WNL. Dermatomes: Sensation testing through bilateral LE for light touch is WNL. Reflexes: Patellar (L4) and achilles (S1) are WNL and WNL. Functional Mobility:  5 times sit to stand: 12 sec. Timed up and go: 20 sec. Body Structures Involved:  1. Nerves  2. Bones  3. Joints  4. Muscles  5.  Ligaments Body Functions Affected:  1. Neuromusculoskeletal  2. Movement Related Activities and Participation Affected:  1. General Tasks and Demands  2. Mobility  3. Self Care  4. Domestic Life  5. Interpersonal Interactions and Relationships  6.  Community, Social and [place]    Number of elements (examined above) that affect the Plan of Care: 4+: HIGH COMPLEXITY    CLINICAL PRESENTATION:    Presentation: Evolving clinical presentation with changing clinical characteristics: MODERATE COMPLEXITY    CLINICAL DECISION MAKING:    Use of outcome tool(s) and clinical judgement create a POC that gives a: Clear prediction of patient's progress: LOW COMPLEXITY

## 2021-07-20 ENCOUNTER — APPOINTMENT (OUTPATIENT)
Dept: PHYSICAL THERAPY | Age: 76
End: 2021-07-20
Attending: ORTHOPAEDIC SURGERY
Payer: MEDICARE

## 2021-07-21 ENCOUNTER — HOSPITAL ENCOUNTER (OUTPATIENT)
Dept: PHYSICAL THERAPY | Age: 76
Discharge: HOME | End: 2021-07-21
Attending: ORTHOPAEDIC SURGERY
Payer: MEDICARE

## 2021-07-21 PROCEDURE — 97140 MANUAL THERAPY 1/> REGIONS: CPT

## 2021-07-21 PROCEDURE — 97110 THERAPEUTIC EXERCISES: CPT

## 2021-07-21 NOTE — PROGRESS NOTES
[address] at [facility] 7/21/2021      Patient's appointment cancelled 7/20/21 due to PT being out sick. On call list for appointment on Wednesday.        Janny Soria, PT, DPT, OMT-C

## 2021-07-21 NOTE — PROGRESS NOTES
Mimi Hilton  : 1945  [place]  Secondary: [address] at [facility]  [address], [address] [place] [address] Avenue, Sebastián kaplan, [address] [place] [address]  [phone]   [phone]      OUTPATIENT PHYSICAL THERAPY: Daily Treatment Note 2021    ICD-10: Treatment Diagnosis: Aftercare following joint replacement surgery [Z47.1]               Treatment Diagnosis 2: Closed hip fracture required operative repair, right, sequela [S72.001S]                Treatment Diagnosis 3: Right hip pain [M25.551]                Treatment Diagnosis 4: Difficulty walking, not elsewhere classified [R26.2]  Precautions: Posterior hip precautions. Allergies: Levaquin [levofloxacin], Morphine, Pcn [penicillins], and Sulfa (sulfonamide antibiotics)   TREATMENT PLAN:  Effective Dates: 2021 TO 2021. Frequency/Duration: 1-2 time a week for 8 weeks MEDICAL/REFERRING DIAGNOSIS:  Closed hip fracture requiring operative repair, right, initial [facility] Jony Caraballo   DATE OF ONSET: s/p right hip hemiarthroplasty 2021. REFERRING PHYSICIAN: Caty Hopper MD MD Orders: Evaluate and Treat  Return MD Appointment: TBD by patient. Pre-treatment Symptoms/Complaints: Patient reports improvements over the last few weeks, but still having some R lateral hip pain with activities. Pain: Initial: Pain Intensity 1: 4  Pain Location 1: Hip  Pain Orientation 1: Right  Post Session:  4/10   Medications Last Reviewed:  2021  Updated Objective Findings:  moderate adhesions under R lateral hip incision site. Significant tenderness with soft tissue work   TREATMENT:   THERAPEUTIC EXERCISE: (30 minutes):  Exercises per grid below to improve mobility, strength, balance and coordination. Required moderate visual, verbal, manual and tactile cues to promote proper body alignment, promote proper body posture and promote proper body mechanics.   Progressed resistance, range, repetitions and complexity of movement as indicated. Date:  7/12/2021 Date:  7/21/2021 Date:     Activity/Exercise Parameters Parameters Parameters   Bent knee fallout x10 Red, 2 x 10 double  X 10 each single    Heel slides x10 ---    Supine hip abd x10 ---    Bridge --> 2 x 10    Clamshell x10 Discontinued due to pain    Sit to stand x5 Chair + Airex, 2 x 10    Standing hip abd x10 2 x 10 each    Standing hip ext x10 ---    Standing hamstring curl x10 ---    Nustep --> Level 1, x 5 minutes, seat 16    LAQ --- 2 x 10    Seated hamstring curl --- Red, 2 x 10    Mini squats --- 2 x 10    Calf stretch --- Slant board, 2 x 30 seconds    Shuttle --- Single leg, 45 degrees hip flexion, 50 lbs, 2 x 10      Time spent with patient reviewing proper muscle recruitment and technique with exercises. MANUAL THERAPY: (25 minutes): Soft tissue mobilization and PROM, manual stretching was utilized and necessary because of the patient's restricted joint motion, painful spasm, loss of articular motion and restricted motion of soft tissue   PROM: Right hip to tolerance and within posterior hip precautions.  Soft tissue mobilization: TFL, IT band, glutes to patient tolerance.  Manual stretching: hip flexor, hip adductor stretch to tolerance.  Scar mobilization over R lateral hip incision site with and without suction cup to decrease adhesions and scar tissue    MODALITIES: (0 minutes):      Pt declined. HEP: As above; handouts given to patient for all exercises. Treatment/Session Summary:    · Response to Treatment:  Patient very tender with manual therapy, but would benefit from contiuned scar mobilization to improve tissue mobility. Patient motivated with exercises, but requires cuing for proper form and technique.   · Communication/Consultation:  None today  · Equipment provided today:  None today  · Recommendations/Intent for next treatment session: Next visit will focus on pain reduction, improving hip joint mobility and flexibility, improving strength, balance, gait.     Total Treatment Billable Duration:  55 minutes  PT Patient Time In/Time Out  Time In: 1100  Time Out: 1 N Jose Huitron, PT

## 2021-07-23 ENCOUNTER — HOSPITAL ENCOUNTER (OUTPATIENT)
Dept: PHYSICAL THERAPY | Age: 76
Discharge: HOME | End: 2021-07-23
Attending: ORTHOPAEDIC SURGERY
Payer: MEDICARE

## 2021-07-23 PROCEDURE — 97110 THERAPEUTIC EXERCISES: CPT

## 2021-07-23 PROCEDURE — 97140 MANUAL THERAPY 1/> REGIONS: CPT

## 2021-07-23 NOTE — PROGRESS NOTES
Reta Matta  : 1945  [place]  Secondary: [address] at [facility]  [address] [place], [address] [place] [address], [place], [address] [place] [address]  [phone]   [phone]      OUTPATIENT PHYSICAL THERAPY: Daily Treatment Note 2021    ICD-10: Treatment Diagnosis: Aftercare following joint replacement surgery [Z47.1]               Treatment Diagnosis 2: Closed hip fracture required operative repair, right, sequela [S72.001S]                Treatment Diagnosis 3: Right hip pain [M25.551]                Treatment Diagnosis 4: Difficulty walking, not elsewhere classified [R26.2]  Precautions: Posterior hip precautions. Allergies: Levaquin [levofloxacin], Morphine, Pcn [penicillins], and Sulfa (sulfonamide antibiotics)   TREATMENT PLAN:  Effective Dates: 2021 TO 2021. Frequency/Duration: 1-2 time a week for 8 weeks MEDICAL/REFERRING DIAGNOSIS:  Closed hip fracture requiring operative repair, right, initial encounter ([facility] John Mayo   DATE OF ONSET: s/p right hip hemiarthroplasty 2021. REFERRING PHYSICIAN: Sotero Kruger MD MD Orders: Evaluate and Treat  Return MD Appointment: TBD by patient. Pre-treatment Symptoms/Complaints: Patient reports some soreness after last session for the next day, but feeling better today. Pain: Initial: Pain Intensity 1: 2  Pain Location 1: Hip  Pain Orientation 1: Right  Post Session:  4/10   Medications Last Reviewed:  2021  Updated Objective Findings:  less tenderness to manual therapy after first few minutes today   TREATMENT:   THERAPEUTIC EXERCISE: (30 minutes):  Exercises per grid below to improve mobility, strength, balance and coordination. Required moderate visual, verbal, manual and tactile cues to promote proper body alignment, promote proper body posture and promote proper body mechanics.   Progressed resistance, range, repetitions and complexity of movement as indicated. Date:  7/12/2021 Date:  7/21/2021 Date:  7/23/2021   Activity/Exercise Parameters Parameters Parameters   Bent knee fallout x10 Red, 2 x 10 double  X 10 each single Green, 2 x 10 double  2 x 10 each single   Heel slides x10 --- ---   Supine hip abd x10 --- ---   Bridge --> 2 x 10 Green abduction, 2 x 10   Clamshell x10 Discontinued due to pain ---   Sit to stand x5 Chair + Airex, 2 x 10 Chair + airex, 2 x 10   Standing hip abd x10 2 x 10 each 2 x 10 each   Standing hip ext x10 ---    Standing hamstring curl x10 ---    Nustep --> Level 1, x 5 minutes, seat 16 Level 1, seat 16, x 5 minutes   LAQ --- 2 x 10 1.5 lbs, 2 x 10   Seated hamstring curl --- Red, 2 x 10 Green, 2 x 10   Mini squats --- 2 x 10 2 x 10   Calf raises --- --- 2 x 10   Calf stretch --- Slant board, 2 x 30 seconds Slant board, 3 x 30 seconds   Shuttle --- Single leg, 45 degrees hip flexion, 50 lbs, 2 x 10 Single leg, 45 degrees hip flexion, 50 lbs, 2 x 10   Side steps --- --- At bar, x 3 down and back   Step ups --- --- 6 inch: Forward x 10  Lateral x 10     Time spent with patient reviewing proper muscle recruitment and technique with exercises. MANUAL THERAPY: (25 minutes): Soft tissue mobilization and PROM, manual stretching was utilized and necessary because of the patient's restricted joint motion, painful spasm, loss of articular motion and restricted motion of soft tissue   PROM: Right hip to tolerance and within posterior hip precautions.  Soft tissue mobilization: TFL, IT band, glutes to patient tolerance.  Manual stretching: hip flexor, hip adductor stretch to tolerance. - not today   Scar mobilization over R lateral hip incision site with and without suction cup to decrease adhesions and scar tissue    MODALITIES: (0 minutes):      Pt declined. HEP: As above; handouts given to patient for all exercises.     Treatment/Session Summary:    · Response to Treatment:  Patient tolerated exercises well and performs standing activities with minimal complaints. Requires cuing for speed and technique. Very motivated. · Communication/Consultation:  None today  · Equipment provided today:  None today  · Recommendations/Intent for next treatment session: Next visit will focus on pain reduction, improving hip joint mobility and flexibility, improving strength, balance, gait.     Total Treatment Billable Duration:  55 minutes  PT Patient Time In/Time Out  Time In: 1430  Time Out: 1527  Efrain Sanders, PT

## 2021-07-26 ENCOUNTER — HOSPITAL ENCOUNTER (OUTPATIENT)
Dept: MRI IMAGING | Age: 76
Discharge: HOME | End: 2021-07-26
Attending: NEUROLOGICAL SURGERY
Payer: MEDICARE

## 2021-07-26 ENCOUNTER — HOSPITAL ENCOUNTER (OUTPATIENT)
Dept: PHYSICAL THERAPY | Age: 76
Discharge: HOME | End: 2021-07-26
Attending: ORTHOPAEDIC SURGERY
Payer: MEDICARE

## 2021-07-26 DIAGNOSIS — M54.2 NECK PAIN: ICD-10-CM

## 2021-07-26 DIAGNOSIS — M48.02 NEUROFORAMINAL STENOSIS OF CERVICAL SPINE: ICD-10-CM

## 2021-07-26 DIAGNOSIS — M47.812 FACET ARTHROPATHY, CERVICAL: ICD-10-CM

## 2021-07-26 PROCEDURE — 74011636320 HC RX REV CODE- 636/320: Performed by: NEUROLOGICAL SURGERY

## 2021-07-26 PROCEDURE — 72156 MRI NECK SPINE W/O & W/DYE: CPT

## 2021-07-26 PROCEDURE — 97110 THERAPEUTIC EXERCISES: CPT

## 2021-07-26 PROCEDURE — 97140 MANUAL THERAPY 1/> REGIONS: CPT

## 2021-07-26 PROCEDURE — A9576 INJ PROHANCE MULTIPACK: HCPCS | Performed by: NEUROLOGICAL SURGERY

## 2021-07-26 RX ORDER — SODIUM CHLORIDE 0.9 % (FLUSH) 0.9 %
10 SYRINGE (ML) INJECTION
Status: COMPLETED | OUTPATIENT
Start: 2021-07-26 | End: 2021-07-26

## 2021-07-26 RX ADMIN — Medication 10 ML: at 11:17

## 2021-07-26 RX ADMIN — GADOTERIDOL 12 ML: 279.3 INJECTION, SOLUTION INTRAVENOUS at 11:17

## 2021-07-26 NOTE — PROGRESS NOTES
Da Foreman  : 1945  [place]  Secondary: [address] at [facility]  [address], [address] [place] [address], Sebastián kaplan, [address] [place] [address]  [phone]   [phone]      OUTPATIENT PHYSICAL THERAPY: Daily Treatment Note 2021    ICD-10: Treatment Diagnosis: Aftercare following joint replacement surgery [Z47.1]               Treatment Diagnosis 2: Closed hip fracture required operative repair, right, sequela [S72.001S]                Treatment Diagnosis 3: Right hip pain [M25.551]                Treatment Diagnosis 4: Difficulty walking, not elsewhere classified [R26.2]  Precautions: Posterior hip precautions. Allergies: Levaquin [levofloxacin], Morphine, Pcn [penicillins], and Sulfa (sulfonamide antibiotics)   TREATMENT PLAN:  Effective Dates: 2021 TO 2021. Frequency/Duration: 1-2 time a week for 8 weeks MEDICAL/REFERRING DIAGNOSIS:  Closed hip fracture requiring operative repair, right, initial encounter ([facility] Ladonna Poon   DATE OF ONSET: s/p right hip hemiarthroplasty 2021. REFERRING PHYSICIAN: Pollo Ramos MD MD Orders: Evaluate and Treat  Return MD Appointment: TBD by patient. Pre-treatment Symptoms/Complaints: Patient reports some soreness after last session for the next day, but feeling better today. Pain: Initial: Pain Intensity 1: 3  Pain Location 1: Hip  Pain Orientation 1: Right  Post Session:  2/10   Medications Last Reviewed:  2021  Updated Objective Findings:  Improved hip external rotation observed today. TREATMENT:   THERAPEUTIC EXERCISE: (40 minutes):  Exercises per grid below to improve mobility, strength, balance and coordination. Required moderate visual, verbal, manual and tactile cues to promote proper body alignment, promote proper body posture and promote proper body mechanics. Progressed resistance, range, repetitions and complexity of movement as indicated. Date:  7/26/2021 Date:  7/21/2021 Date:  7/23/2021   Activity/Exercise Parameters Parameters Parameters   Bent knee fallout Green double 3x10, single 3x10 Red, 2 x 10 double  X 10 each single Green, 2 x 10 double  2 x 10 each single   Heel slides -- --- ---   Supine hip abd -- --- ---   Temi Berliner abduction, 3x10 2 x 10 Green abduction, 2 x 10   Clamshell -- Discontinued due to pain ---   Sit to stand Chair +airex, 2x10 Chair + Airex, 2 x 10 Chair + airex, 2 x 10   Standing hip abd 2x10 2 x 10 each 2 x 10 each   Standing hip ext 2x10 ---    Standing hamstring curl 2x10 ---    Nustep Level 2, 5 mins Level 1, x 5 minutes, seat 16 Level 1, seat 16, x 5 minutes   LAQ 1.5 lbs, 2x10 2 x 10 1.5 lbs, 2 x 10   Seated hamstring curl -- Red, 2 x 10 Green, 2 x 10   Mini squats -- 2 x 10 2 x 10   Calf raises 2x10 --- 2 x 10   Calf stretch Slant board, 3 x 30 seconds Slant board, 2 x 30 seconds Slant board, 3 x 30 seconds   Shuttle Single leg,  50 lbs 2x10 Single leg, 45 degrees hip flexion, 50 lbs, 2 x 10 Single leg, 45 degrees hip flexion, 50 lbs, 2 x 10   Side steps -- --- At bar, x 3 down and back   Step ups 6 in, fwd/lat 2x10 each --- 6 inch: Forward x 10  Lateral x 10      Time spent with patient reviewing proper muscle recruitment and technique with exercises. MANUAL THERAPY: (15 minutes): Soft tissue mobilization and PROM, manual stretching was utilized and necessary because of the patient's restricted joint motion, painful spasm, loss of articular motion and restricted motion of soft tissue   PROM: Right hip to tolerance and within posterior hip precautions.  Soft tissue mobilization: TFL, IT band, glutes to patient tolerance.  Manual stretching: hip flexor, hip adductor stretch to tolerance. - not today   Scar mobilization over R lateral hip incision site with and without suction cup to decrease adhesions and scar tissue    MODALITIES: (0 minutes):      Pt declined.      HEP: As above; handouts given to patient for all exercises. Treatment/Session Summary:    · Response to Treatment:  Good tolerance to therex and manual therapy today. Verbal cues to ensure correct exercise technique . · Communication/Consultation:  None today  · Equipment provided today:  None today  · Recommendations/Intent for next treatment session: Next visit will focus on pain reduction, improving hip joint mobility and flexibility, improving strength, balance, gait.     Total Treatment Billable Duration:  55 minutes  PT Patient Time In/Time Out  Time In: 1330  Time Out: 810 [facility], PT

## 2021-07-30 ENCOUNTER — HOSPITAL ENCOUNTER (OUTPATIENT)
Dept: PHYSICAL THERAPY | Age: 76
Discharge: HOME | End: 2021-07-30
Attending: ORTHOPAEDIC SURGERY
Payer: MEDICARE

## 2021-07-30 PROCEDURE — 97140 MANUAL THERAPY 1/> REGIONS: CPT

## 2021-07-30 PROCEDURE — 97110 THERAPEUTIC EXERCISES: CPT

## 2021-07-30 NOTE — PROGRESS NOTES
Annemarie Mcbride  : 1945  [place]  Secondary: [address] at [facility]  [address], [address] [place] [address], Sebastián kaplan, [address] [place] [address] Street  [phone]   [phone]      OUTPATIENT PHYSICAL THERAPY: Daily Treatment Note 2021    ICD-10: Treatment Diagnosis: Aftercare following joint replacement surgery [Z47.1]               Treatment Diagnosis 2: Closed hip fracture required operative repair, right, sequela [S72.001S]                Treatment Diagnosis 3: Right hip pain [M25.551]                Treatment Diagnosis 4: Difficulty walking, not elsewhere classified [R26.2]  Precautions: Posterior hip precautions. Allergies: Levaquin [levofloxacin], Morphine, Pcn [penicillins], and Sulfa (sulfonamide antibiotics)   TREATMENT PLAN:  Effective Dates: 2021 TO 2021. Frequency/Duration: 1-2 time a week for 8 weeks MEDICAL/REFERRING DIAGNOSIS:  Closed hip fracture requiring operative repair, right, [facility] Josafat Hernandez   DATE OF ONSET: s/p right hip hemiarthroplasty 2021. REFERRING PHYSICIAN: Janet Lovelace MD MD Orders: Evaluate and Treat  Return MD Appointment: TBD by patient. Pre-treatment Symptoms/Complaints: Patient reports has been able to walk limited community distances with single point cane however does have increased soreness the following day;HEP compliance excellent. Pain: Initial: Pain Intensity 1: 4  Pain Location 1: Hip  Pain Orientation 1: Right  Post Session:  2/10   Medications Last Reviewed:  2021  Updated Objective Findings:  See discharge note dated 2021   TREATMENT:   THERAPEUTIC EXERCISE: (40 minutes):  Exercises per grid below to improve mobility, strength, balance and coordination. Required moderate visual, verbal, manual and tactile cues to promote proper body alignment, promote proper body posture and promote proper body mechanics.   Progressed resistance, range, repetitions and complexity of movement as indicated. Date:  7/26/2021 Date:  7/30/2021 Date:  7/23/2021   Activity/Exercise Parameters Parameters Parameters   Bent knee fallout Green double 3x10, single 3x10 Green, 2 x 10 double  X 10 each single Green, 2 x 10 double  2 x 10 each single   Heel slides -- --- ---   Supine hip abd -- --- ---   Phil Gayer abduction, 3x10 2 x 10 Green abduction, 2 x 10   Clamshell -- x10 ---   Sit to stand Chair +airex, 2x10 Chair + Airex, 2 x 10 Chair + airex, 2 x 10   Standing hip abd 2x10 2 x 10 each 2 x 10 each   Standing hip ext 2x10 ---    Standing hamstring curl 2x10 ---    Nustep Level 2, 5 mins Level 1, x 5 minutes, seat 16 Level 1, seat 16, x 5 minutes   LAQ 1.5 lbs, 2x10 2 x 10 1.5 lbs, 2 x 10   Seated hamstring curl -- Red, 2 x 10 Green, 2 x 10   Mini squats -- 2 x 10 2 x 10   Calf raises 2x10 --- 2 x 10   Calf stretch Slant board, 3 x 30 seconds Slant board, 2 x 30 seconds Slant board, 3 x 30 seconds   Shuttle Single leg,  50 lbs 2x10 Single leg, 45 degrees hip flexion, 50 lbs, 2 x 10 Single leg, 45 degrees hip flexion, 50 lbs, 2 x 10   Side steps -- --- At bar, x 3 down and back   Step ups 6 in, fwd/lat 2x10 each --- 6 inch: Forward x 10  Lateral x 10      Time spent with patient reviewing proper muscle recruitment and technique with exercises. MANUAL THERAPY: (15 minutes): Soft tissue mobilization and PROM, manual stretching was utilized and necessary because of the patient's restricted joint motion, painful spasm, loss of articular motion and restricted motion of soft tissue   PROM: Right hip to tolerance and within posterior hip precautions.  Soft tissue mobilization: TFL, IT band, glutes to patient tolerance.  Manual stretching: hip flexor, hip adductor stretch to tolerance. - not today   Scar mobilization over R lateral hip incision site with and without suction cup to decrease adhesions and scar tissue    MODALITIES: (0 minutes):      Pt declined. HEP: As above; handouts given to patient for all exercises. Treatment/Session Summary:    · Response to Treatment:  Increased education provided reviewing final HEP handout to ensure patient understanding. .  · Communication/Consultation:  Final HEP handout provided. · Equipment provided today:  None today  · Recommendations/Intent for next treatment session: Discharge to [facility] at this time; see discharge note dated 7/30/2021.     Total Treatment Billable Duration:  55 minutes  PT Patient Time In/Time Out  Time In: 0563  Time Out: 1001 [facility],

## 2021-07-30 NOTE — THERAPY DISCHARGE
Da Foreman  : 1945  [place]  Secondary: [address] at [address], [address] [place] [address] Street  [phone]   [phone]       OUTPATIENT PHYSICAL THERAPY:Discharge 2021    ICD-10: Treatment Diagnosis: Aftercare following joint replacement surgery [Z47.1]               Treatment Diagnosis 2: Closed hip fracture required operative repair, right, sequela [S72.001S]                Treatment Diagnosis 3: Right hip pain [M25.551]                Treatment Diagnosis 4: Difficulty walking, not elsewhere classified [R26.2]  Precautions: Posterior hip precautions. Allergies: Levaquin [levofloxacin], Morphine, Pcn [penicillins], and Sulfa (sulfonamide antibiotics)   TREATMENT PLAN:  Effective Dates: 2021 TO 2021. Frequency/Duration: 1-2 time a week for 8 weeks MEDICAL/REFERRING DIAGNOSIS:  Fracture of unspecified part of neck of right femur, initial encounter for closed fracture [S72.001A]   DATE OF ONSET: s/p right hip hemiarthroplasty 2021. REFERRING PHYSICIAN: Pollo Ramos MD MD Orders: Evaluate and Treat  Return MD Appointment: TBD by patient. INITIAL ASSESSMENT:  Ms. Vicki Olivier is a 77 y.o. female presenting to physical therapy with complaints of right hip pain s/p right hip hemiarthroplasty 2021. Pt reports injury occurred from fall while changing bed sheets patient tripped over dresser drawer and felt causing hip fracture. After discussion with orthopedic MD it was determined that surgical intervention was indicated. Right hip hemiarthroplasty was performed on 2021. Pt was discharged to rehab facility after short stay in hospital. Pt chose to return home and underwent home health PT treatment. Pt now referred by MD for outpatient PT treatment.  Pt will benefit from skilled PT treatment to address deficits in strength, AROM, balance, gait and functional mobility in order to improve functional independence, improve quality of life and return to prior level of function. DISCHARGE NOTE 7/30/2021: Pt has completed 5 PT visits at this time between 7/12/2021 to 730/2021. Pt demonstrate independence with HEP at this time; pt has made significant progress toward PT goals and is expected to complete remaining PT goals with continue HEP performance. Discussed discharge to [facility] at this time; pt verbalized agreement. PROBLEM LIST (Impacting functional limitations):  1. Decreased Strength  2. Decreased ADL/Functional Activities  3. Decreased Transfer Abilities  4. Decreased Ambulation Ability/Technique  5. Decreased Balance  6. Increased Pain  7. Decreased Activity Tolerance  8. Increased Fatigue  9. Decreased Flexibility/Joint Mobility  10. Edema/Girth  11. Decreased Knowledge of Precautions  12. Decreased Union with Home Exercise Program INTERVENTIONS PLANNED: (Treatment may consist of any combination of the following)  1. Balance Exercise  2. Bed Mobility  3. Cryotherapy  4. Electrical Stimulation  5. Family Education  6. Gait Training  7. Heat  8. Home Exercise Program (HEP)  9. Manual Therapy  10. Neuromuscular Re-education/Strengthening  11. Range of Motion (ROM)  12. Therapeutic Activites  13. Therapeutic Exercise/Strengthening  14. Transcutaneous Electrical Nerve Stimulation (TENS)  15. Transfer Training  16. Ultrasound (US)     GOALS: (Goals have been discussed and agreed upon with patient.)  Short-Term Functional Goals: Time Frame: 7/12/2021 to 8/9/2021  1. Patient demonstrates independence with home exercise program without verbal cueing provided by therapist. -GOAL MET 7/30/2021  2. Pt will report worst pain 5/10 or less in order to return to unrestricted sleeping through night. -GOAL MET 7/30/2021  3. Pt will demonstrate gross right lower extremity strength 4- to 4/5 or greater in order to improve independence with functional transfers. -GOAL MET 7/30/2021  Discharge Goals: Time Frame: 7/12/2021 to 9/6/2021  1. Pt will reports worst pain 3/10 or less in order to return to unrestricted community distance ambulation. -GOAL MET 7/30/2021  2. Pt will demonstrate gross right lower extremity strength 4 to 4+/5 or greater in order to return to independent stair navigation. NOT MET  3. Pt will ambulation 600 ft without use of AD on level surface in order to return to unrestricted community distance ambulation without use of assistive device. -GOAL MET 7/30/2021  4. Pt will perform 5 times sit to  10 sec or less in order to demonstrate improved functional strength. NOT MET  5. Pt will perform timed up and go test in 14 sec or less in order to demonstrate decreased falls risk. NOT MET  6. LEFS score of 40/80 or greater in order to demonstrate overall functional improvement. NT 7/30/2021    Outcome Measure: Tool Used: Lower Extremity Functional Scale (LEFS)  Score:  Initial: 17/80 Most Recent: X/80 (Date: -- )   Interpretation of Score: 20 questions each scored on a 5 point scale with 0 representing \"extreme difficulty or unable to perform\" and 4 representing \"no difficulty\". The lower the score, the greater the functional disability. 80/80 represents no disability. Minimal detectable change is 9 points. Observation/Postural and Gait Assessment: Gait: Mild antalgic with use of single point cane. Palpation: Mild tenderness to palpation noted at TFL and glute region; mild tenderness to palpation noted IT band. ROM:     AROM(PROM) Left Right   Hip flexion ° 90°   Hip extension ° NT°   Hip abduction ° 30°     Passive Accessory Mobility Testing: NT due to post op. Strength:       Manual Muscle Test (out of 5) Left Right   Knee extension 5 4+   Knee flexion 4 4   Hip flexion 4 4   Hip extension 4 4-   Hip abduction 4- 4-   Ankle DF 5 5       Special Tests: Not appropriate at this time.     Neurological Screen:  Myotomes: Key muscle strength testing for bilateral LE is WNL. Dermatomes: Sensation testing through bilateral LE for light touch is WNL. Reflexes: Patellar (L4) and achilles (S1) are WNL and WNL. Functional Mobility:  5 times sit to stand: 11 sec. Timed up and go: 18 sec. Regarding Sean Killian's therapy, I certify that the treatment plan above will be carried out by a therapist or under their direction. Thank you for this referral,  Jae Dubon PT   DPT  Referring Physician Signature: Dl Ortez MD No Signature is Required for this note.

## 2021-10-04 ENCOUNTER — HOSPITAL ENCOUNTER (OUTPATIENT)
Dept: INFUSION THERAPY | Age: 76
Discharge: HOME | End: 2021-10-04
Payer: MEDICARE

## 2021-10-04 VITALS
DIASTOLIC BLOOD PRESSURE: 78 MMHG | TEMPERATURE: 97.6 F | HEART RATE: 92 BPM | RESPIRATION RATE: 18 BRPM | SYSTOLIC BLOOD PRESSURE: 150 MMHG | OXYGEN SATURATION: 97 %

## 2021-10-04 DIAGNOSIS — S72.001A CLOSED FRACTURE OF NECK OF RIGHT FEMUR, INITIAL ENCOUNTER (HCC): Primary | ICD-10-CM

## 2021-10-04 DIAGNOSIS — M85.89 OSTEOPENIA OF MULTIPLE SITES: ICD-10-CM

## 2021-10-04 LAB
CALCIUM SERPL-MCNC: 9.2 MG/DL (ref 8.3–10.4)
MAGNESIUM SERPL-MCNC: 2.3 MG/DL (ref 1.8–2.4)
PHOSPHATE SERPL-MCNC: 3.8 MG/DL (ref 2.3–3.7)

## 2021-10-04 PROCEDURE — 84100 ASSAY OF PHOSPHORUS: CPT

## 2021-10-04 PROCEDURE — 36415 COLL VENOUS BLD VENIPUNCTURE: CPT

## 2021-10-04 PROCEDURE — 83735 ASSAY OF MAGNESIUM: CPT

## 2021-10-04 PROCEDURE — 96372 THER/PROPH/DIAG INJ SC/IM: CPT

## 2021-10-04 PROCEDURE — 74011250636 HC RX REV CODE- 250/636: Performed by: INTERNAL MEDICINE

## 2021-10-04 PROCEDURE — 82310 ASSAY OF CALCIUM: CPT

## 2021-10-04 RX ORDER — ONDANSETRON 2 MG/ML
8 INJECTION INTRAMUSCULAR; INTRAVENOUS AS NEEDED
Status: CANCELLED | OUTPATIENT
Start: 2022-04-04

## 2021-10-04 RX ORDER — ACETAMINOPHEN 325 MG/1
650 TABLET ORAL AS NEEDED
Status: CANCELLED
Start: 2022-04-04

## 2021-10-04 RX ORDER — DIPHENHYDRAMINE HYDROCHLORIDE 50 MG/ML
50 INJECTION, SOLUTION INTRAMUSCULAR; INTRAVENOUS AS NEEDED
Status: CANCELLED
Start: 2022-04-04

## 2021-10-04 RX ORDER — EPINEPHRINE 1 MG/ML
0.3 INJECTION, SOLUTION, CONCENTRATE INTRAVENOUS AS NEEDED
Status: CANCELLED | OUTPATIENT
Start: 2022-04-04

## 2021-10-04 RX ORDER — HYDROCORTISONE SODIUM SUCCINATE 100 MG/2ML
100 INJECTION, POWDER, FOR SOLUTION INTRAMUSCULAR; INTRAVENOUS AS NEEDED
Status: CANCELLED | OUTPATIENT
Start: 2022-04-04

## 2021-10-04 RX ORDER — ALBUTEROL SULFATE 0.83 MG/ML
2.5 SOLUTION RESPIRATORY (INHALATION) AS NEEDED
Status: CANCELLED
Start: 2022-04-04

## 2021-10-04 RX ORDER — DIPHENHYDRAMINE HYDROCHLORIDE 50 MG/ML
25 INJECTION, SOLUTION INTRAMUSCULAR; INTRAVENOUS AS NEEDED
Status: CANCELLED
Start: 2022-04-04

## 2021-10-04 RX ADMIN — DENOSUMAB 60 MG: 60 INJECTION SUBCUTANEOUS at 09:57

## 2021-10-04 NOTE — PROGRESS NOTES
Patient arrived at [facility]. Assessment completed. No needs voiced at this time. Patient tolerated injection well and is aware of next appointment on 4/7/2022 @1400. Patient discharged ambulatory.

## 2022-03-18 PROBLEM — G60.9 IDIOPATHIC PERIPHERAL NEUROPATHY: Status: ACTIVE | Noted: 2020-09-09

## 2022-03-18 PROBLEM — E53.8 B12 DEFICIENCY: Status: ACTIVE | Noted: 2020-09-09

## 2022-03-19 PROBLEM — S72.001A FRACTURE OF NECK OF RIGHT FEMUR (HCC): Status: ACTIVE | Noted: 2021-05-26

## 2022-03-19 PROBLEM — S72.009A HIP FRACTURE REQUIRING OPERATIVE REPAIR (HCC): Status: ACTIVE | Noted: 2021-05-26

## 2022-03-19 PROBLEM — M85.89 OSTEOPENIA OF MULTIPLE SITES: Status: ACTIVE | Noted: 2020-09-09

## 2022-04-07 ENCOUNTER — HOSPITAL ENCOUNTER (OUTPATIENT)
Dept: LAB | Age: 77
Discharge: HOME | End: 2022-04-07

## 2022-04-07 ENCOUNTER — HOSPITAL ENCOUNTER (OUTPATIENT)
Dept: INFUSION THERAPY | Age: 77
Discharge: HOME | End: 2022-04-07
Payer: MEDICARE

## 2022-04-07 VITALS
RESPIRATION RATE: 16 BRPM | DIASTOLIC BLOOD PRESSURE: 69 MMHG | TEMPERATURE: 98.1 F | OXYGEN SATURATION: 95 % | SYSTOLIC BLOOD PRESSURE: 119 MMHG | HEART RATE: 92 BPM

## 2022-04-07 DIAGNOSIS — S72.001A CLOSED FRACTURE OF NECK OF RIGHT FEMUR, INITIAL ENCOUNTER (HCC): Primary | ICD-10-CM

## 2022-04-07 DIAGNOSIS — M85.89 OSTEOPENIA OF MULTIPLE SITES: ICD-10-CM

## 2022-04-07 LAB
CALCIUM SERPL-MCNC: 8.8 MG/DL (ref 8.3–10.4)
CREAT SERPL-MCNC: 0.9 MG/DL (ref 0.6–1)

## 2022-04-07 PROCEDURE — 36415 COLL VENOUS BLD VENIPUNCTURE: CPT

## 2022-04-07 PROCEDURE — 74011250636 HC RX REV CODE- 250/636: Performed by: INTERNAL MEDICINE

## 2022-04-07 PROCEDURE — 82565 ASSAY OF CREATININE: CPT

## 2022-04-07 PROCEDURE — 82310 ASSAY OF CALCIUM: CPT

## 2022-04-07 PROCEDURE — 96372 THER/PROPH/DIAG INJ SC/IM: CPT

## 2022-04-07 RX ORDER — DIPHENHYDRAMINE HYDROCHLORIDE 50 MG/ML
50 INJECTION, SOLUTION INTRAMUSCULAR; INTRAVENOUS AS NEEDED
Start: 2022-10-06

## 2022-04-07 RX ORDER — ACETAMINOPHEN 325 MG/1
650 TABLET ORAL AS NEEDED
Start: 2022-10-06

## 2022-04-07 RX ORDER — EPINEPHRINE 1 MG/ML
0.3 INJECTION, SOLUTION, CONCENTRATE INTRAVENOUS AS NEEDED
OUTPATIENT
Start: 2022-10-06

## 2022-04-07 RX ORDER — ONDANSETRON 2 MG/ML
8 INJECTION INTRAMUSCULAR; INTRAVENOUS AS NEEDED
OUTPATIENT
Start: 2022-10-06

## 2022-04-07 RX ORDER — DIPHENHYDRAMINE HYDROCHLORIDE 50 MG/ML
25 INJECTION, SOLUTION INTRAMUSCULAR; INTRAVENOUS AS NEEDED
Start: 2022-10-06

## 2022-04-07 RX ORDER — HYDROCORTISONE SODIUM SUCCINATE 100 MG/2ML
100 INJECTION, POWDER, FOR SOLUTION INTRAMUSCULAR; INTRAVENOUS AS NEEDED
OUTPATIENT
Start: 2022-10-06

## 2022-04-07 RX ORDER — ALBUTEROL SULFATE 0.83 MG/ML
2.5 SOLUTION RESPIRATORY (INHALATION) AS NEEDED
Start: 2022-10-06

## 2022-04-07 RX ADMIN — DENOSUMAB 60 MG: 60 INJECTION SUBCUTANEOUS at 14:39

## 2022-04-07 NOTE — PROGRESS NOTES
Arrived to the [facility]. Jaylyn completed. Provided education on same. Patient instructed to report any side affects to ordering provider. Patient tolerated well. Any issues or concerns during appointment: none. Patient aware of next infusion appointment on 10/7  Discharged ambulatory.

## 2022-04-11 ENCOUNTER — HOSPITAL ENCOUNTER (OUTPATIENT)
Dept: REHABILITATION | Age: 77
Discharge: HOME | End: 2022-04-11
Payer: MEDICARE

## 2022-04-11 ENCOUNTER — HOSPITAL ENCOUNTER (OUTPATIENT)
Dept: SURGERY | Age: 77
Discharge: HOME | End: 2022-04-11
Payer: MEDICARE

## 2022-04-11 ENCOUNTER — HOSPITAL ENCOUNTER (OUTPATIENT)
Dept: NUCLEAR MEDICINE | Age: 77
Discharge: HOME | End: 2022-04-11
Attending: ORTHOPAEDIC SURGERY
Payer: MEDICARE

## 2022-04-11 VITALS
BODY MASS INDEX: 23.32 KG/M2 | TEMPERATURE: 98.4 F | HEIGHT: 63 IN | OXYGEN SATURATION: 97 % | RESPIRATION RATE: 16 BRPM | WEIGHT: 131.6 LBS | DIASTOLIC BLOOD PRESSURE: 90 MMHG | HEART RATE: 81 BPM | SYSTOLIC BLOOD PRESSURE: 151 MMHG

## 2022-04-11 DIAGNOSIS — Z96.642 PAIN DUE TO LEFT HIP JOINT PROSTHESIS, INITIAL ENCOUNTER (HCC): ICD-10-CM

## 2022-04-11 DIAGNOSIS — T84.84XA PAIN DUE TO LEFT HIP JOINT PROSTHESIS, INITIAL ENCOUNTER (HCC): ICD-10-CM

## 2022-04-11 DIAGNOSIS — Z96.641 HISTORY OF RIGHT HIP HEMIARTHROPLASTY: ICD-10-CM

## 2022-04-11 LAB
ANION GAP SERPL CALC-SCNC: 2 MMOL/L (ref 7–16)
APTT PPP: 28.2 SEC (ref 24.1–35.1)
BACTERIA SPEC CULT: ABNORMAL
BASOPHILS # BLD: 0.1 K/UL (ref 0–0.2)
BASOPHILS NFR BLD: 1 % (ref 0–2)
BUN SERPL-MCNC: 15 MG/DL (ref 8–23)
CALCIUM SERPL-MCNC: 8.8 MG/DL (ref 8.3–10.4)
CHLORIDE SERPL-SCNC: 107 MMOL/L (ref 98–107)
CO2 SERPL-SCNC: 29 MMOL/L (ref 21–32)
CREAT SERPL-MCNC: 0.79 MG/DL (ref 0.6–1)
CRP SERPL HS-MCNC: 0.6 MG/L
DIFFERENTIAL METHOD BLD: ABNORMAL
EOSINOPHIL # BLD: 0.4 K/UL (ref 0–0.8)
EOSINOPHIL NFR BLD: 6 % (ref 0.5–7.8)
ERYTHROCYTE [DISTWIDTH] IN BLOOD BY AUTOMATED COUNT: 13.6 % (ref 11.9–14.6)
ERYTHROCYTE [SEDIMENTATION RATE] IN BLOOD: 5 MM/HR (ref 0–30)
EST. AVERAGE GLUCOSE BLD GHB EST-MCNC: 108 MG/DL
GLUCOSE SERPL-MCNC: 84 MG/DL (ref 65–100)
HBA1C MFR BLD: 5.4 % (ref 4.2–6.3)
HCT VFR BLD AUTO: 38.3 % (ref 35.8–46.3)
HGB BLD-MCNC: 12.3 G/DL (ref 11.7–15.4)
IMM GRANULOCYTES # BLD AUTO: 0 K/UL (ref 0–0.5)
IMM GRANULOCYTES NFR BLD AUTO: 0 % (ref 0–5)
INR PPP: 1
LYMPHOCYTES # BLD: 1.6 K/UL (ref 0.5–4.6)
LYMPHOCYTES NFR BLD: 26 % (ref 13–44)
MCH RBC QN AUTO: 31.1 PG (ref 26.1–32.9)
MCHC RBC AUTO-ENTMCNC: 32.1 G/DL (ref 31.4–35)
MCV RBC AUTO: 96.7 FL (ref 79.6–97.8)
MONOCYTES # BLD: 0.5 K/UL (ref 0.1–1.3)
MONOCYTES NFR BLD: 9 % (ref 4–12)
NEUTS SEG # BLD: 3.6 K/UL (ref 1.7–8.2)
NEUTS SEG NFR BLD: 59 % (ref 43–78)
NRBC # BLD: 0 K/UL (ref 0–0.2)
PLATELET # BLD AUTO: 278 K/UL (ref 150–450)
PMV BLD AUTO: 9.9 FL (ref 9.4–12.3)
POTASSIUM SERPL-SCNC: 4.2 MMOL/L (ref 3.5–5.1)
PROTHROMBIN TIME: 13.3 SEC (ref 12.6–14.5)
RBC # BLD AUTO: 3.96 M/UL (ref 4.05–5.2)
SERVICE CMNT-IMP: ABNORMAL
SODIUM SERPL-SCNC: 138 MMOL/L (ref 136–145)
WBC # BLD AUTO: 6.1 K/UL (ref 4.3–11.1)

## 2022-04-11 PROCEDURE — 86141 C-REACTIVE PROTEIN HS: CPT

## 2022-04-11 PROCEDURE — 97161 PT EVAL LOW COMPLEX 20 MIN: CPT

## 2022-04-11 PROCEDURE — 87641 MR-STAPH DNA AMP PROBE: CPT

## 2022-04-11 PROCEDURE — 85610 PROTHROMBIN TIME: CPT

## 2022-04-11 PROCEDURE — 85652 RBC SED RATE AUTOMATED: CPT

## 2022-04-11 PROCEDURE — 85025 COMPLETE CBC W/AUTO DIFF WBC: CPT

## 2022-04-11 PROCEDURE — 80048 BASIC METABOLIC PNL TOTAL CA: CPT

## 2022-04-11 PROCEDURE — 83036 HEMOGLOBIN GLYCOSYLATED A1C: CPT

## 2022-04-11 PROCEDURE — 77030027138 HC INCENT SPIROMETER -A

## 2022-04-11 PROCEDURE — 85730 THROMBOPLASTIN TIME PARTIAL: CPT

## 2022-04-11 PROCEDURE — 94760 N-INVAS EAR/PLS OXIMETRY 1: CPT

## 2022-04-11 PROCEDURE — 78306 BONE IMAGING WHOLE BODY: CPT

## 2022-04-11 RX ORDER — FEXOFENADINE HCL AND PSEUDOEPHEDRINE HCI 180; 240 MG/1; MG/1
1 TABLET, EXTENDED RELEASE ORAL AS NEEDED
COMMUNITY

## 2022-04-11 RX ORDER — ACETAMINOPHEN 500 MG
500 TABLET ORAL
COMMUNITY

## 2022-04-11 RX ORDER — ASCORBIC ACID 125 MG
TABLET,CHEWABLE ORAL DAILY
COMMUNITY

## 2022-04-11 NOTE — PERIOP NOTES
PLEASE CONTINUE TAKING ALL PRESCRIPTION MEDICATIONS UP TO THE DAY OF SURGERY UNLESS OTHERWISE DIRECTED BELOW. DISCONTINUE all vitamins and supplements 21 day s prior to surgery. DISCONTINUE Non-Steriodal Anti-Inflammatory (NSAIDS) such as Advil, Motrin and Aleve 21 days prior to surgery. Home Medications to take  the day of surgery               Home Medications   to Hold           Comments      On the day before surgery please take Acetaminophen 1000mg in the morning and then again before bed. You may substitute for Tylenol 650 mg. Please do not bring home medications with you on the day of surgery unless otherwise directed by your nurse. If you are instructed to bring home medications, please give them to your nurse as they will be administered by the nursing staff. If you have any questions, please call [facility] [phone] or [facility] [phone]. A copy of this note was provided to the patient for reference.

## 2022-04-11 NOTE — PROGRESS NOTES
Mimi Hilton  : [birth year](9 y.o.) Joint Camp at [facility]  [address], [address]  [phone]       Physical Therapy Prehab Plan of Treatment and Evaluation Summary:2022    ICD-10: Treatment Diagnosis:   · Pain in Right Hip (M25.551)  · Stiffness of Right Hip, Not elsewhere classified (M25.651)  · Difficulty in walking, Not elsewhere classified (R26.2)  · Other abnormalities of gait and mobility (R26.89)  Precautions/Allergies:   Simvastatin, Levaquin [levofloxacin], Morphine, Pcn [penicillins], and Sulfa (sulfonamide antibiotics)  MEDICAL/REFERRING DIAGNOSIS:  Presence of unspecified artificial hip joint [Z96.649]  REFERRING PHYSICIAN: Romy Diego,*  DATE OF SURGERY: 22    Assessment:   Comments:  Pt with pain in right hip. Pt plans to return home with  following right tyra revision. PROBLEM LIST (Impacting functional limitations):  Ms. Abdullahi Chawla presents with the following right lower extremity(s) problems:  1. Transfers  2. Gait  3. Strength  4. Range of Motion  5. Pain   INTERVENTIONS PLANNED:  1. Home Exercise Program  2. Educational Discussion      TREATMENT PLAN: Effective Dates: 2022 TO 2022. Frequency/Duration: Patient to continue to perform home exercise program at least twice per day up until her surgery. GOALS: (Goals have been discussed and agreed upon with patient.)  Discharge Goals: Time Frame: 1 Day  1. Patient will demonstrate independence with a home exercise program designed to increase strength, range of motion and pain control to minimize functional deficits and optimize patient for total joint replacement. Rehabilitation Potential For Stated Goals: Good  Regarding Mary Alice Killian's therapy, I certify that the treatment plan above will be carried out by a therapist or under their direction.   Thank you for this referral,  Alyssa Sequeira, PT               HISTORY:   Present Symptoms:  Pain Intensity 1: 8  Pain Location 1: Hip  Pain Orientation 1: Right   History of Present Injury/Illness (Reason for Referral):  Medical/Referring Diagnosis: Presence of unspecified artificial hip joint [Z96.649]   Past Medical History/Comorbidities:   Ms. Emile Hong  has a past medical history of Allergic eye reaction (6/19/2013), Arthritis, Claustrophobia, Elevated blood pressure (not hypertension) (4/30/2015), Environmental allergies, FH: breast cancer (6/19/2013), FH: colon cancer (6/19/2013), [place] (hard of hearing), Hyperlipidemia (6/19/2013), Left shoulder pain (6/19/2013), Menopause (6/19/2013), Nausea & vomiting, Neuropathy, Osteopenia, Trigger finger (6/19/2013), Unspecified adverse effect of anesthesia, Vertigo (6/19/2013), Vitamin B12 deficiency, and Vitamin D deficiency (8/9/2016). She has no past medical history of Difficult intubation, Malignant hyperthermia due to anesthesia, or Pseudocholinesterase deficiency. Ms. Emile Hong  has a past surgical history that includes hx appendectomy; hx cholecystectomy; hx hysterectomy; hx tubal ligation (1971); hx gi (last 2012); hx mohs procedure (1982); hx orthopaedic (Right, 05/26/2021); and hx rotator cuff repair (Bilateral).   Social History/Living Environment:   Home Environment: Trailer/mobile home  # Steps to Enter: 1  Wheelchair Ramp: Yes  One/Two Story Residence: One story  Living Alone: No  Support Systems: Spouse/Significant Other/Partner  Patient Expects to be Discharged to[de-identified] Trailer/mobile home  Tub or Shower Type: Shower    Work/Activity:  retired  Dominant Side:  RIGHT  Current Medications:  See Pre-assessment nursing note   Number of Personal Factors/Comorbidities that affect the Plan of Care: 0: LOW COMPLEXITY   EXAMINATION:   ADLs (Current Functional Status):   Ambulation:  [] Independent  [] Walk Indoors Only  [] Walk Outdoors  [x] Use Assistive Device quad cane  [] Use Wheelchair Only Dressing:  [] Independent  Requires Assistance from Someone for:  [x] Sock/Shoes  [] Pants  [] Everything   Bathing/Showering:   [x] Independent  [] Requires Assistance from Someone  [] Sponge Bath Only Household Activities:  [] Routine house and yard work  [x] Light Housework Only  [] None   Observation/Orthostatic Postural Assessment:   Within defined limits   ROM/Flexibility:   Gross Assessment: Yes  AROM: Generally decreased, functional                           Strength:   Gross Assessment: Yes  Strength: Generally decreased, functional                  Functional Mobility:    Gross Assessment: Yes  Sensation: Intact    Gait Description (WDL): Within defined limits  Stand to Sit: Independent  Sit to Stand: Independent  Distance (ft): 300 Feet (ft)  Ambulation - Level of Assistance: Independent  Assistive Device: Cane, quad  Gait Abnormalities: Antalgic          Balance:    Sitting: Intact  Standing: Intact   Body Structures Involved:  1. Bones  2. Joints  3. Muscles  4. Ligaments Body Functions Affected:  1. Neuromusculoskeletal  2. Movement Related Activities and Participation Affected:  1. Mobility   Number of elements that affect the Plan of Care: 4+: HIGH COMPLEXITY   CLINICAL PRESENTATION:   Presentation: Stable and uncomplicated: LOW COMPLEXITY   CLINICAL DECISION MAKING:   Tool Used: Hip dysfunction and Osteoarthritis Outcome Score for Joint Replacement (HOOS, JR)  Score:  Initial: 14 (Interval: 46.652) 4/11/2022 Most Recent: TBD   Interpretation of Score: The HOOS, JR contains 6 items from the original HOOS survey. Items are coded from 0 to 4, none to extreme respectively. Shital Juares is scored by summing the raw response (range 0-24) and then converting it to an interval score using the table provided below. The interval score ranges from 0 to 100 where 0 represents total hip disability and 100 represents perfect hip health. Medical Necessity:   · Ms. Kimberlyn Mayers is expected to optimize her lower extremity strength and ROM in preparation for joint replacement surgery.   Reason for Services/Other Comments:  · Achieve baseline assesment of musculoskeletal system, functional mobility and home environment. , educate in PT HEP in preparation for surgery, educate in hospital plan of care. Use of outcome tool(s) and clinical judgement create a POC that gives a: Clear prediction of patient's progress: LOW COMPLEXITY   TREATMENT:   Treatment/Session Assessment:  Patient was instructed in PT- HEP to increase strength and ROM in LEs. Answered all questions. · Post session pain:  8  · Compliance with Program/Exercises: compliant most of the time.   Total Treatment Duration:  PT Patient Time In/Time Out  Time In: 0830  Time Out: 0900    Twyla Baker PT

## 2022-04-11 NOTE — PERIOP NOTES
Patient verified name and . Order for consent  found in EHR and matches case posting; patient does not verify; pt states she is having right hip surgery. Pt states that she will contact Dr. Kishore Woods office today. Office closed at this time. Type 3 surgery, joint camp assessment complete. Labs per surgeon: CBC,BMP, PT/PTT, HGB A1c,/d sed rate ; results within anesthesia guidelines; routed via fax to pcp, Dr. Tobias Rodriguez and to surgeon, Dr Kishore Woods for review. CRP result is pending. Labs per anesthesia protocol: no additional  EKG:completed 21 and within anesthesia guidelines, available for reference in Chart REview. MRSA/MSSA swab collected; pharmacy to review and dose antibiotic as appropriate. Hospital approved surgical skin cleanser and instructions to return bottle on DOS given per hospital policy. Patient provided with handouts including Guide to Surgery, Pain Management, Hand Hygiene, Blood Transfusion Education, and [place] [facility] Brochure. Patient answered medical/surgical history questions at their best of ability. All prior to admission medications documented in [facility]. Original medication prescription bottle  visualized during patient appointment. Patient instructed to hold all vitamins 3 weeks prior to surgery and NSAIDS 5 days prior to surgery. Patient teach back successful and patient demonstrates knowledge of instruction.

## 2022-04-11 NOTE — PROGRESS NOTES
04/11/22 0815   Oxygen Therapy   O2 Sat (%) 95 %   Pulse via Oximetry 83 beats per minute   O2 Device None (Room air)   Pre-Treatment   Breath Sounds Bilateral Clear   Pre FEV1 (liters) 2.2 liters   % Predicted 110     Initial respiratory Assessment completed with pt. Pt was interviewed and evaluated in Joint camp prior to surgery. Patient ID:  Lita Huffman  [id number]  89 y.o.  1945  Surgeon: Dr. Balderas Common  Date of Surgery: 4/26/2022  Procedure: Total Left Hip Arthroplasty  Primary Care Physician: Stiven Tompkins [phone]  Specialists:     Pt taught proper COUGH technique  DIAPHRAGMATIC BREATHING EXERCISE INSTRUCTIONS GIVEN    History of smoking:   DENIES                 Quit date:         Secondhand smoke:father & spouse    Past procedures with Oxygen desaturation or delayed awakening:DELAYED AWAKENING MULTIPLE EPISODES.     Past Medical History:   Diagnosis Date    Allergic eye reaction 6/19/2013    Arthritis     shoulders    Claustrophobia     Elevated blood pressure (not hypertension) 4/30/2015    Environmental allergies     FH: breast cancer 6/19/2013    FH: colon cancer 6/19/2013    [place] (hard of hearing)     has bilateral hearing aids    Hyperlipidemia 6/19/2013    diet controlled    Left shoulder pain 6/19/2013    Menopause 6/19/2013    Nausea & vomiting     post op nausea and vomiting in past    Neuropathy     in toes    Osteopenia     Trigger finger 6/19/2013    left thumb    Unspecified adverse effect of anesthesia     usually takes until next day to wake fully, OK with last procedure (colonoscopy 2012)    Vertigo 6/19/2013    Vitamin B12 deficiency     Vitamin D deficiency 8/9/2016        Respiratory history:DENIES SOB                                                                    Respiratory meds:  DENIES    FAMILY PRESENT:             NO     PAST SLEEP STUDY:                      DENIES  HX OF TROY:                                    DENIES  TROY assessment: GOES TO BATHROOM HS EVERY HOUR                                          SLEEPS ON SIDE       &      BACK            PHYSICAL EXAM   Body mass index is 23.31 kg/m².    Visit Vitals  BP (!) 151/90 (BP 1 Location: Right upper arm, BP Patient Position: Sitting)   Pulse 81   Temp 98.4 °F (36.9 °C)   Resp 16   Ht 5' 3\" (1.6 m)   Wt 59.7 kg (131 lb 9.6 oz)   SpO2 (P) 95%   BMI 23.31 kg/m²     Neck circumference:   32   cm    Loud snoring:                                                 YES          ON HER BACK  Witnessed apnea or wakening gasping or choking:        DENIES         Awakens with headaches:                                               DENIES  Morning or daytime tiredness/ sleepiness:                      DENIES            Dry mouth or sore throat in morning:            YES                                               Go stage:  F3                                   SACS score: 3  Stop Bang   STOP-BANG  Does the patient snore loudly (louder than talking or loud enough to be heard through closed doors)?: Yes  Does the patient often feel tired, fatigued, or sleepy during the daytime, even after a \"good\" night's sleep?: No  Has anyone ever observed the patient stop breathing during their sleep? : No  Does the patient have or are they being treated for high blood pressure?: Yes  Is the patient's BMI greater than 35?: No  Is your neck circumference greater than 17 inches (Male) or 16 inches (Female)?: No  Is the patient older than 48?: Yes  Is the patient male?: No  TROY Score: 3  Has the patient been referred to Sleep Medicine?: No  Has the patient previously been diagnosed with Obstructive Sleep Apnea?: No                            CS HS  RESPIRATORY ASSESSMENT Q SHIFT   O2 PRN    ALBUTEROL  NEBULIZER Q6 PRN WHEEZING                                                Referrals:    Pt. Phone Number:

## 2022-04-11 NOTE — PERIOP NOTES
Recent Results (from the past 8 hour(s))   CBC WITH AUTOMATED DIFF    Collection Time: 04/11/22  8:11 AM   Result Value Ref Range    WBC 6.1 4.3 - 11.1 K/uL    RBC 3.96 (L) 4.05 - 5.2 M/uL    HGB 12.3 11.7 - 15.4 g/dL    HCT 38.3 35.8 - 46.3 %    MCV 96.7 79.6 - 97.8 FL    MCH 31.1 26.1 - 32.9 PG    MCHC 32.1 31.4 - 35.0 g/dL    RDW 13.6 11.9 - 14.6 %    PLATELET 607 762 - 176 K/uL    MPV 9.9 9.4 - 12.3 FL    ABSOLUTE NRBC 0.00 0.0 - 0.2 K/uL    DF AUTOMATED      NEUTROPHILS 59 43 - 78 %    LYMPHOCYTES 26 13 - 44 %    MONOCYTES 9 4.0 - 12.0 %    EOSINOPHILS 6 0.5 - 7.8 %    BASOPHILS 1 0.0 - 2.0 %    IMMATURE GRANULOCYTES 0 0.0 - 5.0 %    ABS. NEUTROPHILS 3.6 1.7 - 8.2 K/UL    ABS. LYMPHOCYTES 1.6 0.5 - 4.6 K/UL    ABS. MONOCYTES 0.5 0.1 - 1.3 K/UL    ABS. EOSINOPHILS 0.4 0.0 - 0.8 K/UL    ABS. BASOPHILS 0.1 0.0 - 0.2 K/UL    ABS. IMM. GRANS. 0.0 0.0 - 0.5 K/UL   HEMOGLOBIN A1C WITH EAG    Collection Time: 04/11/22  8:11 AM   Result Value Ref Range    Hemoglobin A1c 5.4 4.20 - 6.30 %    Est. average glucose 212 mg/dL   METABOLIC PANEL, BASIC    Collection Time: 04/11/22  8:11 AM   Result Value Ref Range    Sodium 138 136 - 145 mmol/L    Potassium 4.2 3.5 - 5.1 mmol/L    Chloride 107 98 - 107 mmol/L    CO2 29 21 - 32 mmol/L    Anion gap 2 (L) 7 - 16 mmol/L    Glucose 84 65 - 100 mg/dL    BUN 15 8 - 23 MG/DL    Creatinine 0.79 0.6 - 1.0 MG/DL    GFR est AA >60 >60 ml/min/1.73m2    GFR est non-AA >60 >60 ml/min/1.73m2    Calcium 8.8 8.3 - 10.4 MG/DL   MSSA/MRSA SC BY PCR, NASAL SWAB    Collection Time: 04/11/22  8:11 AM    Specimen: Nasal swab   Result Value Ref Range    Special Requests: Nasopharyngeal      Culture result: (A)       MRSA target DNA detected, SA target DNA detected. A positive test result does not necessarily indicate the presence of viable organisms. It is however, presumptive for the presence of MRSA or SA.    PROTHROMBIN TIME + INR    Collection Time: 04/11/22  8:11 AM   Result Value Ref Range Prothrombin time 13.3 12.6 - 14.5 sec    INR 1.0     PTT    Collection Time: 04/11/22  8:11 AM   Result Value Ref Range    aPTT 28.2 24.1 - 35.1 SEC   SED RATE, AUTOMATED    Collection Time: 04/11/22  8:11 AM   Result Value Ref Range    Sed rate, automated 5 0 - 30 mm/hr

## 2022-04-11 NOTE — PERIOP NOTES
Call placed to [facility] [phone]; message left with  regarding incorrect surgery posting. Pt states surgery to be on her right hip.  took message and states will make Dr Ferguson Alert surgery scheduler aware.

## 2022-04-12 NOTE — ADVANCED PRACTICE NURSE
Total Joint Surgery Preoperative Chart Review      Patient ID:  Lakesha Gotti  [id number]  64 y.o.  1945  Surgeon: Dr. Regis Raymond  Date of Surgery: 4/26/2022  Procedure: Total Left Hip Arthroplasty  Primary Care Physician: Angel Ramos [phone]  Specialty Physician(s):      Subjective:   Lakesha Gotti is a 77 y.o. WHITE/NON- female who presents for preoperative evaluation for Total Left Hip arthroplasty. This is a preoperative chart review note based on data collected by the nurse at the surgical Pre-Assessment visit.     Past Medical History:   Diagnosis Date    Allergic eye reaction 6/19/2013    Arthritis     shoulders    Claustrophobia     Elevated blood pressure (not hypertension) 4/30/2015    Environmental allergies     FH: breast cancer 6/19/2013    FH: colon cancer 6/19/2013    [place] (hard of hearing)     has bilateral hearing aids    Hyperlipidemia 6/19/2013    diet controlled    Left shoulder pain 6/19/2013    Menopause 6/19/2013    Nausea & vomiting     post op nausea and vomiting in past    Neuropathy     in toes    Osteopenia     Trigger finger 6/19/2013    left thumb    Unspecified adverse effect of anesthesia     usually takes until next day to wake fully, OK with last procedure (colonoscopy 2012)    Vertigo 6/19/2013    Vitamin B12 deficiency     Vitamin D deficiency 8/9/2016      Past Surgical History:   Procedure Laterality Date    HX APPENDECTOMY      HX CHOLECYSTECTOMY      HX GI  last 2012    colonoscopy (at least 5-6)    HX HYSTERECTOMY      no bso    HX MOHS PROCEDURES  1982    right arm    HX ORTHOPAEDIC Right 05/26/2021    hip replacement    HX ROTATOR CUFF REPAIR Bilateral     HX TUBAL LIGATION  1971     Family History   Problem Relation Age of Onset    Cancer Mother 68        colon    Colon Cancer Mother     Heart Disease Father     Heart Attack Father     Lung Disease Father             Cancer Sister 44        breast cancer, spread to lungs    Breast Cancer Sister     Cancer Brother         prostate cancer    Other Brother         HCV + Fransico (hosp acquired)      Social History     Tobacco Use    Smoking status: Never Smoker    Smokeless tobacco: Never Used   Substance Use Topics    Alcohol use: No       Prior to Admission medications    Medication Sig Start Date End Date Taking? Authorizing Provider   ZINC ACETATE PO Take  by mouth daily. Yes Provider, Historical   vitamin K2 100 mcg cap Take  by mouth daily. Yes Provider, Historical   acetaminophen (Acetaminophen Extra Strength) 500 mg tablet Take 500 mg by mouth every six (6) hours as needed for Pain. Yes Provider, Historical   fexofenadine-pseudoephedrine (Allegra-D 24 Hour) 180-240 mg per tablet Take 1 Tablet by mouth as needed. Yes Provider, Historical   THYME OIL, BULK, by Does Not Apply route daily. Yes Provider, Historical   denosumab (PROLIA SC) by SubCUTAneous route once. Every 6 months   Yes Provider, Historical   magnesium chloride (Slow-Mag) 71.5 mg tablet Take 143 mg by mouth every evening. Yes Provider, Historical   calcium citrate 200 mg (950 mg) tablet Take 200 mg by mouth daily. Yes Provider, Historical   cholecalciferol, vitamin D3, (VITAMIN D3 PO) Take 1 Tab by mouth daily. Yes Provider, Historical   melatonin 3 mg tablet Take 3 mg by mouth as needed. Yes Provider, Historical   cyanocobalamin, vitamin B-12, 1,000 mcg cap Take 1 Tab by mouth daily. Yes Kristian Olea MD     Allergies   Allergen Reactions    Simvastatin Unknown (comments)    Levaquin [Levofloxacin] Nausea Only    Morphine Atopic Dermatitis    Pcn [Penicillins] Hives    Sulfa (Sulfonamide Antibiotics) Hives          Objective:     Physical Exam:   No data found.     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 O2 prn per respiratory protocol.      Signed By: Eleuterio Marie NP-C    April 12, 2022

## 2022-04-12 NOTE — PERIOP NOTES
CRP, HIGH SENSITIVITY  Order: [id number]   Collected 4/11/2022 08:11     Status: Final result     Next appt: 04/25/2022 at 09:30 AM in Orthopedic Surgery Jeanie Tobar MD)     0 Result Notes     Ref Range & Units 4/11/22 0811   CRP, High sensitivity mg/L 0.6    Comment: (NOTE)    Value                          Risk     <1.0 mg/L                        Low   1.0-3.0 mg/L                     Average   >3.0 mg/L                        High     CRP is a nonspecific marker of inflammation and conditions other than   atherosclerosis may cause elevated levels. If first result >3.0 mg/L,   consider repeating at least 2 weeks later with patient in a   metabolically stable state, free of infection or acute illness.     [address] [facility], [facility]              Specimen Collected: 04/11/22 08:11 Last Resulted: 04/11/22 14:48

## 2022-04-25 ENCOUNTER — ANESTHESIA EVENT (OUTPATIENT)
Dept: SURGERY | Age: 77
DRG: 468 | End: 2022-04-25
Payer: MEDICARE

## 2022-04-25 NOTE — H&P
[address] [facility]  History and Physical Exam    Patient ID:  Francy Bence  [id number]    38 y.o.  1945    Today: April 25, 2022    Vitals Signs: Reviewed as noted in medical record. Allergies: Allergies   Allergen Reactions    Simvastatin Unknown (comments)    Levaquin [Levofloxacin] Nausea Only    Morphine Atopic Dermatitis    Pcn [Penicillins] Hives    Sulfa (Sulfonamide Antibiotics) Hives       CC: Right hip pain    HPI:  Pt complains of right hip pain and difficulty ambulating. Relevant PMH:   Past Medical History:   Diagnosis Date    Allergic eye reaction 6/19/2013    Arthritis     shoulders    Claustrophobia     Elevated blood pressure (not hypertension) 4/30/2015    Environmental allergies     FH: breast cancer 6/19/2013    FH: colon cancer 6/19/2013    [place] (hard of hearing)     has bilateral hearing aids    Hyperlipidemia 6/19/2013    diet controlled    Left shoulder pain 6/19/2013    Menopause 6/19/2013    Nausea & vomiting     post op nausea and vomiting in past    Neuropathy     in toes    Osteopenia     Trigger finger 6/19/2013    left thumb    Unspecified adverse effect of anesthesia     usually takes until next day to wake fully, OK with last procedure (colonoscopy 2012)    Vertigo 6/19/2013    Vitamin B12 deficiency     Vitamin D deficiency 8/9/2016       Objective:                    HEENT: NC/AT                   Lungs:  clear                   Heart:   rrr                   Abdomen: soft                   Extremities:  Pain with rom of the right hip joint    Radiographs: reveal stable appearing right hip hemiarthroplasty    Assessment: History of hemiarthroplasty of hip [Z96.649]    Plan:  Proceed with scheduled Procedure(s) (LRB):  RIGHT HIP ARTHROPLASTY TOTAL CONVSERSION FROM [employer] LESLIE (Right) . The patient has failed conservative treatment including NSAIDS, and injections.   Due to the amount of pain the patient is experiencing we will proceed with scheduled procedure. Will plan for same day discharge.     Signed By: ANEL Burgess  April 25, 2022

## 2022-04-26 ENCOUNTER — HOME HEALTH ADMISSION (OUTPATIENT)
Dept: HOME HEALTH SERVICES | Facility: HOME HEALTH | Age: 77
End: 2022-04-26
Payer: MEDICARE

## 2022-04-26 ENCOUNTER — HOSPITAL ENCOUNTER (INPATIENT)
Age: 77
LOS: 1 days | Discharge: HOME HEALTH | DRG: 468 | End: 2022-04-27
Attending: ORTHOPAEDIC SURGERY | Admitting: ORTHOPAEDIC SURGERY
Payer: MEDICARE

## 2022-04-26 ENCOUNTER — ANESTHESIA (OUTPATIENT)
Dept: SURGERY | Age: 77
DRG: 468 | End: 2022-04-26
Payer: MEDICARE

## 2022-04-26 ENCOUNTER — APPOINTMENT (OUTPATIENT)
Dept: GENERAL RADIOLOGY | Age: 77
DRG: 468 | End: 2022-04-26
Attending: PHYSICIAN ASSISTANT
Payer: MEDICARE

## 2022-04-26 DIAGNOSIS — Z96.641 STATUS POST TOTAL HIP REPLACEMENT, RIGHT: Primary | ICD-10-CM

## 2022-04-26 DIAGNOSIS — T84.031D LOOSENING OF FEMORAL COMPONENT OF PROSTHETIC LEFT HIP, SUBSEQUENT ENCOUNTER: ICD-10-CM

## 2022-04-26 DIAGNOSIS — T84.090A OTHER MECHANICAL COMPLICATION OF INTERNAL RIGHT HIP PROSTHESIS, INITIAL ENCOUNTER (HCC): ICD-10-CM

## 2022-04-26 PROBLEM — M16.11 ARTHRITIS OF RIGHT HIP: Status: ACTIVE | Noted: 2022-04-26

## 2022-04-26 LAB — HGB BLD-MCNC: 10.4 G/DL (ref 11.7–15.4)

## 2022-04-26 PROCEDURE — 74011000250 HC RX REV CODE- 250

## 2022-04-26 PROCEDURE — 94760 N-INVAS EAR/PLS OXIMETRY 1: CPT

## 2022-04-26 PROCEDURE — 74011000250 HC RX REV CODE- 250: Performed by: PHYSICIAN ASSISTANT

## 2022-04-26 PROCEDURE — 74011250637 HC RX REV CODE- 250/637: Performed by: PHYSICIAN ASSISTANT

## 2022-04-26 PROCEDURE — 76210000006 HC OR PH I REC 0.5 TO 1 HR: Performed by: ORTHOPAEDIC SURGERY

## 2022-04-26 PROCEDURE — C1713 ANCHOR/SCREW BN/BN,TIS/BN: HCPCS | Performed by: ORTHOPAEDIC SURGERY

## 2022-04-26 PROCEDURE — 97116 GAIT TRAINING THERAPY: CPT

## 2022-04-26 PROCEDURE — C1776 JOINT DEVICE (IMPLANTABLE): HCPCS | Performed by: ORTHOPAEDIC SURGERY

## 2022-04-26 PROCEDURE — 77030002933 HC SUT MCRYL J&J -A: Performed by: ORTHOPAEDIC SURGERY

## 2022-04-26 PROCEDURE — 76060000034 HC ANESTHESIA 1.5 TO 2 HR: Performed by: ORTHOPAEDIC SURGERY

## 2022-04-26 PROCEDURE — 97165 OT EVAL LOW COMPLEX 30 MIN: CPT

## 2022-04-26 PROCEDURE — 97535 SELF CARE MNGMENT TRAINING: CPT

## 2022-04-26 PROCEDURE — 0SR90JZ REPLACEMENT OF RIGHT HIP JOINT WITH SYNTHETIC SUBSTITUTE, OPEN APPROACH: ICD-10-PCS | Performed by: ORTHOPAEDIC SURGERY

## 2022-04-26 PROCEDURE — 85018 HEMOGLOBIN: CPT

## 2022-04-26 PROCEDURE — 72170 X-RAY EXAM OF PELVIS: CPT

## 2022-04-26 PROCEDURE — 74011000250 HC RX REV CODE- 250: Performed by: ORTHOPAEDIC SURGERY

## 2022-04-26 PROCEDURE — 77030007880 HC KT SPN EPDRL BBMI -B: Performed by: ANESTHESIOLOGY

## 2022-04-26 PROCEDURE — 74011250636 HC RX REV CODE- 250/636

## 2022-04-26 PROCEDURE — 77030006835 HC BLD SAW SAG STRY -B: Performed by: ORTHOPAEDIC SURGERY

## 2022-04-26 PROCEDURE — 77030012935 HC DRSG AQUACEL BMS -B: Performed by: ORTHOPAEDIC SURGERY

## 2022-04-26 PROCEDURE — 74011000250 HC RX REV CODE- 250: Performed by: ANESTHESIOLOGY

## 2022-04-26 PROCEDURE — 74011250636 HC RX REV CODE- 250/636: Performed by: PHYSICIAN ASSISTANT

## 2022-04-26 PROCEDURE — 77030002966 HC SUT PDS J&J -A: Performed by: ORTHOPAEDIC SURGERY

## 2022-04-26 PROCEDURE — 36415 COLL VENOUS BLD VENIPUNCTURE: CPT

## 2022-04-26 PROCEDURE — 74011250637 HC RX REV CODE- 250/637: Performed by: ANESTHESIOLOGY

## 2022-04-26 PROCEDURE — 77030018547 HC SUT ETHBND1 J&J -B: Performed by: ORTHOPAEDIC SURGERY

## 2022-04-26 PROCEDURE — 77030019557 HC ELECTRD VES SEAL MEDT -F: Performed by: ORTHOPAEDIC SURGERY

## 2022-04-26 PROCEDURE — 74011250636 HC RX REV CODE- 250/636: Performed by: ORTHOPAEDIC SURGERY

## 2022-04-26 PROCEDURE — 2709999900 HC NON-CHARGEABLE SUPPLY: Performed by: ORTHOPAEDIC SURGERY

## 2022-04-26 PROCEDURE — 97161 PT EVAL LOW COMPLEX 20 MIN: CPT

## 2022-04-26 PROCEDURE — 76010000162 HC OR TIME 1.5 TO 2 HR INTENSV-TIER 1: Performed by: ORTHOPAEDIC SURGERY

## 2022-04-26 PROCEDURE — 94762 N-INVAS EAR/PLS OXIMTRY CONT: CPT

## 2022-04-26 PROCEDURE — 97110 THERAPEUTIC EXERCISES: CPT

## 2022-04-26 PROCEDURE — 77030040922 HC BLNKT HYPOTHRM STRY -A: Performed by: ANESTHESIOLOGY

## 2022-04-26 PROCEDURE — 74011250636 HC RX REV CODE- 250/636: Performed by: ANESTHESIOLOGY

## 2022-04-26 PROCEDURE — 74011000258 HC RX REV CODE- 258: Performed by: ORTHOPAEDIC SURGERY

## 2022-04-26 PROCEDURE — 0SP90JZ REMOVAL OF SYNTHETIC SUBSTITUTE FROM RIGHT HIP JOINT, OPEN APPROACH: ICD-10-PCS | Performed by: ORTHOPAEDIC SURGERY

## 2022-04-26 PROCEDURE — 77030003665 HC NDL SPN BBMI -A: Performed by: ANESTHESIOLOGY

## 2022-04-26 DEVICE — RESTORATION ADM/MDM X3 INSERT
Type: IMPLANTABLE DEVICE | Site: HIP | Status: FUNCTIONAL
Brand: RESTORATION ADM/MDM X3 INSERT

## 2022-04-26 DEVICE — 6.5MM LOW PROFILE HEX SCREW 25MM
Type: IMPLANTABLE DEVICE | Site: HIP | Status: FUNCTIONAL
Brand: TRIDENT II

## 2022-04-26 DEVICE — TRIDENT II TRITANIUM CLUSTER 52E
Type: IMPLANTABLE DEVICE | Site: HIP | Status: FUNCTIONAL
Brand: TRIDENT II

## 2022-04-26 DEVICE — LOW FRICTION ION TREATMENT
Type: IMPLANTABLE DEVICE | Site: HIP | Status: FUNCTIONAL
Brand: C-TAPER HEAD

## 2022-04-26 DEVICE — LINER- CEMENTLESS
Type: IMPLANTABLE DEVICE | Site: HIP | Status: FUNCTIONAL
Brand: MDM

## 2022-04-26 RX ORDER — MIDAZOLAM HYDROCHLORIDE 1 MG/ML
2 INJECTION, SOLUTION INTRAMUSCULAR; INTRAVENOUS
Status: DISCONTINUED | OUTPATIENT
Start: 2022-04-26 | End: 2022-04-26 | Stop reason: HOSPADM

## 2022-04-26 RX ORDER — ASPIRIN 81 MG/1
81 TABLET ORAL EVERY 12 HOURS
Status: DISCONTINUED | OUTPATIENT
Start: 2022-04-26 | End: 2022-04-27 | Stop reason: HOSPADM

## 2022-04-26 RX ORDER — FLUMAZENIL 0.1 MG/ML
0.2 INJECTION INTRAVENOUS
Status: DISCONTINUED | OUTPATIENT
Start: 2022-04-26 | End: 2022-04-26

## 2022-04-26 RX ORDER — ACETAMINOPHEN 650 MG/1
650 SUPPOSITORY RECTAL ONCE
Status: DISCONTINUED | OUTPATIENT
Start: 2022-04-26 | End: 2022-04-26

## 2022-04-26 RX ORDER — DIPHENHYDRAMINE HYDROCHLORIDE 50 MG/ML
12.5 INJECTION, SOLUTION INTRAMUSCULAR; INTRAVENOUS
Status: DISCONTINUED | OUTPATIENT
Start: 2022-04-26 | End: 2022-04-26

## 2022-04-26 RX ORDER — OXYCODONE HYDROCHLORIDE 5 MG/1
10 TABLET ORAL
Status: DISCONTINUED | OUTPATIENT
Start: 2022-04-26 | End: 2022-04-27 | Stop reason: HOSPADM

## 2022-04-26 RX ORDER — BUPIVACAINE HYDROCHLORIDE 7.5 MG/ML
INJECTION, SOLUTION INTRASPINAL
Status: COMPLETED | OUTPATIENT
Start: 2022-04-26 | End: 2022-04-26

## 2022-04-26 RX ORDER — NALOXONE HYDROCHLORIDE 0.4 MG/ML
.2-.4 INJECTION, SOLUTION INTRAMUSCULAR; INTRAVENOUS; SUBCUTANEOUS
Status: DISCONTINUED | OUTPATIENT
Start: 2022-04-26 | End: 2022-04-27 | Stop reason: HOSPADM

## 2022-04-26 RX ORDER — ACETAMINOPHEN 500 MG
1000 TABLET ORAL ONCE
Status: COMPLETED | OUTPATIENT
Start: 2022-04-26 | End: 2022-04-26

## 2022-04-26 RX ORDER — MIDAZOLAM HYDROCHLORIDE 1 MG/ML
INJECTION, SOLUTION INTRAMUSCULAR; INTRAVENOUS AS NEEDED
Status: DISCONTINUED | OUTPATIENT
Start: 2022-04-26 | End: 2022-04-26 | Stop reason: HOSPADM

## 2022-04-26 RX ORDER — CEFAZOLIN SODIUM/WATER 2 G/20 ML
2 SYRINGE (ML) INTRAVENOUS ONCE
Status: COMPLETED | OUTPATIENT
Start: 2022-04-26 | End: 2022-04-26

## 2022-04-26 RX ORDER — DIPHENHYDRAMINE HCL 25 MG
25 CAPSULE ORAL
Status: DISCONTINUED | OUTPATIENT
Start: 2022-04-26 | End: 2022-04-27 | Stop reason: HOSPADM

## 2022-04-26 RX ORDER — HYDROMORPHONE HYDROCHLORIDE 2 MG/ML
0.5 INJECTION, SOLUTION INTRAMUSCULAR; INTRAVENOUS; SUBCUTANEOUS
Status: DISCONTINUED | OUTPATIENT
Start: 2022-04-26 | End: 2022-04-26

## 2022-04-26 RX ORDER — PROPOFOL 10 MG/ML
INJECTION, EMULSION INTRAVENOUS AS NEEDED
Status: DISCONTINUED | OUTPATIENT
Start: 2022-04-26 | End: 2022-04-26 | Stop reason: HOSPADM

## 2022-04-26 RX ORDER — ROPIVACAINE HYDROCHLORIDE 2 MG/ML
INJECTION, SOLUTION EPIDURAL; INFILTRATION; PERINEURAL AS NEEDED
Status: DISCONTINUED | OUTPATIENT
Start: 2022-04-26 | End: 2022-04-26 | Stop reason: HOSPADM

## 2022-04-26 RX ORDER — SODIUM CHLORIDE 0.9 % (FLUSH) 0.9 %
5-40 SYRINGE (ML) INJECTION AS NEEDED
Status: DISCONTINUED | OUTPATIENT
Start: 2022-04-26 | End: 2022-04-27 | Stop reason: HOSPADM

## 2022-04-26 RX ORDER — ACETAMINOPHEN 325 MG/1
975 TABLET ORAL ONCE
Status: DISCONTINUED | OUTPATIENT
Start: 2022-04-26 | End: 2022-04-26

## 2022-04-26 RX ORDER — OXYCODONE HYDROCHLORIDE 5 MG/1
5 TABLET ORAL
Status: DISCONTINUED | OUTPATIENT
Start: 2022-04-26 | End: 2022-04-26

## 2022-04-26 RX ORDER — SODIUM CHLORIDE 0.9 % (FLUSH) 0.9 %
5-40 SYRINGE (ML) INJECTION EVERY 8 HOURS
Status: DISCONTINUED | OUTPATIENT
Start: 2022-04-26 | End: 2022-04-27 | Stop reason: HOSPADM

## 2022-04-26 RX ORDER — LIDOCAINE HYDROCHLORIDE 10 MG/ML
0.1 INJECTION INFILTRATION; PERINEURAL AS NEEDED
Status: DISCONTINUED | OUTPATIENT
Start: 2022-04-26 | End: 2022-04-26 | Stop reason: HOSPADM

## 2022-04-26 RX ORDER — OXYCODONE HYDROCHLORIDE 5 MG/1
5-10 TABLET ORAL
Qty: 60 TABLET | Refills: 0 | Status: SHIPPED | OUTPATIENT
Start: 2022-04-26 | End: 2022-05-01

## 2022-04-26 RX ORDER — ASPIRIN 81 MG/1
81 TABLET ORAL EVERY 12 HOURS
Qty: 70 TABLET | Refills: 0 | Status: SHIPPED | OUTPATIENT
Start: 2022-04-26 | End: 2022-05-31

## 2022-04-26 RX ORDER — PROPOFOL 10 MG/ML
INJECTION, EMULSION INTRAVENOUS
Status: DISCONTINUED | OUTPATIENT
Start: 2022-04-26 | End: 2022-04-26 | Stop reason: HOSPADM

## 2022-04-26 RX ORDER — HYDROMORPHONE HYDROCHLORIDE 1 MG/ML
1 INJECTION, SOLUTION INTRAMUSCULAR; INTRAVENOUS; SUBCUTANEOUS
Status: DISCONTINUED | OUTPATIENT
Start: 2022-04-26 | End: 2022-04-27 | Stop reason: HOSPADM

## 2022-04-26 RX ORDER — SODIUM CHLORIDE, SODIUM LACTATE, POTASSIUM CHLORIDE, CALCIUM CHLORIDE 600; 310; 30; 20 MG/100ML; MG/100ML; MG/100ML; MG/100ML
75 INJECTION, SOLUTION INTRAVENOUS CONTINUOUS
Status: DISCONTINUED | OUTPATIENT
Start: 2022-04-26 | End: 2022-04-26

## 2022-04-26 RX ORDER — ACETAMINOPHEN 500 MG
1000 TABLET ORAL EVERY 6 HOURS
Status: DISCONTINUED | OUTPATIENT
Start: 2022-04-26 | End: 2022-04-27 | Stop reason: HOSPADM

## 2022-04-26 RX ORDER — VANCOMYCIN HYDROCHLORIDE 1 G/20ML
INJECTION, POWDER, LYOPHILIZED, FOR SOLUTION INTRAVENOUS ONCE
Status: DISCONTINUED | OUTPATIENT
Start: 2022-04-26 | End: 2022-04-26

## 2022-04-26 RX ORDER — DEXAMETHASONE SODIUM PHOSPHATE 100 MG/10ML
10 INJECTION INTRAMUSCULAR; INTRAVENOUS ONCE
Status: DISCONTINUED | OUTPATIENT
Start: 2022-04-27 | End: 2022-04-27 | Stop reason: HOSPADM

## 2022-04-26 RX ORDER — HALOPERIDOL 5 MG/ML
1 INJECTION INTRAMUSCULAR
Status: DISCONTINUED | OUTPATIENT
Start: 2022-04-26 | End: 2022-04-26

## 2022-04-26 RX ORDER — NALOXONE HYDROCHLORIDE 0.4 MG/ML
0.1 INJECTION, SOLUTION INTRAMUSCULAR; INTRAVENOUS; SUBCUTANEOUS
Status: DISCONTINUED | OUTPATIENT
Start: 2022-04-26 | End: 2022-04-26

## 2022-04-26 RX ORDER — AMOXICILLIN 250 MG
2 CAPSULE ORAL DAILY
Status: DISCONTINUED | OUTPATIENT
Start: 2022-04-27 | End: 2022-04-27 | Stop reason: HOSPADM

## 2022-04-26 RX ORDER — CEFAZOLIN SODIUM/WATER 2 G/20 ML
2 SYRINGE (ML) INTRAVENOUS EVERY 8 HOURS
Status: DISCONTINUED | OUTPATIENT
Start: 2022-04-26 | End: 2022-04-27 | Stop reason: HOSPADM

## 2022-04-26 RX ORDER — VANCOMYCIN HYDROCHLORIDE 1 G/20ML
INJECTION, POWDER, LYOPHILIZED, FOR SOLUTION INTRAVENOUS AS NEEDED
Status: DISCONTINUED | OUTPATIENT
Start: 2022-04-26 | End: 2022-04-26 | Stop reason: HOSPADM

## 2022-04-26 RX ORDER — ONDANSETRON 2 MG/ML
INJECTION INTRAMUSCULAR; INTRAVENOUS AS NEEDED
Status: DISCONTINUED | OUTPATIENT
Start: 2022-04-26 | End: 2022-04-26 | Stop reason: HOSPADM

## 2022-04-26 RX ORDER — SODIUM CHLORIDE 9 MG/ML
100 INJECTION, SOLUTION INTRAVENOUS CONTINUOUS
Status: DISCONTINUED | OUTPATIENT
Start: 2022-04-26 | End: 2022-04-27 | Stop reason: HOSPADM

## 2022-04-26 RX ORDER — EPHEDRINE SULFATE/0.9% NACL/PF 50 MG/5 ML
SYRINGE (ML) INTRAVENOUS AS NEEDED
Status: DISCONTINUED | OUTPATIENT
Start: 2022-04-26 | End: 2022-04-26 | Stop reason: HOSPADM

## 2022-04-26 RX ORDER — ONDANSETRON 4 MG/1
4 TABLET, ORALLY DISINTEGRATING ORAL
Status: DISCONTINUED | OUTPATIENT
Start: 2022-04-26 | End: 2022-04-27 | Stop reason: HOSPADM

## 2022-04-26 RX ORDER — ONDANSETRON 4 MG/1
4 TABLET, ORALLY DISINTEGRATING ORAL
Qty: 30 TABLET | Refills: 0 | Status: SHIPPED | OUTPATIENT
Start: 2022-04-26

## 2022-04-26 RX ORDER — DEXAMETHASONE SODIUM PHOSPHATE 4 MG/ML
INJECTION, SOLUTION INTRA-ARTICULAR; INTRALESIONAL; INTRAMUSCULAR; INTRAVENOUS; SOFT TISSUE AS NEEDED
Status: DISCONTINUED | OUTPATIENT
Start: 2022-04-26 | End: 2022-04-26 | Stop reason: HOSPADM

## 2022-04-26 RX ORDER — KETOROLAC TROMETHAMINE 30 MG/ML
INJECTION, SOLUTION INTRAMUSCULAR; INTRAVENOUS AS NEEDED
Status: DISCONTINUED | OUTPATIENT
Start: 2022-04-26 | End: 2022-04-26 | Stop reason: HOSPADM

## 2022-04-26 RX ORDER — SODIUM CHLORIDE, SODIUM LACTATE, POTASSIUM CHLORIDE, CALCIUM CHLORIDE 600; 310; 30; 20 MG/100ML; MG/100ML; MG/100ML; MG/100ML
100 INJECTION, SOLUTION INTRAVENOUS CONTINUOUS
Status: DISCONTINUED | OUTPATIENT
Start: 2022-04-26 | End: 2022-04-26 | Stop reason: HOSPADM

## 2022-04-26 RX ADMIN — PHENYLEPHRINE HYDROCHLORIDE 100 MCG: 10 INJECTION INTRAVENOUS at 11:43

## 2022-04-26 RX ADMIN — MIDAZOLAM 2 MG: 1 INJECTION INTRAMUSCULAR; INTRAVENOUS at 11:29

## 2022-04-26 RX ADMIN — PHENYLEPHRINE HYDROCHLORIDE 50 MCG: 10 INJECTION INTRAVENOUS at 12:51

## 2022-04-26 RX ADMIN — BUPIVACAINE HYDROCHLORIDE IN DEXTROSE 13.5 MG: 7.5 INJECTION, SOLUTION SUBARACHNOID at 11:33

## 2022-04-26 RX ADMIN — PROPOFOL 100 MCG/KG/MIN: 10 INJECTION, EMULSION INTRAVENOUS at 11:40

## 2022-04-26 RX ADMIN — Medication 10 MG: at 11:59

## 2022-04-26 RX ADMIN — SODIUM CHLORIDE, SODIUM LACTATE, POTASSIUM CHLORIDE, AND CALCIUM CHLORIDE 100 ML/HR: 600; 310; 30; 20 INJECTION, SOLUTION INTRAVENOUS at 08:57

## 2022-04-26 RX ADMIN — Medication 1 G: at 11:33

## 2022-04-26 RX ADMIN — PHENYLEPHRINE HYDROCHLORIDE 50 MCG: 10 INJECTION INTRAVENOUS at 12:47

## 2022-04-26 RX ADMIN — Medication 81 MG: at 21:18

## 2022-04-26 RX ADMIN — PHENYLEPHRINE HYDROCHLORIDE 50 MCG: 10 INJECTION INTRAVENOUS at 11:59

## 2022-04-26 RX ADMIN — PHENYLEPHRINE HYDROCHLORIDE 25 MCG: 10 INJECTION INTRAVENOUS at 12:29

## 2022-04-26 RX ADMIN — SODIUM CHLORIDE, PRESERVATIVE FREE 10 ML: 5 INJECTION INTRAVENOUS at 21:18

## 2022-04-26 RX ADMIN — Medication 1 AMPULE: at 21:17

## 2022-04-26 RX ADMIN — PHENYLEPHRINE HYDROCHLORIDE 50 MCG: 10 INJECTION INTRAVENOUS at 12:19

## 2022-04-26 RX ADMIN — CEFAZOLIN SODIUM 2 G: 10 INJECTION, POWDER, FOR SOLUTION INTRAVENOUS at 20:13

## 2022-04-26 RX ADMIN — PROPOFOL 10 MG: 10 INJECTION, EMULSION INTRAVENOUS at 11:40

## 2022-04-26 RX ADMIN — Medication 3 AMPULE: at 08:45

## 2022-04-26 RX ADMIN — Medication 2 G: at 11:25

## 2022-04-26 RX ADMIN — ACETAMINOPHEN 1000 MG: 500 TABLET, FILM COATED ORAL at 18:00

## 2022-04-26 RX ADMIN — ACETAMINOPHEN 1000 MG: 500 TABLET, FILM COATED ORAL at 08:47

## 2022-04-26 RX ADMIN — PHENYLEPHRINE HYDROCHLORIDE 100 MCG: 10 INJECTION INTRAVENOUS at 12:54

## 2022-04-26 RX ADMIN — DEXAMETHASONE SODIUM PHOSPHATE 10 MG: 4 INJECTION, SOLUTION INTRAMUSCULAR; INTRAVENOUS at 11:37

## 2022-04-26 RX ADMIN — PHENYLEPHRINE HYDROCHLORIDE 25 MCG: 10 INJECTION INTRAVENOUS at 12:40

## 2022-04-26 RX ADMIN — OXYCODONE 5 MG: 5 TABLET ORAL at 18:37

## 2022-04-26 RX ADMIN — VANCOMYCIN HYDROCHLORIDE 1000 MG: 1 INJECTION, POWDER, LYOPHILIZED, FOR SOLUTION INTRAVENOUS at 09:05

## 2022-04-26 RX ADMIN — PHENYLEPHRINE HYDROCHLORIDE 25 MCG: 10 INJECTION INTRAVENOUS at 12:35

## 2022-04-26 RX ADMIN — OXYCODONE 10 MG: 5 TABLET ORAL at 21:40

## 2022-04-26 RX ADMIN — ONDANSETRON 4 MG: 2 INJECTION INTRAMUSCULAR; INTRAVENOUS at 11:37

## 2022-04-26 NOTE — PROGRESS NOTES
04/26/22 1510   Oxygen Therapy   O2 Sat (%) 98 %   Pulse via Oximetry 61 beats per minute   O2 Device None (Room air)   O2 Flow Rate (L/min) 0 l/min   Incentive Spirometry Treatment   Actual Volume (ml) 1500 ml   Patient encouraged to do 10 breaths every hour while awake-patient agreed and demonstrated. No shortness of breath or distress noted. BS are clear b/l. Joint Camp notes reviewed- Sat monitor order noted HS.

## 2022-04-26 NOTE — OP NOTES
Revision Total Hip Procedure Note    Clarisa Vazquez,  [id number],  1945    Pre-operative Diagnosis:  Painful Right Hip Arthroplasty  Post-operative Diagnosis: MARI    Procedure: Revision of Total Hip Arthroplasty     BMI: Body mass index is 23.17 kg/m². Scottie Case Location: [employer]. First Assistant Curt Warren      Anesthesia: Spinal     EBL: 300 cc    Procedure: Revision of Total Hip Arthroplasty       The complexity of total joint surgery requires the use of a skilled first assistant for positioning, retraction and assistance in closure. Clarisa Vazquez was brought to the operating room and positioned on the operating table. Anesthesia was administered. IV antibiotics were administered. A time out identifying the patient, procedure, operative side and surgeon was administered and charted by the OR Nurse. Clarisa Vazquez was positioned on their  right lateral decubitus position. The limb was prepped and draped in the usual sterile fashion. The original posterior approach incision was utilized to expose the hip. The incision was carried through the subcutaneous tissue and underlying fascia with hemostasis obtained using the bovie cauterization. A Charnley retractor was inserted. The short external rotators were divided at their insertion. The sciatic nerve was palpated and identified. Next, a T-shaped capsular incision was accomplished and the total hip implants dislocated. Cultures were obtained. The femoral head was disassociated from the femoral stem. The trunnion was inspected and noted to be stable without corrosion. It was mobilized anterior and superior. The stem was inspected with 10 degrees of anteversion antiversion and was stable. The acetabulum was exposed. The acetabulum was then reamed to accommodate a 52 mm size holed acetabular shell. It was reamed somewhat superior to allow for stability with an already long right leg.   The shell was impacted with appropriate tilt and anteversion. 1 screw was placed. The dual mobility liner was placed. The femoral head was trialed to equilibrate the limb length and obtain stability. Next, the permanent  size 0  femoral head was impacted into place. yS Killian's hip was reduced and stability was as mentioned above. Copious irrigation was accomplished about the surgical site. The sciatic nerve was palpated and noted to be intact. The capsule was repaired with a #1 PDS and the external rotators were reattached with a #5 Mersilene where possible. A lavage of Betadyne was allowed to soak in the wound for 3 minutes after implanting of the prosthesis. The wound was irrigated with Saline again before closure. Prior to the final skin closure, full strength betadine was applied to the skin surrounding the skin incision. The operative hip was injected prior to closure for post operative pain management. A #1 PDS suture was used to re-approximate the fascia. Monocryl sutures were used to approximate the subcutaneous layers. TXA was injected under the fascial layer. .  Skin staples were applied in an occlusive fashion to reapproximate the skin edges and a sterile bandage was placed. The patient was then rolled to a supine position. The sponge and needle counts were correct. The patient tolerated the procedure without difficulty and left the operating room in satisfactory condition. Implants:   Implant Name Type Inv.  Item Serial No.  Lot No. LRB No. Used Action   SHELL ACET CLUS H 52E TRTANIUM -- TRIDENT II - [id number]  SHELL ACET CLUS H 52E TRTANIUM -- TRIDENT II  [employer] [facility] [id number] Right 1 Implanted   SCREW ACET HEX LOW PROFILE 6.5X25MM TRIDENT II - [id number]  SCREW ACET HEX LOW PROFILE 6.5X25MM TRIDENT II  [employer] [facility]_WD [id number] Right 1 Implanted   LINER MDM COCR 42MM E --  - [id number]  LINER MDM COCR 42MM E --   [employer] [facility]_ [id number] Right 1 Implanted   HEAD FEM C-TAPR 28MM 0 NK -- LOW FRIC COCR - [id number]  HEAD FEM C-TAPR 28MM 0 NK -- LOW FRIC COCR  [employer] HOW_WD [id number] Right 1 Implanted   INSERT ACET 48X28 MM HIP X3 ADM/MDM REST - [id number]  INSERT ACET 48X28 MM HIP X3 ADM/MDM REST  [employer] HOW_WD [id number] Right 1 Implanted     Signed By: Shiela Hicks MD

## 2022-04-26 NOTE — ANESTHESIA PREPROCEDURE EVALUATION
Anesthetic History   No history of anesthetic complications            Review of Systems / Medical History  Patient summary reviewed and pertinent labs reviewed    Pulmonary  Within defined limits                 Neuro/Psych   Within defined limits           Cardiovascular    Hypertension (No meds)          Hyperlipidemia    Exercise tolerance: >4 METS  Comments: Denies CP, SOB or changes in functional status   GI/Hepatic/Renal  Within defined limits              Endo/Other        Arthritis     Other Findings              Physical Exam    Airway  Mallampati: II  TM Distance: 4 - 6 cm  Neck ROM: normal range of motion   Mouth opening: Normal     Cardiovascular    Rhythm: regular  Rate: normal         Dental    Dentition: Edentulous     Pulmonary  Breath sounds clear to auscultation               Abdominal  GI exam deferred       Other Findings            Anesthetic Plan    ASA: 2  Anesthesia type: spinal          Induction: Intravenous  Anesthetic plan and risks discussed with: Patient and Spouse

## 2022-04-26 NOTE — ANESTHESIA PROCEDURE NOTES
Spinal Block    Start time: 4/26/2022 11:31 AM  End time: 4/26/2022 11:33 AM  Performed by: Abhi Shrestha MD  Authorized by: Abhi Shrestha MD     Pre-procedure:   Indications: primary anesthetic  Preanesthetic Checklist: patient identified, risks and benefits discussed, anesthesia consent, patient being monitored and timeout performed    Timeout Time: 11:29 EDT          Spinal Block:   Patient Position:  Seated  Prep Region:  Lumbar  Prep: chlorhexidine and patient draped      Location:  L3-4  Technique:  Single shot    Local Dose (mL):  2    Needle:   Needle Type:  Pencan  Needle Gauge:  25 G  Attempts:  1      Events: CSF confirmed, no blood with aspiration and no paresthesia        Assessment:  Insertion:  Uncomplicated  Patient tolerance:  Patient tolerated the procedure well with no immediate complications

## 2022-04-26 NOTE — PERIOP NOTES
Sonia Hill (Spouse)   [phone] (Home    Al will be out in the car napping while pt is in surgery

## 2022-04-26 NOTE — PROGRESS NOTES
Pt refuses po oxycodone for pain. Pt states\" I just want to stay on Tylenol for pain\". Will continue to monitor.

## 2022-04-26 NOTE — PROGRESS NOTES
TRANSFER - IN REPORT:    Verbal report received from LIBERTAD Snyderrn(name) on Candelario Gum  being received from [place]) for routine post - op      Report consisted of patients Situation, Background, Assessment and   Recommendations(SBAR). Information from the following report(s) SBAR, Kardex, Procedure Summary, Intake/Output, MAR and Recent Results was reviewed with the receiving nurse. Opportunity for questions and clarification was provided. Assessment completed upon patients arrival to unit and care assumed.

## 2022-04-26 NOTE — H&P
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 The patient was seen and examined and there are no changes to the patient's orthopedic condition. They have tried conservative treatment for this condition; including antiinflammatories and lifestyle modifications and have failed. The necessity for the joint replacement is still present, and the H&P from the office is still current. The patient will be admitted today forProcedure(s) (LRB):  RIGHT HIP ARTHROPLASTY TOTAL CONVSERSION FROM [facility] LESLIE (Right) .

## 2022-04-26 NOTE — PERIOP NOTES
TRANSFER - OUT REPORT:    Verbal report given to [place] (name) on Serge Aliment  being transferred to [facility] (unit) for routine post - op       Report consisted of patients Situation, Background, Assessment and   Recommendations(SBAR). Information from the following report(s) SBAR was reviewed with the receiving nurse. Lines:   Peripheral IV 04/26/22 Anterior;Right Wrist (Active)   Site Assessment Clean, dry, & intact 04/26/22 1351   Phlebitis Assessment 0 04/26/22 1351   Infiltration Assessment 0 04/26/22 1351   Dressing Status Clean, dry, & intact 04/26/22 1351   Dressing Type Transparent 04/26/22 1351   Hub Color/Line Status Green 04/26/22 1351        Opportunity for questions and clarification was provided.       Patient transported with:  Chart, IV

## 2022-04-26 NOTE — ANESTHESIA POSTPROCEDURE EVALUATION
Procedure(s):  RIGHT HIP ARTHROPLASTY TOTAL CONVSERSION FROM [employer].    spinal    Anesthesia Post Evaluation      Multimodal analgesia: multimodal analgesia used between 6 hours prior to anesthesia start to PACU discharge  Patient location during evaluation: bedside  Patient participation: complete - patient participated  Level of consciousness: awake  Pain management: adequate  Airway patency: patent  Anesthetic complications: no  Cardiovascular status: acceptable  Respiratory status: spontaneous ventilation and acceptable  Hydration status: acceptable  Post anesthesia nausea and vomiting:  none      INITIAL Post-op Vital signs:   Vitals Value Taken Time   /56 04/26/22 1351   Temp 37.1 °C (98.8 °F) 04/26/22 1320   Pulse 72 04/26/22 1353   Resp 18 04/26/22 1350   SpO2 88 % 04/26/22 1353   Vitals shown include unvalidated device data.

## 2022-04-26 NOTE — PROGRESS NOTES
Problem: Mobility Impaired (Adult and Pediatric)  Goal: *Acute Goals and Plan of Care (Insert Text)  Outcome: Progressing Towards Goal  Note: GOALS (1-4 days):  (1.)Ms. Baldev Cedeno will move from supine to sit and sit to supine  in bed with SUPERVISION. (2.)Ms. Baldev Cedeno will transfer from bed to chair and chair to bed with SUPERVISION using the least restrictive device. (3.)Ms. Baldev Cedeno will ambulate with SUPERVISION for 300 feet with the least restrictive device. (4.)Ms. Baldev Cedeno will ambulate up/down 1 steps and up and down ramp with RW with CONTACT GUARD ASSIST with no device. (5.)Ms. Baldev Cedeno will state/observe SAMREEN precautions with 0 verbal cues. ________________________________________________________________________________________________    PHYSICAL THERAPY JOINT CAMP SAMREEN: Initial Assessment, Treatment Day: Day of Assessment, and PM 4/26/2022  OUTPATIENT: Hospital Day: 1  [place] / Product Type: Medicare /      NAME/AGE/GENDER: Raina Ma is a 77 y.o. female   PRIMARY DIAGNOSIS:  History of hemiarthroplasty of hip [Z96.649]   Procedure(s) and Anesthesia Type:     * RIGHT HIP ARTHROPLASTY TOTAL CONVSERSION FROM ELLIS LESLIE - Spinal (Right)  ICD-10: Treatment Diagnosis:    Pain in Right Hip (M25.551)  Stiffness of Right Hip, Not elsewhere classified (M25.651)  Difficulty in walking, Not elsewhere classified (R26.2)      ASSESSMENT:     Ms. Baldev Cedeno presents with decreased strength and range of motion right lower extremity and with decreased independence with functional mobility s/p  right total hip arthroplasty. Pt will benefit from skilled PT interventions to maximize independence with functional mobility and SAMREEN management. Pt did well with assessment despite some soreness (had tylenol) . Worked on gait training in the borjas with verbal cues and RW, doing well with reciprocal gait pattern. Some soreness with gait.  In room in recliner worked on Gen[employer] exercises with verbal cues. She stayed up in chair with needs in reach and family at bedside. Pt instructed not to get up without assist. Mode Luz to progress mobility and SAMREEN exercises at next session. Pt plans to discharge to home from the hospital with continued therapy for follow up. This section established at most recent assessment   PROBLEM LIST (Impairments causing functional limitations):  Decreased Strength  Decreased ADL/Functional Activities  Decreased Transfer Abilities  Decreased Ambulation Ability/Technique  Increased Pain  Decreased Flexibility/Joint Mobility  Decreased [place] with Home Exercise Program   INTERVENTIONS PLANNED: (Benefits and precautions of physical therapy have been discussed with the patient.)  Bed Mobility  Cold  Gait Training  Home Exercise Program (HEP)  Range of Motion (ROM)  Therapeutic Activites  Therapeutic Exercise/Strengthening  Transfer Training     TREATMENT PLAN: Frequency/Duration: Follow patient BID for duration of hospital stay to address above goals. Rehabilitation Potential For Stated Goals: Good     RECOMMENDED REHABILITATION/EQUIPMENT: (at time of discharge pending progress): Continue Skilled Therapy and Home Health: Physical Therapy. HISTORY:   History of Present Injury/Illness (Reason for Referral):  Pt s/p SAMREEN on 4/26/2022  Past Medical History/Comorbidities:   Ms. Sergey Ortez  has a past medical history of Allergic eye reaction (6/19/2013), Arthritis, Claustrophobia, Elevated blood pressure (not hypertension) (4/30/2015), Environmental allergies, FH: breast cancer (6/19/2013), FH: colon cancer (6/19/2013), [place] (hard of hearing), Hyperlipidemia (6/19/2013), Left shoulder pain (6/19/2013), Menopause (6/19/2013), Nausea & vomiting, Neuropathy, Osteopenia, Trigger finger (6/19/2013), Unspecified adverse effect of anesthesia, Vertigo (6/19/2013), Vitamin B12 deficiency, and Vitamin D deficiency (8/9/2016). She has no past medical history of Difficult intubation, Malignant hyperthermia due to anesthesia, or Pseudocholinesterase deficiency. Ms. Tressa Councilman  has a past surgical history that includes hx appendectomy; hx cholecystectomy; hx hysterectomy; hx tubal ligation (1971); hx gi (last 2012); hx mohs procedure (1982); hx orthopaedic (Right, 05/26/2021); and hx rotator cuff repair (Bilateral). Social History/Living Environment:   Home Environment: Private residence  # Steps to Enter: 1  Wheelchair Ramp: Yes  One/Two Story Residence: One story  Living Alone: No  Support Systems: Spouse/Significant Other  Patient Expects to be Discharged to[de-identified] Home with home health  [facility]; Walker, rolling  Tub or Shower Type: Shower    Prior Level of Function/Work/Activity:  Pt living at home, independent with mobility with a quad cane   Number of Personal Factors/Comorbidities that affect the Plan of Care: 0: LOW COMPLEXITY   EXAMINATION:   Most Recent Physical Functioning:   Gross Assessment: Yes  Gross Assessment  AROM: Within functional limits (except right lower extrmeity s/p SAMREEN)  Strength: Within functional limits (except right lower extrmeity s/p SAMREEN)                     Bed Mobility  Supine to Sit: Stand-by assistance    Transfers  Sit to Stand: Stand-by assistance;Contact guard assistance  Stand to Sit: Stand-by assistance;Contact guard assistance    Balance  Sitting: Intact  Standing: Intact; With support    Posture  Posture (WDL): Within defined limits         Weight Bearing Status  Right Side Weight Bearing: As tolerated  Distance (ft): 200 Feet (ft)  Ambulation - Level of Assistance: Stand-by assistance  Assistive Device: Walker, rolling  Speed/Becky: Pace decreased (<100 feet/min)  Step Length: Left shortened  Stance: Right decreased  Gait Abnormalities: Antalgic;Decreased step clearance  Interventions: Safety awareness training;Verbal cues     Braces/Orthotics: none           Body Structures Involved:  Joints  Muscles Body Functions Affected: Movement Related Activities and Participation Affected: Mobility  Self Care   Number of elements that affect the Plan of Care: 4+: HIGH COMPLEXITY   CLINICAL PRESENTATION:   Presentation: Stable and uncomplicated: LOW COMPLEXITY   CLINICAL DECISION MAKING:   [facility] AM-PAC 6 Clicks   Basic Mobility Inpatient Short Form  How much difficulty does the patient currently have. .. Unable A Lot A Little None   1. Turning over in bed (including adjusting bedclothes, sheets and blankets)? [] 1   [] 2   [x] 3   [] 4   2. Sitting down on and standing up from a chair with arms ( e.g., wheelchair, bedside commode, etc.)   [] 1   [] 2   [x] 3   [] 4   3. Moving from lying on back to sitting on the side of the bed? [] 1   [] 2   [x] 3   [] 4   How much help from another person does the patient currently need. .. Total A Lot A Little None   4. Moving to and from a bed to a chair (including a wheelchair)? [] 1   [] 2   [x] 3   [] 4   5. Need to walk in hospital room? [] 1   [] 2   [x] 3   [] 4   6. Climbing 3-5 steps with a railing? [] 1   [] 2   [x] 3   [] 4   © 2007, Trustees of [facility], under license to [employer]. All rights reserved     Score:  Initial: 18 Most Recent: X (Date: -- )    Interpretation of Tool:  Represents activities that are increasingly more difficult (i.e. Bed mobility, Transfers, Gait). Medical Necessity:     Patient is expected to demonstrate progress in   strength, range of motion, and functional technique   to   decrease assistance required with functional mobility and SAMREEN management  . Reason for Services/Other Comments:  Patient continues to require skilled intervention due to   Inability to complete functional mobility and SAMREEN management independently  .    Use of outcome tool(s) and clinical judgement create a POC that gives a: Clear prediction of patient's progress: LOW COMPLEXITY            TREATMENT:   (In addition to Assessment/Re-Assessment sessions the following treatments were rendered)     Pre-treatment Symptoms/Complaints:  none  Pain Initial:   Pain Intensity 1: 7  Post Session:  7/10     Gait Training (10 Minutes):  Gait training to improve and/or restore physical functioning as related to mobility and strength. Ambulated 200 Feet (ft) with Stand-by assistance using a Walker, rolling and minimal Safety awareness training;Verbal cues related to their stance phase and stride length to promote proper body alignment and promote proper body posture. Instruction in performance of walker use and gait sequencing to correct stance phase and stride length. Therapeutic Exercise: (13 Minutes):  Exercises per grid below to improve mobility and strength. Required minimal verbal cues to promote proper body alignment and promote proper body posture. Progressed repetitions as indicated. Assessment   Date:  4/26 Date:   Date:     ACTIVITY/EXERCISE AM PM AM PM AM PM     []  []  []  []  []  []   Ankle Pumps  10       Quad Sets  10       Gluteal Sets  10       Hip ABd/ADduction  10       Knee Slides  10       Short Arc Quads  10       Long Arc Quads                                    B = bilateral; AA = active assistive; A = active; P = passive      Treatment/Session Assessment:     Response to Treatment:  pt did well and seemed pleased to have been up and moving. Education:  [] Home Exercises  [x] Fall Precautions  [] Hip Precautions [] D/C Instruction Review  [] Hip Prosthesis Review  [x] Walker Management/Safety [] Adaptive Equipment as Needed       Interdisciplinary Collaboration:   Occupational Therapist  Registered Nurse    After treatment position/precautions:   Up in chair  Bed/Chair-wheels locked  Call light within reach  Family at bedside    Compliance with Program/Exercises: Compliant all of the time, Will assess as treatment progresses.     Recommendations/Intent for next treatment session:  Treatment next visit will focus on increasing Ms. Constantin's independence with bed mobility, transfers, gait training, strength/ROM exercises, modalities for pain, and patient education.       Total Treatment Duration:  PT Patient Time In/Time Out  Time In: 1700  Time Out: [address], PT

## 2022-04-26 NOTE — PROGRESS NOTES
Care Management Interventions  PCP Verified by CM: Yes  Mode of Transport at Discharge: Self  Transition of Care Consult (CM Consult): [address]: Yes  Discharge Durable Medical Equipment: No  Physical Therapy Consult: Yes  Occupational Therapy Consult: Yes  Support Systems: Spouse/Significant Other  Confirm Follow Up Transport: Self  The Plan for Transition of Care is Related to the Following Treatment Goals : improve mobility  The Patient and/or Patient Representative was Provided with a Choice of Provider and Agrees with the Discharge Plan?: Yes  Name of the Patient Representative Who was Provided with a Choice of Provider and Agrees with the Discharge Plan: pt  Freedom of Choice List was Provided with Basic Dialogue that Supports the Patient's Individualized Plan of Care/Goals, Treatment Preferences and Shares the Quality Data Associated with the Providers?: Yes  Discharge Location  Patient Expects to be Discharged to[de-identified] Home with home health  Patient is a 77y.o. year old female admitted for Right SAMREEN . Patient plans to return home on discharge. Order received to arrange home health. Patient without preference towards agency. Referral sent to [facility]. Patient denies any equipment needs as patient has a walker and raised toilet seat. Will follow until discharge.

## 2022-04-26 NOTE — PROGRESS NOTES
Problem: Self Care Deficits Care Plan (Adult)  Goal: *Acute Goals and Plan of Care (Insert Text)  Outcome: Progressing Towards Goal  Note: GOALS:   DISCHARGE GOALS (in preparation for going home/rehab):  3 days  1. Ms. Jerry Cisse will perform one lower body dressing activity with minimal assistance with adaptive equipment to demonstrate improved functional mobility and safety. 2.  Ms. Jerry Cisse will perform one lower body bathing activity with minimal  assistance with adaptive equipment to demonstrate improved functional mobility and safety. 3.  Ms. Jerry Cisse will perform toileting/toilet transfer with contact guard assistance with adaptive equipment to demonstrate improved functional mobility and safety. 4.  Ms. Jerry Cisse will perform shower transfer with contact guard assistance with adaptive equipment to demonstrate improved functional mobility and safety. 5.  Ms. Jerry Cisse will state SAMREEN precautions with two verbal cues to demonstrate improved functional mobility and safety. JOINT CAMP OCCUPATIONAL THERAPY SAMREEN: Initial Assessment, Daily Note, Treatment Day: Day of Assessment, and PM 4/26/2022  OUTPATIENT: Hospital Day: 1  [place] / Product Type: Medicare /      NAME/AGE/GENDER: Kimberly Messer is a 77 y.o. female   PRIMARY DIAGNOSIS:  History of hemiarthroplasty of hip [Z96.649]   Procedure(s) and Anesthesia Type:     * RIGHT HIP ARTHROPLASTY TOTAL CONVSERSION FROM ELLIS LESLIE - Spinal (Right)  ICD-10: Treatment Diagnosis:    Pain in Right Hip (M25.551)  Stiffness of Right Hip, Not elsewhere classified (M25.651)      ASSESSMENT:     Ms. Jerry Cisse is s/p right SAMREEN revision and presents with decreased weight bearing on right LE and decreased independence with functional mobility and activities of daily living as compared to prior level of function and safety.   Patient would benefit from skilled Occupational Therapy to maximize independence and safety with self-care task and functional mobility. Pt would also benefit from education on lower body adaptive equipment and hip precautions post-surgery in preparation for going home. Patient plans for further rehab at home with home health services and good family support. OT reviewed therapy schedule and plan of care with patient. Patient was able to transfer and perform self care skills as charted below. Patient instructed to call for assistance when needing to get up from the recliner and all needs in reach. Patient verbalized understanding of call light. Pt up in room, donned clothes, to bathroom for toileting and ambulated in the borjas. Pt should progress well with self care tomorrow. This section established at most recent assessment   PROBLEM LIST (Impairments causing functional limitations):  Decreased Strength  Decreased ADL/Functional Activities  Decreased Transfer Abilities  Increased Pain  Increased Fatigue  Decreased Flexibility/Joint Mobility  Decreased Knowledge of Precautions   INTERVENTIONS PLANNED: (Benefits and precautions of occupational therapy have been discussed with the patient.)  Activities of daily living training  Adaptive equipment training  Balance training  Clothing management  Donning&doffing training  Theraputic activity     TREATMENT PLAN: Frequency/Duration: Follow patient 1-2 times to address above goals. Rehabilitation Potential For Stated Goals: Good     RECOMMENDED REHABILITATION/EQUIPMENT: (at time of discharge pending progress): Continue Skilled Therapy. OCCUPATIONAL PROFILE AND HISTORY:   History of Present Injury/Illness (Reason for Referral): Pt presents this date s/p (right) SAMREEN.     Past Medical History/Comorbidities:   Ms. Maribel Oliveira  has a past medical history of Allergic eye reaction (6/19/2013), Arthritis, Claustrophobia, Elevated blood pressure (not hypertension) (4/30/2015), Environmental allergies, FH: breast cancer (6/19/2013), FH: colon cancer (6/19/2013), [place] (hard of hearing), Hyperlipidemia (6/19/2013), Left shoulder pain (6/19/2013), Menopause (6/19/2013), Nausea & vomiting, Neuropathy, Osteopenia, Trigger finger (6/19/2013), Unspecified adverse effect of anesthesia, Vertigo (6/19/2013), Vitamin B12 deficiency, and Vitamin D deficiency (8/9/2016). She has no past medical history of Difficult intubation, Malignant hyperthermia due to anesthesia, or Pseudocholinesterase deficiency. Ms. Kelly Roy  has a past surgical history that includes hx appendectomy; hx cholecystectomy; hx hysterectomy; hx tubal ligation (1971); hx gi (last 2012); hx mohs procedure (1982); hx orthopaedic (Right, 05/26/2021); and hx rotator cuff repair (Bilateral). Social History/Living Environment:   Home Environment: Private residence  # Steps to Enter: 1  Wheelchair Ramp: Yes  One/Two Story Residence: One story  Living Alone: No  Support Systems: Spouse/Significant Other  Patient Expects to be Discharged to[de-identified] Home with home health  Current DME Used/Available at Home: [address] [facility], [facility]; Walker, rolling  Tub or Shower Type: Shower    Prior Level of Function/Work/Activity:  Pt was (I) with ADL's and ambulated short distances. Number of Personal Factors/Comorbidities that affect the Plan of Care: Brief history (0):  LOW COMPLEXITY   ASSESSMENT OF OCCUPATIONAL PERFORMANCE[de-identified]   Most Recent Physical Functioning:   Balance  Sitting: Intact  Standing: Intact; With support       Gross Assessment: Yes  Gross Assessment  AROM: Within functional limits (except right lower extrmeity s/p SAMREEN)  Strength: Within functional limits (except right lower extrmeity s/p SAMREEN)            Coordination  Fine Motor Skills-Upper: Left Intact; Right Intact  Gross Motor Skills-Upper: Left Intact; Right Intact                          Basic ADLs (From Assessment) Complex ADLs (From Assessment)   Basic ADL  Feeding: Independent  Oral Facial Hygiene/Grooming: Supervision  Bathing:  Moderate assistance  Upper Body Dressing: Supervision  Lower Body Dressing: Moderate assistance  Toileting: Minimum assistance     Grooming/Bathing/Dressing Activities of Daily Living                       Functional Transfers  Bathroom Mobility: Contact guard assistance  Toilet Transfer : Minimum assistance  Shower Transfer: Minimum assistance     Bed/Mat Mobility  Supine to Sit: Stand-by assistance  Sit to Stand: Stand-by assistance;Contact guard assistance  Stand to Sit: Stand-by assistance;Contact guard assistance         Physical Skills Involved:  Range of Motion  Balance  Pain (acute) Cognitive Skills Affected (resulting in the inability to perform in a timely and safe manner):  none Psychosocial Skills Affected:  Environmental Adaptation   Number of elements that affect the Plan of Care: 3-5:  MODERATE COMPLEXITY   CLINICAL DECISION MAKIN[address] [place] [address] AM-PAC 6 Clicks   Daily Activity Inpatient Short Form  How much help from another person does the patient currently need. .. Total A Lot A Little None   1. Putting on and taking off regular lower body clothing? [] 1   [x] 2   [] 3   [] 4   2. Bathing (including washing, rinsing, drying)? [] 1   [x] 2   [] 3   [] 4   3. Toileting, which includes using toilet, bedpan or urinal?   [] 1   [] 2   [x] 3   [] 4   4. Putting on and taking off regular upper body clothing? [] 1   [] 2   [] 3   [x] 4   5. Taking care of personal grooming such as brushing teeth? [] 1   [] 2   [] 3   [x] 4   6. Eating meals? [] 1   [] 2   [] 3   [x] 4   © , Trustees of [address] [place] [address], under license to [employer]. All rights reserved     Score:  Initial: 19 Most Recent: X (Date: -- )    Interpretation of Tool:  Represents activities that are increasingly more difficult (i.e. Bed mobility, Transfers, Gait).      Use of outcome tool(s) and clinical judgement create a POC that gives a: LOW COMPLEXITY            TREATMENT:   (In addition to Assessment/Re-Assessment sessions the following treatments were rendered)     Pre-treatment Symptoms/Complaints:  pt without complaint of pain  Pain: Initial:      Post Session:  0     Self Care: (25 min): Procedure(s) (per grid) utilized to improve and/or restore self-care/home management as related to dressing, toileting, grooming, and functional mobility . Required minimal verbal and manual cueing to facilitate activities of daily living skills and compensatory activities. OT evaluation completed     Treatment/Session Assessment:     Response to Treatment:  pt up in room tolerated well. Education:  [] Home Exercises  [x] Fall Precautions  [x] Hip Precautions [] Going Home Video  [] Knee/Hip Prosthesis Review  [x] Walker Management/Safety [x] Adaptive Equipment as Needed       Interdisciplinary Collaboration:   Physical Therapist  Occupational Therapist  Registered Nurse    After treatment position/precautions:   Up in chair  Bed/Chair-wheels locked  Caregiver at bedside  Call light within reach  RN notified     Compliance with Program/Exercises: Compliant all of the time, Will assess as treatment progresses. Recommendations/Intent for next treatment session:  Treatment next visit will focus on increasing Ms. Killian's independence with bed mobility, transfers, self care, functional mobility, modalities for pain, and patient education.       Total Treatment Duration:  OT Patient Time In/Time Out  Time In: 1650  Time Out: [address], OT

## 2022-04-27 VITALS
RESPIRATION RATE: 16 BRPM | SYSTOLIC BLOOD PRESSURE: 99 MMHG | DIASTOLIC BLOOD PRESSURE: 54 MMHG | WEIGHT: 135 LBS | BODY MASS INDEX: 23.05 KG/M2 | OXYGEN SATURATION: 98 % | HEART RATE: 66 BPM | HEIGHT: 64 IN | TEMPERATURE: 98.1 F

## 2022-04-27 LAB — HGB BLD-MCNC: 9.1 G/DL (ref 11.7–15.4)

## 2022-04-27 PROCEDURE — 74011250636 HC RX REV CODE- 250/636: Performed by: PHYSICIAN ASSISTANT

## 2022-04-27 PROCEDURE — 97535 SELF CARE MNGMENT TRAINING: CPT

## 2022-04-27 PROCEDURE — 97530 THERAPEUTIC ACTIVITIES: CPT

## 2022-04-27 PROCEDURE — 74011000250 HC RX REV CODE- 250: Performed by: PHYSICIAN ASSISTANT

## 2022-04-27 PROCEDURE — 74011250637 HC RX REV CODE- 250/637: Performed by: PHYSICIAN ASSISTANT

## 2022-04-27 PROCEDURE — 65270000029 HC RM PRIVATE

## 2022-04-27 PROCEDURE — 27134 REVISE HIP JOINT REPLACEMENT: CPT | Performed by: PHYSICIAN ASSISTANT

## 2022-04-27 PROCEDURE — 27134 REVISE HIP JOINT REPLACEMENT: CPT | Performed by: ORTHOPAEDIC SURGERY

## 2022-04-27 PROCEDURE — 85018 HEMOGLOBIN: CPT

## 2022-04-27 PROCEDURE — 36415 COLL VENOUS BLD VENIPUNCTURE: CPT

## 2022-04-27 RX ADMIN — ACETAMINOPHEN 1000 MG: 500 TABLET, FILM COATED ORAL at 03:32

## 2022-04-27 RX ADMIN — SODIUM CHLORIDE, PRESERVATIVE FREE 10 ML: 5 INJECTION INTRAVENOUS at 05:44

## 2022-04-27 RX ADMIN — OXYCODONE 5 MG: 5 TABLET ORAL at 08:43

## 2022-04-27 RX ADMIN — CEFAZOLIN SODIUM 2 G: 10 INJECTION, POWDER, FOR SOLUTION INTRAVENOUS at 03:14

## 2022-04-27 RX ADMIN — DOCUSATE SODIUM 50MG AND SENNOSIDES 8.6MG 2 TABLET: 8.6; 5 TABLET, FILM COATED ORAL at 08:03

## 2022-04-27 RX ADMIN — OXYCODONE 5 MG: 5 TABLET ORAL at 03:32

## 2022-04-27 RX ADMIN — Medication 1 AMPULE: at 08:03

## 2022-04-27 RX ADMIN — Medication 81 MG: at 08:03

## 2022-04-27 NOTE — PROGRESS NOTES
2022         Post Op day: 1 Day Post-Op   Admit Diagnosis: History of hemiarthroplasty of hip [Z96.649]; Arthritis of right hip [M16.11]  LAB:    Recent Results (from the past 24 hour(s))   HEMOGLOBIN    Collection Time: 22  7:08 PM   Result Value Ref Range    HGB 10.4 (L) 11.7 - 15.4 g/dL   HEMOGLOBIN    Collection Time: 22  3:46 AM   Result Value Ref Range    HGB 9.1 (L) 11.7 - 15.4 g/dL     Vital Signs:    Patient Vitals for the past 8 hrs:   BP Temp Pulse Resp SpO2   22 0727 (!) 99/54 98.1 °F (36.7 °C) 66 16 98 %   22 0233 (!) 101/48 98.7 °F (37.1 °C) 74 16 95 %     Temp (24hrs), Av.5 °F (36.9 °C), Min:98.1 °F (36.7 °C), Max:98.8 °F (37.1 °C)    Pain Control:   Pain Assessment  Pain Scale 1: Numeric (0 - 10)  Pain Intensity 1: 0  Pain Onset 1: post op  Pain Location 1: Hip  Pain Orientation 1: Right  Pain Description 1: Aching  Pain Intervention(s) 1: Medication (see MAR),Rest,Repositioned  Subjective: Doing well, normal recovery experienced. 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     Signed By: Savannah Osorio MD

## 2022-04-27 NOTE — PROGRESS NOTES
Pt discharge summary and home medication sheet reviewed with pt and signed. Copy given for take home use. All goals met. Assessment unchanged. Pt left hospital via w/c with staff member and .

## 2022-04-27 NOTE — PROGRESS NOTES
Problem: Mobility Impaired (Adult and Pediatric)  Goal: *Acute Goals and Plan of Care (Insert Text)  Outcome: Progressing Towards Goal  Note: GOALS (1-4 days):  (1.)Ms. Tuan Soto will move from supine to sit and sit to supine  in bed with SUPERVISION. (2.)Ms. Tuan Soto will transfer from bed to chair and chair to bed with SUPERVISION using the least restrictive device. (3.)Ms. Tuan Soto will ambulate with SUPERVISION for 300 feet with the least restrictive device. 4/27  (4.)Ms. Tuan Soto will ambulate up/down 1 steps and up and down ramp with RW with CONTACT GUARD ASSIST with no device. met 4/27  (5.)Ms. Tuan Soto will state/observe SAMREEN precautions with 0 verbal cues. met 4/27  ________________________________________________________________________________________________    PHYSICAL THERAPY JOINT CAMP SAMREEN: Daily Note and AM 4/27/2022  INPATIENT: Hospital Day: 2  [place] / [place] / Product Type: Medicare /      NAME/AGE/GENDER: Madeline Sinclair is a 77 y.o. female   PRIMARY DIAGNOSIS:  History of hemiarthroplasty of hip [Z96.649]   Procedure(s) and Anesthesia Type:     * RIGHT HIP ARTHROPLASTY TOTAL CONVSERSION FROM ELLIS LESLIE - Spinal (Right)  ICD-10: Treatment Diagnosis:    · Pain in Right Hip (M25.551)  · Stiffness of Right Hip, Not elsewhere classified (M25.651)  · Difficulty in walking, Not elsewhere classified (R26.2)      ASSESSMENT:     Ms. Tuan Soto presents with decreased strength and range of motion right lower extremity and with decreased independence with functional mobility s/p  right total hip arthroplasty. Pt will benefit from skilled PT interventions to maximize independence with functional mobility and SAMREEN management. Pt did well with assessment despite some soreness (had tylenol) . Worked on gait training in the borjas with verbal cues and RW, doing well with reciprocal gait pattern. Some soreness with gait. In room in recliner worked on [employer] exercises with verbal cues.  She stayed up in chair with needs in reach and family at bedside. Pt instructed not to get up without assist. Lisa Brandon to progress mobility and SAMREEN exercises at next session. Pt plans to discharge to home from the hospital with continued therapy for follow up.   4/47 supine upon arrival.  Performs R hip exercises in the bed with verbal cues to stay within the hip precaution. Review hip precaution good recall 3/3. Supine>eob with supervision and good technique with hip precaution. Ambulated 274 ft using RW with supervision while working on step length and stride. Therapist instructed/domenstration going up/down one curb x 2 with good technique. Then ambulated another 274 ft back to the room. Return to bed with supervision, needs in reach, ice to R hip and instructed to call for assists, before getting up. All question answer and ready for D/C. This section established at most recent assessment   PROBLEM LIST (Impairments causing functional limitations):  1. Decreased Strength  2. Decreased ADL/Functional Activities  3. Decreased Transfer Abilities  4. Decreased Ambulation Ability/Technique  5. Increased Pain  6. Decreased Flexibility/Joint Mobility  7. Decreased [place] with Home Exercise Program   INTERVENTIONS PLANNED: (Benefits and precautions of physical therapy have been discussed with the patient.)  1. Bed Mobility  2. Cold  3. Gait Training  4. Home Exercise Program (HEP)  5. Range of Motion (ROM)  6. Therapeutic Activites  7. Therapeutic Exercise/Strengthening  8. Transfer Training     TREATMENT PLAN: Frequency/Duration: Follow patient BID for duration of hospital stay to address above goals. Rehabilitation Potential For Stated Goals: Good     RECOMMENDED REHABILITATION/EQUIPMENT: (at time of discharge pending progress): Continue Skilled Therapy and Home Health: Physical Therapy.               HISTORY:   History of Present Injury/Illness (Reason for Referral):  Pt s/p SAMREEN on 4/26/2022  Past Medical History/Comorbidities:   Ms. Garcia Seen  has a past medical history of Allergic eye reaction (6/19/2013), Arthritis, Claustrophobia, Elevated blood pressure (not hypertension) (4/30/2015), Environmental allergies, FH: breast cancer (6/19/2013), FH: colon cancer (6/19/2013), [place] (hard of hearing), Hyperlipidemia (6/19/2013), Left shoulder pain (6/19/2013), Menopause (6/19/2013), Nausea & vomiting, Neuropathy, Osteopenia, Trigger finger (6/19/2013), Unspecified adverse effect of anesthesia, Vertigo (6/19/2013), Vitamin B12 deficiency, and Vitamin D deficiency (8/9/2016). She has no past medical history of Difficult intubation, Malignant hyperthermia due to anesthesia, or Pseudocholinesterase deficiency. Ms. Garcia Seen  has a past surgical history that includes hx appendectomy; hx cholecystectomy; hx hysterectomy; hx tubal ligation (1971); hx gi (last 2012); hx mohs procedure (1982); hx orthopaedic (Right, 05/26/2021); and hx rotator cuff repair (Bilateral).    Social History/Living Environment:   Home Environment: Private residence  # Steps to Enter: 1  Wheelchair Ramp: Yes  One/Two Story Residence: One story  Living Alone: No  Support Systems: Spouse/Significant Other  Patient Expects to be Discharged to[de-identified] Home with home health  Current DME Used/Available at Home: Pinkie Mower, quad; Walker, rolling  Tub or Shower Type: Shower    Prior Level of Function/Work/Activity:  Pt living at home, independent with mobility with a quad cane   Number of Personal Factors/Comorbidities that affect the Plan of Care: 0: LOW COMPLEXITY   EXAMINATION:   Most Recent Physical Functioning:                            Bed Mobility  Supine to Sit: Supervision  Scooting: Stand-by assistance    Transfers  Sit to Stand: Supervision  Stand to Sit: Supervision  Bed to Chair: Supervision    Balance  Sitting: Intact  Standing: With support              Weight Bearing Status  Right Side Weight Bearing: As tolerated  Distance (ft): 274 Feet (ft) (+274)  Ambulation - Level of Assistance: Stand-by assistance  Assistive Device: Walker, rolling  Speed/Becky: Pace decreased (<100 feet/min)  Step Length: Right shortened  Stance: Right decreased  Gait Abnormalities: Decreased step clearance  Interventions: Safety awareness training     Braces/Orthotics: none    Right Hip Cold  Type: Cold/ice packs      Body Structures Involved:  1. Joints  2. Muscles Body Functions Affected:  1. Movement Related Activities and Participation Affected:  1. Mobility  2. Self Care   Number of elements that affect the Plan of Care: 4+: HIGH COMPLEXITY   CLINICAL PRESENTATION:   Presentation: Stable and uncomplicated: LOW COMPLEXITY   CLINICAL DECISION MAKING:   [facility] 6 Clicks   Basic Mobility Inpatient Short Form  How much difficulty does the patient currently have. .. Unable A Lot A Little None   1. Turning over in bed (including adjusting bedclothes, sheets and blankets)? [] 1   [] 2   [x] 3   [] 4   2. Sitting down on and standing up from a chair with arms ( e.g., wheelchair, bedside commode, etc.)   [] 1   [] 2   [x] 3   [] 4   3. Moving from lying on back to sitting on the side of the bed? [] 1   [] 2   [x] 3   [] 4   How much help from another person does the patient currently need. .. Total A Lot A Little None   4. Moving to and from a bed to a chair (including a wheelchair)? [] 1   [] 2   [x] 3   [] 4   5. Need to walk in hospital room? [] 1   [] 2   [x] 3   [] 4   6. Climbing 3-5 steps with a railing? [] 1   [] 2   [x] 3   [] 4   © 2007, Trustees of [facility], under license to [employer]. All rights reserved     Score:  Initial: 18 Most Recent: X (Date: -- )    Interpretation of Tool:  Represents activities that are increasingly more difficult (i.e. Bed mobility, Transfers, Gait).     Medical Necessity:     · Patient is expected to demonstrate progress in   · strength, range of motion, and functional technique  ·  to   · decrease assistance required with functional mobility and SAMREEN management  · .  Reason for Services/Other Comments:  · Patient continues to require skilled intervention due to   · Inability to complete functional mobility and SAMREEN management independently  · . Use of outcome tool(s) and clinical judgement create a POC that gives a: Clear prediction of patient's progress: LOW COMPLEXITY            TREATMENT:   (In addition to Assessment/Re-Assessment sessions the following treatments were rendered)     Pre-treatment Symptoms/Complaints:  agreeable  Pain Initial:   Pain Intensity 1: 2 (about the same after therapy)  Post Session:       Gait Training ( ):  Gait training to improve and/or restore physical functioning as related to mobility and strength. Ambulated 274 Feet (ft) (+274) with Stand-by assistance using a Walker, rolling and minimal Safety awareness training related to their stance phase and stride length to promote proper body alignment and promote proper body posture. Instruction in performance of walker use and gait sequencing to correct stance phase and stride length. Therapeutic Exercise: ( ):  Exercises per grid below to improve mobility and strength. Required minimal verbal cues to promote proper body alignment and promote proper body posture. Progressed repetitions as indicated. Therapeutic Activity: (  40 Minutes ):  Therapeutic activities including Bed transfers, Ambulation on level ground, Stairs and  strength and balance.   Required minimal Safety awareness training     Date:  4/26 Date:  4/27   Date:     ACTIVITY/EXERCISE AM PM AM PM AM PM     []  []  []  []  []  []   Ankle Pumps  10 15      Quad Sets  10 15      Gluteal Sets  10 15      Hip ABd/ADduction  10 15      Knee Slides  10 15      Short Arc Quads  10 15      Long Arc Quads   15                                 B = bilateral; AA = active assistive; A = active; P = passive      Treatment/Session Assessment:     Response to Treatment:  Participated well, ready for D/C.    Education:  [] Home Exercises  [x] Fall Precautions  [] Hip Precautions [] D/C Instruction Review  [] Hip Prosthesis Review  [x] Walker Management/Safety [] Adaptive Equipment as Needed       Interdisciplinary Collaboration:   o Physical Therapy Assistant  o Registered Nurse    After treatment position/precautions:   o Supine in bed  o Bed/Chair-wheels locked  o Call light within reach  o Family at bedside    Compliance with Program/Exercises: Compliant all of the time, Will assess as treatment progresses. Recommendations/Intent for next treatment session:  Treatment next visit will focus on increasing Ms. Killian's independence with bed mobility, transfers, gait training, strength/ROM exercises, modalities for pain, and patient education.       Total Treatment Duration:  PT Patient Time In/Time Out  Time In: 0815  Time Out: 9557    Kate Norman, PTA

## 2022-04-27 NOTE — PROGRESS NOTES
Problem: Self Care Deficits Care Plan (Adult)  Goal: *Acute Goals and Plan of Care (Insert Text)  Outcome: Progressing Towards Goal  Note: GOALS:   DISCHARGE GOALS (in preparation for going home/rehab):  3 days all goals met 4/27/2022  1. Ms. Afua Allison will perform one lower body dressing activity with minimal assistance with adaptive equipment to demonstrate improved functional mobility and safety. 2.  Ms. Afua Allison will perform one lower body bathing activity with minimal  assistance with adaptive equipment to demonstrate improved functional mobility and safety. 3.  Ms. Afua Allison will perform toileting/toilet transfer with contact guard assistance with adaptive equipment to demonstrate improved functional mobility and safety. 4.  Ms. Afua Allison will perform shower transfer with contact guard assistance with adaptive equipment to demonstrate improved functional mobility and safety. 5.  Ms. Afua Allison will state SAMREEN precautions with two verbal cues to demonstrate improved functional mobility and safety. JOINT CAMP OCCUPATIONAL THERAPY SAMREEN: Daily Note, Discharge and AM 4/27/2022  OUTPATIENT: Hospital Day: 2  [place] / Product Type: Medicare /      NAME/AGE/GENDER: Antwan Christensen is a 77 y.o. female   PRIMARY DIAGNOSIS:  History of hemiarthroplasty of hip [Z96.649]   Procedure(s) and Anesthesia Type:     * RIGHT HIP ARTHROPLASTY TOTAL CONVSERSION FROM ELLIS LESLIE - Spinal (Right)  ICD-10: Treatment Diagnosis:    · Pain in Right Hip (M25.551)  · Stiffness of Right Hip, Not elsewhere classified (M25.651)      ASSESSMENT:     Ms. Afua Allison is s/p R SAMREEN and presents with decreased weight bearing on R LE and decreased independence with functional mobility and activities of daily living. Patient completed shower and dressing as charted below in ADL grid and is ambulating with rolling walker and stand by assist.  Patient has met 5/5 goals and plans to return home with good family support. Family able to provide patient with appropriate level of assistance at this time. OT reviewed hip precautions throughout session. Patient instructed to call for assistance when needing to get up from recliner and all needs in reach. Patient verbalized understanding of call light. This section established at most recent assessment   PROBLEM LIST (Impairments causing functional limitations):  1. Decreased Strength  2. Decreased ADL/Functional Activities  3. Decreased Transfer Abilities  4. Increased Pain  5. Increased Fatigue  6. Decreased Flexibility/Joint Mobility  7. Decreased Knowledge of Precautions   INTERVENTIONS PLANNED: (Benefits and precautions of occupational therapy have been discussed with the patient.)  1. Activities of daily living training  2. Adaptive equipment training  3. Balance training  4. Clothing management  5. Donning&doffing training  6. Theraputic activity     TREATMENT PLAN: Frequency/Duration: Follow patient 1-2 times to address above goals. Rehabilitation Potential For Stated Goals: Good     RECOMMENDED REHABILITATION/EQUIPMENT: (at time of discharge pending progress): Continue Skilled Therapy. OCCUPATIONAL PROFILE AND HISTORY:   History of Present Injury/Illness (Reason for Referral): Pt presents this date s/p (right) SAMREEN. Past Medical History/Comorbidities:   Ms. Mohini Malin  has a past medical history of Allergic eye reaction (6/19/2013), Arthritis, Claustrophobia, Elevated blood pressure (not hypertension) (4/30/2015), Environmental allergies, FH: breast cancer (6/19/2013), FH: colon cancer (6/19/2013), [place] (hard of hearing), Hyperlipidemia (6/19/2013), Left shoulder pain (6/19/2013), Menopause (6/19/2013), Nausea & vomiting, Neuropathy, Osteopenia, Trigger finger (6/19/2013), Unspecified adverse effect of anesthesia, Vertigo (6/19/2013), Vitamin B12 deficiency, and Vitamin D deficiency (8/9/2016).     She has no past medical history of Difficult intubation, Malignant hyperthermia due to anesthesia, or Pseudocholinesterase deficiency. Ms. Kelly Roy  has a past surgical history that includes hx appendectomy; hx cholecystectomy; hx hysterectomy; hx tubal ligation (1971); hx gi (last 2012); hx mohs procedure (1982); hx orthopaedic (Right, 05/26/2021); and hx rotator cuff repair (Bilateral). Social History/Living Environment:   Home Environment: Private residence  # Steps to Enter: 1  Wheelchair Ramp: Yes  One/Two Story Residence: One story  Living Alone: No  Support Systems: Spouse/Significant Other  Patient Expects to be Discharged to[de-identified] Home with home health  Current DME Used/Available at Home: Angelica Yoan, quad; Walker, rolling  Tub or Shower Type: Shower    Prior Level of Function/Work/Activity:  Pt was (I) with ADL's and ambulated short distances. Number of Personal Factors/Comorbidities that affect the Plan of Care: Brief history (0):  LOW COMPLEXITY   ASSESSMENT OF OCCUPATIONAL PERFORMANCE[de-identified]   Most Recent Physical Functioning:   Balance  Sitting: Intact  Standing: With support                              Mental Status  Neurologic State: Alert  Orientation Level: Oriented X4  Cognition: Follows commands  Perception: Appears intact  Perseveration: No perseveration noted  Safety/Judgement: Fall prevention                Basic ADLs (From Assessment) Complex ADLs (From Assessment)   Basic ADL  Feeding: Independent  Oral Facial Hygiene/Grooming: Supervision  Bathing: Moderate assistance  Type of Bath: Chlorhexidine (CHG),Shower,Full  Upper Body Dressing: Supervision  Lower Body Dressing:  Moderate assistance  Toileting: Minimum assistance     Grooming/Bathing/Dressing Activities of Daily Living   Grooming  Grooming Assistance: Set-up  Position Performed: Standing  Brushing Teeth: Set-up  Brushing/Combing Hair: Set-up Cognitive Retraining  Safety/Judgement: Fall prevention   Upper Body Bathing  Bathing Assistance: Set-up  Position Performed: Seated in chair  Cues: Verbal cues provided  Adaptive Equipment: Grab bar;Long handled sponge; Shower chair Feeding  Feeding Assistance: Set-up   Lower Body Bathing  Bathing Assistance: Set-up  Perineal  : Set-up  Position Performed: Standing  Cues: Verbal cues provided  Adaptive Equipment: Grab bar;Long handled sponge  Lower Body : Set-up  Position Performed: Seated in chair;Standing  Cues: Verbal cues provided  Adaptive Equipment: Grab bar;Long handled sponge; Shower chair Toileting  Toileting Assistance: Set-up  Bladder Hygiene: Set-up   Upper Body Dressing Assistance  Dressing Assistance: Set-up  Bra: Set-up  Pullover Shirt: Set-up Functional Transfers  Bathroom Mobility: Stand-by assistance  Toilet Transfer : Stand-by assistance  Shower Transfer: Stand-by assistance  Adaptive Equipment: Bedside commode;Grab bars; Shower chair with back; Walker (comment)   Lower Body Dressing Assistance  Dressing Assistance: Set-up  Underpants: Set-up  Pants With Elastic Waist: Set-up  Socks: Set-up; Compensatory technique training  Shoes with Cloth Laces: Set-up  Position Performed: Seated in chair  Adaptive Equipment Used: Grab bar;Long handled shoe horn;Reacher;Sock aid; Walker Bed/Mat Mobility  Sit to Stand: Stand-by assistance  Stand to Sit: Stand-by assistance  Bed to Chair: Stand-by assistance  Scooting: Stand-by assistance         Physical Skills Involved:  1. Range of Motion  2. Balance  3. Pain (acute) Cognitive Skills Affected (resulting in the inability to perform in a timely and safe manner): 1. none Psychosocial Skills Affected:  1. Environmental Adaptation   Number of elements that affect the Plan of Care: 3-5:  MODERATE COMPLEXITY   CLINICAL DECISION MAKING:   [facility] AM-PAC 6 Clicks   Daily Activity Inpatient Short Form  How much help from another person does the patient currently need. .. Total A Lot A Little None   1. Putting on and taking off regular lower body clothing? [] 1   [] 2   [x] 3   [] 4   2.   Bathing (including washing, rinsing, drying)? [] 1   [] 2   [] 3   [x] 4   3. Toileting, which includes using toilet, bedpan or urinal?   [] 1   [] 2   [] 3   [x] 4   4. Putting on and taking off regular upper body clothing? [] 1   [] 2   [] 3   [x] 4   5. Taking care of personal grooming such as brushing teeth? [] 1   [] 2   [] 3   [x] 4   6. Eating meals? [] 1   [] 2   [] 3   [x] 4   © 2007, Trustees of [facility] [employer], under license to [employer]. All rights reserved     Score:  Initial: 19 Most Recent: 23 (Date: 4-27-22 )    Interpretation of Tool:  Represents activities that are increasingly more difficult (i.e. Bed mobility, Transfers, Gait). Use of outcome tool(s) and clinical judgement create a POC that gives a: LOW COMPLEXITY            TREATMENT:   (In addition to Assessment/Re-Assessment sessions the following treatments were rendered)     Pre-treatment Symptoms/Complaints:  pt tolerated shower  Pain: Initial:   Pain Intensity 1: 0  Post Session:  0     Self Care: (45 min): Procedure(s) (per grid) utilized to improve and/or restore self-care/home management as related to dressing, bathing, toileting, grooming and functional mobility. Required minimal verbal cueing to facilitate activities of daily living skills and compensatory activities. Treatment/Session Assessment:     Response to Treatment:  pt tolerated well. Education:  [] Home Exercises  [x] Fall Precautions  [x] Hip Precautions [] Going Home Video  [] Knee/Hip Prosthesis Review  [x] Walker Management/Safety [x] Adaptive Equipment as Needed       Interdisciplinary Collaboration:   o Occupational Therapist  o Registered Nurse    After treatment position/precautions:   o Up in chair  o Bed/Chair-wheels locked  o Call light within reach  o RN notified  o LEs reclined     Compliance with Program/Exercises: Compliant all of the time.     Recommendations/Intent for next treatment session: Pt doing well all goals met and will do well at home with support from spouse. Patient will be discharged home with home health PT. No further Occupational Therapy warranted, will discharge Occupational Therapy services.       Total Treatment Duration:  OT Patient Time In/Time Out  Time In: 4718  Time Out: 1787 Radha Fritz, OT

## 2022-04-27 NOTE — PROGRESS NOTES
04/26/22 2057   Oxygen Therapy   O2 Sat (%) 95 %   Pulse via Oximetry 80 beats per minute   O2 Device None (Room air)   Pt on continuous monitor for HS. Alarm limits set. Pt working on IS.

## 2022-04-27 NOTE — DISCHARGE INSTRUCTIONS
[facility]   Patient Discharge Instructions    Candelario Wright / [id number] : 1945    Admitted 2022 Discharged: 2022     IF YOU HAVE ANY PROBLEMS ONCE YOU ARE AT HOME CALL THE FOLLOWING NUMBERS:   Main office number: [phone]    Take Home Medications     · It is important that you take the medication exactly as they are prescribed. · Keep your medication in the bottles provided by the pharmacist and keep a list of the medication names, dosages, and times to be taken in your wallet. · Do not take other medications without consulting your doctor. What to do at [address] your prehospital diet. If you have excessive nausea or vomitting call your doctor's office     Home Physical Therapy is arranged. Use rolling walker when walking. Patients who have had a joint replacement should not drive until you are seen for your follow up appointment by Dr. Chidi Zepeda. When to Call    - Call if you have a temperature greater then 101  - Unable to keep food down  - Loose control of your bladder or bowel function  - Are unable to bear any weight   - Need a pain medication refill     Patient Education        Hip Replacement Surgery (Posterior): What to Expect at Home  Your Recovery  Hip replacement surgery replaces the worn parts of your hip joint. When you leave the hospital, you will probably be walking with crutches or a walker. You may be able to climb a few stairs and get in and out of bed and chairs. But you will need someone to help you at home until you have more energy and can move around better. You will go home with a bandage and stitches, staples, skin glue, or tape strips. You can remove the bandage when your doctor tells you to. If you have stitches or staples, your doctor will remove them about 2 weeks after your surgery. Glue or tape strips will fall off on their own over time.  You may still have some mild pain, and the area may be swollen for 3 to 4 months after surgery. Your doctor may give you medicine for the pain. You will continue the rehabilitation program (rehab) you started in the hospital. The better you do with your rehab exercises, the sooner you will get your strength and movement back. Most people are able to return to work 4 weeks to 4 months after surgery. This care sheet gives you a general idea about how long it will take for you to recover. But each person recovers at a different pace. Follow the steps below to get better as quickly as possible. How can you care for yourself at home? Activity    · Your doctor may not want your affected leg to cross the center of your body toward the other leg. If so, your therapist may suggest these ideas:  ? Do not cross your legs. ? Be very careful as you get in or out of bed or a car so your leg does not cross the imaginary line in the middle of your body.     · Go slowly when you climb stairs. Make sure the lights are on. Have someone watch you, if you can. When you climb stairs:  ? Step up first with your unaffected leg. Then bring the affected leg up to the same step. Bring your crutches or cane up. ? To go down stairs, reverse the order. First, put your crutches or cane on the lower step. Then bring the affected leg down to that step. Finally, step down with the unaffected leg.     · You can ride in a car, but stop at least once every hour to get out and walk around.     · You may want to sleep on your back. Don't reach down too far to pull up blankets when you lie in bed.     · If your doctor recommends exercises, do them as directed. You can cut back on your exercises if your muscles start to ache, but don't stop doing them.     · Rest when you feel tired. You may take a nap, but don't stay in bed all day.     · Work with your physical therapist to learn the best way to exercise.  You will probably have to use a walker, crutches, or a cane for at least 4 to 6 weeks.     · Your doctor may advise you to stay away from activities that put stress on the joint. This includes sports such as tennis, football, and jogging.     · Try not to sit for too long at one time. You will feel less stiff if you take a short walk about every hour. When you sit, use chairs with arms, and don't sit in low chairs.     · Do not bend over more than 90 degrees (like the angle in a letter \"L\").     · Sleep on your back with your legs slightly apart or on your side with a pillow between your knees for about 6 weeks or as your doctor tells you. Do not sleep on your stomach or affected leg.     · Ask your doctor when you can drive again.     · Most people are able to return to work 4 weeks to 4 months after surgery.     · Ask your doctor when it is okay for you to have sex. Diet    · By the time you leave the hospital, you will probably be eating your normal diet. Your doctor may recommend that you take iron and vitamin supplements.     · Drink plenty of fluids (unless your doctor tells you not to).   · Eat healthy foods, and watch your portion sizes. Try to stay at your ideal weight. Too much weight puts more stress on your new hip joint.     · You may notice that your bowel movements are not regular right after your surgery. This is common. Try to avoid constipation and straining with bowel movements. You may want to take a fiber supplement every day. If you have not had a bowel movement after a couple of days, ask your doctor about taking a mild laxative. Medicines    · Your doctor will tell you if and when you can restart your medicines. You will also get instructions about taking any new medicines.     · If you take aspirin or some other blood thinner, ask your doctor if and when to start taking it again. Make sure that you understand exactly what your doctor wants you to do.     · Your doctor may give you a blood-thinning medicine to prevent blood clots.  If you take a blood thinner, be sure you get instructions about how to take your medicine safely. Blood thinners can cause serious bleeding problems. This medicine could be in pill form or as a shot (injection). If a shot is necessary, your doctor will tell you how to do this.     · Be safe with medicines. Take pain medicines exactly as directed. ? If the doctor gave you a prescription medicine for pain, take it as prescribed. ? If you are not taking a prescription pain medicine, ask your doctor if you can take an over-the-counter medicine.     · If you think your pain medicine is making you sick to your stomach:  ? Take your medicine after meals (unless your doctor has told you not to). ? Ask your doctor for a different pain medicine.     · If your doctor prescribed antibiotics, take them as directed. Do not stop taking them just because you feel better. You need to take the full course of antibiotics. Incision care    · If your doctor told you how to care for your cut (incision), follow your doctor's instructions. You will have a dressing over the cut. A dressing helps the incision heal and protects it. Your doctor will tell you how to take care of this.     · If you did not get instructions, follow this general advice:  ? If you have strips of tape on the cut the doctor made, leave the tape on for a week or until it falls off.  ? If you have stitches or staples, your doctor will tell you when to come back to have them removed. ? If you have skin glue on the cut, leave it on until it falls off. Skin glue is also called skin adhesive or liquid stitches. ? Change the bandage every day. ? Wash the area daily with warm water, and pat it dry. Don't use hydrogen peroxide or alcohol. They can slow healing. ? You may cover the area with a gauze bandage if it oozes fluid or rubs against clothing. ? You may shower 24 to 48 hours after surgery. Pat the incision dry. Don't swim or take a bath for the first 2 weeks, or until your doctor tells you it is okay.    Exercise    · Your physical therapist will teach you exercises to do at home. Always do them as your therapist tells you.     · Avoid activities where you might fall. Ice and elevation    · For pain, put ice or a cold pack on the area for 10 to 20 minutes at a time. Put a thin cloth between the ice and your skin. If your doctor recommended cold therapy using a portable machine, follow the instructions that came with the machine.     · Your ankle may swell for about 3 months. Prop up your ankle when you ice it or anytime you sit or lie down. Try to keep it above the level of your heart. This will help reduce swelling. Other instructions    · Wear compression stockings if your doctor told you to. These may help to prevent blood clots. Your doctor will tell you how long you need to keep wearing the compression stockings.     · Try to prevent falls. To avoid falling:  ? Arrange furniture so that you will not trip on it. ? Get rid of throw rugs, and move electrical cords out of the way. ? Walk only in areas with plenty of light. ? Put grab bars in showers and bathtubs. ? Try to avoid icy or snowy sidewalks. Choose shoes with good traction, or consider using traction devices that attach to your shoes. ? Wear shoes with sturdy, flat soles. Follow-up care is a key part of your treatment and safety. Be sure to make and go to all appointments, and call your doctor if you are having problems. It's also a good idea to know your test results and keep a list of the medicines you take. When should you call for help? Call 911 anytime you think you may need emergency care. For example, call if:    · You passed out (lost consciousness).     · You have severe trouble breathing.     · You have sudden chest pain and shortness of breath, or you cough up blood. Call your doctor now or seek immediate medical care if:    · You have signs that your hip may be dislocated, including:  ? Severe pain and not being able to stand.   ? A crooked leg that looks like your hip is out of position. ? Not being able to bend or straighten your leg.     · Your leg or foot is cool or pale or changes color.     · You cannot feel or move your leg.     · You have signs of a blood clot, such as:  ? Pain in your calf, back of the knee, thigh, or groin. ? Redness and swelling in your leg or groin.     · Your incision comes open and begins to bleed, or the bleeding increases.     · You feel like your heart is racing or beating irregularly.     · You have signs of infection, such as:  ? Increased pain, swelling, warmth, or redness. ? Red streaks leading from the incision. ? Pus draining from the incision. ? A fever. Watch closely for changes in your health, and be sure to contact your doctor if:    · You do not have a bowel movement after taking a laxative.     · You do not get better as expected. Where can you learn more? Go to http://www.gray.com/  Enter Q746 in the search box to learn more about \"Hip Replacement Surgery (Posterior): What to Expect at Home. \"  Current as of: July 1, 2021               Content Version: 13.2  © 7914-3529 [employer]. Care instructions adapted under license by [employer] (which disclaims liability or warranty for this information). If you have questions about a medical condition or this instruction, always ask your healthcare professional. [place] [address] any warranty or liability for your use of this information. Information obtained by :  I understand that if any problems occur once I am at home I am to contact my physician. I understand and acknowledge receipt of the instructions indicated above.                                                                                                                                            Physician's or R.N.'s Signature                                                                  Date/Time Patient or Representative Signature                                                          Date/Time

## 2022-04-29 ENCOUNTER — HOME CARE VISIT (OUTPATIENT)
Dept: SCHEDULING | Facility: HOME HEALTH | Age: 77
End: 2022-04-29
Payer: MEDICARE

## 2022-04-29 VITALS
SYSTOLIC BLOOD PRESSURE: 122 MMHG | DIASTOLIC BLOOD PRESSURE: 70 MMHG | HEART RATE: 72 BPM | TEMPERATURE: 99 F | RESPIRATION RATE: 17 BRPM | OXYGEN SATURATION: 98 %

## 2022-04-29 PROCEDURE — 3331090002 HH PPS REVENUE DEBIT

## 2022-04-29 PROCEDURE — 400013 HH SOC

## 2022-04-29 PROCEDURE — 400018 HH-NO PAY CLAIM PROCEDURE

## 2022-04-29 PROCEDURE — G0151 HHCP-SERV OF PT,EA 15 MIN: HCPCS

## 2022-04-29 PROCEDURE — 3331090001 HH PPS REVENUE CREDIT

## 2022-04-30 PROCEDURE — 3331090001 HH PPS REVENUE CREDIT

## 2022-04-30 PROCEDURE — 3331090002 HH PPS REVENUE DEBIT

## 2022-05-01 PROCEDURE — 3331090001 HH PPS REVENUE CREDIT

## 2022-05-01 PROCEDURE — 3331090002 HH PPS REVENUE DEBIT

## 2022-05-02 ENCOUNTER — HOME CARE VISIT (OUTPATIENT)
Dept: SCHEDULING | Facility: HOME HEALTH | Age: 77
End: 2022-05-02
Payer: MEDICARE

## 2022-05-02 VITALS
RESPIRATION RATE: 16 BRPM | SYSTOLIC BLOOD PRESSURE: 130 MMHG | OXYGEN SATURATION: 97 % | TEMPERATURE: 98.6 F | HEART RATE: 69 BPM | DIASTOLIC BLOOD PRESSURE: 66 MMHG

## 2022-05-02 PROCEDURE — 3331090002 HH PPS REVENUE DEBIT

## 2022-05-02 PROCEDURE — 3331090001 HH PPS REVENUE CREDIT

## 2022-05-02 PROCEDURE — G0151 HHCP-SERV OF PT,EA 15 MIN: HCPCS

## 2022-05-03 PROCEDURE — 3331090001 HH PPS REVENUE CREDIT

## 2022-05-03 PROCEDURE — 3331090002 HH PPS REVENUE DEBIT

## 2022-05-04 ENCOUNTER — HOME CARE VISIT (OUTPATIENT)
Dept: SCHEDULING | Facility: HOME HEALTH | Age: 77
End: 2022-05-04
Payer: MEDICARE

## 2022-05-04 VITALS
RESPIRATION RATE: 16 BRPM | DIASTOLIC BLOOD PRESSURE: 70 MMHG | OXYGEN SATURATION: 98 % | TEMPERATURE: 97.4 F | HEART RATE: 90 BPM | SYSTOLIC BLOOD PRESSURE: 120 MMHG

## 2022-05-04 PROCEDURE — 3331090001 HH PPS REVENUE CREDIT

## 2022-05-04 PROCEDURE — G0157 HHC PT ASSISTANT EA 15: HCPCS

## 2022-05-04 PROCEDURE — 3331090002 HH PPS REVENUE DEBIT

## 2022-05-05 ENCOUNTER — HOME CARE VISIT (OUTPATIENT)
Dept: SCHEDULING | Facility: HOME HEALTH | Age: 77
End: 2022-05-05
Payer: MEDICARE

## 2022-05-05 VITALS
DIASTOLIC BLOOD PRESSURE: 70 MMHG | OXYGEN SATURATION: 96 % | SYSTOLIC BLOOD PRESSURE: 124 MMHG | RESPIRATION RATE: 16 BRPM | HEART RATE: 93 BPM | TEMPERATURE: 97.3 F

## 2022-05-05 PROCEDURE — 3331090001 HH PPS REVENUE CREDIT

## 2022-05-05 PROCEDURE — 3331090002 HH PPS REVENUE DEBIT

## 2022-05-05 PROCEDURE — G0157 HHC PT ASSISTANT EA 15: HCPCS

## 2022-05-06 PROCEDURE — 3331090002 HH PPS REVENUE DEBIT

## 2022-05-06 PROCEDURE — 3331090001 HH PPS REVENUE CREDIT

## 2022-05-07 PROCEDURE — 3331090002 HH PPS REVENUE DEBIT

## 2022-05-07 PROCEDURE — 3331090001 HH PPS REVENUE CREDIT

## 2022-05-08 PROCEDURE — 3331090001 HH PPS REVENUE CREDIT

## 2022-05-08 PROCEDURE — 3331090002 HH PPS REVENUE DEBIT

## 2022-05-09 ENCOUNTER — HOME CARE VISIT (OUTPATIENT)
Dept: SCHEDULING | Facility: HOME HEALTH | Age: 77
End: 2022-05-09
Payer: MEDICARE

## 2022-05-09 VITALS
DIASTOLIC BLOOD PRESSURE: 74 MMHG | OXYGEN SATURATION: 98 % | TEMPERATURE: 97.1 F | SYSTOLIC BLOOD PRESSURE: 118 MMHG | HEART RATE: 75 BPM | RESPIRATION RATE: 16 BRPM

## 2022-05-09 PROCEDURE — G0157 HHC PT ASSISTANT EA 15: HCPCS

## 2022-05-09 PROCEDURE — 3331090001 HH PPS REVENUE CREDIT

## 2022-05-09 PROCEDURE — 3331090002 HH PPS REVENUE DEBIT

## 2022-05-10 PROCEDURE — 3331090001 HH PPS REVENUE CREDIT

## 2022-05-10 PROCEDURE — 3331090002 HH PPS REVENUE DEBIT

## 2022-05-11 PROCEDURE — 3331090001 HH PPS REVENUE CREDIT

## 2022-05-11 PROCEDURE — 3331090002 HH PPS REVENUE DEBIT

## 2022-05-12 ENCOUNTER — HOME CARE VISIT (OUTPATIENT)
Dept: SCHEDULING | Facility: HOME HEALTH | Age: 77
End: 2022-05-12
Payer: MEDICARE

## 2022-05-12 VITALS
DIASTOLIC BLOOD PRESSURE: 68 MMHG | TEMPERATURE: 97.1 F | OXYGEN SATURATION: 98 % | SYSTOLIC BLOOD PRESSURE: 118 MMHG | RESPIRATION RATE: 16 BRPM | HEART RATE: 88 BPM

## 2022-05-12 PROCEDURE — 3331090002 HH PPS REVENUE DEBIT

## 2022-05-12 PROCEDURE — 3331090001 HH PPS REVENUE CREDIT

## 2022-05-12 PROCEDURE — G0157 HHC PT ASSISTANT EA 15: HCPCS

## 2022-05-13 PROCEDURE — 3331090002 HH PPS REVENUE DEBIT

## 2022-05-13 PROCEDURE — 3331090001 HH PPS REVENUE CREDIT

## 2022-05-14 PROCEDURE — 3331090002 HH PPS REVENUE DEBIT

## 2022-05-14 PROCEDURE — 3331090001 HH PPS REVENUE CREDIT

## 2022-05-15 PROCEDURE — 3331090002 HH PPS REVENUE DEBIT

## 2022-05-15 PROCEDURE — 3331090001 HH PPS REVENUE CREDIT

## 2022-05-16 ENCOUNTER — HOME CARE VISIT (OUTPATIENT)
Dept: SCHEDULING | Facility: HOME HEALTH | Age: 77
End: 2022-05-16
Payer: MEDICARE

## 2022-05-16 VITALS
TEMPERATURE: 98.4 F | RESPIRATION RATE: 16 BRPM | HEART RATE: 68 BPM | DIASTOLIC BLOOD PRESSURE: 80 MMHG | SYSTOLIC BLOOD PRESSURE: 138 MMHG

## 2022-05-16 PROCEDURE — 3331090002 HH PPS REVENUE DEBIT

## 2022-05-16 PROCEDURE — 3331090001 HH PPS REVENUE CREDIT

## 2022-05-16 PROCEDURE — G0151 HHCP-SERV OF PT,EA 15 MIN: HCPCS

## 2022-05-17 PROCEDURE — 3331090002 HH PPS REVENUE DEBIT

## 2022-05-17 PROCEDURE — 3331090001 HH PPS REVENUE CREDIT

## 2022-05-18 ENCOUNTER — HOME CARE VISIT (OUTPATIENT)
Dept: SCHEDULING | Facility: HOME HEALTH | Age: 77
End: 2022-05-18
Payer: MEDICARE

## 2022-05-18 VITALS
OXYGEN SATURATION: 98 % | HEART RATE: 85 BPM | DIASTOLIC BLOOD PRESSURE: 64 MMHG | RESPIRATION RATE: 16 BRPM | SYSTOLIC BLOOD PRESSURE: 108 MMHG | TEMPERATURE: 98 F

## 2022-05-18 PROCEDURE — 3331090001 HH PPS REVENUE CREDIT

## 2022-05-18 PROCEDURE — 3331090002 HH PPS REVENUE DEBIT

## 2022-05-18 PROCEDURE — G0151 HHCP-SERV OF PT,EA 15 MIN: HCPCS

## 2022-05-23 ENCOUNTER — OFFICE VISIT (OUTPATIENT)
Dept: ORTHOPEDIC SURGERY | Age: 77
End: 2022-05-23

## 2022-05-23 DIAGNOSIS — Z96.649 STATUS POST REVISION OF TOTAL HIP: Primary | ICD-10-CM

## 2022-05-23 PROCEDURE — 99999 PR OFFICE/OUTPT VISIT,PROCEDURE ONLY: CPT | Performed by: ORTHOPAEDIC SURGERY

## 2022-05-23 NOTE — PROGRESS NOTES
Name: Robb Mg  YOB: 1945  Gender: female  MRN: [id number]      Current Outpatient Medications:     Zinc Acetate, Oral, (ZINC ACETATE PO), Take by mouth daily, Disp: , Rfl:     acetaminophen (TYLENOL) 500 MG tablet, Take 500 mg by mouth every 6 hours as needed, Disp: , Rfl:     calcium citrate (CALCITRATE) 950 (200 Ca) MG tablet, Take 200 mg by mouth daily, Disp: , Rfl:     Cyanocobalamin 1000 MCG CAPS, Take 1 tablet by mouth daily, Disp: , Rfl:     fexofenadine-pseudoephedrine (ALLEGRA-D 24HR) 180-240 MG per extended release tablet, Take 1 tablet by mouth as needed, Disp: , Rfl:     magnesium cl-calcium carbonate (SLOW-MAG) 71.5-119 MG TBEC tablet, Take 143 mg by mouth every evening, Disp: , Rfl:     melatonin 3 MG TABS tablet, Take 3 mg by mouth as needed, Disp: , Rfl:     Menaquinone-7 (VITAMIN K2) 100 MCG CAPS, Take by mouth daily, Disp: , Rfl:   Allergies   Allergen Reactions    Simvastatin Other (See Comments)    Levofloxacin Nausea Only    Morphine Other (See Comments)    Penicillins Hives    Sulfa Antibiotics Hives       Post-op conversion of painful right hemiarthroplasty to NEAL    The patient returns now post hemiarthroplasty conversion to NEAL. Their pain is diminishing and the wound healing. Radiographs:  AP pelvis and lateral views of the right hip were taken in the office today and reveal good bone, prosthetic appearance. The hip is properly located. Radiographic Diagnosis:  Normal post-operative right total hip. Recommendations:    Reviewed x-ray findings with the patient. Patient is to increase their weight bearing status as tolerated. A cane can be used in transition. They are to follow the routine dislocation precautions. They are to wean from their pain medications as tolerated. I will see them back for their 6 month follow up and radiographs.

## 2022-06-20 ENCOUNTER — TELEPHONE (OUTPATIENT)
Dept: ORTHOPEDIC SURGERY | Age: 77
End: 2022-06-20

## 2022-06-20 DIAGNOSIS — Z96.649 STATUS POST REVISION OF TOTAL HIP: Primary | ICD-10-CM

## 2022-06-20 NOTE — TELEPHONE ENCOUNTER
Spoke with her sent order for PT to [facility]

## 2022-06-23 ENCOUNTER — HOSPITAL ENCOUNTER (OUTPATIENT)
Dept: PHYSICAL THERAPY | Age: 77
Setting detail: RECURRING SERIES
Discharge: HOME | End: 2022-06-26
Payer: MEDICARE

## 2022-06-23 PROCEDURE — 97110 THERAPEUTIC EXERCISES: CPT

## 2022-06-23 PROCEDURE — 97162 PT EVAL MOD COMPLEX 30 MIN: CPT

## 2022-06-23 NOTE — THERAPY EVALUATION
Lorraine Drake  : 1945  Primary: Medicare Part A And B  Secondary: [address] @ [address] [facility] [address]  Phone: [phone]  Fax: [phone] Plan Frequency: Two times a week for 8 weeks    Plan of Care/Certification Expiration Date: 22 (POC started on 22)      PT Visit Info:    No data recorded    OUTPATIENT PHYSICAL THERAPY:OP NOTE TYPE: Initial Assessment 2022               Episode  Appt Desk         Treatment Diagnosis:  Pain in Right Hip (M25.551)  Difficulty in walking, Not elsewhere classified (R26.2)  Other abnormalities of gait and mobility (R26.89)  Generalized Muscle Weakness (M62.81)    Medical/Referring Diagnosis:  Presence of unspecified artificial hip joint [Z96.649]  Referring Physician:  Chauncey George MD Orders:  PT Eval and Treat   Return MD Appt:  10/24/22  Date of Onset:  Onset Date: 22 (Patient had revision from previous NEAL )     Allergies:  Simvastatin, Levofloxacin, Morphine, Penicillins, and Sulfa antibiotics  Restrictions/Precautions:    No data recordedHip Precautions: No hip flexion > 90 degrees; Posterior hip precautions     Medications Last Reviewed:  2022     SUBJECTIVE   History of Injury/Illness (Reason for Referral):  Patient is a 77year old woman presenting to physical therapy following a revision of a R NEAL with revision performed on 2022. Patient reports original surgery happened in May of 2021. Patient had continued pain in her hip since original surgery causing her to seek out opinion from a different orthopedic MD. Patient reports pain is improving since surgery and reports going through home therapy for 3 weeks. Patient denies any numbness or tingling at this time and reports pain is approximately 6/10.  Patient is improving but reports she is still ambulating with a cane and still has difficulty performing ADLs as well as navigating home and community environment. Patient met with MD and would like to proceed with outpatient PT in order to progress strengthening and activity tolerance to improve function. Patient Stated Goal(s):  \"Patient would like to walk without assistive device\"  Initial: Right Hip 6/10 Post Session: Right Hip 6/10  Past Medical History/Comorbidities:   Ms. Jenni Vigil  has a past medical history of Allergic eye reaction, Arthritis, Claustrophobia, Elevated blood pressure (not hypertension), Environmental allergies, FH: breast cancer, FH: colon cancer, [place] (hard of hearing), Hyperlipidemia, Left shoulder pain, Menopause, Nausea & vomiting, Neuropathy, Osteopenia, Trigger finger, Unspecified adverse effect of anesthesia, Vertigo, Vitamin B12 deficiency, and Vitamin D deficiency. Ms. Jenni Vigil  has a past surgical history that includes Rotator cuff repair (Bilateral); orthopedic surgery (Right, 05/26/2021); Mohs surgery (1982); gi (last 2012); Tubal ligation (1971); Cholecystectomy; Hysterectomy; and Appendectomy. Social History/Living Environment:   Type of Home: House  Home Layout: One level  Home Access: Level entry     Prior Level of Function/Work/Activity:   Prior level of function: Patient reports ability to ambulate without assistive device prior to hip pain. Prior level of function: Independent  Current level of function: Patient ambulates with SPC. No data recordedNo data recorded   Learning:   Does the patient/guardian have any barriers to learning?: No barriers  Will there be a co-learner?: No  What is the preferred language of the patient/guardian?: English  Is an  required?: No  How does the patient/guardian prefer to learn new concepts?: Listening; Reading; Demonstration; Pictures/Videos     Fall Risk Scale: Total Score: 25  Wilson Fall Risk: Medium (25-44)           OBJECTIVE   Patient denies any LE paresthesia. Patient denies any increase of symptoms with cough, sneeze or valsalva.  Patient denies any saddle paresthesia or bowel/bladder deficits. Observation/Orthostatic Postural Assessment:          Patient ambulating with SPC with reduced stance on R LE. Patient stands with weight shifted towards L LE. Palpation:          Patient tender to palpation diffusely through R hip. ROM:          WFL: Within functional limits         NT: Not Tested  AROM/ PROM Left (degrees) Right (degrees)   Hip Flexion 85 81   Hip External Rotation (ER) 30 NT secondary to status after NEAL   Hip Internal Rotation (IR) 28 NT secondary to status after NEAL   Hip Extension WFL observed in standing WFL observed in standing   Hip Abduction 40 NT secondary to status after NEAL   Knee Flexion 135 128   Knee Extension 0 0   Ankle Dorsiflexion (DF) -   knee extended 5 4   Ankle Dorsiflexion (DF) - knee flexed 10 10   Ankle Plantarflexion (PF) 50 50     Strength: Motion Tested Left   (*/5) Right  (*/5)   Knee Extension 4+ 4   Knee Flexion 4+ 4   Hip Flexion 4+ 3+   Hip Extension 4+ 3+   Hip Abduction 4 3+   Ankle DF 5 5     Special Tests:          TU seconds with use of SPC        30 second sit to stand: 7 repetitions with UE support    Passive Accessory Motion:         Decreased motion observed through active motion in R hip. Neurological Screen:              Myotomes: Key muscle strength testing through bilateral LE is [facility] with deficits noted above. Dermatomes: Sensation to light touch for bilateral LE is intact and WFL. Reflexes: Patellar (L3/ L4): 2+                 Achilles (S1/ S2): 2+           Abnormal reflexes: Clonus: negative, Talley: negative, Babinski: negative  Neural tension tests: Upper limb tension test is negative. Slump test is negative. Functional Mobility:         Gait/Ambulation:  Patient ambulates with SPC with reduced step length and reduced stance on R LE.          Transfers:  Patient requires additional time to transfer secondary to generalized weakness        Stairs:  Patient ambulated step to method. Balance:          Patient unable to stand greater than 5 seconds in single limb or tandem stance. ASSESSMENT   Initial Assessment:  Patient is a 77year old woman presenting to physical therapy following a revision of a R NEAL with revision performed on 4/26/2022. Patient reports original surgery happened in May of 2021. Patient had continued pain in her hip since original surgery causing her to seek out opinion from a different orthopedic MD. Patient reports pain is improving since surgery and reports going through home therapy for 3 weeks. Patient denies any numbness or tingling at this time and reports pain is approximately 6/10. Patient is improving but reports she is still ambulating with a cane and still has difficulty performing ADLs as well as navigating home and community environment. Patient met with MD and would like to proceed with outpatient PT in order to progress strengthening and activity tolerance to improve function. Patient would benefit from skilled therapy in order to address deficits, progress functional independence, and improve ability to navigate home and community environment. Problem List: (Impacting functional limitations): Body Structures, Functions, Activity Limitations Requiring Skilled Therapeutic Intervention: Decreased functional mobility ; Decreased ADL status; Decreased ROM; Decreased body mechanics; Decreased strength; Decreased endurance; Decreased balance;  Increased pain; Decreased posture     Therapy Prognosis:   Therapy Prognosis: Good     Assessment Complexity:   Medium Complexity  PLAN   Effective Dates:  Plan of Care/Certification Expiration Date: 08/18/22 (POC started on 6/23/22)     Frequency/Duration: Plan Frequency: Two times a week for 8 weeks     Interventions Planned (Treatment may consist of any combination of the following):    Current Treatment Recommendations: Strengthening; ROM; Balance training; Functional mobility training; Transfer training; ADL/Self-care training; Endurance training; Gait training; Neuromuscular re-education; Manual Therapy - Soft Tissue Mobilization; Pain management; Home exercise program; Modalities; Positioning; Integrated dry needling; Therapeutic activities     Goals: (Goals have been discussed and agreed upon with patient.)  Short-Term Functional Goals: Time Frame: 6/23/22 to 7/21/22  1. Patient will be independent with HEP in order to progress and improve activity tolerance. 2. Patient will be able to demonstrate and verbalize understanding of hip precautions with 100% accuracy. 3. Patient will report pain no greater than 4/10 in order to tolerate improved ability to perform ADLs. 4. Patient will be able to stand greater than 10 minutes in order to tolerate preparing a meal in the kitchen. Discharge Goals: Time Frame: 6/23/22 to 8/18/2022  1. Patient will report pain no greater than 2/10 in order to tolerate improved ability to perform ADLs. 2. Patient will be able to ambulate 2000 feet with LRAD in order to tolerate community ambulation. 3. Patient will demonstrate gross 5/5 LE strength in order to raise and lift 20 pounds while performing homemaking tasks. 4. Patient will be able to perform TUG in less than 12 seconds in order to demonstrate decreased fall risk. Outcome Measure: Tool Used: Lower Extremity Functional Scale (LEFS)  Score:  Initial: 20/80 Most Recent: X/80 (Date: -- )   Interpretation of Score: 20 questions each scored on a 5 point scale with 0 representing \"extreme difficulty or unable to perform\" and 4 representing \"no difficulty\". The lower the score, the greater the functional disability. 80/80 represents no disability. Minimal detectable change is 9 points.       Medical Necessity:    Patient is expected to demonstrate progress in strength, range of motion, balance, coordination and functional technique to improve ability to perform ADLs as well as navigate home and community environment without dysfunction.  Skilled intervention continues to be required due to decreased activity tolerance, increased pain, decreased ROM, decreased strength, and difficulty ambulating. Reason For Services/Other Comments:   Patient continues to require skilled intervention due to increasing complexity of exercsies within graded exercise program.  Total Duration: 30 minutes moderate complexity, 25 minutes therapeutic exercise   Time In: 1000  Time Out: 1100    Regarding Audie King's therapy, I certify that the treatment plan above will be carried out by a therapist or under their direction.   Thank you for this referral,  Dominguez Samuel, PT     Referring Physician Signature: Jesus Lock,* _______________________________ Date _____________        Post Session Pain  Charge Capture  PT Visit Info  POC Link  Treatment Note Link  MD Guidelines  MyChart

## 2022-06-23 NOTE — PROGRESS NOTES
Darrel Parks  : 1945  Primary: Medicare Part A And B  Secondary: [address] @ [address] [facility] [address]  Phone: [phone]  Fax: [phone] Plan Frequency: Two times a week for 8 weeks    Plan of Care/Certification Expiration Date: 22 (POC started on 22)      PT Visit Info:   No data recorded    OUTPATIENT PHYSICAL THERAPY:OP NOTE TYPE: Treatment Note 2022       Episode  }Appt Desk              Treatment Diagnosis:  Pain in Right Hip (M25.551)  Difficulty in walking, Not elsewhere classified (R26.2)  Other abnormalities of gait and mobility (R26.89)  Generalized Muscle Weakness (M62.81)  Medical/Referring Diagnosis:  Presence of unspecified artificial hip joint [Z96.649]  Referring Physician:  Bette Conrad,*  MD Orders:  PT Eval and Treat   Date of Onset:  Onset Date: 22 (Patient had revision from previous NEAL )     Allergies:   Simvastatin, Levofloxacin, Morphine, Penicillins, and Sulfa antibiotics  Restrictions/Precautions:  No data recordedHip Precautions: No hip flexion > 90 degrees; Posterior hip precautions     Interventions Planned (Treatment may consist of any combination of the following):    Current Treatment Recommendations: Strengthening; ROM; Balance training; Functional mobility training; Transfer training; ADL/Self-care training; Endurance training; Gait training; Neuromuscular re-education; Manual Therapy - Soft Tissue Mobilization; Pain management; Home exercise program; Modalities; Positioning; Integrated dry needling; Therapeutic activities     Subjective Comments:  Patient eager to start therapy in order to improve function.   Initial:}Right Hip 10Post Session:  Right  Hip 10  Medications Last Reviewed:  2022  Updated Objective Findings:  See evaluation note from today  Treatment   THERAPEUTIC EXERCISE: (25 minutes):    Exercises per grid below to improve mobility, strength, balance and coordination. Required minimal visual, verbal, manual and tactile cues to promote proper body alignment, promote proper body posture, promote proper body mechanics and promote proper body breathing techniques. Progressed resistance, range, repetitions and complexity of movement as indicated. MANUAL THERAPY: (0 minutes):   Joint mobilization and Soft tissue mobilization was utilized and necessary because of the patient's restricted joint motion and painful spasm. MODALITIES: (0 minutes):        None today      Date:  6/24/22 Date:   Date:     Activity/Exercise Parameters Parameters Parameters   TA Activation 2x10 with 2-3 sec hold     Hooklying Glute Set 3x5 holding 2-3 seconds     ASLR Flexion 3x5 cuing for quad contraction     Short Arc Quad 2x10                             Treatment/Session Summary:    · Treatment Assessment:  Patient tolerated session well with ability to demonstrate and verbalize understanding of HEP. · Communication/Consultation:  Discussed HEP and POC with patient. · Equipment provided today:  None  · Recommendations/Intent for next treatment session: Next visit will focus on participation in graded exercise program to improve activity tolerance, strength, ROM, and function.     Total Treatment Billable Duration:  55 minutes (30 minutes eval, 25 minutes therapeutic exercise)  Time In: 1000  Time Out: [facility], PT       Charge Capture  }Post Session Pain  PT Visit Info  [employer] Portal  MD Guidelines  Scanned Media  Benefits  MyChart    Future Appointments   Date Time Provider Jitendra Houston   6/27/2022  9:00 AM [facility]   6/30/2022  9:00 AM Palma Terry PTA [facility]   7/7/2022 10:00 AM INA Sawyer O   7/11/2022  9:00 AM Anu Bauman MD [address] [facility] AMB   7/12/2022  1:30 PM Brennon Trammell PT [facility]   7/14/2022  9:00 AM Brennon Trammell PT [facility]   7/18/2022 11:00 AM Jaylon Mahoney Angel, PT [facility]   7/20/2022  9:00 AM Noalfred Rufus, PTA [facility]   7/25/2022 10:00 AM Avani Mills, PT [facility]   7/27/2022 10:00 AM Avani Heller, PT [facility]   8/2/2022 10:00 AM [place]sn Rufus, PTA [facility]   8/4/2022 10:00 AM Denny Hooper, PTA [facility]   8/8/2022 11:00 AM Avani Mills, PT [facility] S[facility]   8/11/2022 11:00 AM Avani Mills, PT [facility]   8/15/2022 10:00 AM Noalfred Hooper, PTA [facility]   8/16/2022 10:00 AM Avani Mills, PT [facility]   10/7/2022  1:20 PM [address]   10/7/2022  2:00 PM SS [facility]   10/24/2022  1:00 PM Luciana Carballo MD [facility] AM[facility]

## 2022-06-25 ASSESSMENT — PAIN SCALES - GENERAL: PAINLEVEL_OUTOF10: 6

## 2022-06-25 ASSESSMENT — PAIN DESCRIPTION - DESCRIPTORS: DESCRIPTORS: ACHING

## 2022-06-25 ASSESSMENT — PAIN DESCRIPTION - ORIENTATION: ORIENTATION: RIGHT

## 2022-06-25 ASSESSMENT — PAIN DESCRIPTION - LOCATION: LOCATION: HIP

## 2022-06-27 ENCOUNTER — HOSPITAL ENCOUNTER (OUTPATIENT)
Dept: PHYSICAL THERAPY | Age: 77
Setting detail: RECURRING SERIES
Discharge: HOME | End: 2022-06-30
Payer: MEDICARE

## 2022-06-27 PROCEDURE — 97110 THERAPEUTIC EXERCISES: CPT

## 2022-06-27 PROCEDURE — 97140 MANUAL THERAPY 1/> REGIONS: CPT

## 2022-06-27 ASSESSMENT — PAIN DESCRIPTION - ORIENTATION: ORIENTATION: RIGHT

## 2022-06-27 ASSESSMENT — PAIN DESCRIPTION - LOCATION: LOCATION: HIP

## 2022-06-27 ASSESSMENT — PAIN SCALES - GENERAL: PAINLEVEL_OUTOF10: 3

## 2022-06-27 NOTE — PROGRESS NOTES
per grid below to improve mobility, strength, balance and coordination. Required minimal visual, verbal, manual and tactile cues to promote proper body alignment, promote proper body posture, promote proper body mechanics and promote proper body breathing techniques. Progressed resistance, range, repetitions and complexity of movement as indicated. MANUAL THERAPY: (30 minutes):   Joint mobilization and Soft tissue mobilization was utilized and necessary because of the patient's restricted joint motion and painful spasm. Pt. Received soft tissue mobilization to  Right hip in left sidelying position with pillow between knees to decreased pain and tightness . Suction cup and cross friction massage  was used to break up scar tissue. MODALITIES: (0 minutes):        None today      Date:  6/24/22 Date:  6/27/22 Date:     Activity/Exercise Parameters Parameters Parameters   TA Activation 2x10 with 2-3 sec hold 2x10 reps     Hooklying Glute Set 3x5 holding 2-3 seconds X 10 reps     Active straight leg raise 3x5 cuing for quad contraction Supine 2x10 reps 5 sec hold with quad contraction    Short Arc Quad 2x10 2x10     Clam shells  sidelying knees bent 2x10 reps     Heel slides  x20 reps     Nu step   Level 1 x 6 mins    Calf stretch  4x30 sec hold     Hamstring stretch   4x30 sec hold strap                             Treatment/Session Summary:    · Treatment Assessment:Pt. Had decreased pain and tightness after session and had better posture and gait      · Communication/Consultation:  Discussed HEP and POC with patient. · Equipment provided today:  None  · Recommendations/Intent for next treatment session: Next visit will focus on participation in graded exercise program to improve activity tolerance, strength, ROM, and function.     Total Treatment Billable Duration:  55 minutes   Time In: 0900  Time Out: 1000    AVNI BECKWITH PTA       Charge Capture  }Post Session Pain  PT Visit [address] [facility] Portal  MD Guidelines  Scanned Media  Benefits  MyChart    Future Appointments   Date Time Provider Jitendra Mcfaddeni   6/30/2022  9:00 AM Jackqueline Standing, PTA [facility]   7/7/2022 10:00 AM Jackqueline Standing, PTA [facility]   7/11/2022  9:00 AM Raymond Barger MD [address] [facility]   7/12/2022  1:30 PM Alea Easton, PT [facility]   7/14/2022  9:00 AM Alea Easton, PT [facility]   7/18/2022 11:00 AM Alea Easton, PT [facility]   7/20/2022  9:00 AM Jackqueline Standing, PTA [facility]   7/25/2022 10:00 AM Alea Easton, PT [facility]   7/27/2022 10:00 AM Alea Easton, PT [facility]   8/2/2022 10:00 AM Jackqueline Standing, PTA [facility]   8/4/2022 10:00 AM Jackqueline Standing, PTA [facility]   8/8/2022 11:00 AM Alea Easton, PT [facility]   8/11/2022 11:00 AM Alea Easton, PT [facility]   8/15/2022 10:00 AM Jackqueline Standing, PTA [facility]   8/16/2022 10:00 AM Alea Easton, PT [facility]   10/7/2022  1:20 PM Ayden [address] [place] [address]   10/7/2022  2:00 PM SS [facility]   10/24/2022  1:00 PM Ana Germain MD [facility] AM[facility]

## 2022-06-30 ENCOUNTER — HOSPITAL ENCOUNTER (OUTPATIENT)
Dept: PHYSICAL THERAPY | Age: 77
Setting detail: RECURRING SERIES
Discharge: HOME | End: 2022-07-03
Payer: MEDICARE

## 2022-06-30 PROCEDURE — 97140 MANUAL THERAPY 1/> REGIONS: CPT

## 2022-06-30 PROCEDURE — 97110 THERAPEUTIC EXERCISES: CPT

## 2022-06-30 ASSESSMENT — PAIN SCALES - GENERAL: PAINLEVEL_OUTOF10: 4

## 2022-06-30 ASSESSMENT — PAIN DESCRIPTION - ORIENTATION: ORIENTATION: RIGHT

## 2022-06-30 ASSESSMENT — PAIN DESCRIPTION - LOCATION: LOCATION: HIP

## 2022-06-30 NOTE — PROGRESS NOTES
Andres Hairston  : 1945  Primary: Medicare Part A And B  Secondary: [address] @ [address] [facility] [address]  Phone: [phone]  Fax: [phone] Plan Frequency: Two times a week for 8 weeks    Plan of Care/Certification Expiration Date: 22 (POC started on 22)      PT Visit Info:   No data recorded    OUTPATIENT PHYSICAL THERAPY:OP NOTE TYPE: Treatment Note 2022       Episode  }Appt Desk              Treatment Diagnosis:  Pain in Right Hip (M25.551)  Difficulty in walking, Not elsewhere classified (R26.2)  Other abnormalities of gait and mobility (R26.89)  Generalized Muscle Weakness (M62.81)  Medical/Referring Diagnosis:  Presence of unspecified artificial hip joint [Z96.649]  Referring Physician:  Chriss Anna,*  MD Orders:  PT Eval and Treat   Date of Onset:  Onset Date: 22 (Patient had revision from previous NEAL )     Allergies:   Simvastatin, Levofloxacin, Morphine, Penicillins, and Sulfa antibiotics  Restrictions/Precautions:  No data recordedHip Precautions: No hip flexion > 90 degrees; Posterior hip precautions     Interventions Planned (Treatment may consist of any combination of the following):    Current Treatment Recommendations: Strengthening; ROM; Balance training; Functional mobility training; Transfer training; ADL/Self-care training; Endurance training; Gait training; Neuromuscular re-education; Manual Therapy - Soft Tissue Mobilization; Pain management; Home exercise program; Modalities; Positioning; Integrated dry needling; Therapeutic activities     Subjective Comments:  Patient reports moderate hip pain today but overall feels she is improving.   Initial:}Right Hip 4/10Post Session:  Right  Hip 0/10  Medications Last Reviewed:  2022  Updated Objective Findings:  Pt. had increased scar tissue at incision site that improved after manuall   Treatment   THERAPEUTIC EXERCISE: (30 minutes):    Exercises per grid below to improve mobility, strength, balance and coordination. Required minimal visual, verbal, manual and tactile cues to promote proper body alignment, promote proper body posture, promote proper body mechanics and promote proper body breathing techniques. Progressed resistance, range, repetitions and complexity of movement as indicated. MANUAL THERAPY: (24 minutes):   Joint mobilization and Soft tissue mobilization was utilized and necessary because of the patient's restricted joint motion and painful spasm. Pt. Received soft tissue mobilization to  Right hip in left sidelying position with pillow between knees to decreased pain and tightness . Suction cup and cross friction massage  . MODALITIES: (0 minutes):        None today      Date:  6/24/22 Date:  6/27/22 Date:  6/30/22   Activity/Exercise Parameters Parameters Parameters   TA Activation 2x10 with 2-3 sec hold 2x10 reps  2 x 10   Hooklying Glute Set 3x5 holding 2-3 seconds X 10 reps  X 20   Active straight leg raise 3x5 cuing for quad contraction Supine 2x10 reps 5 sec hold with quad contraction 2 x 10   Short Arc Quad 2x10 2x10  2 x 15   Clam shells  sidelying knees bent 2x10 reps  2 x 10   Heel slides  x20 reps  2 x 10   Nu step   Level 1 x 6 mins    Calf stretch  4x30 sec hold  Incline lowest setting 5 x 30 sec   Hamstring stretch   4x30 sec hold strap     Bridge   1 x 10  1 x 7   Lateral stepping   3 x 30 ft   Calf raise   2 x 15   Ambulation    100 ft no device         Treatment/Session Summary:    · Treatment Assessment:   Tolerated exercises well , did have difficulty with clamshell  · Communication/Consultation:  Discussed HEP and POC with patient. · Equipment provided today:  None  · Recommendations/Intent for next treatment session: Next visit will focus on participation in graded exercise program to improve activity tolerance, strength, ROM, and function.     Total Treatment Billable Duration:  54 minutes   Time In: 8276  Time Out: 1816    ABDON LIN PTA       Charge Capture  }Post Session Pain  PT Visit Info  [employer] Portal  MD Guidelines  Scanned Media  Benefits  MyChart    Future Appointments   Date Time Provider Jitendra Houston   7/7/2022 10:00 AM Destiny Manriquez, PTA [facility]   7/11/2022  9:00 AM Franny Shaw MD [address]  [facility]   7/12/2022  1:30 PM Dary Davis, PT [facility]   7/14/2022  9:00 AM Dary Davis, PT [facility]   7/18/2022 11:00 AM Dary Davis, PT [facility]   7/20/2022  9:00 AM Destiny Hedger, PTA [facility]   7/25/2022 10:00 AM Dary Davis, PT [facility]   7/27/2022 10:00 AM Dary Davis, PT [facility]   8/2/2022 10:00 AM Destiny Manriquez, PTA [facility]   8/4/2022 10:00 AM Destiny Manriquez, PTA [facility]   8/8/2022 11:00 AM Dary Davis, PT [facility]   8/11/2022 11:00 AM Dary Davis, PT [facility]   8/15/2022 10:00 AM Destiny Manriquez, PTA [facility]   8/16/2022 10:00 AM Dary Davis, PT [facility]   10/7/2022  1:20 PM Ayden [address] [facility]   10/7/2022  2:00 PM SS [facility]   10/24/2022  1:00 PM Noel Akhtar MD [facility] AMB

## 2022-07-05 ENCOUNTER — HOSPITAL ENCOUNTER (OUTPATIENT)
Dept: PHYSICAL THERAPY | Age: 77
Setting detail: RECURRING SERIES
Discharge: HOME | End: 2022-07-08
Payer: MEDICARE

## 2022-07-05 PROCEDURE — 97140 MANUAL THERAPY 1/> REGIONS: CPT

## 2022-07-05 PROCEDURE — 97110 THERAPEUTIC EXERCISES: CPT

## 2022-07-05 ASSESSMENT — PAIN DESCRIPTION - ORIENTATION: ORIENTATION: RIGHT

## 2022-07-05 ASSESSMENT — PAIN DESCRIPTION - LOCATION: LOCATION: HIP

## 2022-07-05 ASSESSMENT — PAIN SCALES - GENERAL: PAINLEVEL_OUTOF10: 3

## 2022-07-05 NOTE — PROGRESS NOTES
Niurka Castle  : 1945  Primary: Medicare Part A And B  Secondary: [address] @ [address] [place] [address]  Phone: [phone]  Fax: [phone] Plan Frequency: Two times a week for 8 weeks    Plan of Care/Certification Expiration Date: 22 (POC started on 22)      PT Visit Info:   No data recorded    OUTPATIENT PHYSICAL THERAPY:OP NOTE TYPE: Treatment Note 2022       Episode  }Appt Desk              Treatment Diagnosis:  Pain in Right Hip (M25.551)  Difficulty in walking, Not elsewhere classified (R26.2)  Other abnormalities of gait and mobility (R26.89)  Generalized Muscle Weakness (M62.81)  Medical/Referring Diagnosis:  Presence of unspecified artificial hip joint [Z96.649]  Referring Physician:  Tommy Dela Cruz,*  MD Orders:  PT Eval and Treat   Date of Onset:  Onset Date: 22 (Patient had revision from previous NEAL )     Allergies:   Simvastatin, Levofloxacin, Morphine, Penicillins, and Sulfa antibiotics  Restrictions/Precautions:  No data recordedHip Precautions: No hip flexion > 90 degrees; Posterior hip precautions     Interventions Planned (Treatment may consist of any combination of the following):    Current Treatment Recommendations: Strengthening; ROM; Balance training; Functional mobility training; Transfer training; ADL/Self-care training; Endurance training; Gait training; Neuromuscular re-education; Manual Therapy - Soft Tissue Mobilization; Pain management; Home exercise program; Modalities; Positioning; Integrated dry needling; Therapeutic activities     Subjective Comments:  Patient reports dull pain behind hip but overall is pleased with progress.    Initial:}Right Hip 3/10Post Session:  Right  Hip 1/10  Medications Last Reviewed:  2022  Updated Objective Findings:  Pt. had increased scar tissue at incision site that improved after manuall   Treatment   THERAPEUTIC EXERCISE: (40 minutes):    Exercises per grid below to improve mobility, strength, balance and coordination. Required minimal visual, verbal, manual and tactile cues to promote proper body alignment, promote proper body posture, promote proper body mechanics and promote proper body breathing techniques. Progressed resistance, range, repetitions and complexity of movement as indicated. MANUAL THERAPY: (14 minutes):   Joint mobilization and Soft tissue mobilization was utilized and necessary because of the patient's restricted joint motion and painful spasm. Pt. Received soft tissue mobilization to  Right hip in left sidelying position with pillow between knees to decreased pain and tightness . Suction cup and cross friction massage  . MODALITIES: (0 minutes):        None today      Date:  7/5/2022 Date:  6/27/22 Date:  6/30/22   Activity/Exercise Parameters Parameters Parameters   TA Activation 2x10 with 2-3 sec hold 2x10 reps  2 x 10   Hooklying Glute Set 3x10 holding 2-3 seconds X 10 reps  X 20   Active straight leg raise 3x5 cuing for quad contraction Supine 2x10 reps 5 sec hold with quad contraction 2 x 10   Short Arc Quad 3x12 2x10  2 x 15   Clam shells 2x10 sidelying knees bent 2x10 reps  2 x 10   Heel slides 2x10 x20 reps  2 x 10   Nu step  Level 2.0 x 5 minutes Level 1 x 6 mins    Calf stretch 3x30 sec on incline  4x30 sec hold  Incline lowest setting 5 x 30 sec   Hamstring stretch   4x30 sec hold strap     Bridge 2x10  1 x 10  1 x 7   Lateral stepping 3 x 30 ft  3 x 30 ft   Calf raise 2 x 15  2 x 15   Ambulation  100 ft no device  100 ft no device   Single leg stance 3x30 sec with UE assist on bar      Sit to Stand 3x10 with blue ball           Treatment/Session Summary:    · Treatment Assessment: Added sit to stand for functional loading as well as step up. Patient tolerated session well with closed chain loaded to progress strengthening.  Patient also added single limb stance with UE support to progress balance. · Communication/Consultation:  Discussed HEP and POC with patient. · Equipment provided today:  None  · Recommendations/Intent for next treatment session: Next visit will focus on participation in graded exercise program to improve activity tolerance, strength, ROM, and function.     Total Treatment Billable Duration:  54 minutes   Time In: 1330  Time Out: [facility], PT       Charge Capture  }Post Session Pain  PT Visit [address] Portal  MD Guidelines  Scanned Media  Benefits  MyChart    Future Appointments   Date Time Provider Jitendra Houston   7/7/2022 10:00 AM Manishamaria luz Aguilaro, PTA [facility] [place] [facility]   7/11/2022  9:00 AM Leslie Smith MD [address] [facility]   7/12/2022  1:30 PM Radha Teo, PT [facility]   7/14/2022  9:00 AM Radha Teo, PT [facility]   7/18/2022 11:00 AM Radha Teo, PT [facility]   7/20/2022  9:00 AM Manisha Brandt, PTA [facility]   7/25/2022 10:00 AM Radha Teo, PT [facility]   7/27/2022 10:00 AM Radha Teo, PT [facility]   8/2/2022 10:00 AM Manisha Brandt, PTA [facility]   8/4/2022 10:00 AM Manisha Brandt, PTA [facility]   8/8/2022 11:00 AM Radha Teo, PT [facility]   8/11/2022 11:00 AM Radha Teo, PT [facility]   8/15/2022 10:00 AM Manisha Brandt, PTA [facility]   8/16/2022 10:00 AM Radha Teo, PT [facility]   10/7/2022  1:20 PM Mellemvemaria g [id number] [facility]   10/7/2022  2:00 PM SS [facility]   10/24/2022  1:00 PM Kobi Salazar MD [facility]

## 2022-07-07 ENCOUNTER — HOSPITAL ENCOUNTER (OUTPATIENT)
Dept: PHYSICAL THERAPY | Age: 77
Setting detail: RECURRING SERIES
Discharge: HOME | End: 2022-07-10
Payer: MEDICARE

## 2022-07-07 PROCEDURE — 97140 MANUAL THERAPY 1/> REGIONS: CPT

## 2022-07-07 PROCEDURE — 97110 THERAPEUTIC EXERCISES: CPT

## 2022-07-07 ASSESSMENT — PAIN SCALES - GENERAL: PAINLEVEL_OUTOF10: 3

## 2022-07-07 NOTE — PROGRESS NOTES
Tricia Ohm  : 1945  Primary: Medicare Part A And B  Secondary: [address] @ [address] [place] [address]  Phone: [phone]  Fax: [phone] Plan Frequency: Two times a week for 8 weeks    Plan of Care/Certification Expiration Date: 22 (POC started on 22)      PT Visit Info:   No data recorded    OUTPATIENT PHYSICAL THERAPY:OP NOTE TYPE: Treatment Note 2022       Episode  }Appt Desk              Treatment Diagnosis:  Pain in Right Hip (M25.551)  Difficulty in walking, Not elsewhere classified (R26.2)  Other abnormalities of gait and mobility (R26.89)  Generalized Muscle Weakness (M62.81)  Medical/Referring Diagnosis:  Presence of unspecified artificial hip joint [Z96.649]  Referring Physician:  David Quintero,*  MD Orders:  PT Eval and Treat   Date of Onset:  Onset Date: 22 (Patient had revision from previous NEAL )     Allergies:   Simvastatin, Levofloxacin, Morphine, Penicillins, and Sulfa antibiotics  Restrictions/Precautions:  No data recordedHip Precautions: No hip flexion > 90 degrees; Posterior hip precautions     Interventions Planned (Treatment may consist of any combination of the following):    Current Treatment Recommendations: Strengthening; ROM; Balance training; Functional mobility training; Transfer training; ADL/Self-care training; Endurance training; Gait training; Neuromuscular re-education; Manual Therapy - Soft Tissue Mobilization; Pain management; Home exercise program; Modalities; Positioning; Integrated dry needling; Therapeutic activities     Subjective Comments:  Patient reports improved mobility and endurance. .   Initial:}    3/10Post Session:       1/10  Medications Last Reviewed:  2022  Updated Objective Findings:  None Today  Treatment   THERAPEUTIC EXERCISE: (40 minutes):    Exercises per grid below to improve mobility, strength, balance and coordination. Required minimal visual, verbal, manual and tactile cues to promote proper body alignment, promote proper body posture, promote proper body mechanics and promote proper body breathing techniques. Progressed resistance, range, repetitions and complexity of movement as indicated. MANUAL THERAPY: (13 minutes):   Joint mobilization and Soft tissue mobilization was utilized and necessary because of the patient's restricted joint motion and painful spasm. Pt. Received soft tissue mobilization to  Right hip in left sidelying position with pillow between knees to decreased pain and tightness . Suction cup and cross friction massage  . MODALITIES: (0 minutes):        None today      Date:  7/5/2022 Date:  7/7/22 Date:  6/30/22   Activity/Exercise Parameters Parameters Parameters   TA Activation 2x10 with 2-3 sec hold 2x10 reps  2 x 10   Hooklying Glute Set 3x10 holding 2-3 seconds X 10 reps  X 20   Active straight leg raise 3x5 cuing for quad contraction Supine 3x10  2 x 10   Short Arc Quad 3x12 3x10  2 x 15   Clam shells 2x10 sidelying knees bent 3x10 reps  2 x 10   Heel slides 2x10 x20 reps  2 x 10   Nu step  Level 2.0 x 5 minutes Level 1 x 5 mins    Calf stretch 3x30 sec on incline  4x30 sec hold  Incline lowest setting 5 x 30 sec   Hamstring stretch       Bridge 2x10  1 x 10  1 x 7   Lateral stepping 3 x 30 ft 2 x 30 ft  2 x 30 ft red band 3 x 30 ft   Calf raise 2 x 15  2 x 15   Ambulation  100 ft no device 200 ft no device 100 ft no device   Single leg stance 3x30 sec with UE assist on bar      Sit to Stand 3x10 with blue ball 3 x 10 blue ball          Treatment/Session Summary:    · Treatment Assessment:    Tolerated exercises well. good understanding of HEP and exercise technique  · Communication/Consultation:  Discussed HEP and POC with patient.   · Equipment provided today:  None  · Recommendations/Intent for next treatment session: Next visit will focus on participation in graded exercise program to

## 2022-07-11 ENCOUNTER — OFFICE VISIT (OUTPATIENT)
Dept: INTERNAL MEDICINE CLINIC | Facility: CLINIC | Age: 77
End: 2022-07-11
Payer: MEDICARE

## 2022-07-11 VITALS
WEIGHT: 134.6 LBS | DIASTOLIC BLOOD PRESSURE: 78 MMHG | SYSTOLIC BLOOD PRESSURE: 130 MMHG | HEIGHT: 64 IN | OXYGEN SATURATION: 96 % | HEART RATE: 70 BPM | BODY MASS INDEX: 22.98 KG/M2 | TEMPERATURE: 97.5 F

## 2022-07-11 DIAGNOSIS — E53.8 B12 DEFICIENCY: ICD-10-CM

## 2022-07-11 DIAGNOSIS — M85.89 OSTEOPENIA OF MULTIPLE SITES: ICD-10-CM

## 2022-07-11 DIAGNOSIS — E55.9 VITAMIN D DEFICIENCY: ICD-10-CM

## 2022-07-11 DIAGNOSIS — J30.9 ALLERGIC RHINITIS, UNSPECIFIED SEASONALITY, UNSPECIFIED TRIGGER: ICD-10-CM

## 2022-07-11 DIAGNOSIS — E78.49 OTHER HYPERLIPIDEMIA: Primary | ICD-10-CM

## 2022-07-11 DIAGNOSIS — E78.49 OTHER HYPERLIPIDEMIA: ICD-10-CM

## 2022-07-11 LAB
ALBUMIN SERPL-MCNC: 3.9 G/DL (ref 3.2–4.6)
ALBUMIN/GLOB SERPL: 1.1 {RATIO} (ref 1.2–3.5)
ALP SERPL-CCNC: 52 U/L (ref 50–136)
ALT SERPL-CCNC: 19 U/L (ref 12–65)
ANION GAP SERPL CALC-SCNC: 7 MMOL/L (ref 7–16)
AST SERPL-CCNC: 18 U/L (ref 15–37)
BASOPHILS # BLD: 0.1 K/UL (ref 0–0.2)
BASOPHILS NFR BLD: 1 % (ref 0–2)
BILIRUB DIRECT SERPL-MCNC: 0.1 MG/DL
BILIRUB SERPL-MCNC: 0.3 MG/DL (ref 0.2–1.1)
BUN SERPL-MCNC: 17 MG/DL (ref 8–23)
CALCIUM SERPL-MCNC: 9 MG/DL (ref 8.3–10.4)
CHLORIDE SERPL-SCNC: 112 MMOL/L (ref 98–107)
CHOLEST SERPL-MCNC: 190 MG/DL
CO2 SERPL-SCNC: 21 MMOL/L (ref 21–32)
CREAT SERPL-MCNC: 0.9 MG/DL (ref 0.6–1)
DIFFERENTIAL METHOD BLD: NORMAL
EOSINOPHIL # BLD: 0.4 K/UL (ref 0–0.8)
EOSINOPHIL NFR BLD: 7 % (ref 0.5–7.8)
ERYTHROCYTE [DISTWIDTH] IN BLOOD BY AUTOMATED COUNT: 12.3 % (ref 11.9–14.6)
GLOBULIN SER CALC-MCNC: 3.4 G/DL (ref 2.3–3.5)
GLUCOSE SERPL-MCNC: 84 MG/DL (ref 65–100)
HCT VFR BLD AUTO: 39.4 % (ref 35.8–46.3)
HDLC SERPL-MCNC: 92 MG/DL (ref 40–60)
HDLC SERPL: 2.1 {RATIO}
HGB BLD-MCNC: 12.8 G/DL (ref 11.7–15.4)
IMM GRANULOCYTES # BLD AUTO: 0 K/UL (ref 0–0.5)
IMM GRANULOCYTES NFR BLD AUTO: 0 % (ref 0–5)
LDLC SERPL CALC-MCNC: 86.4 MG/DL
LYMPHOCYTES # BLD: 1.9 K/UL (ref 0.5–4.6)
LYMPHOCYTES NFR BLD: 32 % (ref 13–44)
MCH RBC QN AUTO: 31.4 PG (ref 26.1–32.9)
MCHC RBC AUTO-ENTMCNC: 32.5 G/DL (ref 31.4–35)
MCV RBC AUTO: 96.6 FL (ref 79.6–97.8)
MONOCYTES # BLD: 0.6 K/UL (ref 0.1–1.3)
MONOCYTES NFR BLD: 10 % (ref 4–12)
NEUTS SEG # BLD: 3 K/UL (ref 1.7–8.2)
NEUTS SEG NFR BLD: 50 % (ref 43–78)
NRBC # BLD: 0 K/UL (ref 0–0.2)
PLATELET # BLD AUTO: 261 K/UL (ref 150–450)
PMV BLD AUTO: 10.9 FL (ref 9.4–12.3)
POTASSIUM SERPL-SCNC: 4.9 MMOL/L (ref 3.5–5.1)
PROT SERPL-MCNC: 7.3 G/DL (ref 6.3–8.2)
RBC # BLD AUTO: 4.08 M/UL (ref 4.05–5.2)
SODIUM SERPL-SCNC: 140 MMOL/L (ref 136–145)
TRIGL SERPL-MCNC: 58 MG/DL (ref 35–150)
VIT B12 SERPL-MCNC: 570 PG/ML (ref 193–986)
VLDLC SERPL CALC-MCNC: 11.6 MG/DL (ref 6–23)
WBC # BLD AUTO: 6.1 K/UL (ref 4.3–11.1)

## 2022-07-11 PROCEDURE — 1036F TOBACCO NON-USER: CPT | Performed by: INTERNAL MEDICINE

## 2022-07-11 PROCEDURE — 1123F ACP DISCUSS/DSCN MKR DOCD: CPT | Performed by: INTERNAL MEDICINE

## 2022-07-11 PROCEDURE — 99214 OFFICE O/P EST MOD 30 MIN: CPT | Performed by: INTERNAL MEDICINE

## 2022-07-11 PROCEDURE — G8420 CALC BMI NORM PARAMETERS: HCPCS | Performed by: INTERNAL MEDICINE

## 2022-07-11 PROCEDURE — 1090F PRES/ABSN URINE INCON ASSESS: CPT | Performed by: INTERNAL MEDICINE

## 2022-07-11 PROCEDURE — 3017F COLORECTAL CA SCREEN DOC REV: CPT | Performed by: INTERNAL MEDICINE

## 2022-07-11 PROCEDURE — G8427 DOCREV CUR MEDS BY ELIG CLIN: HCPCS | Performed by: INTERNAL MEDICINE

## 2022-07-11 PROCEDURE — G8399 PT W/DXA RESULTS DOCUMENT: HCPCS | Performed by: INTERNAL MEDICINE

## 2022-07-11 RX ORDER — ONDANSETRON 4 MG/1
1 TABLET, ORALLY DISINTEGRATING ORAL EVERY 4 HOURS PRN
COMMUNITY
Start: 2022-04-26 | End: 2022-07-11

## 2022-07-11 RX ORDER — MONTELUKAST SODIUM 10 MG/1
10 TABLET ORAL DAILY
Qty: 90 TABLET | Refills: 1 | Status: SHIPPED | OUTPATIENT
Start: 2022-07-11

## 2022-07-11 RX ORDER — EPINEPHRINE 1 MG/ML
0.3 INJECTION, SOLUTION, CONCENTRATE INTRAVENOUS PRN
OUTPATIENT
Start: 2022-10-03

## 2022-07-11 RX ORDER — MAGNESIUM OXIDE 400 MG/1
1 TABLET ORAL DAILY
COMMUNITY
End: 2022-07-11 | Stop reason: SDUPTHER

## 2022-07-11 RX ORDER — ACETAMINOPHEN 325 MG/1
650 TABLET ORAL
OUTPATIENT
Start: 2022-10-03

## 2022-07-11 RX ORDER — SODIUM CHLORIDE 9 MG/ML
INJECTION, SOLUTION INTRAVENOUS CONTINUOUS
OUTPATIENT
Start: 2022-10-03

## 2022-07-11 RX ORDER — OMEGA-3S/DHA/EPA/FISH OIL/D3 300MG-1000
1 CAPSULE ORAL DAILY
COMMUNITY

## 2022-07-11 RX ORDER — DENOSUMAB 60 MG/ML
60 INJECTION SUBCUTANEOUS
COMMUNITY

## 2022-07-11 RX ORDER — FAMOTIDINE 10 MG/ML
20 INJECTION, SOLUTION INTRAVENOUS
OUTPATIENT
Start: 2022-10-03

## 2022-07-11 RX ORDER — ALBUTEROL SULFATE 90 UG/1
4 AEROSOL, METERED RESPIRATORY (INHALATION) PRN
OUTPATIENT
Start: 2022-10-03

## 2022-07-11 RX ORDER — ONDANSETRON 2 MG/ML
8 INJECTION INTRAMUSCULAR; INTRAVENOUS
OUTPATIENT
Start: 2022-10-03

## 2022-07-11 RX ORDER — DIPHENHYDRAMINE HYDROCHLORIDE 50 MG/ML
50 INJECTION INTRAMUSCULAR; INTRAVENOUS
OUTPATIENT
Start: 2022-10-03

## 2022-07-11 RX ORDER — ASPIRIN 81 MG/1
1 TABLET, COATED ORAL EVERY 12 HOURS
COMMUNITY
Start: 2022-04-26 | End: 2022-07-11

## 2022-07-11 ASSESSMENT — PATIENT HEALTH QUESTIONNAIRE - PHQ9
SUM OF ALL RESPONSES TO PHQ QUESTIONS 1-9: 0
SUM OF ALL RESPONSES TO PHQ QUESTIONS 1-9: 0
1. LITTLE INTEREST OR PLEASURE IN DOING THINGS: 0
SUM OF ALL RESPONSES TO PHQ QUESTIONS 1-9: 0
SUM OF ALL RESPONSES TO PHQ9 QUESTIONS 1 & 2: 0
2. FEELING DOWN, DEPRESSED OR HOPELESS: 0
SUM OF ALL RESPONSES TO PHQ QUESTIONS 1-9: 0

## 2022-07-11 ASSESSMENT — ANXIETY QUESTIONNAIRES
6. BECOMING EASILY ANNOYED OR IRRITABLE: 0
4. TROUBLE RELAXING: 0
7. FEELING AFRAID AS IF SOMETHING AWFUL MIGHT HAPPEN: 1
1. FEELING NERVOUS, ANXIOUS, OR ON EDGE: 0
GAD7 TOTAL SCORE: 1
3. WORRYING TOO MUCH ABOUT DIFFERENT THINGS: 0
5. BEING SO RESTLESS THAT IT IS HARD TO SIT STILL: 0
2. NOT BEING ABLE TO STOP OR CONTROL WORRYING: 0

## 2022-07-11 ASSESSMENT — ENCOUNTER SYMPTOMS: SHORTNESS OF BREATH: 0

## 2022-07-11 NOTE — PROGRESS NOTES
Re Mcmullen M.D. Internal Medicine  [address], [address]   Office : [phone]  Fax : [phone]    Chief Complaint   Patient presents with    Cholesterol Problem       History of Present Illness:  Tricia Sanders is a 77 y.o. female. HPI    Hyperlipidemia  Patient is in for follow-up for hyperlipidemia. Diet and Lifestyle: generally follows a low fat low cholesterol diet. Risk factors for vascular disease consist of hyperlipidemia. Last LDL was   Lab Results   Component Value Date    [address] 95 09/14/2021   . Last ALT was   Lab Results   Component Value Date/Time    ALT 9 09/14/2021 10:59 AM   .  No muscle aches. Vitamin D Deficiency and Osteopenia   Last DEXA in 4/2021 and she has done well with Prolia.    Lab Results   Component Value Date/Time    VITD25 26.0 09/14/2021 10:59 AM      Lab Results   Component Value Date    CALCIUM 8.8 04/11/2022    PHOS 3.8 (H) 10/04/2021          Peripheral Neuropathy with B12 deficiency   Taking OTC Vitamin B12  Lab Results   Component Value Date    BIAGFKLL50 194 09/14/2021     Allergic Rhinitis  Not controlled with allegra and flonase    Past Medical History:  Past Medical History:   Diagnosis Date    Allergic eye reaction 6/19/2013    Arthritis     shoulders    Claustrophobia     Elevated blood pressure (not hypertension) 4/30/2015    Environmental allergies     FH: breast cancer 6/19/2013    FH: colon cancer 6/19/2013    Nikolai (hard of hearing)     has bilateral hearing aids    Hyperlipidemia 6/19/2013    diet controlled    Left shoulder pain 6/19/2013    Menopause 6/19/2013    Nausea & vomiting     post op nausea and vomiting in past    Neuropathy     in toes    Osteopenia     Trigger finger 6/19/2013    left thumb    Unspecified adverse effect of anesthesia     usually takes until next day to wake fully, OK with last procedure (colonoscopy 2012)    Vertigo 6/19/2013  Vitamin B12 deficiency     Vitamin D deficiency 8/9/2016     Past Surgical History:  Past Surgical History:   Procedure Laterality Date    APPENDECTOMY      CHOLECYSTECTOMY      GI  last 2012    colonoscopy (at least 5-6)    HYSTERECTOMY (CERVIX STATUS UNKNOWN)      no bso    JOINT REPLACEMENT Right 04/26/2022    2nd hip replacement    JOINT REPLACEMENT Right 05/26/2021    1st hip replacement    MOHS SURGERY  1982    right arm    ORTHOPEDIC SURGERY Right 05/26/2021    hip replacement    ROTATOR CUFF REPAIR Bilateral     TUBAL LIGATION  1971     Allergies: Allergies   Allergen Reactions    Simvastatin Other (See Comments)    Levofloxacin Nausea Only    Morphine Other (See Comments)    Penicillins Hives    Sulfa Antibiotics Hives     Medications:   Current Outpatient Medications   Medication Sig Dispense Refill    denosumab (PROLIA) 60 MG/ML SOSY SC injection Inject 60 mg into the skin every 6 months      vitamin D3 (CHOLECALCIFEROL) 10 MCG (400 UNIT) TABS tablet Take 1 tablet by mouth daily      montelukast (SINGULAIR) 10 MG tablet Take 1 tablet by mouth daily 90 tablet 1    Zinc Acetate, Oral, (ZINC ACETATE PO) Take by mouth daily      acetaminophen (TYLENOL) 500 MG tablet Take 500 mg by mouth every 6 hours as needed      calcium citrate (CALCITRATE) 950 (200 Ca) MG tablet Take 200 mg by mouth daily      Cyanocobalamin 1000 MCG CAPS Take 1 tablet by mouth daily      fexofenadine-pseudoephedrine (ALLEGRA-D 24HR) 180-240 MG per extended release tablet Take 1 tablet by mouth as needed      magnesium cl-calcium carbonate (SLOW-MAG) 71.5-119 MG TBEC tablet Take 143 mg by mouth every evening      melatonin 3 MG TABS tablet Take 3 mg by mouth as needed      Menaquinone-7 (VITAMIN K2) 100 MCG CAPS Take by mouth daily       No current facility-administered medications for this visit.      Social History:  Social History     Tobacco Use    Smoking status: Never Smoker    Smokeless tobacco: Never Used   Substance Use Topics    Alcohol use: No     Family History  Family History   Problem Relation Age of Onset    Cancer Mother 68        colon    Colon Cancer Mother     Heart Disease Father     Heart Attack Father     Lung Disease Father             Cancer Sister 44        breast cancer, spread to lungs    Breast Cancer Sister     Cancer Brother         prostate cancer    Other Brother         HCV + Staph (hosp acquired)       Review of Systems   Constitutional: Negative for chills, fatigue and fever. Respiratory: Negative for shortness of breath. Cardiovascular: Negative for chest pain and leg swelling. Musculoskeletal: Positive for gait problem. Skin: Negative for rash. Neurological: Negative for weakness. Psychiatric/Behavioral: Negative for dysphoric mood. Vital Signs  /78 (Site: Right Upper Arm, Position: Sitting, Cuff Size: Medium Adult)   Pulse 70   Temp 97.5 °F (36.4 °C) (Temporal)   Ht 5' 4\" (1.626 m)   Wt 134 lb 9.6 oz (61.1 kg)   SpO2 96%   BMI 23.10 kg/m²   Body mass index is 23.1 kg/m². Physical Exam  Vitals reviewed. Constitutional:       General: She is not in acute distress. Appearance: Normal appearance. She is not ill-appearing. HENT:      Head: Normocephalic and atraumatic. Eyes:      General: No scleral icterus. Extraocular Movements: Extraocular movements intact. Conjunctiva/sclera: Conjunctivae normal.   Neck:      Vascular: No carotid bruit. Cardiovascular:      Rate and Rhythm: Normal rate and regular rhythm. Heart sounds: Normal heart sounds. No murmur heard. Pulmonary:      Effort: Pulmonary effort is normal.      Breath sounds: Normal breath sounds. Musculoskeletal:         General: No swelling. Skin:     Coloration: Skin is not jaundiced. Findings: No rash. Neurological:      General: No focal deficit present. Mental Status: She is alert. Mental status is at baseline. Cranial Nerves: No cranial nerve deficit. Motor: No weakness. Gait: Gait abnormal.   Psychiatric:         Mood and Affect: Mood normal.         Behavior: Behavior normal.         Thought Content: Thought content normal.         Judgment: Judgment normal.           Assessment/Plan:  Kalina Singleton was seen today for cholesterol problem. Diagnoses and all orders for this visit:    Other hyperlipidemia  -     Hepatic Function Panel; Future  -     Lipid Panel; Future    Allergic rhinitis, unspecified seasonality, unspecified trigger  -     montelukast (SINGULAIR) 10 MG tablet; Take 1 tablet by mouth daily  -     CBC with Auto Differential; Future    Osteopenia of multiple sites  -     Basic Metabolic Panel; Future    Vitamin D deficiency  -     Vitamin D 25 Hydroxy; Future    B12 deficiency  -     Vitamin B12; Future        Return in about 6 months (around 1/11/2023), or if symptoms worsen or fail to improve.   __  Felicita Bronson M.D.

## 2022-07-12 ENCOUNTER — HOSPITAL ENCOUNTER (OUTPATIENT)
Dept: PHYSICAL THERAPY | Age: 77
Setting detail: RECURRING SERIES
Discharge: HOME | End: 2022-07-15
Payer: MEDICARE

## 2022-07-12 LAB — 25(OH)D3 SERPL-MCNC: 34.7 NG/ML (ref 30–100)

## 2022-07-12 PROCEDURE — 97140 MANUAL THERAPY 1/> REGIONS: CPT

## 2022-07-12 PROCEDURE — 97110 THERAPEUTIC EXERCISES: CPT

## 2022-07-12 ASSESSMENT — PAIN SCALES - GENERAL: PAINLEVEL_OUTOF10: 4

## 2022-07-12 NOTE — PROGRESS NOTES
America Vale  : 1945  Primary: Medicare Part A And B  Secondary: [address] @ [address] [place] [address]  Phone: [phone]  Fax: [phone] Plan Frequency: Two times a week for 8 weeks    Plan of Care/Certification Expiration Date: 22 (POC started on 22)      PT Visit Info:   No data recorded    OUTPATIENT PHYSICAL THERAPY:OP NOTE TYPE: Treatment Note 2022       Episode  }Appt Desk              Treatment Diagnosis:  Pain in Right Hip (M25.551)  Difficulty in walking, Not elsewhere classified (R26.2)  Other abnormalities of gait and mobility (R26.89)  Generalized Muscle Weakness (M62.81)  Medical/Referring Diagnosis:  Presence of unspecified artificial hip joint [Z96.649]  Referring Physician:  Lisseth Millan,*  MD Orders:  PT Eval and Treat   Date of Onset:  Onset Date: 22 (Patient had revision from previous NEAL )     Allergies:   Simvastatin, Levofloxacin, Morphine, Penicillins, and Sulfa antibiotics  Restrictions/Precautions:  No data recordedHip Precautions: No hip flexion > 90 degrees; Posterior hip precautions     Interventions Planned (Treatment may consist of any combination of the following):    Current Treatment Recommendations: Strengthening; ROM; Balance training; Functional mobility training; Transfer training; ADL/Self-care training; Endurance training; Gait training; Neuromuscular re-education; Manual Therapy - Soft Tissue Mobilization; Pain management; Home exercise program; Modalities; Positioning; Integrated dry needling;  Therapeutic activities     Subjective Comments:  Pt. reported no issues and performing HEP twice a day everyday  Initial:}    4/10Post Session:       2/10  Medications Last Reviewed:  2022  Updated Objective Findings:  None Today  Treatment   THERAPEUTIC EXERCISE: (40 minutes):    Exercises per grid below to improve mobility, strength, balance and coordination. Required minimal visual, verbal, manual and tactile cues to promote proper body alignment, promote proper body posture, promote proper body mechanics and promote proper body breathing techniques. Progressed resistance, range, repetitions and complexity of movement as indicated. MANUAL THERAPY: (15 minutes):   Joint mobilization and Soft tissue mobilization was utilized and necessary because of the patient's restricted joint motion and painful spasm. Pt. Received soft tissue mobilization to  Right hip in left sidelying position with pillow between knees to decreased pain and tightness . Suction cup and cross friction massage  .     MODALITIES: (0 minutes):        None today      Date:  7/5/2022 Date:  7/7/22 Date:  7/12/22   Activity/Exercise Parameters Parameters Parameters   TA Activation 2x10 with 2-3 sec hold 2x10 reps  2 x 10   Hooklying Glute Set 3x10 holding 2-3 seconds X 10 reps  X 20   Active straight leg raise 3x5 cuing for quad contraction Supine 3x10  Supine 3 x 10   Short Arc Quad 3x12 3x10  3x10 reps    Clam shells 2x10 sidelying knees bent 3x10 reps  3 x 10 reps    Heel slides 2x10 x20 reps  3 x 10 reps    Nu step  Level 2.0 x 5 minutes Level 1 x 5 mins Level 4 x 10 mins    Calf stretch 3x30 sec on incline  4x30 sec hold  Incline 4x30 sec hold    Supine hip abduction   Yellow band 3x10 reps    Hamstring stretch    Strap 4x30 sec hold    Bridge 2x10  3x10 reps    Lateral stepping 3 x 30 ft 2 x 30 ft  2 x 30 ft red band 3 x 30 ft   Calf raise 2 x 15  2 x 15   Ambulation  100 ft no device 200 ft no device 250 ft no device   Single leg stance 3x30 sec with UE assist on bar   3x30 sec hold at bar with UE assist   Sit to Stand 3x10 with blue ball 3 x 10 blue ball 3x10 reps          Treatment/Session Summary:    · Treatment Assessment:    Pt. reported the manual was helping her hip pain and tightness in that area  · Communication/Consultation:  Discussed HEP and POC with patient. · Equipment provided today:  None  · Recommendations/Intent for next treatment session: Next visit will focus on participation in graded exercise program to improve activity tolerance, strength, ROM, and function.     Total Treatment Billable Duration:  55 minutes   Time In: 1100  Time Out: 1200    AVNI BECKWITH, INA       Charge Capture  }Post Session Pain  PT Visit Info  [facility] Portal  MD Guidelines  Scanned Media  Benefits  MyChart    Future Appointments   Date Time Provider Jitendra Rosemarie   7/14/2022  9:00 AM Dary Davis, PT [facility] [place] [facility]   7/18/2022 11:00 AM Dary Davis, PT [facility]   7/20/2022  9:00 AM Destiny Manriquez, PTA [facility]   7/25/2022 10:00 AM Dary Davis, PT [facility]   7/27/2022 10:00 AM Dary Davis, PT [facility]   8/2/2022 10:00 AM Destiny Manriquez, PTA [facility]   8/4/2022 10:00 AM Destiny Manriquez, PTA [facility]   8/8/2022 11:00 AM Dary Davis, PT [facility]   8/11/2022 11:00 AM Dary Davis, PT [facility]   8/15/2022 10:00 AM Destiny Manriquez, PTA [facility]   8/16/2022 10:00 AM Dary Davis, PT [facility]   10/7/2022  1:20 PM Ayden [address] [place] [address]   10/7/2022  2:00 PM SS [facility]   10/24/2022  1:00 PM Noel Akhtar MD [facility]   1/11/2023  9:00 AM Miracle Coe MD [address] [facility]

## 2022-07-13 NOTE — PROGRESS NOTES
Edna Damian  : 1945  Primary: Medicare Part A And B  Secondary: [address] @ [address] [place] [id number]  Phone: [phone]  Fax: [phone] Plan Frequency: Two times a week for 8 weeks    Plan of Care/Certification Expiration Date: 22 (POC started on 22)      PT Visit Info:   No data recorded    OUTPATIENT PHYSICAL THERAPY:OP NOTE TYPE: Treatment Note 2022       Episode  }Appt Desk              Treatment Diagnosis:  Pain in Right Hip (M25.551)  Difficulty in walking, Not elsewhere classified (R26.2)  Other abnormalities of gait and mobility (R26.89)  Generalized Muscle Weakness (M62.81)  Medical/Referring Diagnosis:  Presence of unspecified artificial hip joint [Z96.649]  Referring Physician:  Zan Frye MD Orders:  PT Eval and Treat   Date of Onset:  Onset Date: 22 (Patient had revision from previous NEAL )     Allergies:   Simvastatin, Levofloxacin, Morphine, Penicillins, and Sulfa antibiotics  Restrictions/Precautions:  No data recordedHip Precautions: No hip flexion > 90 degrees; Posterior hip precautions     Interventions Planned (Treatment may consist of any combination of the following):    Current Treatment Recommendations: Strengthening; ROM; Balance training; Functional mobility training; Transfer training; ADL/Self-care training; Endurance training; Gait training; Neuromuscular re-education; Manual Therapy - Soft Tissue Mobilization; Pain management; Home exercise program; Modalities; Positioning; Integrated dry needling; Therapeutic activities     Subjective Comments:  Patient reports good days and bad days. She is reporting some increased discomfort today. Initial:}    4/10Post Session:       2/10  Medications Last Reviewed:  2022  Updated Objective Findings:  Patient ambulating with cane with decreased stance on R LE.   Treatment   THERAPEUTIC EXERCISE: (41 minutes):    Exercises per grid below to improve mobility, strength, balance and coordination. Required minimal visual, verbal, manual and tactile cues to promote proper body alignment, promote proper body posture, promote proper body mechanics and promote proper body breathing techniques. Progressed resistance, range, repetitions and complexity of movement as indicated. MANUAL THERAPY: (15 minutes):   Joint mobilization and Soft tissue mobilization was utilized and necessary because of the patient's restricted joint motion and painful spasm. Pt. Received soft tissue mobilization to  Right hip in left sidelying position with pillow between knees to decreased pain and tightness . Instrument assisted with lacrosse ball.      MODALITIES: (0 minutes):        None today      Date:  7/14/22 Date:  7/7/22 Date:  7/12/22   Activity/Exercise Parameters Parameters Parameters   TA Activation 2x10 with 2-3 sec hold 2x10 reps  2 x 10   Hooklying Glute Set 3x10 holding 2-3 seconds X 10 reps  X 20   Active straight leg raise 3x10 supine Supine 3x10  Supine 3 x 10   Short Arc Quad 3x12 3x10  3x10 reps    Clam shells 3x10 sidelying knees bent 3x10 reps  3 x 10 reps    Heel slides x25 reps x20 reps  3 x 10 reps    Nu step  Level 4.0 x 10 minutes Level 1 x 5 mins Level 4 x 10 mins    Calf stretch 3x30 sec on incline  4x30 sec hold  Incline 4x30 sec hold    Supine hip abduction Yellow band 3x10 reps   Yellow band 3x10 reps    Hamstring stretch  3x30 sec with strap  Strap 4x30 sec hold    Bridge 3x10  3x10 reps    Lateral stepping 3 x 30 ft 2 x 30 ft  2 x 30 ft red band 3 x 30 ft   Calf raise 2 x 15  2 x 15   Ambulation  250 ft no device 200 ft no device 250 ft no device   Single leg stance 3x30 sec with UE assist on bar   3x30 sec hold at bar with UE assist   Sit to Stand 3x10 with blue ball 3 x 10 blue ball 3x10 reps          Treatment/Session Summary:    · Treatment Assessment: Continued to emphasize functional loading with sit to stand as well as exercises to improve weight bearing and gait. Patient tolerated session well but had intermittent discomfort on lateral aspect of hip. Patient able to get relief from instrument assisted soft tissue mobilization on lateral aspect of hip. · Communication/Consultation:  Discussed HEP and POC with patient. · Equipment provided today:  None  · Recommendations/Intent for next treatment session: Next visit will focus on participation in graded exercise program to improve activity tolerance, strength, ROM, and function.     Total Treatment Billable Duration:  56 minutes   Time In: 0900  Time Out: Zita PT       Charge Capture  }Post Session Pain  PT Visit Info  [facility] Portal  MD Guidelines  Scanned Media  Benefits  MyChart    Future Appointments   Date Time Provider Jitendra Houston   7/18/2022 11:00 AM Maycol Riddle, PT [facility] [place]   7/20/2022  9:00 AM Dipika Arvizu, PTA [facility] [place]   7/25/2022 10:00 AM Maycol Riddle, PT [facility]   7/27/2022 10:00 AM Maycol Riddle, PT [facility]   8/2/2022 10:00 AM Dipika Arvizu, PTA [facility]   8/4/2022 10:00 AM Dipika Arvizu, PTA [facility]   8/8/2022 11:00 AM Maycol Riddle, PT [facility]   8/11/2022 11:00 AM Maycol Riddle, PT [facility]   8/15/2022 10:00 AM Dipika Arvizu, PTA [facility]   8/16/2022 10:00 AM Maycol Riddle, PT [facility]   10/7/2022  1:20 PM Melldon 88 [address] [facility]   10/7/2022  2:00 PM SS [facility]   10/24/2022  1:00 PM Rigo Hammer MD [facility]   1/11/2023  9:00 AM Siva Mason MD [address]  [facility]

## 2022-07-14 ENCOUNTER — HOSPITAL ENCOUNTER (OUTPATIENT)
Dept: PHYSICAL THERAPY | Age: 77
Setting detail: RECURRING SERIES
Discharge: HOME | End: 2022-07-17
Payer: MEDICARE

## 2022-07-14 PROCEDURE — 97140 MANUAL THERAPY 1/> REGIONS: CPT

## 2022-07-14 PROCEDURE — 97110 THERAPEUTIC EXERCISES: CPT

## 2022-07-14 ASSESSMENT — PAIN SCALES - GENERAL: PAINLEVEL_OUTOF10: 4

## 2022-07-18 ENCOUNTER — HOSPITAL ENCOUNTER (OUTPATIENT)
Dept: PHYSICAL THERAPY | Age: 77
Setting detail: RECURRING SERIES
Discharge: HOME | End: 2022-07-21
Payer: MEDICARE

## 2022-07-18 PROCEDURE — 97110 THERAPEUTIC EXERCISES: CPT

## 2022-07-18 PROCEDURE — 97140 MANUAL THERAPY 1/> REGIONS: CPT

## 2022-07-18 ASSESSMENT — PAIN SCALES - GENERAL: PAINLEVEL_OUTOF10: 2

## 2022-07-20 ENCOUNTER — HOSPITAL ENCOUNTER (OUTPATIENT)
Dept: PHYSICAL THERAPY | Age: 77
Setting detail: RECURRING SERIES
Discharge: HOME | End: 2022-07-23
Payer: MEDICARE

## 2022-07-20 PROCEDURE — 97110 THERAPEUTIC EXERCISES: CPT

## 2022-07-20 PROCEDURE — 97140 MANUAL THERAPY 1/> REGIONS: CPT

## 2022-07-20 ASSESSMENT — PAIN SCALES - GENERAL: PAINLEVEL_OUTOF10: 2

## 2022-07-20 NOTE — PROGRESS NOTES
Niurka [place]  : 1945  Primary: Medicare Part A And B  Secondary: [address] @ [address] [address]  Phone: [phone]  Fax: [phone] Plan Frequency: Two times a week for 8 weeks    Plan of Care/Certification Expiration Date: 22 (POC started on 22)      PT Visit Info:   No data recorded    OUTPATIENT PHYSICAL THERAPY:OP NOTE TYPE: Treatment Note 2022       Episode  }Appt Desk              Treatment Diagnosis:  Pain in Right Hip (M25.551)  Difficulty in walking, Not elsewhere classified (R26.2)  Other abnormalities of gait and mobility (R26.89)  Generalized Muscle Weakness (M62.81)  Medical/Referring Diagnosis:  Presence of unspecified artificial hip joint [Z96.649]  Referring Physician:  Tommy Dela Cruz,*  MD Orders:  PT Eval and Treat   Date of Onset:  Onset Date: 22 (Patient had revision from previous NEAL )     Allergies:   Simvastatin, Levofloxacin, Morphine, Penicillins, and Sulfa antibiotics  Restrictions/Precautions:  No data recordedHip Precautions: No hip flexion > 90 degrees; Posterior hip precautions     Interventions Planned (Treatment may consist of any combination of the following):    Current Treatment Recommendations: Strengthening; ROM; Balance training; Functional mobility training; Transfer training; ADL/Self-care training; Endurance training; Gait training; Neuromuscular re-education; Manual Therapy - Soft Tissue Mobilization; Pain management; Home exercise program; Modalities; Positioning; Integrated dry needling; Therapeutic activities     Subjective Comments:  Patient reports steady improvement overall. She does report increased hip pain after walking community distances. Initial:}    2/10Post Session:       2/10  Medications Last Reviewed:  2022  Updated Objective Findings:  Patient ambulating with cane with reciprocal loading and stance today.    Treatment THERAPEUTIC EXERCISE: (40 minutes):    Exercises per grid below to improve mobility, strength, balance and coordination. Required minimal visual, verbal, manual and tactile cues to promote proper body alignment, promote proper body posture, promote proper body mechanics and promote proper body breathing techniques. Progressed resistance, range, repetitions and complexity of movement as indicated. MANUAL THERAPY: (13 minutes):   Joint mobilization and Soft tissue mobilization was utilized and necessary because of the patient's restricted joint motion and painful spasm. Pt. Received soft tissue mobilization to  Right hip in left sidelying position with pillow between knees to decreased pain and tightness . Instrument assisted with lacrosse ball.      MODALITIES: (0 minutes):        None today       Date:  7/14/22 Date:  7/18/22 Date:  7/20/22   Activity/Exercise Parameters Parameters Parameters   TA Activation 2x10 with 2-3 sec hold 2x10 with 2-3 sec hold 2 x 10   Hooklying Glute Set 3x10 holding 2-3 seconds 3x10 holding 2-3 seconds     Active straight leg raise 3x10 supine 3x10 supine Supine 3 x 10   Short Arc Quad 3x12      Clam shells 3x10 3x10 3 x 10 reps    Heel slides x25 reps x20 reps  3 x 10 reps    Nu step  Level 4.0 x 10 minutes Level 4.0 x 10 minutes Level 3 x 10 mins    Calf stretch 3x30 sec on incline  3x30 sec hold     Supine hip abduction Yellow band 3x10 reps  3x10 red band Green  band 3x10 reps    Hamstring stretch  3x30 sec with strap 3x30 sec with strap Strap 4x30 sec hold    Bridge 3x10 3x10 3x10 reps    Lateral stepping 3 x 30 ft 2 x 30 ft  2 x 30 ft red band 3 x 30 ft   Calf raise 2 x 15     Ambulation  250 ft no device 250 ft no device 400  ft no device   Single leg stance 3x30 sec with UE assist on bar  3x30 sec with UE assist on bar  3x30 sec hold at bar with UE assist   Sit to Stand 3x10 with blue ball 3 x 12 with pink ball 3x10 reps blue ball   Step Up  2x10 from 6 inch step Treatment/Session Summary:    Treatment Assessment: Muscle fatigue afer exercises only complaint. Communication/Consultation:   Discussed HEP and POC with patient. Equipment provided today:  None  Recommendations/Intent for next treatment session: Next visit will focus on participation in graded exercise program to improve activity tolerance, strength, ROM, and function.     Total Treatment Billable Duration:  53 minutes   Time In: 0270  Time Out: 6880    ABDON LIN PTA       Charge Capture  }Post Session Pain  PT Visit Info  [facility] Portal  MD Guidelines  Scanned Media  Benefits  MyChart    Future Appointments   Date Time Provider Jitendra Houston   7/25/2022 10:00 AM Remyjacqueline Cortez, PT [facility]   7/27/2022 10:00 AM Remy Inglewood, PT [facility]   8/2/2022 10:00 AM Dilan Sanches, PTA [facility]   8/4/2022 10:00 AM Dilan Lavender, PTA [facility]   8/8/2022 11:00 AM Remyjacqueline Cortez, PT [facility]   8/11/2022 11:00 AM Remyjacqueline Cortez, PT [facility]   8/15/2022 10:00 AM Dilan Lavender, PTA [facility]   8/16/2022 10:00 AM Remy Dana, PT [facility]   10/7/2022  1:20 PM Ayden 88 [address] [facility]   10/7/2022  2:00 PM SS [facility]   10/24/2022  1:00 PM Maylin Sosa MD [facility]   1/11/2023  9:00 AM Isabel Cope MD [address] [facility]

## 2022-07-25 ENCOUNTER — HOSPITAL ENCOUNTER (OUTPATIENT)
Dept: PHYSICAL THERAPY | Age: 77
Setting detail: RECURRING SERIES
Discharge: HOME | End: 2022-07-28
Payer: MEDICARE

## 2022-07-25 PROCEDURE — 97110 THERAPEUTIC EXERCISES: CPT

## 2022-07-25 PROCEDURE — 97140 MANUAL THERAPY 1/> REGIONS: CPT

## 2022-07-25 ASSESSMENT — PAIN SCALES - GENERAL: PAINLEVEL_OUTOF10: 1

## 2022-07-25 NOTE — PROGRESS NOTES
Judy Shook  : 1945  Primary: Medicare Part A And B  Secondary: [address] @ [address] [facility] [address]  Phone: [phone]  Fax: [phone] Plan Frequency: Two times a week for 8 weeks    Plan of Care/Certification Expiration Date: 22 (POC started on 22)      PT Visit Info:   No data recorded    OUTPATIENT PHYSICAL THERAPY:OP NOTE TYPE: Treatment Note 2022       Episode  }Appt Desk              Treatment Diagnosis:  Pain in Right Hip (M25.551)  Difficulty in walking, Not elsewhere classified (R26.2)  Other abnormalities of gait and mobility (R26.89)  Generalized Muscle Weakness (M62.81)  Medical/Referring Diagnosis:  Presence of unspecified artificial hip joint [Z96.649]  Referring Physician:  Dayo Rock,*  MD Orders:  PT Eval and Treat   Date of Onset:  Onset Date: 22 (Patient had revision from previous NEAL )     Allergies:   Simvastatin, Levofloxacin, Morphine, Penicillins, and Sulfa antibiotics  Restrictions/Precautions:  No data recordedHip Precautions: No hip flexion > 90 degrees; Posterior hip precautions     Interventions Planned (Treatment may consist of any combination of the following):    Current Treatment Recommendations: Strengthening; ROM; Balance training; Functional mobility training; Transfer training; ADL/Self-care training; Endurance training; Gait training; Neuromuscular re-education; Manual Therapy - Soft Tissue Mobilization; Pain management; Home exercise program; Modalities; Positioning; Integrated dry needling; Therapeutic activities     Subjective Comments:  Patient reports being pleased with progress.   Initial:}    1/10Post Session:       1/10  Medications Last Reviewed:  2022  Updated Objective Findings:  See Progress Note from today   Treatment   THERAPEUTIC EXERCISE: (45 minutes):    Exercises per grid below to improve mobility, strength, balance and emphasized sit to stand for functional loading and strengthening. Cued weight shift with sit to stand to work on loading through [address]. Communication/Consultation:   Discussed HEP and POC with patient. Equipment provided today:  None  Recommendations/Intent for next treatment session: Next visit will focus on participation in graded exercise program to improve activity tolerance, strength, ROM, and function.     Total Treatment Billable Duration:  55 minutes   Time In: 1000  Time Out: 2894 [facility], PT       Charge Capture  }Post Session Pain  PT Visit Info  [address] Portal  MD Guidelines  Scanned Media  Benefits  MyChart    Future Appointments   Date Time Provider Jitendra Houston   7/27/2022 10:00 AM Rachell Gray, PT [facility]   8/2/2022 10:00 AM Noman Watts, PTA [facility]   8/4/2022 10:00 AM Noman Watts, PTA [facility]   8/8/2022 11:00 AM Rachell Gray, PT [facility]   8/11/2022 11:00 AM Rachell Gray, PT [facility]   8/15/2022 10:00 AM Noman Watts, PTA [facility]   8/16/2022 10:00 AM Rachell Gray, PT [facility]   10/7/2022  1:20 PM Ayden [address] [place] [address]   10/7/2022  2:00 PM SS [facility]   10/24/2022  1:00 PM Leia Coles MD [facility]   1/11/2023  9:00 AM Moustapha Pa MD [address] [facility]

## 2022-07-25 NOTE — PROGRESS NOTES
Hip Extension WFL observed in standing WFL observed in standing   Hip Abduction 40 35 (from NT secondary to status after NEAL)   Knee Flexion 135 128   Knee Extension 0 0   Ankle Dorsiflexion (DF) -   knee extended 5 4   Ankle Dorsiflexion (DF) - knee flexed 10 10   Ankle Plantarflexion (PF) 50 50     Strength: Motion Tested Left   (*/5) Right  (*/5)   Knee Extension 4+ 4+ (from 4)   Knee Flexion 4+ 4+ (from 4)   Hip Flexion 4+ 4 (from 3+)   Hip Extension 4+ 4 (from 3+)   Hip Abduction 4 4 (from 3+)   Ankle DF 5 5     Special Tests:          TU without assistive device (From 26 seconds with use of SPC)        30 second sit to stand: 8 (From 7 repetitions with UE support)    Passive Accessory Motion:         Decreased motion observed through active motion in R hip. Neurological Screen:              Myotomes: Key muscle strength testing through bilateral LE is [facility] with deficits noted above. Dermatomes: Sensation to light touch for bilateral LE is intact and WFL. Reflexes: Patellar (L3/ L4): 2+                 Achilles (S1/ S2): 2+           Abnormal reflexes: Clonus: negative, Talley: negative, Babinski: negative  Neural tension tests: Upper limb tension test is negative. Slump test is negative. Functional Mobility:         Gait/Ambulation:  Patient able to ambulate without assistive device with reduced stance time on R LE (From Patient ambulates with SPC with reduced step length and reduced stance on R LE)        Transfers:  Unremarkable (From Patient requires additional time to transfer secondary to generalized weakness)        Stairs:  Patient ambulates step to method. Balance:          Patient unable to stand greater than 5 seconds in single limb or tandem stance. ASSESSMENT   Initial Assessment:  Patient is a 77year old woman presenting to physical therapy following a revision of a R NEAL with revision performed on 2022.  Patient reports original surgery happened in May of 2021. Patient had continued pain in her hip since original surgery causing her to seek out opinion from a different orthopedic MD. Patient reports pain is improving since surgery and reports going through home therapy for 3 weeks. Patient denies any numbness or tingling at this time and reports pain is approximately 6/10. Patient is improving but reports she is still ambulating with a cane and still has difficulty performing ADLs as well as navigating home and community environment. Patient met with MD and would like to proceed with outpatient PT in order to progress strengthening and activity tolerance to improve function. Patient would benefit from skilled therapy in order to address deficits, progress functional independence, and improve ability to navigate home and community environment. PROGRESS Note: Patient was seen for 9 sessions of Physical Therapy from 6/20/22 to 7/25/22. Patient reports feeling 85% better with pain and dysfunction in R hip. Patient has demonstrated improvements in ROM per goniometer, strength per MMT, and activity tolerance per exercise selection and standardized outcome measure. Patient also demonstrating improved ability to ambulate without assistive device. Patient still has functional deficits in strength, balance, and ROM impacting ability to perform all ADLs and IADLs as well as navigate home and community environment. Patient will benefit from continued skilled Physical Therapy in order to address remaining deficits and progress functional independence. Problem List: (Impacting functional limitations): Body Structures, Functions, Activity Limitations Requiring Skilled Therapeutic Intervention: Decreased functional mobility ; Decreased ADL status; Decreased ROM; Decreased body mechanics; Decreased strength; Decreased endurance; Decreased balance;  Increased pain; Decreased posture     Therapy Prognosis:   Therapy Prognosis: Good     Assessment Complexity:      PLAN Effective Dates:  Plan of Care/Certification Expiration Date: 08/18/22 (POC started on 6/23/22)     Frequency/Duration: Plan Frequency: Two times a week for 8 weeks     Interventions Planned (Treatment may consist of any combination of the following):    Current Treatment Recommendations: Strengthening; ROM; Balance training; Functional mobility training; Transfer training; ADL/Self-care training; Endurance training; Gait training; Neuromuscular re-education; Manual Therapy - Soft Tissue Mobilization; Pain management; Home exercise program; Modalities; Positioning; Integrated dry needling; Therapeutic activities     Goals: (Goals have been discussed and agreed upon with patient.)  Short-Term Functional Goals: Time Frame: 6/23/22 to 7/21/22  Patient will be independent with HEP in order to progress and improve activity tolerance. (GOAL MET)  Patient will be able to demonstrate and verbalize understanding of hip precautions with 100% accuracy. (GOAL MET)  Patient will report pain no greater than 4/10 in order to tolerate improved ability to perform ADLs. (GOAL MET)  Patient will be able to stand greater than 10 minutes in order to tolerate preparing a meal in the kitchen. (GOAL MET)  Discharge Goals: Time Frame: 6/23/22 to 8/18/2022  Patient will report pain no greater than 2/10 in order to tolerate improved ability to perform ADLs. (IN PROGRESS)  Patient will be able to ambulate 2000 feet with LRAD in order to tolerate community ambulation. (IN PROGRESS)  Patient will demonstrate gross 5/5 LE strength in order to raise and lift 20 pounds while performing homemaking tasks. (IN PROGRESS)  Patient will be able to perform TUG in less than 12 seconds in order to demonstrate decreased fall risk. (IN PROGRESS         Outcome Measure:    Tool Used: Lower Extremity Functional Scale (LEFS)  Score:  Initial: 20/80 Most Recent: 47/80 (Date: 7/25/22 )   Interpretation of Score: 20 questions each scored on a 5 point scale with 0 representing \"extreme difficulty or unable to perform\" and 4 representing \"no difficulty\". The lower the score, the greater the functional disability. 80/80 represents no disability. Minimal detectable change is 9 points. Medical Necessity:   Patient is expected to demonstrate progress in strength, range of motion, balance, coordination and functional technique to improve ability to perform ADLs as well as navigate home and community environment without dysfunction. Skilled intervention continues to be required due to decreased activity tolerance, increased pain, decreased ROM, decreased strength, and difficulty ambulating. Reason For Services/Other Comments:  Patient continues to require skilled intervention due to increasing complexity of exercsies within graded exercise program.  Total Duration: 30 minutes moderate complexity, 25 minutes therapeutic exercise        Regarding Robyn King's therapy, I certify that the treatment plan above will be carried out by a therapist or under their direction. Thank you for this referral,  Kathryn Brown, PT     Referring Physician Signature: Robyn Arambula,* No Signature is Required for this note.         Post Session Pain  Charge Capture  PT Visit Info  POC Link  Treatment Note Link  MD Guidelines  [facility]

## 2022-07-27 ENCOUNTER — HOSPITAL ENCOUNTER (OUTPATIENT)
Dept: PHYSICAL THERAPY | Age: 77
Setting detail: RECURRING SERIES
Discharge: HOME | End: 2022-07-30
Payer: MEDICARE

## 2022-07-27 PROCEDURE — 97140 MANUAL THERAPY 1/> REGIONS: CPT

## 2022-07-27 PROCEDURE — 97110 THERAPEUTIC EXERCISES: CPT

## 2022-07-27 ASSESSMENT — PAIN SCALES - GENERAL: PAINLEVEL_OUTOF10: 3

## 2022-07-27 ASSESSMENT — PAIN DESCRIPTION - LOCATION: LOCATION: HIP

## 2022-07-27 ASSESSMENT — PAIN DESCRIPTION - ORIENTATION: ORIENTATION: RIGHT

## 2022-07-27 NOTE — PROGRESS NOTES
below to improve mobility, strength, balance and coordination. Required minimal visual, verbal, manual and tactile cues to promote proper body alignment, promote proper body posture, promote proper body mechanics and promote proper body breathing techniques. Progressed resistance, range, repetitions and complexity of movement as indicated. MANUAL THERAPY: (10 minutes):   Joint mobilization and Soft tissue mobilization was utilized and necessary because of the patient's restricted joint motion and painful spasm. Pt. Received soft tissue mobilization to  Right hip in left sidelying position with pillow between knees to decreased pain and tightness . Instrument assisted with lacrosse ball. MODALITIES: (0 minutes):        None today       Date:  7/25/22 Date:  7/27/22 Date:  7/20/22   Activity/Exercise Parameters Parameters Parameters   TA Activation HEP HEP 2 x 10   Hooklying Glute Set HEP HEP    Active straight leg raise 3x10 supine 3x10 supine Supine 3 x 10   Short Arc Quad 3x12 3x12     Clam shells 3x10 3x10 3 x 10 reps    Heel slides x25 reps X25 reps  3 x 10 reps    Nu step  Level 3 10 min Level 3 x 10 minutes Level 3 x 10 mins    Calf stretch 3x30 sec on incline  3x30 sec hold     Supine hip abduction Green  band 3x10 reps  3x15 green band  Green  band 3x10 reps    Hamstring stretch  Strap 4x30 sec hold  3x30 sec with strap Strap 4x30 sec hold    Bridge 3x10 3x10 3x10 reps    Lateral stepping 3 x 30 ft 3x30 with yellow band 3 x 30 ft   Calf raise 2 x 15 2x15    Ambulation  400 feet no device   ft no device   Single leg stance 3x30 sec with UE assist on bar  3x30 sec with UE assist on bar  3x30 sec hold at bar with UE assist   Sit to Stand 3x12 pink ball with weight shift 3 x 12 with pink ball 3x10 reps blue ball   Step Up 2x10 from 6 inch step 2x10 from 6 inch step          Treatment/Session Summary:    Treatment Assessment:  Patient tolerated session well.  Continued to work and progress on functional loading patterns with step up and sit to stand as well as balance with single leg stance. Patient had increased tenderness along lateral hip that was reduced with manual interventions. Communication/Consultation:   Discussed HEP and POC with patient. Equipment provided today:  None  Recommendations/Intent for next treatment session: Next visit will focus on participation in graded exercise program to improve activity tolerance, strength, ROM, and function.     Total Treatment Billable Duration:  56 minutes   Time In: 1000  Time Out: 7727 [facility], PT       Charge Capture  }Post Session Pain  PT Visit Info  [facility] Portal  MD Guidelines  Scanned Media  Benefits  MyChart    Future Appointments   Date Time Provider Jitendra [place]   8/2/2022 10:00 AM Amaryllis Given, PTA [facility]   8/4/2022 10:00 AM Amaryllis Given, PTA [facility]   8/8/2022 11:00 AM Kathryn Rawhitley, PT [facility]   8/11/2022 11:00 AM Kathryn Kevin, PT [facility]   8/15/2022 10:00 AM Amaryllis Given, PTA [facility]   8/16/2022 10:00 AM Kathryn Raider, PT [facility]   10/7/2022  1:20 PM [address] [facility]   10/7/2022  2:00 PM SS GCC[facility] GCC   10/24/2022  1:00 PM Mikki Mendez MD [facility]   1/11/2023  9:00 AM Waldo García MD [address]  [facility]

## 2022-08-02 ENCOUNTER — TELEPHONE (OUTPATIENT)
Dept: INTERNAL MEDICINE CLINIC | Facility: CLINIC | Age: 77
End: 2022-08-02

## 2022-08-02 ENCOUNTER — HOSPITAL ENCOUNTER (OUTPATIENT)
Dept: PHYSICAL THERAPY | Age: 77
Setting detail: RECURRING SERIES
End: 2022-08-02
Payer: MEDICARE

## 2022-08-02 DIAGNOSIS — U07.1 COVID-19: Primary | ICD-10-CM

## 2022-08-02 NOTE — TELEPHONE ENCOUNTER
Patient called stating she tested positive last night for COVID. Her symptoms started on Saturday and she is still having symptoms. She would like for you to call in a prescription for her for the anti viral medication.    Solomon Hernandez in [address]

## 2022-08-04 ENCOUNTER — HOSPITAL ENCOUNTER (OUTPATIENT)
Dept: PHYSICAL THERAPY | Age: 77
Setting detail: RECURRING SERIES
End: 2022-08-04
Payer: MEDICARE

## 2022-08-08 ENCOUNTER — HOSPITAL ENCOUNTER (OUTPATIENT)
Dept: PHYSICAL THERAPY | Age: 77
Setting detail: RECURRING SERIES
End: 2022-08-08
Payer: MEDICARE

## 2022-08-11 ENCOUNTER — HOSPITAL ENCOUNTER (OUTPATIENT)
Dept: PHYSICAL THERAPY | Age: 77
Setting detail: RECURRING SERIES
Discharge: HOME | End: 2022-08-14
Payer: MEDICARE

## 2022-08-11 PROCEDURE — 97110 THERAPEUTIC EXERCISES: CPT

## 2022-08-11 PROCEDURE — 97140 MANUAL THERAPY 1/> REGIONS: CPT

## 2022-08-11 ASSESSMENT — PAIN DESCRIPTION - LOCATION: LOCATION: HIP

## 2022-08-11 ASSESSMENT — PAIN SCALES - GENERAL: PAINLEVEL_OUTOF10: 2

## 2022-08-11 ASSESSMENT — PAIN DESCRIPTION - ORIENTATION: ORIENTATION: RIGHT

## 2022-08-11 NOTE — PROGRESS NOTES
Carolina Borjas  : 1945  Primary: Medicare Part A And B  Secondary: [address] @ [address] [place] [id number]  Phone: [phone]  Fax: [phone] Plan Frequency: Two times a week for 8 weeks    Plan of Care/Certification Expiration Date: 22 (POC started on 22)      PT Visit Info:   No data recorded    OUTPATIENT PHYSICAL THERAPY:OP NOTE TYPE: Treatment Note 2022       Episode  }Appt Desk              Treatment Diagnosis:  Pain in Right Hip (M25.551)  Difficulty in walking, Not elsewhere classified (R26.2)  Other abnormalities of gait and mobility (R26.89)  Generalized Muscle Weakness (M62.81)  Medical/Referring Diagnosis:  Presence of unspecified artificial hip joint [Z96.649]  Referring Physician:  Kaylene Linda,*  MD Orders:  PT Eval and Treat   Date of Onset:  Onset Date: 22 (Patient had revision from previous NEAL )     Allergies:   Simvastatin, Levofloxacin, Morphine, Penicillins, and Sulfa antibiotics  Restrictions/Precautions:  No data recordedHip Precautions: No hip flexion > 90 degrees; Posterior hip precautions     Interventions Planned (Treatment may consist of any combination of the following):    Current Treatment Recommendations: Strengthening; ROM; Balance training; Functional mobility training; Transfer training; ADL/Self-care training; Endurance training; Gait training; Neuromuscular re-education; Manual Therapy - Soft Tissue Mobilization; Pain management; Home exercise program; Modalities; Positioning; Integrated dry needling; Therapeutic activities     Subjective Comments:  Patient feeling better after recovering from [place]. Initial:}Right Hip 2/10Post Session:  Right  Hip 10  Medications Last Reviewed:  2022  Updated Objective Findings:  Patient with tenderness along IT band.    Treatment   THERAPEUTIC EXERCISE: (45 minutes):    Exercises per grid below to improve mobility, strength, balance and coordination. Required minimal visual, verbal, manual and tactile cues to promote proper body alignment, promote proper body posture, promote proper body mechanics and promote proper body breathing techniques. Progressed resistance, range, repetitions and complexity of movement as indicated. MANUAL THERAPY: (10 minutes):   Joint mobilization and Soft tissue mobilization was utilized and necessary because of the patient's restricted joint motion and painful spasm. Pt. Received soft tissue mobilization to  Right hip in left sidelying position with pillow between knees to decreased pain and tightness . Instrument assisted with lacrosse ball.      MODALITIES: (0 minutes):        None today       Date:  7/25/22 Date:  7/27/22 Date:  8/11/22   Activity/Exercise Parameters Parameters Parameters   TA Activation HEP HEP 2 x 10   Hooklying Glute Set HEP HEP    Active straight leg raise 3x10 supine 3x10 supine Supine 3 x 10   Short Arc Quad 3x12 3x12     Clam shells 3x10 3x10 3 x 10 reps    Heel slides x25 reps X25 reps  3 x 10 reps    Nu step  Level 3 10 min Level 3 x 10 minutes Level 3 x 10 mins    Calf stretch 3x30 sec on incline  3x30 sec hold  3x30 sec hold   Supine hip abduction Green  band 3x10 reps  3x15 green band  Green  band 3x10 reps    Hamstring stretch  Strap 4x30 sec hold  3x30 sec with strap Strap 4x30 sec hold    Bridge 3x10 3x10 3x12 reps    Lateral stepping 3 x 30 ft 3x30 with yellow band 3x15 yellow loop   Calf raise 2 x 15 2x15 3x10   Ambulation  400 feet no device   ft no device   Single leg stance 3x30 sec with UE assist on bar  3x30 sec with UE assist on bar  3x30 sec hold at bar with UE assist   Sit to Stand 3x12 pink ball with weight shift 3 x 12 with pink ball 3x12 with pink ball   Step Up 2x10 from 6 inch step 2x10 from 6 inch step 3x10 from 6 inch step         Treatment/Session Summary:    Treatment Assessment:  Kept volume similar to previous session secondary to patient taking two weeks off with COVID diagnosis and increased fatigue today. Patient continues to have tenderness along lateral hip from hip to knee. Patient tolerated session well with ability to demonstrate improved ambulation per increased step length following session. Communication/Consultation:   Discussed HEP and POC with patient. Equipment provided today:  None  Recommendations/Intent for next treatment session: Next visit will focus on participation in graded exercise program to improve activity tolerance, strength, ROM, and function.     Total Treatment Billable Duration:  55 minutes   Time In: 1100  Time Out: [address] [facility] [address], PT       Charge Capture  }Post Session Pain  PT Visit Info  295 [facility] Portal  MD Guidelines  Scanned Media  Benefits  MyChart    Future Appointments   Date Time Provider [place]   8/15/2022 10:00 [facility]   8/16/2022 10:00 AM Landen Veloz, JOSIE [facility]   10/7/2022  [facility]   10/7/2022  2:00 PM SS GCC[facility] GCC   10/24/2022  1:00 PM Yoselyn Hatch MD [facility]   1/11/2023  9:00 AM Elodia Wise MD [address] [facility]

## 2022-08-15 ENCOUNTER — HOSPITAL ENCOUNTER (OUTPATIENT)
Dept: PHYSICAL THERAPY | Age: 77
Setting detail: RECURRING SERIES
Discharge: HOME | End: 2022-08-18
Payer: MEDICARE

## 2022-08-15 PROCEDURE — 97110 THERAPEUTIC EXERCISES: CPT

## 2022-08-15 PROCEDURE — 97140 MANUAL THERAPY 1/> REGIONS: CPT

## 2022-08-15 ASSESSMENT — PAIN SCALES - GENERAL: PAINLEVEL_OUTOF10: 5

## 2022-08-15 NOTE — THERAPY RECERTIFICATION
America Vale  : 1945  Primary: Medicare Part A And B  Secondary: [address] @ [address] [place] [id number]  Phone: [phone]  Fax: [phone] Plan Frequency: One to two times a week for 8 weeks. Plan of Care/Certification Expiration Date: 10/15/22 (plan starting 22)      PT Visit Info:    No data recorded    OUTPATIENT PHYSICAL THERAPY:OP NOTE TYPE: Recertification                Episode  Appt Desk         Treatment Diagnosis:  Pain in Right Hip (M25.551)  Difficulty in walking, Not elsewhere classified (R26.2)  Other abnormalities of gait and mobility (R26.89)  Generalized Muscle Weakness (M62.81)    Medical/Referring Diagnosis:  Presence of unspecified artificial hip joint [I20.737]  Referring Physician:  Lisseth Millan MD Orders:  PT Eval and Treat   Return MD Appt:  10/24/22  Date of Onset:  Onset Date: 22 (Patient had revision from previous NEAL )     Allergies:  Simvastatin, Levofloxacin, Morphine, Penicillins, and Sulfa antibiotics  Restrictions/Precautions:    No data recordedHip Precautions: No hip flexion > 90 degrees; Posterior hip precautions     Medications Last Reviewed:  2022           OBJECTIVE   Patient denies any LE paresthesia. Patient denies any increase of symptoms with cough, sneeze or valsalva. Patient denies any saddle paresthesia or bowel/bladder deficits. Observation/Orthostatic Postural Assessment:          Patient ambulating with SPC with reduced stance on R LE. Patient stands with weight shifted towards L LE. Palpation:          Patient tender to palpation diffusely through R hip.      ROM:          WFL: Within functional limits         NT: Not Tested  AROM/ PROM Left (degrees) Right (degrees)   Hip Flexion 85 90 (from 81)   Hip External Rotation (ER) 30  NT secondary to status after NEAL)   Hip Internal Rotation (IR) 28 NT secondary to status after NEAL   Hip Extension WFL observed in standing WFL observed in standing   Hip Abduction 40 40 (from NT secondary to status after NEAL)   Knee Flexion 135 128   Knee Extension 0 0   Ankle Dorsiflexion (DF) -   knee extended 5 4   Ankle Dorsiflexion (DF) - knee flexed 10 10   Ankle Plantarflexion (PF) 50 50     Strength: Motion Tested Left   (*/5) Right  (*/5)   Knee Extension 4+ 4+ (from 4)   Knee Flexion 4+ 4+ (from 4)   Hip Flexion 4+ 4+ (from 3+)   Hip Extension 4+ 4 (from 3+)   Hip Abduction 4 4 (from 3+)   Ankle DF 5 5     Special Tests:          TUG: 15.4 without assistive device (From 26 seconds with use of SPC)        30 second sit to stand: 8 (From 7 repetitions with UE support)    Passive Accessory Motion:         Decreased motion observed through active motion in R hip. Neurological Screen:              Myotomes: Key muscle strength testing through bilateral LE is [facility] with deficits noted above. Dermatomes: Sensation to light touch for bilateral LE is intact and WFL. Reflexes: Patellar (L3/ L4): 2+                 Achilles (S1/ S2): 2+           Abnormal reflexes: Clonus: negative, Talley: negative, Babinski: negative  Neural tension tests: Upper limb tension test is negative. Slump test is negative. Functional Mobility:         Gait/Ambulation:  Patient able to ambulate without assistive device with reduced stance time on R LE (From Patient ambulates with SPC with reduced step length and reduced stance on R LE)        Transfers:  Unremarkable (From Patient requires additional time to transfer secondary to generalized weakness)        Stairs:  Patient ambulates step to method. Balance:          Patient unable to stand greater than 5 seconds in single limb or tandem stance. ASSESSMENT   Initial Assessment:  Patient is a 77year old woman presenting to physical therapy following a revision of a R NEAL with revision performed on 4/26/2022.  Patient reports original surgery happened in May of 2021. Patient had continued pain in her hip since original surgery causing her to seek out opinion from a different orthopedic MD. Patient reports pain is improving since surgery and reports going through home therapy for 3 weeks. Patient denies any numbness or tingling at this time and reports pain is approximately 6/10. Patient is improving but reports she is still ambulating with a cane and still has difficulty performing ADLs as well as navigating home and community environment. Patient met with MD and would like to proceed with outpatient PT in order to progress strengthening and activity tolerance to improve function. Patient would benefit from skilled therapy in order to address deficits, progress functional independence, and improve ability to navigate home and community environment. RECERTIFICATION Note: Patient was seen for 14 sessions of Physical Therapy from 6/20/22 to 8/16/22. Patient reports feeling 85% better with pain and dysfunction in R hip. Patient has demonstrated improvements in ROM per goniometer, strength per MMT, and activity tolerance per exercise selection and standardized outcome measure since onset of POC. Patient did test positive for COVID and had to take two weeks off returning more deconditioned as well as with more discomfort in R hip. At this time patient does have functional deficits and would continue to benefit from skilled therapy to address deficits. Problem List: (Impacting functional limitations): Body Structures, Functions, Activity Limitations Requiring Skilled Therapeutic Intervention: Decreased functional mobility ; Decreased ADL status; Decreased ROM; Decreased body mechanics; Decreased strength; Decreased endurance; Decreased balance;  Increased pain; Decreased posture     Therapy Prognosis:   Therapy Prognosis: Good     Assessment Complexity:   Medium Complexity  PLAN   Effective Dates:  Plan of Care/Certification Expiration Date: 10/15/22 (plan starting 8/16/22)     Frequency/Duration: Plan Frequency: One to two times a week for 8 weeks. Interventions Planned (Treatment may consist of any combination of the following):    Current Treatment Recommendations: Strengthening; ROM; Balance training; Functional mobility training; Transfer training; ADL/Self-care training; Endurance training; Gait training; Neuromuscular re-education; Manual Therapy - Soft Tissue Mobilization; Pain management; Home exercise program; Modalities; Positioning; Integrated dry needling; Therapeutic activities     Goals: (Goals have been discussed and agreed upon with patient.)  Short-Term Functional Goals: Time Frame: 6/23/22 to 7/21/22  Patient will be independent with HEP in order to progress and improve activity tolerance. (GOAL MET)  Patient will be able to demonstrate and verbalize understanding of hip precautions with 100% accuracy. (GOAL MET)  Patient will report pain no greater than 4/10 in order to tolerate improved ability to perform ADLs. (GOAL MET)  Patient will be able to stand greater than 10 minutes in order to tolerate preparing a meal in the kitchen. (GOAL MET)  Discharge Goals: Time Frame: 6/23/22 to 10/15/2022  Patient will report pain no greater than 2/10 in order to tolerate improved ability to perform ADLs. (IN PROGRESS)  Patient will be able to ambulate 2000 feet with LRAD in order to tolerate community ambulation. (IN PROGRESS)  Patient will demonstrate gross 5/5 LE strength in order to raise and lift 20 pounds while performing homemaking tasks. (IN PROGRESS)  Patient will be able to perform TUG in less than 12 seconds in order to demonstrate decreased fall risk. (IN PROGRESS         Outcome Measure:    Tool Used: Lower Extremity Functional Scale (LEFS)  Score:  Initial: 20/80 Most Recent: 47/80 (Date: 7/25/22 )                 48/80 (8/16/22)   Interpretation of Score: 20 questions each scored on a 5 point scale with 0 representing \"extreme difficulty or unable to perform\" and 4 representing \"no difficulty\". The lower the score, the greater the functional disability. 80/80 represents no disability. Minimal detectable change is 9 points. Medical Necessity:   Patient is expected to demonstrate progress in strength, range of motion, balance, coordination and functional technique to improve ability to perform ADLs as well as navigate home and community environment without dysfunction. Skilled intervention continues to be required due to decreased activity tolerance, increased pain, decreased ROM, decreased strength, and difficulty ambulating. Reason For Services/Other Comments:  Patient continues to require skilled intervention due to increasing complexity of exercsies within graded exercise program.  Total Duration: 55 minutes  Time In: 1000  Time Out: 1100    Regarding Alejo Marco Antonio Fernando's therapy, I certify that the treatment plan above will be carried out by a therapist or under their direction.   Thank you for this referral,  Reza Correa, PT     Referring Physician Signature: Deidre Feng,* _______________________________ Date _____________        Post Session Pain  Charge Capture  PT Visit Info  POC Link  Treatment Note Link  MD Guidelines  MyChart

## 2022-08-15 NOTE — PROGRESS NOTES
Sueellen Lesches  : 1945  Primary: Medicare Part A And B  Secondary: [address] @ [address] [address]  Phone: [phone]  Fax: [phone] Plan Frequency: One to two times a week for 8 weeks. Plan of Care/Certification Expiration Date: 10/15/22 (plan starting 22)      PT Visit Info:   No data recorded    OUTPATIENT PHYSICAL THERAPY:OP NOTE TYPE: Treatment Note 2022       Episode  }Appt Desk              Treatment Diagnosis:  Pain in Right Hip (M25.551)  Difficulty in walking, Not elsewhere classified (R26.2)  Other abnormalities of gait and mobility (R26.89)  Generalized Muscle Weakness (M62.81)  Medical/Referring Diagnosis:  Presence of unspecified artificial hip joint [P81.165]  Referring Physician:  Zan Whittaker MD Orders:  PT Eval and Treat   Date of Onset:  Onset Date: 22 (Patient had revision from previous NEAL )     Allergies:   Simvastatin, Levofloxacin, Morphine, Penicillins, and Sulfa antibiotics  Restrictions/Precautions:  No data recordedHip Precautions: No hip flexion > 90 degrees; Posterior hip precautions     Interventions Planned (Treatment may consist of any combination of the following):    Current Treatment Recommendations: Strengthening; ROM; Balance training; Functional mobility training; Transfer training; ADL/Self-care training; Endurance training; Gait training; Neuromuscular re-education; Manual Therapy - Soft Tissue Mobilization; Pain management; Home exercise program; Modalities; Positioning; Integrated dry needling; Therapeutic activities     Subjective Comments:  Patient reports decreased discomfort compared to yesterday. Initial:}    3/10Post Session:       2/10  Medications Last Reviewed:  2022  Updated Objective Findings:  Patient with tenderness along lateral hip.    Treatment   THERAPEUTIC EXERCISE: (40 minutes):    Exercises per grid below to improve mobility, strength, balance and coordination. Required minimal visual, verbal, manual and tactile cues to promote proper body alignment, promote proper body posture, promote proper body mechanics and promote proper body breathing techniques. Progressed resistance, range, repetitions and complexity of movement as indicated. MANUAL THERAPY: (15 minutes):   Joint mobilization and Soft tissue mobilization was utilized and necessary because of the patient's restricted joint motion and painful spasm. Pt. Received soft tissue mobilization to  Right hip in left sidelying position with pillow between knees to decreased pain and tightness . Instrument assisted with lacrosse ball.      MODALITIES: (0 minutes):        None today       Date:  8/15/22 Date:  8/16/22 Date:  8/11/22   Activity/Exercise Parameters Parameters Parameters   TA Activation HEP HEP 2 x 10   Hooklying Glute Set HEP HEP    Active straight leg raise 3x10 supine 3x10 supine Supine 3 x 10   Short Arc Quad  3x12     Clam shells 3x10 3x10 3 x 10 reps    Heel slides x25 reps X25 reps  3 x 10 reps    Nu step  Level 3 x 5 min Level 3 x 10 minutes Level 3 x 10 mins    Calf stretch 3x30 sec on incline  3x30 sec hold  3x30 sec hold   Supine hip abduction Green  band 3x10 reps  3x15 green band  Green  band 3x10 reps    Hamstring stretch  Strap 2x30 sec hold  3x30 sec with strap Strap 4x30 sec hold    Bridge 3x10 green band 3x10 green loop 3x12 reps    Lateral stepping 3 x 30 ft 3x30 with yellow band 3x15 yellow loop   Calf raise  3x10 3x10   Ambulation     ft no device   Single leg stance 3x30 sec with UE assist on bar  3x30 sec with UE assist on bar  3x30 sec hold at bar with UE assist   Sit to Stand 3x12 pink ball with weight shift 3 x 12 with pink ball 3x12 with pink ball   Step Up 2x10 from 6 inch step 2x10 from 6 inch step 3x10 from 6 inch step         Treatment/Session Summary:    Treatment Assessment: Continued to emphasize functional loading with sit to stand and step up. Patient continues to get relief from manual interventions at onset of session to reduce pain and irritability in R hip. Communication/Consultation:   Discussed HEP and POC with patient. Equipment provided today:  None  Recommendations/Intent for next treatment session: Next visit will focus on participation in graded exercise program to improve activity tolerance, strength, ROM, and function.     Total Treatment Billable Duration:  55 minutes   Time In: 1000  Time Out: 0471 [facility], PT       Charge Capture  }Post Session Pain  PT Visit Info  [facility] Portal  MD Guidelines  Scanned Media  Benefits  MyChart    Future Appointments   Date Time Provider Jitendra Houston   8/26/2022  9:00 AM [facility]   8/30/2022  1:30 PM Felicia Acosta [facility]   9/7/2022 12:30 PM Felicia Acosta [facility]   9/13/2022  1:30 PM Shawanda Wilks, PT [facility]   10/7/2022  1:20 PM Ayden [address] [place] [address]   10/7/2022  2:00 PM SS [facility] GCC   10/24/2022  1:00 PM Gabbie Taveras MD [facility]   1/11/2023  9:00 AM Carolann Muniz MD [address] [facility] AMB

## 2022-08-15 NOTE — PROGRESS NOTES
Moises Nickerson  : 1945  Primary: Medicare Part A And B  Secondary: [address] @ [address] [place] [id number]  Phone: [phone]  Fax: [phone] Plan Frequency: Two times a week for 8 weeks    Plan of Care/Certification Expiration Date: 22 (POC started on 22)      PT Visit Info:   No data recorded    OUTPATIENT PHYSICAL THERAPY:OP NOTE TYPE: Treatment Note 8/15/2022       Episode  }Appt Desk              Treatment Diagnosis:  Pain in Right Hip (M25.551)  Difficulty in walking, Not elsewhere classified (R26.2)  Other abnormalities of gait and mobility (R26.89)  Generalized Muscle Weakness (M62.81)  Medical/Referring Diagnosis:  Presence of unspecified artificial hip joint [Z96.649]  Referring Physician:  Derek Castañeda,*  MD Orders:  PT Eval and Treat   Date of Onset:  Onset Date: 22 (Patient had revision from previous NEAL )     Allergies:   Simvastatin, Levofloxacin, Morphine, Penicillins, and Sulfa antibiotics  Restrictions/Precautions:  No data recordedHip Precautions: No hip flexion > 90 degrees; Posterior hip precautions     Interventions Planned (Treatment may consist of any combination of the following):    Current Treatment Recommendations: Strengthening; ROM; Balance training; Functional mobility training; Transfer training; ADL/Self-care training; Endurance training; Gait training; Neuromuscular re-education; Manual Therapy - Soft Tissue Mobilization; Pain management; Home exercise program; Modalities; Positioning; Integrated dry needling; Therapeutic activities     Subjective Comments:  Patient reports increased hip pain past several days. Initial:}    5/10Post Session:       2/10  Medications Last Reviewed:  8/15/2022  Updated Objective Findings:  Patient with tenderness anterior and posterior hip.    Treatment   THERAPEUTIC EXERCISE: (40 minutes):    Exercises per grid below to improve mobility, strength, balance and coordination. Required minimal visual, verbal, manual and tactile cues to promote proper body alignment, promote proper body posture, promote proper body mechanics and promote proper body breathing techniques. Progressed resistance, range, repetitions and complexity of movement as indicated. MANUAL THERAPY: (13 minutes):   Joint mobilization and Soft tissue mobilization was utilized and necessary because of the patient's restricted joint motion and painful spasm. Pt. Received soft tissue mobilization to  Right hip in left sidelying position with pillow between knees to decreased pain and tightness . Instrument assisted with lacrosse ball.      MODALITIES: (0 minutes):        None today       Date:  8/15/22 Date:  7/27/22 Date:  8/11/22   Activity/Exercise Parameters Parameters Parameters   TA Activation HEP HEP 2 x 10   Hooklying Glute Set HEP HEP    Active straight leg raise 3x10 supine 3x10 supine Supine 3 x 10   Short Arc Quad  3x12     Clam shells 3x10 3x10 3 x 10 reps    Heel slides x25 reps X25 reps  3 x 10 reps    Nu step  Level 3 x 5 min Level 3 x 10 minutes Level 3 x 10 mins    Calf stretch 3x30 sec on incline  3x30 sec hold  3x30 sec hold   Supine hip abduction Green  band 3x10 reps  3x15 green band  Green  band 3x10 reps    Hamstring stretch  Strap 2x30 sec hold  3x30 sec with strap Strap 4x30 sec hold    Bridge 3x10 green band 3x10 3x12 reps    Lateral stepping 3 x 30 ft 3x30 with yellow band 3x15 yellow loop   Calf raise  2x15 3x10   Ambulation     ft no device   Single leg stance 3x30 sec with UE assist on bar  3x30 sec with UE assist on bar  3x30 sec hold at bar with UE assist   Sit to Stand 3x12 pink ball with weight shift 3 x 12 with pink ball 3x12 with pink ball   Step Up 2x10 from 6 inch step 2x10 from 6 inch step 3x10 from 6 inch step         Treatment/Session Summary:    Treatment Assessment:  Decreased exercise volume due to increased hip pain today. Reports decreased pain after session    Communication/Consultation:   Discussed HEP and POC with patient. Equipment provided today:  None  Recommendations/Intent for next treatment session: Next visit will focus on participation in graded exercise program to improve activity tolerance, strength, ROM, and function.     Total Treatment Billable Duration:  53 minutes   Time In: 1000  Time Out: 1055    ABDON LIN PTA       Charge Capture  }Post Session Pain  PT Visit Info  MedBridge Portal  MD Guidelines  Scanned Media  Benefits  MyChart    Future Appointments   Date Time Provider Jitendra Houston   8/16/2022 10:00 AM Carl Freeman, PT [facility]   [facility]2022  [facility] [address] [facility]   10/7/2022  2:00 PM SS [facility]   10/24/2022  1:00 PM Deny Phillips MD [facility] AMB   1/11/2023  9:00 AM [facility]

## 2022-08-16 ENCOUNTER — HOSPITAL ENCOUNTER (OUTPATIENT)
Dept: PHYSICAL THERAPY | Age: 77
Setting detail: RECURRING SERIES
Discharge: HOME | End: 2022-08-19
Payer: MEDICARE

## 2022-08-16 PROCEDURE — 97110 THERAPEUTIC EXERCISES: CPT

## 2022-08-16 PROCEDURE — 97140 MANUAL THERAPY 1/> REGIONS: CPT

## 2022-08-16 ASSESSMENT — PAIN SCALES - GENERAL: PAINLEVEL_OUTOF10: 3

## 2022-08-26 ENCOUNTER — HOSPITAL ENCOUNTER (OUTPATIENT)
Dept: PHYSICAL THERAPY | Age: 77
Setting detail: RECURRING SERIES
Discharge: HOME | End: 2022-08-29
Payer: MEDICARE

## 2022-08-26 PROCEDURE — 97110 THERAPEUTIC EXERCISES: CPT

## 2022-08-26 PROCEDURE — 97140 MANUAL THERAPY 1/> REGIONS: CPT

## 2022-08-26 ASSESSMENT — PAIN SCALES - GENERAL: PAINLEVEL_OUTOF10: 3

## 2022-08-26 NOTE — PROGRESS NOTES
America Vale  : 1945  Primary: Medicare Part A And B  Secondary: [address] @ [address] [place] [id number]  Phone: [phone]  Fax: [phone] Plan Frequency: One to two times a week for 8 weeks. Plan of Care/Certification Expiration Date: 10/15/22 (plan starting 22)      PT Visit Info:   No data recorded    OUTPATIENT PHYSICAL THERAPY:OP NOTE TYPE: Treatment Note 2022       Episode  }Appt Desk              Treatment Diagnosis:  Pain in Right Hip (M25.551)  Difficulty in walking, Not elsewhere classified (R26.2)  Other abnormalities of gait and mobility (R26.89)  Generalized Muscle Weakness (M62.81)  Medical/Referring Diagnosis:  Presence of unspecified artificial hip joint [E81.190]  Referring Physician:  Lisseth Millan,*  MD Orders:  PT Eval and Treat   Date of Onset:  Onset Date: 22 (Patient had revision from previous NEAL )     Allergies:   Simvastatin, Levofloxacin, Morphine, Penicillins, and Sulfa antibiotics  Restrictions/Precautions:  No data recordedHip Precautions: No hip flexion > 90 degrees; Posterior hip precautions     Interventions Planned (Treatment may consist of any combination of the following):    Current Treatment Recommendations: Strengthening; ROM; Balance training; Functional mobility training; Transfer training; ADL/Self-care training; Endurance training; Gait training; Neuromuscular re-education; Manual Therapy - Soft Tissue Mobilization; Pain management; Home exercise program; Modalities; Positioning; Integrated dry needling; Therapeutic activities     Subjective Comments:  Pt. reported walking is the issue due to fatigue and weakness  Initial:}    3/10Post Session:       4/10  Medications Last Reviewed:  2022  Updated Objective Findings:  Patient continues to have increased soreness with manual.  Adjusted cane one notch higher to assist with proper gait. Treatment   THERAPEUTIC EXERCISE: (40 minutes):    Exercises per grid below to improve mobility, strength, balance and coordination. Required minimal visual, verbal, manual and tactile cues to promote proper body alignment, promote proper body posture, promote proper body mechanics and promote proper body breathing techniques. Progressed resistance, range, repetitions and complexity of movement as indicated. MANUAL THERAPY: (15 minutes):   Joint mobilization and Soft tissue mobilization was utilized and necessary because of the patient's restricted joint motion and painful spasm. Pt. Received soft tissue mobilization to  Right hip in left sidelying position with pillow between knees to decreased pain and tightness . Used suction cup over scar and surrounding tight tissue.       MODALITIES: (0 minutes):        None today and patient will ice at home       Date:  8/15/22 Date:  8/16/22 Date:  8/26/22   Activity/Exercise Parameters Parameters Parameters   TA Activation HEP HEP 2 x 10   Hooklying Glute Set HEP HEP    Active straight leg raise 3x10 supine 3x10 supine Supine 3 x 10   Short Arc Quad  3x12     Clam shells 3x10 3x10 3 x 10 reps with red  t-band and bridge   Heel slides x25 reps X25 reps  3 x 10 reps    Nu step  Level 3 x 5 min Level 3 x 10 minutes Level 3 x 10 mins    Calf stretch 3x30 sec on incline  3x30 sec hold  3x30 sec hold   Supine hip abduction Green  band 3x10 reps  3x15 green band  Green  band 3x10 reps    Hamstring stretch  Strap 2x30 sec hold  3x30 sec with strap Strap 4x30 sec hold    Bridge 3x10 green band 3x10 green loop 3x12 reps    Lateral stepping 3 x 30 ft 3x30 with yellow band 3x15ft at wall no band   Calf raise  3x10 3x10   Ambulation   HEP Cane adjusted to one notch higher with less leaning to the left with ambulation   Single leg stance 3x30 sec with UE assist on bar  3x30 sec with UE assist on bar  4x30 sec hold at bar with UE assist   Sit to Stand 3x12 pink ball with weight shift 3 x 12 with pink ball X 20 reps no ball in chair one cushion   Step Up 2x10 from 6 inch step 2x10 from 6 inch step None today         Treatment/Session Summary:    Treatment Assessment: better gait with better posture and less leaning onto Left LE    Communication/Consultation:   Discussed HEP and POC with patient. Equipment provided today:  None  Recommendations/Intent for next treatment session: Next visit will focus on participation in graded exercise program to improve activity tolerance, strength, ROM, and function.     Total Treatment Billable Duration:  55 minutes   Time In: 1000  Time Out: 1100    AVNI BECKWITH PTA       Charge Capture  }Post Session Pain  PT Visit Info  [place] Portal  MD Guidelines  Scanned Media  Benefits  MyChart    Future Appointments   Date Time Provider Jitendra Houston   8/30/2022  1:30 PM Jackqueline Standing, [facility]   9/7/2022 12:30 PM Jackqueline Standing, [facility]   9/13/2022  1:30 PM Alea Mccormack, PT [facility]   10/7/2022  1:20 PM Ayden 88 [address] [facility]   10/7/2022  2:00 PM SS [facility] GCC   10/24/2022  1:00 PM Ana Germain MD [facility] AMB   1/11/2023  9:00 AM Chevy Chavez MD [address] [facility] AMB

## 2022-08-30 ENCOUNTER — HOSPITAL ENCOUNTER (OUTPATIENT)
Dept: PHYSICAL THERAPY | Age: 77
Setting detail: RECURRING SERIES
Discharge: HOME | End: 2022-09-02
Payer: MEDICARE

## 2022-08-30 PROCEDURE — 97110 THERAPEUTIC EXERCISES: CPT

## 2022-08-30 PROCEDURE — 97140 MANUAL THERAPY 1/> REGIONS: CPT

## 2022-08-30 ASSESSMENT — PAIN SCALES - GENERAL: PAINLEVEL_OUTOF10: 3

## 2022-08-30 NOTE — PROGRESS NOTES
to assist with proper gait. Treatment   THERAPEUTIC EXERCISE: (24 minutes):    Exercises per grid below to improve mobility, strength, balance and coordination. Required minimal visual, verbal, manual and tactile cues to promote proper body alignment, promote proper body posture, promote proper body mechanics and promote proper body breathing techniques. Progressed resistance, range, repetitions and complexity of movement as indicated. MANUAL THERAPY: (30 minutes):   Joint mobilization and Soft tissue mobilization was utilized and necessary because of the patient's restricted joint motion and painful spasm. Pt. Received soft tissue mobilization to  Right hip in left sidelying position with pillow between knees to decreased pain and tightness . Used suction cup over scar and surrounding tight tissue.       MODALITIES: (0 minutes):        None today and patient will ice at home       Date:  8/30/22 Date:  8/16/22 Date:  8/26/22   Activity/Exercise Parameters Parameters Parameters   TA Activation HEP HEP 2 x 10   Hooklying Glute Set HEP HEP    Active straight leg raise 3x10 supine 3x10 supine Supine 3 x 10   Short Arc Quad  3x12     Clam shells 3x10 3x10 3 x 10 reps with red  t-band and bridge   Heel slides  X25 reps  3 x 10 reps    Nu step  Level 4 x 10 minutes Level 3 x 10 minutes Level 3 x 10 mins    Calf stretch 3x30 sec on incline  3x30 sec hold  3x30 sec hold   Supine hip abduction Green  band 3x10 reps  3x15 green band  Green  band 3x10 reps    Hamstring stretch  Strap  3x30 sec with strap Strap 4x30 sec hold    Bridge 3x10  3x10 green loop 3x12 reps    Lateral stepping  3x30 with yellow band 3x15ft at wall no band   Calf raise  3x10 3x10   Ambulation  400 ft with cane HEP Cane adjusted to one notch higher with less leaning to the left with ambulation   Single leg stance 3x30 sec with UE assist on bar  3x30 sec with UE assist on bar  4x30 sec hold at bar with UE assist   Sit to Stand  3 x 12 with pink ball X 20 reps no ball in chair one cushion   Step Up  2x10 from 6 inch step None today         Treatment/Session Summary:    Treatment Assessment: Decreased tenderness IT band and hip after manual therapy. Smoother gait after manual intervention also but pain with weight bearing about the same    Communication/Consultation:   Discussed HEP and POC with patient. Discussed starting a light walking program at home or local indoor gym  Equipment provided today:  None  Recommendations/Intent for next treatment session: Next visit will focus on participation in graded exercise program to improve activity tolerance, strength, ROM, and function.     Total Treatment Billable Duration:  54 minutes   Time In: 8918  Time Out: 1427    ABDON LIN PTA       Charge Capture  }Post Session Pain  PT Visit Info  MedBridge Portal  MD Guidelines  Scanned Media  Benefits  MyChart    Future Appointments   Date Time Provider Jitendra Houston   9/8/2022  8:00 [facility]   9/13/2022  1:30 PM Rachell Gray, PT [facility]   10/7/2022  [facility] [address] [facility]   10/7/2022  2:00 PM SS [facility] GCC   10/24/2022  1:00 PM Leia Coles MD [facility] AMB   1/11/2023  9:00 AM Sorin Torres MD [facility] AMB

## 2022-09-08 ENCOUNTER — HOSPITAL ENCOUNTER (OUTPATIENT)
Dept: PHYSICAL THERAPY | Age: 77
Setting detail: RECURRING SERIES
Discharge: HOME | End: 2022-09-11
Payer: MEDICARE

## 2022-09-08 PROCEDURE — 97140 MANUAL THERAPY 1/> REGIONS: CPT

## 2022-09-08 PROCEDURE — 97110 THERAPEUTIC EXERCISES: CPT

## 2022-09-08 ASSESSMENT — PAIN SCALES - GENERAL: PAINLEVEL_OUTOF10: 4

## 2022-09-08 NOTE — PROGRESS NOTES
Anupam Almanza  : 1945  Primary: Medicare Part A And B  Secondary: [address] @ [address] [facility] [address]  Phone: [phone]  Fax: [phone] Plan Frequency: One to two times a week for 8 weeks. Plan of Care/Certification Expiration Date: 10/15/22 (plan starting 22)      PT Visit Info:   No data recorded    OUTPATIENT PHYSICAL THERAPY:OP NOTE TYPE: Treatment Note 2022       Episode  }Appt Desk              Treatment Diagnosis:  Pain in Right Hip (M25.551)  Difficulty in walking, Not elsewhere classified (R26.2)  Other abnormalities of gait and mobility (R26.89)  Generalized Muscle Weakness (M62.81)  Medical/Referring Diagnosis:  Presence of unspecified artificial hip joint [K21.871]  Referring Physician:  Vanesa Argueta,*  MD Orders:  PT Eval and Treat   Date of Onset:  Onset Date: 22 (Patient had revision from previous NEAL )     Allergies:   Simvastatin, Levofloxacin, Morphine, Penicillins, and Sulfa antibiotics  Restrictions/Precautions:  No data recordedHip Precautions: No hip flexion > 90 degrees; Posterior hip precautions     Interventions Planned (Treatment may consist of any combination of the following):    Current Treatment Recommendations: Strengthening; ROM; Balance training; Functional mobility training; Transfer training; ADL/Self-care training; Endurance training; Gait training; Neuromuscular re-education; Manual Therapy - Soft Tissue Mobilization; Pain management; Home exercise program; Modalities; Positioning; Integrated dry needling; Therapeutic activities     Subjective Comments:  Patient reports no pain at rest. Pain increases with walking  Initial:}    4/10Post Session:       2/10  Medications Last Reviewed:  2022  Updated Objective Findings:  Patient continues to have increased soreness with manual.  Adjusted cane one notch higher to assist with proper gait.     Treatment THERAPEUTIC EXERCISE: (25 minutes):    Exercises per grid below to improve mobility, strength, balance and coordination. Required minimal visual, verbal, manual and tactile cues to promote proper body alignment, promote proper body posture, promote proper body mechanics and promote proper body breathing techniques. Progressed resistance, range, repetitions and complexity of movement as indicated. MANUAL THERAPY: (30 minutes):   Joint mobilization and Soft tissue mobilization was utilized and necessary because of the patient's restricted joint motion and painful spasm. Pt. Received soft tissue mobilization to  Right hip in left sidelying position with pillow between knees to decreased pain and tightness . Used suction cup over scar and surrounding tight tissue.       MODALITIES: (0 minutes):        None today and patient will ice at home       Date:  8/30/22 Date:  9/8/22 Date:  8/26/22   Activity/Exercise Parameters Parameters Parameters   TA Activation HEP  2 x 10   Hooklying Glute Set HEP     Active straight leg raise 3x10 supine 3x10 supine Supine 3 x 10   Short Arc Quad       Clam shells 3x10  3 x 10 reps with red  t-band and bridge   Heel slides    3 x 10 reps    Nu step  Level 4 x 10 minutes Level 3 x 10 minutes Level 3 x 10 mins    Calf stretch 3x30 sec on incline  3x30 sec hold  3x30 sec hold   Supine hip abduction Green  band 3x10 reps  3x15 yellow band  Green  band 3x10 reps    Hamstring stretch  Strap  Strap 2 x 10 x 3 sec Strap 4x30 sec hold    Bridge 3x10  3x10 yellow loop 3x12 reps    Lateral stepping   3x15ft at wall no band   Calf raise  3x10 3x10   Ambulation  400 ft with cane HEP Cane adjusted to one notch higher with less leaning to the left with ambulation   Single leg stance 3x30 sec with UE assist on bar   4x30 sec hold at bar with UE assist   Sit to Stand   X 20 reps no ball in chair one cushion   Step Up   None today   Single knee to chest  Towel 2 x 10 x 3 sec

## 2022-09-13 ENCOUNTER — HOSPITAL ENCOUNTER (OUTPATIENT)
Dept: PHYSICAL THERAPY | Age: 77
Setting detail: RECURRING SERIES
Discharge: HOME | End: 2022-09-16
Payer: MEDICARE

## 2022-09-13 PROCEDURE — 97110 THERAPEUTIC EXERCISES: CPT

## 2022-09-13 PROCEDURE — 97140 MANUAL THERAPY 1/> REGIONS: CPT

## 2022-09-13 ASSESSMENT — PAIN SCALES - GENERAL: PAINLEVEL_OUTOF10: 2

## 2022-09-13 NOTE — THERAPY DISCHARGE
secondary to status after NEAL)   Hip Extension WFL observed in standing WFL observed in standing   Hip Abduction 40 40 (from NT secondary to status after NEAL)   Knee Flexion 135 128   Knee Extension 0 0   Ankle Dorsiflexion (DF) -   knee extended 5 4   Ankle Dorsiflexion (DF) - knee flexed 10 10   Ankle Plantarflexion (PF) 50 50     Strength: Motion Tested Left   (*/5) Right  (*/5)   Knee Extension 4+ 5 (from 4)   Knee Flexion 4+ 5 (from 4)   Hip Flexion 4+ 4+ (from 3+)   Hip Extension 4+ 4+ (from 3+)   Hip Abduction 4 4+ (from 3+)   Ankle DF 5 5     Special Tests:          TU.1 without assistive device (From 26 seconds with use of SPC)        30 second sit to stand: 9 (From 7 repetitions with UE support)    Passive Accessory Motion:         Decreased motion observed through active motion in R hip. Neurological Screen:              Myotomes: Key muscle strength testing through bilateral LE is [facility] with deficits noted above. Dermatomes: Sensation to light touch for bilateral LE is intact and WFL. Reflexes: Patellar (L3/ L4): 2+                 Achilles (S1/ S2): 2+           Abnormal reflexes: Clonus: negative, Talley: negative, Babinski: negative  Neural tension tests: Upper limb tension test is negative. Slump test is negative. Functional Mobility:         Gait/Ambulation:  Patient requires single point cane for ambulation(From Patient ambulates with SPC with reduced step length and reduced stance on R LE)        Transfers:  Unremarkable (From Patient requires additional time to transfer secondary to generalized weakness)        Stairs:  Patient ambulates step to method. Balance:          Patient unable to stand greater than 5 seconds in single limb or tandem stance. ASSESSMENT   Initial Assessment:  Patient is a 77year old woman presenting to physical therapy following a revision of a R NEAL with revision performed on 2022.  Patient reports original surgery happened in May of 2021. Patient had continued pain in her hip since original surgery causing her to seek out opinion from a different orthopedic MD. Patient reports pain is improving since surgery and reports going through home therapy for 3 weeks. Patient denies any numbness or tingling at this time and reports pain is approximately 6/10. Patient is improving but reports she is still ambulating with a cane and still has difficulty performing ADLs as well as navigating home and community environment. Patient met with MD and would like to proceed with outpatient PT in order to progress strengthening and activity tolerance to improve function. Patient would benefit from skilled therapy in order to address deficits, progress functional independence, and improve ability to navigate home and community environment. DISCHARGE UPDATE: Patient was seen for 17 sessions of Physical Therapy from 6/20/22 to 9/13/22. Patient reports feeling 95% better with pain and dysfunction in R hip. Patient has demonstrated improvements in ROM per goniometer, strength per MMT, and activity tolerance per exercise selection and standardized outcome measure since onset of POC. At time of discharge patient has increased complaints of discomfort in R LE. Patient had COVID during [address] and has regressed to using cane for ambulation as well as reported increased discomfort since COVID. Patient would like to be discharged at this time as she is fully compliant with HEP and has started to attend [facility] program for additional strengthening. Patient is being discharged at this time with [facility]. Problem List: (Impacting functional limitations): Body Structures, Functions, Activity Limitations Requiring Skilled Therapeutic Intervention: Decreased functional mobility ; Decreased ADL status; Decreased ROM; Decreased body mechanics; Decreased strength; Decreased endurance; Decreased balance;  Increased pain; Decreased posture     Therapy Prognosis:   Therapy Prognosis: Good     Assessment Complexity:      PLAN   Effective Dates:  Plan of Care/Certification Expiration Date: 10/15/22 (plan starting 8/16/22)     Frequency/Duration: Plan Frequency: One to two times a week for 8 weeks. Interventions Planned (Treatment may consist of any combination of the following):    Current Treatment Recommendations: Strengthening; ROM; Balance training; Functional mobility training; Transfer training; ADL/Self-care training; Endurance training; Gait training; Neuromuscular re-education; Manual Therapy - Soft Tissue Mobilization; Pain management; Home exercise program; Modalities; Positioning; Integrated dry needling; Therapeutic activities     Goals: (Goals have been discussed and agreed upon with patient.)  Short-Term Functional Goals: Time Frame: 6/23/22 to 7/21/22  Patient will be independent with HEP in order to progress and improve activity tolerance. (GOAL MET)  Patient will be able to demonstrate and verbalize understanding of hip precautions with 100% accuracy. (GOAL MET)  Patient will report pain no greater than 4/10 in order to tolerate improved ability to perform ADLs. (GOAL MET)  Patient will be able to stand greater than 10 minutes in order to tolerate preparing a meal in the kitchen. (GOAL MET)  Discharge Goals: Time Frame: 6/23/22 to 10/15/2022  Patient will report pain no greater than 2/10 in order to tolerate improved ability to perform ADLs. (NOT MET)  Patient will be able to ambulate 2000 feet with LRAD in order to tolerate community ambulation. (NOT MET)  Patient will demonstrate gross 5/5 LE strength in order to raise and lift 20 pounds while performing homemaking tasks. (NOT MET)  Patient will be able to perform TUG in less than 12 seconds in order to demonstrate decreased fall risk. (NOT MET)         Outcome Measure:    Tool Used: Lower Extremity Functional Scale (LEFS)  Score:  Initial: 20/80 Most Recent: 47/80 (Date: 7/25/22 )                 48/80 (8/16/22)              50/80 (9/13/22)   Interpretation of Score: 20 questions each scored on a 5 point scale with 0 representing \"extreme difficulty or unable to perform\" and 4 representing \"no difficulty\". The lower the score, the greater the functional disability. 80/80 represents no disability. Minimal detectable change is 9 points. Medical Necessity:   Patient is being discharged at this time with HEP. Total Duration: 55 minutes       Regarding Ashok King's therapy, I certify that the treatment plan above will be carried out by a therapist or under their direction. Thank you for this referral,  Rajiv Aguilar, PT     Referring Physician Signature: Lupe Sandoval,* No Signature is Required for this note.         Post Session Pain  Charge Capture  PT Visit Info  POC Link  Treatment Note Link  MD Guidelines  Abdirashid

## 2022-10-07 ENCOUNTER — HOSPITAL ENCOUNTER (OUTPATIENT)
Dept: INFUSION THERAPY | Age: 77
Discharge: HOME | End: 2022-10-07
Payer: MEDICARE

## 2022-10-07 ENCOUNTER — HOSPITAL ENCOUNTER (OUTPATIENT)
Dept: LAB | Age: 77
Discharge: HOME | End: 2022-10-10

## 2022-10-07 VITALS
HEART RATE: 70 BPM | DIASTOLIC BLOOD PRESSURE: 82 MMHG | SYSTOLIC BLOOD PRESSURE: 144 MMHG | RESPIRATION RATE: 18 BRPM | TEMPERATURE: 97.9 F

## 2022-10-07 DIAGNOSIS — M85.89 OSTEOPENIA OF MULTIPLE SITES: Primary | ICD-10-CM

## 2022-10-07 LAB — CALCIUM SERPL-MCNC: 9.3 MG/DL (ref 8.3–10.4)

## 2022-10-07 PROCEDURE — 96372 THER/PROPH/DIAG INJ SC/IM: CPT

## 2022-10-07 PROCEDURE — 36415 COLL VENOUS BLD VENIPUNCTURE: CPT

## 2022-10-07 PROCEDURE — 82310 ASSAY OF CALCIUM: CPT

## 2022-10-07 PROCEDURE — 6360000002 HC RX W HCPCS: Performed by: INTERNAL MEDICINE

## 2022-10-07 RX ORDER — ALBUTEROL SULFATE 90 UG/1
4 AEROSOL, METERED RESPIRATORY (INHALATION) PRN
OUTPATIENT
Start: 2023-01-09

## 2022-10-07 RX ORDER — EPINEPHRINE 1 MG/ML
0.3 INJECTION, SOLUTION, CONCENTRATE INTRAVENOUS PRN
OUTPATIENT
Start: 2023-01-09

## 2022-10-07 RX ORDER — DIPHENHYDRAMINE HYDROCHLORIDE 50 MG/ML
50 INJECTION INTRAMUSCULAR; INTRAVENOUS
OUTPATIENT
Start: 2023-01-09

## 2022-10-07 RX ORDER — ACETAMINOPHEN 325 MG/1
650 TABLET ORAL
OUTPATIENT
Start: 2023-01-09

## 2022-10-07 RX ORDER — ONDANSETRON 2 MG/ML
8 INJECTION INTRAMUSCULAR; INTRAVENOUS
OUTPATIENT
Start: 2023-01-09

## 2022-10-07 RX ORDER — SODIUM CHLORIDE 9 MG/ML
INJECTION, SOLUTION INTRAVENOUS CONTINUOUS
OUTPATIENT
Start: 2023-01-09

## 2022-10-07 RX ADMIN — DENOSUMAB 60 MG: 60 INJECTION SUBCUTANEOUS at 14:24

## 2022-10-07 ASSESSMENT — PAIN DESCRIPTION - DESCRIPTORS: DESCRIPTORS: ACHING

## 2022-10-07 ASSESSMENT — PAIN SCALES - GENERAL: PAINLEVEL_OUTOF10: 3

## 2022-10-07 ASSESSMENT — PAIN DESCRIPTION - PAIN TYPE: TYPE: SURGICAL PAIN

## 2022-10-07 ASSESSMENT — PAIN DESCRIPTION - LOCATION: LOCATION: HIP

## 2022-10-07 ASSESSMENT — PAIN DESCRIPTION - ONSET: ONSET: GRADUAL

## 2022-10-07 ASSESSMENT — PAIN DESCRIPTION - FREQUENCY: FREQUENCY: INTERMITTENT

## 2022-10-07 ASSESSMENT — PAIN DESCRIPTION - ORIENTATION: ORIENTATION: RIGHT

## 2022-10-07 ASSESSMENT — PAIN - FUNCTIONAL ASSESSMENT: PAIN_FUNCTIONAL_ASSESSMENT: PREVENTS OR INTERFERES SOME ACTIVE ACTIVITIES AND ADLS

## 2022-10-07 NOTE — PROGRESS NOTES
Arrived to the [facility]. Prolia completed. Provided education on Prolia    Patient instructed to report any side affects to ordering provider. Patient tolerated without complications. Any issues or concerns during appointment: NO.  Patient aware next injection due in 6 months. Discharged ambulatory.

## 2022-10-24 ENCOUNTER — OFFICE VISIT (OUTPATIENT)
Dept: ORTHOPEDIC SURGERY | Age: 77
End: 2022-10-24
Payer: MEDICARE

## 2022-10-24 DIAGNOSIS — Z96.641 STATUS POST RIGHT HIP REPLACEMENT: Primary | ICD-10-CM

## 2022-10-24 PROCEDURE — 1036F TOBACCO NON-USER: CPT | Performed by: ORTHOPAEDIC SURGERY

## 2022-10-24 PROCEDURE — G8484 FLU IMMUNIZE NO ADMIN: HCPCS | Performed by: ORTHOPAEDIC SURGERY

## 2022-10-24 PROCEDURE — 99213 OFFICE O/P EST LOW 20 MIN: CPT | Performed by: ORTHOPAEDIC SURGERY

## 2022-10-24 PROCEDURE — G8399 PT W/DXA RESULTS DOCUMENT: HCPCS | Performed by: ORTHOPAEDIC SURGERY

## 2022-10-24 PROCEDURE — G8420 CALC BMI NORM PARAMETERS: HCPCS | Performed by: ORTHOPAEDIC SURGERY

## 2022-10-24 PROCEDURE — G8428 CUR MEDS NOT DOCUMENT: HCPCS | Performed by: ORTHOPAEDIC SURGERY

## 2022-10-24 PROCEDURE — 1123F ACP DISCUSS/DSCN MKR DOCD: CPT | Performed by: ORTHOPAEDIC SURGERY

## 2022-10-24 PROCEDURE — 1090F PRES/ABSN URINE INCON ASSESS: CPT | Performed by: ORTHOPAEDIC SURGERY

## 2022-10-24 NOTE — PROGRESS NOTES
Name: Francisco Ortiz  YOB: 1945  Gender: female  MRN: [id number]      Current Outpatient Medications:     denosumab (PROLIA) 60 MG/ML SOSY SC injection, Inject 60 mg into the skin every 6 months, Disp: , Rfl:     vitamin D3 (CHOLECALCIFEROL) 10 MCG (400 UNIT) TABS tablet, Take 1 tablet by mouth daily, Disp: , Rfl:     montelukast (SINGULAIR) 10 MG tablet, Take 1 tablet by mouth daily, Disp: 90 tablet, Rfl: 1    Zinc Acetate, Oral, (ZINC ACETATE PO), Take by mouth daily, Disp: , Rfl:     acetaminophen (TYLENOL) 500 MG tablet, Take 500 mg by mouth every 6 hours as needed, Disp: , Rfl:     calcium citrate (CALCITRATE) 950 (200 Ca) MG tablet, Take 200 mg by mouth daily, Disp: , Rfl:     Cyanocobalamin 1000 MCG CAPS, Take 1 tablet by mouth daily, Disp: , Rfl:     fexofenadine-pseudoephedrine (ALLEGRA-D 24HR) 180-240 MG per extended release tablet, Take 1 tablet by mouth as needed, Disp: , Rfl:     magnesium cl-calcium carbonate (SLOW-MAG) 71.5-119 MG TBEC tablet, Take 143 mg by mouth every evening, Disp: , Rfl:     melatonin 3 MG TABS tablet, Take 3 mg by mouth as needed, Disp: , Rfl:     Menaquinone-7 (VITAMIN K2) 100 MCG CAPS, Take by mouth daily, Disp: , Rfl:     Allergies   Allergen Reactions    Simvastatin Other (See Comments)    Levofloxacin Nausea Only    Morphine Other (See Comments)    Penicillins Hives    Sulfa Antibiotics Hives       CC: Post op right NEAL    HPI: Patient is here now 6 months postop total hip replacement. Here for evaluation of this. Patient reports still some thigh pain, this is lateral.  Her pain is much less after the conversion hip arthroplasty. She is work diligently. She is now doing therapy and water aerobics on her own. She takes yoga. No other new complaints. PMH: Reviewed in chart    ROS:  As per HPI. Pertinent positives and negatives are addressed with the patient, particularly those related to musculoskeletal concerns.   Non-orthopaedic concerns were referred back to the primary care physician. PE:  right hip  Patient is comfortable and in no distress. Flexion: 0 to 90 degrees  Internal rotation: 20 degrees  External rotation: 25 degrees  Adduction: 20 degrees  Abduction: 20 degrees  Incision:  well healed  Gait: Noted trendelenburg or antalgia  No instability with ROM  Limb length is equal  Quadriceps strength is good    Radiographs:  AP pelvis and lateral views of the right taken in the office reveal a good bone prosthetic interface with no suggestion of a progressive lucency. Radiographic Diagnosis:  Normal post-operative total hip. Diagnosis: Post-operative total hip arthroplasty. They are doing well. Is been slow by COVID. She has been slowed by her overall recovery. I think her stem is stable radiographically and was clinically. She should continue to rehab her muscles and expect continued improvement. Recommendations:  Reviewed x-ray findings with the patient. Activities to be advanced as tolerated. Normal lifetime restrictions as discussed. Appropriate activities for strengthening and conditioning were reviewed. Return appointment as scheduled for follow up and radiographs. Approximately 20 minutes was spent reviewing the medical record, imaging, performing history and physical examination, discussing the diagnosis and treatment plan with the patient, and completing documentation for this visit.

## 2022-10-24 NOTE — LETTER
I recommended a handicap parking placard for the reason, walking 100 feet nonstop will aggravate their existing orthopedic condition, also the orthopedic condition creates a substantial limitation in routine walking. This permit is of medical necessity secondary to an impairment of mobility. Permanent tag.      Karen [phone] # [id number]

## 2022-11-23 ENCOUNTER — OFFICE VISIT (OUTPATIENT)
Dept: INTERNAL MEDICINE CLINIC | Facility: CLINIC | Age: 77
End: 2022-11-23
Payer: MEDICARE

## 2022-11-23 VITALS
DIASTOLIC BLOOD PRESSURE: 82 MMHG | OXYGEN SATURATION: 99 % | WEIGHT: 137.8 LBS | HEART RATE: 83 BPM | HEIGHT: 64 IN | BODY MASS INDEX: 23.52 KG/M2 | SYSTOLIC BLOOD PRESSURE: 145 MMHG

## 2022-11-23 DIAGNOSIS — Z12.12 SCREENING FOR COLORECTAL CANCER: ICD-10-CM

## 2022-11-23 DIAGNOSIS — Z12.11 SCREENING FOR COLORECTAL CANCER: ICD-10-CM

## 2022-11-23 DIAGNOSIS — E78.49 OTHER HYPERLIPIDEMIA: Primary | ICD-10-CM

## 2022-11-23 DIAGNOSIS — M85.89 OSTEOPENIA OF MULTIPLE SITES: ICD-10-CM

## 2022-11-23 DIAGNOSIS — J30.9 ALLERGIC RHINITIS, UNSPECIFIED SEASONALITY, UNSPECIFIED TRIGGER: ICD-10-CM

## 2022-11-23 DIAGNOSIS — D12.6 TUBULOVILLOUS ADENOMA OF COLON: ICD-10-CM

## 2022-11-23 DIAGNOSIS — E55.9 VITAMIN D DEFICIENCY: ICD-10-CM

## 2022-11-23 PROCEDURE — 1036F TOBACCO NON-USER: CPT | Performed by: INTERNAL MEDICINE

## 2022-11-23 PROCEDURE — G8484 FLU IMMUNIZE NO ADMIN: HCPCS | Performed by: INTERNAL MEDICINE

## 2022-11-23 PROCEDURE — 99214 OFFICE O/P EST MOD 30 MIN: CPT | Performed by: INTERNAL MEDICINE

## 2022-11-23 PROCEDURE — G8420 CALC BMI NORM PARAMETERS: HCPCS | Performed by: INTERNAL MEDICINE

## 2022-11-23 PROCEDURE — 1123F ACP DISCUSS/DSCN MKR DOCD: CPT | Performed by: INTERNAL MEDICINE

## 2022-11-23 PROCEDURE — G8399 PT W/DXA RESULTS DOCUMENT: HCPCS | Performed by: INTERNAL MEDICINE

## 2022-11-23 PROCEDURE — G8427 DOCREV CUR MEDS BY ELIG CLIN: HCPCS | Performed by: INTERNAL MEDICINE

## 2022-11-23 PROCEDURE — 1090F PRES/ABSN URINE INCON ASSESS: CPT | Performed by: INTERNAL MEDICINE

## 2022-11-23 ASSESSMENT — PATIENT HEALTH QUESTIONNAIRE - PHQ9
SUM OF ALL RESPONSES TO PHQ QUESTIONS 1-9: 0
2. FEELING DOWN, DEPRESSED OR HOPELESS: 0
SUM OF ALL RESPONSES TO PHQ QUESTIONS 1-9: 0
SUM OF ALL RESPONSES TO PHQ QUESTIONS 1-9: 0
1. LITTLE INTEREST OR PLEASURE IN DOING THINGS: 0
SUM OF ALL RESPONSES TO PHQ QUESTIONS 1-9: 0
SUM OF ALL RESPONSES TO PHQ9 QUESTIONS 1 & 2: 0

## 2022-11-23 ASSESSMENT — ANXIETY QUESTIONNAIRES
4. TROUBLE RELAXING: 0
6. BECOMING EASILY ANNOYED OR IRRITABLE: 0
IF YOU CHECKED OFF ANY PROBLEMS ON THIS QUESTIONNAIRE, HOW DIFFICULT HAVE THESE PROBLEMS MADE IT FOR YOU TO DO YOUR WORK, TAKE CARE OF THINGS AT HOME, OR GET ALONG WITH OTHER PEOPLE: NOT DIFFICULT AT ALL
1. FEELING NERVOUS, ANXIOUS, OR ON EDGE: 0
5. BEING SO RESTLESS THAT IT IS HARD TO SIT STILL: 0
GAD7 TOTAL SCORE: 0
2. NOT BEING ABLE TO STOP OR CONTROL WORRYING: 0
7. FEELING AFRAID AS IF SOMETHING AWFUL MIGHT HAPPEN: 0
3. WORRYING TOO MUCH ABOUT DIFFERENT THINGS: 0

## 2022-11-23 ASSESSMENT — ENCOUNTER SYMPTOMS
SINUS PRESSURE: 1
SHORTNESS OF BREATH: 0

## 2022-11-23 NOTE — PROGRESS NOTES
Beny Christie M.D. Internal Medicine  [address] [facility] [place] , [address]   Office : [phone]  Fax : [phone]    Chief Complaint   Patient presents with    Hyperlipidemia         History of Present Illness:  Francisco Ortiz is a 69 y.o. female. HPI    Hyperlipidemia  Patient is in for follow-up for hyperlipidemia. Diet and Lifestyle: generally follows a low fat low cholesterol diet. Risk factors for vascular disease consist of hyperlipidemia. Last LDL was   Lab Results   Component Value Date    LDLCALC 86.4 07/11/2022   . Last ALT was   Lab Results   Component Value Date/Time    ALT 19 07/11/2022 09:55 AM   .  No muscle aches. Vitamin D Deficiency and Osteopenia  Last DEXA in 4/2021.   Currently on Prolia therapy  Lab Results   Component Value Date/Time    VITD25 34.7 07/11/2022 09:55 AM        Lab Results   Component Value Date    CALCIUM 9.3 10/07/2022    PHOS 3.8 (H) 10/04/2021       Peripheral Neuropathy with B12 deficiency  Taking OTC Vitamin B12  Lab Results   Component Value Date    [id number] 570 07/11/2022       Allergic Rhinitis  Not controlled with allegra, singulair and flonase, but she does not want to do any additional testing    Past Medical History:  Past Medical History:   Diagnosis Date    Allergic eye reaction 6/19/2013    Arthritis     shoulders    Claustrophobia     Elevated blood pressure (not hypertension) 4/30/2015    Environmental allergies     FH: breast cancer 6/19/2013    FH: colon cancer 6/19/2013    [place] (hard of hearing)     has bilateral hearing aids    Hyperlipidemia 6/19/2013    diet controlled    Left shoulder pain 6/19/2013    Menopause 6/19/2013    Nausea & vomiting     post op nausea and vomiting in past    Neuropathy     in toes    Osteopenia     Trigger finger 6/19/2013    left thumb    Unspecified adverse effect of anesthesia     usually takes until next day to wake fully, OK with last procedure (colonoscopy 2012)    Vertigo 6/19/2013    Vitamin B12 deficiency     Vitamin D deficiency 8/9/2016     Past Surgical History:  Past Surgical History:   Procedure Laterality Date    APPENDECTOMY      CHOLECYSTECTOMY      GI  last 2012    colonoscopy (at least 5-6)    HYSTERECTOMY (CERVIX STATUS UNKNOWN)      no bso    JOINT REPLACEMENT Right 04/26/2022    2nd hip replacement    JOINT REPLACEMENT Right 05/26/2021    1st hip replacement    MOHS SURGERY  1982    right arm    ORTHOPEDIC SURGERY Right 05/26/2021    hip replacement    ROTATOR CUFF REPAIR Bilateral     TUBAL LIGATION  1971     Allergies:    Allergies   Allergen Reactions    Simvastatin Other (See Comments)    Levofloxacin Nausea Only    Morphine Other (See Comments)    Penicillins Hives    Sulfa Antibiotics Hives     Medications:   Current Outpatient Medications   Medication Sig Dispense Refill    denosumab (PROLIA) 60 MG/ML SOSY SC injection Inject 60 mg into the skin every 6 months      vitamin D3 (CHOLECALCIFEROL) 10 MCG (400 UNIT) TABS tablet Take 1 tablet by mouth daily      montelukast (SINGULAIR) 10 MG tablet Take 1 tablet by mouth daily 90 tablet 1    Zinc Acetate, Oral, (ZINC ACETATE PO) Take by mouth daily      acetaminophen (TYLENOL) 500 MG tablet Take 500 mg by mouth every 6 hours as needed      calcium citrate (CALCITRATE) 950 (200 Ca) MG tablet Take 200 mg by mouth daily      fexofenadine-pseudoephedrine (ALLEGRA-D 24HR) 180-240 MG per extended release tablet Take 1 tablet by mouth as needed      magnesium cl-calcium carbonate (SLOW-MAG) 71.5-119 MG TBEC tablet Take 143 mg by mouth every evening      Cyanocobalamin 1000 MCG CAPS Take 1 tablet by mouth daily (Patient not taking: Reported on 11/23/2022)      melatonin 3 MG TABS tablet Take 3 mg by mouth as needed (Patient not taking: Reported on 11/23/2022)      Menaquinone-7 (VITAMIN K2) 100 MCG CAPS Take by mouth daily (Patient not taking: Reported on 11/23/2022)       No current facility-administered medications for this visit. Social History:  Social History     Tobacco Use    Smoking status: Never    Smokeless tobacco: Never   Substance Use Topics    Alcohol use: No     Family History  Family History   Problem Relation Age of Onset    Cancer Mother 68        colon    Colon Cancer Mother     Heart Disease Father     Heart Attack Father     Lung Disease Father             Cancer Sister 44        breast cancer, spread to lungs    Breast Cancer Sister     Cancer Brother         prostate cancer    Other Brother         HCV + Staph (hosp acquired)       Review of Systems   Constitutional:  Negative for chills, fatigue and fever. HENT:  Positive for sinus pressure and sneezing. Respiratory:  Negative for shortness of breath. Cardiovascular:  Negative for chest pain. Musculoskeletal:  Positive for arthralgias. Negative for gait problem. Skin:  Negative for rash. Neurological:  Negative for speech difficulty. Psychiatric/Behavioral:  Negative for dysphoric mood. Vital Signs  BP (!) 145/82   Pulse 83   Ht 5' 4\" (1.626 m)   Wt 137 lb 12.8 oz (62.5 kg)   SpO2 99%   BMI 23.65 kg/m²   Body mass index is 23.65 kg/m². Physical Exam  Vitals reviewed. Constitutional:       General: She is not in acute distress. Appearance: Normal appearance. She is not ill-appearing. HENT:      Head: Normocephalic and atraumatic. Eyes:      General: No scleral icterus. Conjunctiva/sclera: Conjunctivae normal.   Neck:      Vascular: No carotid bruit. Cardiovascular:      Rate and Rhythm: Normal rate and regular rhythm. Heart sounds: Normal heart sounds. No murmur heard. Pulmonary:      Effort: Pulmonary effort is normal.      Breath sounds: Normal breath sounds. Musculoskeletal:         General: No swelling. Skin:     Coloration: Skin is not jaundiced. Findings: No rash. Neurological:      General: No focal deficit present.       Mental Status: She is alert. Mental status is at baseline. Cranial Nerves: No cranial nerve deficit. Motor: No weakness. Gait: Gait abnormal.   Psychiatric:         Mood and Affect: Mood normal.         Behavior: Behavior normal.         Thought Content: Thought content normal.         Judgment: Judgment normal.         Assessment/Plan:  Moses Peña was seen today for hyperlipidemia. Diagnoses and all orders for this visit:    Other hyperlipidemia    Osteopenia of multiple sites    Vitamin D deficiency    Allergic rhinitis, unspecified seasonality, unspecified trigger    Tubulovillous adenoma of colon  -     [facility] - Nayla Lockett MD, Gastroenterology    Screening for colorectal cancer  -     LOLI Lockett MD, Gastroenterology      Return in about 6 months (around 5/23/2023), or if symptoms worsen or fail to improve.   __  Wendie Means M.D.

## 2023-03-11 SDOH — HEALTH STABILITY: PHYSICAL HEALTH: ON AVERAGE, HOW MANY MINUTES DO YOU ENGAGE IN EXERCISE AT THIS LEVEL?: 90 MIN

## 2023-03-11 SDOH — HEALTH STABILITY: PHYSICAL HEALTH: ON AVERAGE, HOW MANY DAYS PER WEEK DO YOU ENGAGE IN MODERATE TO STRENUOUS EXERCISE (LIKE A BRISK WALK)?: 3 DAYS

## 2023-03-11 ASSESSMENT — PATIENT HEALTH QUESTIONNAIRE - PHQ9
SUM OF ALL RESPONSES TO PHQ QUESTIONS 1-9: 0
SUM OF ALL RESPONSES TO PHQ QUESTIONS 1-9: 0
1. LITTLE INTEREST OR PLEASURE IN DOING THINGS: 0
SUM OF ALL RESPONSES TO PHQ QUESTIONS 1-9: 0
SUM OF ALL RESPONSES TO PHQ9 QUESTIONS 1 & 2: 0
SUM OF ALL RESPONSES TO PHQ QUESTIONS 1-9: 0
2. FEELING DOWN, DEPRESSED OR HOPELESS: 0

## 2023-03-11 ASSESSMENT — LIFESTYLE VARIABLES
HOW MANY STANDARD DRINKS CONTAINING ALCOHOL DO YOU HAVE ON A TYPICAL DAY: 0
HOW MANY STANDARD DRINKS CONTAINING ALCOHOL DO YOU HAVE ON A TYPICAL DAY: PATIENT DOES NOT DRINK
HOW OFTEN DO YOU HAVE SIX OR MORE DRINKS ON ONE OCCASION: 1
HOW OFTEN DO YOU HAVE A DRINK CONTAINING ALCOHOL: 1
HOW OFTEN DO YOU HAVE A DRINK CONTAINING ALCOHOL: NEVER

## 2023-03-20 SDOH — ECONOMIC STABILITY: HOUSING INSECURITY
IN THE LAST 12 MONTHS, WAS THERE A TIME WHEN YOU DID NOT HAVE A STEADY PLACE TO SLEEP OR SLEPT IN A SHELTER (INCLUDING NOW)?: NO

## 2023-03-20 SDOH — ECONOMIC STABILITY: FOOD INSECURITY: WITHIN THE PAST 12 MONTHS, THE FOOD YOU BOUGHT JUST DIDN'T LAST AND YOU DIDN'T HAVE MONEY TO GET MORE.: NEVER TRUE

## 2023-03-20 SDOH — ECONOMIC STABILITY: TRANSPORTATION INSECURITY
IN THE PAST 12 MONTHS, HAS LACK OF TRANSPORTATION KEPT YOU FROM MEETINGS, WORK, OR FROM GETTING THINGS NEEDED FOR DAILY LIVING?: NO

## 2023-03-20 SDOH — ECONOMIC STABILITY: FOOD INSECURITY: WITHIN THE PAST 12 MONTHS, YOU WORRIED THAT YOUR FOOD WOULD RUN OUT BEFORE YOU GOT MONEY TO BUY MORE.: NEVER TRUE

## 2023-03-20 SDOH — ECONOMIC STABILITY: INCOME INSECURITY: HOW HARD IS IT FOR YOU TO PAY FOR THE VERY BASICS LIKE FOOD, HOUSING, MEDICAL CARE, AND HEATING?: NOT VERY HARD

## 2023-03-22 ENCOUNTER — OFFICE VISIT (OUTPATIENT)
Dept: INTERNAL MEDICINE CLINIC | Facility: CLINIC | Age: 78
End: 2023-03-22
Payer: MEDICARE

## 2023-03-22 VITALS
WEIGHT: 133.4 LBS | TEMPERATURE: 98 F | DIASTOLIC BLOOD PRESSURE: 72 MMHG | OXYGEN SATURATION: 97 % | BODY MASS INDEX: 22.77 KG/M2 | SYSTOLIC BLOOD PRESSURE: 114 MMHG | HEART RATE: 80 BPM | HEIGHT: 64 IN

## 2023-03-22 DIAGNOSIS — J30.9 ALLERGIC RHINITIS, UNSPECIFIED SEASONALITY, UNSPECIFIED TRIGGER: ICD-10-CM

## 2023-03-22 DIAGNOSIS — I10 ESSENTIAL HYPERTENSION: ICD-10-CM

## 2023-03-22 DIAGNOSIS — Z00.00 MEDICARE ANNUAL WELLNESS VISIT, SUBSEQUENT: Primary | ICD-10-CM

## 2023-03-22 PROCEDURE — 3078F DIAST BP <80 MM HG: CPT | Performed by: INTERNAL MEDICINE

## 2023-03-22 PROCEDURE — 1123F ACP DISCUSS/DSCN MKR DOCD: CPT | Performed by: INTERNAL MEDICINE

## 2023-03-22 PROCEDURE — G0439 PPPS, SUBSEQ VISIT: HCPCS | Performed by: INTERNAL MEDICINE

## 2023-03-22 PROCEDURE — G8484 FLU IMMUNIZE NO ADMIN: HCPCS | Performed by: INTERNAL MEDICINE

## 2023-03-22 PROCEDURE — 3074F SYST BP LT 130 MM HG: CPT | Performed by: INTERNAL MEDICINE

## 2023-03-22 RX ORDER — OLMESARTAN MEDOXOMIL 20 MG/1
20 TABLET ORAL DAILY
Qty: 90 TABLET | Refills: 1 | Status: SHIPPED | OUTPATIENT
Start: 2023-03-22

## 2023-03-22 RX ORDER — MONTELUKAST SODIUM 10 MG/1
10 TABLET ORAL DAILY
Qty: 90 TABLET | Refills: 1 | Status: SHIPPED | OUTPATIENT
Start: 2023-03-22

## 2023-03-22 NOTE — PROGRESS NOTES
Cecy Lazo M.D. Internal Medicine  [address] , [address]   Office : [phone]  Fax : [phone]  Chief Complaint   Patient presents with    Medicare [address]         Medicare Annual Wellness Visit    Chapincito Segura is here for Medicare AWV    Assessment & Plan   Medicare annual wellness visit, subsequent      Recommendations for Preventive Services Due: see orders and patient instructions/AVS.  Recommended screening schedule for the next 5-10 years is provided to the patient in written form: see Patient Instructions/AVS.     No follow-ups on file. Subjective       Patient's complete Health Risk Assessment and screening values have been reviewed and are found in Flowsheets. The following problems were reviewed today and where indicated follow up appointments were made and/or referrals ordered. Positive Risk Factor Screenings with Interventions:    Fall Risk:  Do you feel unsteady or are you worried about falling? : (!) yes  2 or more falls in past year?: no  Fall with injury in past year?: no     Interventions:    Continue using 4 prong cane               Dentist Screen:  Have you seen the dentist within the past year?: (!) No    Intervention:  Not applicable - dentures       ADL's:   Patient reports needing help with:  Select all that apply: (!) Housekeeping  Interventions:  Patient declined any further interventions or treatment, Family is assisting her                    Objective   Vitals:    03/22/23 0850   BP: 114/72   Pulse: 80   Temp: 98 °F (36.7 °C)   TempSrc: Oral   SpO2: 97%   Weight: 133 lb 6.4 oz (60.5 kg)   Height: 5' 4\" (1.626 m)      Body mass index is 22.9 kg/m².                Allergies   Allergen Reactions    Simvastatin Other (See Comments)    Levofloxacin Nausea Only    Morphine Other (See Comments)    Penicillins Hives    Sulfa Antibiotics Hives     Prior to Visit Medications

## 2023-03-22 NOTE — PATIENT INSTRUCTIONS
you hear. It can range from mild to severe. Permanent hearing loss can occur with aging. It also can happen when you are exposed long-term to loud noise. Examples include listening to loud music, riding motorcycles, or being around other loud machines. Hearing loss can affect your work and home life. It can make you feel lonely or depressed. You may feel that you have lost your independence. But hearing aids and other devices can help you hear better and feel connected to others. Follow-up care is a key part of your treatment and safety. Be sure to make and go to all appointments, and call your doctor if you are having problems. It's also a good idea to know your test results and keep a list of the medicines you take. How can you care for yourself at home? Avoid loud noises whenever possible. This helps keep your hearing from getting worse. Always wear hearing protection around loud noises. Wear a hearing aid as directed. See a professional who can help you pick a hearing aid that fits you. Have hearing tests as your doctor suggests. They can show whether your hearing has changed. Your hearing aid may need to be adjusted. Use other devices as needed. These may include:  Telephone amplifiers and hearing aids that can connect to a television, stereo, radio, or microphone. Devices that use lights or vibrations. These alert you to the doorbell, a ringing telephone, or a baby monitor. Television closed-captioning. This shows the words at the bottom of the screen. Most new TVs can do this. TTY (text telephone). This lets you type messages back and forth on the telephone instead of talking or listening. These devices are also called TDD. When messages are typed on the keyboard, they are sent over the phone line to a receiving TTY. The message is shown on a monitor. Use text messaging, social media, and email if it is hard for you to communicate by telephone.   Try to learn a listening technique called

## 2023-04-14 ENCOUNTER — HOSPITAL ENCOUNTER (OUTPATIENT)
Dept: LAB | Age: 78
Discharge: HOME | End: 2023-04-17

## 2023-04-14 ENCOUNTER — HOSPITAL ENCOUNTER (OUTPATIENT)
Dept: INFUSION THERAPY | Age: 78
Discharge: HOME | End: 2023-04-14
Payer: MEDICARE

## 2023-04-14 VITALS
OXYGEN SATURATION: 97 % | DIASTOLIC BLOOD PRESSURE: 55 MMHG | SYSTOLIC BLOOD PRESSURE: 109 MMHG | RESPIRATION RATE: 16 BRPM | TEMPERATURE: 98 F | HEART RATE: 83 BPM

## 2023-04-14 DIAGNOSIS — M85.89 OSTEOPENIA OF MULTIPLE SITES: Primary | ICD-10-CM

## 2023-04-14 LAB
ALBUMIN SERPL-MCNC: 3.7 G/DL (ref 3.2–4.6)
ALBUMIN/GLOB SERPL: 1 (ref 0.4–1.6)
ALP SERPL-CCNC: 46 U/L (ref 50–136)
ALT SERPL-CCNC: 16 U/L (ref 12–65)
ANION GAP SERPL CALC-SCNC: 1 MMOL/L (ref 2–11)
AST SERPL-CCNC: 19 U/L (ref 15–37)
BILIRUB SERPL-MCNC: 0.3 MG/DL (ref 0.2–1.1)
BUN SERPL-MCNC: 19 MG/DL (ref 8–23)
CALCIUM SERPL-MCNC: 9.1 MG/DL (ref 8.3–10.4)
CHLORIDE SERPL-SCNC: 108 MMOL/L (ref 101–110)
CO2 SERPL-SCNC: 29 MMOL/L (ref 21–32)
CREAT SERPL-MCNC: 1.1 MG/DL (ref 0.6–1)
GLOBULIN SER CALC-MCNC: 3.6 G/DL (ref 2.8–4.5)
GLUCOSE SERPL-MCNC: 103 MG/DL (ref 65–100)
POTASSIUM SERPL-SCNC: 4.9 MMOL/L (ref 3.5–5.1)
PROT SERPL-MCNC: 7.3 G/DL (ref 6.3–8.2)
SODIUM SERPL-SCNC: 138 MMOL/L (ref 133–143)

## 2023-04-14 PROCEDURE — 96372 THER/PROPH/DIAG INJ SC/IM: CPT

## 2023-04-14 PROCEDURE — 80053 COMPREHEN METABOLIC PANEL: CPT

## 2023-04-14 PROCEDURE — 6360000002 HC RX W HCPCS: Performed by: INTERNAL MEDICINE

## 2023-04-14 PROCEDURE — 36415 COLL VENOUS BLD VENIPUNCTURE: CPT

## 2023-04-14 RX ORDER — ACETAMINOPHEN 325 MG/1
650 TABLET ORAL
OUTPATIENT
Start: 2023-10-06

## 2023-04-14 RX ORDER — DIPHENHYDRAMINE HYDROCHLORIDE 50 MG/ML
50 INJECTION INTRAMUSCULAR; INTRAVENOUS
OUTPATIENT
Start: 2023-10-06

## 2023-04-14 RX ORDER — EPINEPHRINE 1 MG/ML
0.3 INJECTION, SOLUTION, CONCENTRATE INTRAVENOUS PRN
OUTPATIENT
Start: 2023-10-06

## 2023-04-14 RX ORDER — ONDANSETRON 2 MG/ML
8 INJECTION INTRAMUSCULAR; INTRAVENOUS
OUTPATIENT
Start: 2023-10-06

## 2023-04-14 RX ORDER — SODIUM CHLORIDE 9 MG/ML
INJECTION, SOLUTION INTRAVENOUS CONTINUOUS
OUTPATIENT
Start: 2023-10-06

## 2023-04-14 RX ORDER — ALBUTEROL SULFATE 90 UG/1
4 AEROSOL, METERED RESPIRATORY (INHALATION) PRN
OUTPATIENT
Start: 2023-10-06

## 2023-04-14 RX ADMIN — DENOSUMAB 60 MG: 60 INJECTION SUBCUTANEOUS at 15:14

## 2023-04-14 NOTE — PROGRESS NOTES
Arrived to the [facility]. Earline  completed. Provided education on same. Patient instructed to report any side affects to ordering provider. Patient tolerated well. Any issues or concerns during appointment: none. Patient to call to make next appt  Discharged ambulatory with cane.

## 2023-04-24 ENCOUNTER — OFFICE VISIT (OUTPATIENT)
Dept: ORTHOPEDIC SURGERY | Age: 78
End: 2023-04-24
Payer: MEDICARE

## 2023-04-24 DIAGNOSIS — Z96.641 STATUS POST RIGHT HIP REPLACEMENT: ICD-10-CM

## 2023-04-24 DIAGNOSIS — M70.61 GREATER TROCHANTERIC BURSITIS OF RIGHT HIP: Primary | ICD-10-CM

## 2023-04-24 PROCEDURE — 20610 DRAIN/INJ JOINT/BURSA W/O US: CPT | Performed by: ORTHOPAEDIC SURGERY

## 2023-04-24 PROCEDURE — 99214 OFFICE O/P EST MOD 30 MIN: CPT | Performed by: ORTHOPAEDIC SURGERY

## 2023-04-24 PROCEDURE — G8420 CALC BMI NORM PARAMETERS: HCPCS | Performed by: ORTHOPAEDIC SURGERY

## 2023-04-24 PROCEDURE — 1036F TOBACCO NON-USER: CPT | Performed by: ORTHOPAEDIC SURGERY

## 2023-04-24 PROCEDURE — 1123F ACP DISCUSS/DSCN MKR DOCD: CPT | Performed by: ORTHOPAEDIC SURGERY

## 2023-04-24 PROCEDURE — 1090F PRES/ABSN URINE INCON ASSESS: CPT | Performed by: ORTHOPAEDIC SURGERY

## 2023-04-24 PROCEDURE — G8428 CUR MEDS NOT DOCUMENT: HCPCS | Performed by: ORTHOPAEDIC SURGERY

## 2023-04-24 PROCEDURE — G8399 PT W/DXA RESULTS DOCUMENT: HCPCS | Performed by: ORTHOPAEDIC SURGERY

## 2023-04-24 RX ORDER — TRIAMCINOLONE ACETONIDE 40 MG/ML
40 INJECTION, SUSPENSION INTRA-ARTICULAR; INTRAMUSCULAR ONCE
Status: COMPLETED | OUTPATIENT
Start: 2023-04-24 | End: 2023-04-24

## 2023-04-24 RX ADMIN — TRIAMCINOLONE ACETONIDE 40 MG: 40 INJECTION, SUSPENSION INTRA-ARTICULAR; INTRAMUSCULAR at 13:35

## 2023-04-24 NOTE — PROGRESS NOTES
Name: Alyx Parks  YOB: 1945  Gender: female  MRN: [id number]      Current Outpatient Medications:     olmesartan (BENICAR) 20 MG tablet, Take 1 tablet by mouth daily, Disp: 90 tablet, Rfl: 1    montelukast (SINGULAIR) 10 MG tablet, Take 1 tablet by mouth daily, Disp: 90 tablet, Rfl: 1    denosumab (PROLIA) 60 MG/ML SOSY SC injection, Inject 60 mg into the skin every 6 months, Disp: , Rfl:     vitamin D3 (CHOLECALCIFEROL) 10 MCG (400 UNIT) TABS tablet, Take 1 tablet by mouth daily, Disp: , Rfl:     acetaminophen (TYLENOL) 500 MG tablet, Take 500 mg by mouth every 6 hours as needed, Disp: , Rfl:     calcium citrate (CALCITRATE) 950 (200 Ca) MG tablet, Take 200 mg by mouth daily, Disp: , Rfl:     Cyanocobalamin 1000 MCG CAPS, Take 1 tablet by mouth daily, Disp: , Rfl:     fexofenadine-pseudoephedrine (ALLEGRA-D 24HR) 180-240 MG per extended release tablet, Take 1 tablet by mouth as needed, Disp: , Rfl:     magnesium cl-calcium carbonate (SLOW-MAG) 71.5-119 MG TBEC tablet, Take 143 mg by mouth every evening, Disp: , Rfl:     melatonin 3 MG TABS tablet, Take 3 mg by mouth as needed, Disp: , Rfl:     Allergies   Allergen Reactions    Simvastatin Other (See Comments)    Levofloxacin Nausea Only    Morphine Other (See Comments)    Penicillins Hives    Sulfa Antibiotics Hives       CC: Post op right NEAL    HPI: Patient is here now 1 year postop total hip replacement. Here for evaluation of this. Patient reports minimal, occasional discomfort, to no pain in the thigh. No other new complaints. PMH: Reviewed in chart    ROS:  As per HPI. Pertinent positives and negatives are addressed with the patient, particularly those related to musculoskeletal concerns. Non-orthopaedic concerns were referred back to the primary care physician. PE:  right hip  Patient is comfortable and in no distress.   Flexion: 0 to 90 degrees  Internal rotation: 20 degrees  External rotation: 25 degrees  Adduction: 20
education: Not on file    Highest education level: Not on file   Occupational History    Not on file   Tobacco Use    Smoking status: Never    Smokeless tobacco: Never   Vaping Use    Vaping Use: Never used   Substance and Sexual Activity    Alcohol use: No    Drug use: No    Sexual activity: Not on file     Comment: tv   Other Topics Concern    Not on file   Social History Narrative    Not on file     Social Determinants of Health     Financial Resource Strain: Low Risk     Difficulty of Paying Living Expenses: Not very hard   Food Insecurity: No Food Insecurity    Worried About Running Out of Food in the Last Year: Never true    Ran Out of Food in the Last Year: Never true   Transportation Needs: Unknown    Lack of Transportation (Medical): Not on file    Lack of Transportation (Non-Medical): No   Physical Activity: Sufficiently Active    Days of Exercise per Week: 3 days    Minutes of Exercise per Session: 90 min   Stress: Not on file   Social Connections: Not on file   Intimate Partner Violence: Not on file   Housing Stability: Unknown    Unable to Pay for Housing in the Last Year: Not on file    Number of Places Lived in the Last Year: Not on file    Unstable Housing in the Last Year: No                  PHYSICAL EXAMINATION:   The patient is alert and oriented, in no distress. The lower extremities are as described below. Circulation is normal with palpable pedal pulses bilaterally and no edema. Skin of the leg is normal with no chronic stasis disease bilaterally. Sensation is intact to light touch bilaterally. Gait is noted to be with a slight trendelenburg and antalgia.   right hip range of motion is 0-90 degrees of flexion and 20 degrees on abduction and adduction. There is 15 degrees internal rotation and 25 degrees of external rotation with no pain in groin.   There is focal tenderness to palpation over right greater trochanter  Limb lengths are equal.  Straight leg test is negative

## 2023-05-12 ENCOUNTER — OFFICE VISIT (OUTPATIENT)
Dept: ORTHOPEDIC SURGERY | Age: 78
End: 2023-05-12

## 2023-05-12 DIAGNOSIS — M25.551 RIGHT HIP PAIN: ICD-10-CM

## 2023-05-12 DIAGNOSIS — M47.816 LUMBAR SPONDYLOSIS: Primary | ICD-10-CM

## 2023-05-12 NOTE — PROGRESS NOTES
Substance and Sexual Activity    Alcohol use: No    Drug use: No    Sexual activity: Not on file     Comment: tvh   Other Topics Concern    Not on file   Social History Narrative    Not on file     Social Determinants of Health     Financial Resource Strain: Low Risk     Difficulty of Paying Living Expenses: Not very hard   Food Insecurity: No Food Insecurity    Worried About Running Out of Food in the Last Year: Never true    Ran Out of Food in the Last Year: Never true   Transportation Needs: Unknown    Lack of Transportation (Medical): Not on file    Lack of Transportation (Non-Medical): No   Physical Activity: Sufficiently Active    Days of Exercise per Week: 3 days    Minutes of Exercise per Session: 90 min   Stress: Not on file   Social Connections: Not on file   Intimate Partner Violence: Not on file   Housing Stability: Unknown    Unable to Pay for Housing in the Last Year: Not on file    Number of Places Lived in the Last Year: Not on file    Unstable Housing in the Last Year: No       Physical examination:  Alert and oriented, in no acute distress. No pain on terminal ROM of the right Hip. Mild tenderness about the hip today. No dramatic antalgia. Impression:    Diagnosis Orders   1. Lumbar spondylosis        2. Right hip pain            Recommendations: Today we discussed additional shots or PT in the future. As noted, she got no relief from bursa injection. We discussed her symptoms today and that  she likely has a spine component contributing to her pain. We will refer to the spine team for further evaluation and management. She will continue to function as tolerated. Avoid activities that cause pain and swelling. She may follow-up with us on as-needed basis. Approximately 20 minutes was spent reviewing the medical record, imaging, performing history and physical examination, discussing the diagnosis and treatment plan with the patient, and completing documentation for this visit.

## 2023-05-17 ENCOUNTER — OFFICE VISIT (OUTPATIENT)
Dept: INTERNAL MEDICINE CLINIC | Facility: CLINIC | Age: 78
End: 2023-05-17
Payer: MEDICARE

## 2023-05-17 VITALS
WEIGHT: 133.2 LBS | SYSTOLIC BLOOD PRESSURE: 131 MMHG | OXYGEN SATURATION: 94 % | HEART RATE: 68 BPM | TEMPERATURE: 98 F | DIASTOLIC BLOOD PRESSURE: 80 MMHG | HEIGHT: 64 IN | BODY MASS INDEX: 22.74 KG/M2

## 2023-05-17 DIAGNOSIS — E78.49 OTHER HYPERLIPIDEMIA: Primary | ICD-10-CM

## 2023-05-17 DIAGNOSIS — E53.8 B12 DEFICIENCY: ICD-10-CM

## 2023-05-17 DIAGNOSIS — E55.9 VITAMIN D DEFICIENCY: ICD-10-CM

## 2023-05-17 DIAGNOSIS — E78.49 OTHER HYPERLIPIDEMIA: ICD-10-CM

## 2023-05-17 DIAGNOSIS — M76.31 ILIOTIBIAL BAND SYNDROME AFFECTING RIGHT LOWER LEG: ICD-10-CM

## 2023-05-17 DIAGNOSIS — M85.89 OSTEOPENIA OF MULTIPLE SITES: ICD-10-CM

## 2023-05-17 LAB
25(OH)D3 SERPL-MCNC: 30.7 NG/ML (ref 30–100)
ALBUMIN SERPL-MCNC: 3.8 G/DL (ref 3.2–4.6)
ALBUMIN/GLOB SERPL: 1.2 (ref 0.4–1.6)
ALP SERPL-CCNC: 47 U/L (ref 50–136)
ALT SERPL-CCNC: 20 U/L (ref 12–65)
ANION GAP SERPL CALC-SCNC: 4 MMOL/L (ref 2–11)
AST SERPL-CCNC: 21 U/L (ref 15–37)
BASOPHILS # BLD: 0.1 K/UL (ref 0–0.2)
BASOPHILS NFR BLD: 1 % (ref 0–2)
BILIRUB DIRECT SERPL-MCNC: 0.1 MG/DL
BILIRUB SERPL-MCNC: 0.4 MG/DL (ref 0.2–1.1)
BUN SERPL-MCNC: 12 MG/DL (ref 8–23)
CALCIUM SERPL-MCNC: 8.5 MG/DL (ref 8.3–10.4)
CHLORIDE SERPL-SCNC: 105 MMOL/L (ref 101–110)
CHOLEST SERPL-MCNC: 199 MG/DL
CO2 SERPL-SCNC: 27 MMOL/L (ref 21–32)
CREAT SERPL-MCNC: 0.8 MG/DL (ref 0.6–1)
DIFFERENTIAL METHOD BLD: ABNORMAL
EOSINOPHIL # BLD: 0.2 K/UL (ref 0–0.8)
EOSINOPHIL NFR BLD: 3 % (ref 0.5–7.8)
ERYTHROCYTE [DISTWIDTH] IN BLOOD BY AUTOMATED COUNT: 13.5 % (ref 11.9–14.6)
GLOBULIN SER CALC-MCNC: 3.1 G/DL (ref 2.8–4.5)
GLUCOSE SERPL-MCNC: 87 MG/DL (ref 65–100)
HCT VFR BLD AUTO: 37.8 % (ref 35.8–46.3)
HDLC SERPL-MCNC: 105 MG/DL (ref 40–60)
HDLC SERPL: 1.9
HGB BLD-MCNC: 12.1 G/DL (ref 11.7–15.4)
IMM GRANULOCYTES # BLD AUTO: 0 K/UL (ref 0–0.5)
IMM GRANULOCYTES NFR BLD AUTO: 0 % (ref 0–5)
LDLC SERPL CALC-MCNC: 81.4 MG/DL
LYMPHOCYTES # BLD: 1.6 K/UL (ref 0.5–4.6)
LYMPHOCYTES NFR BLD: 22 % (ref 13–44)
MCH RBC QN AUTO: 32.1 PG (ref 26.1–32.9)
MCHC RBC AUTO-ENTMCNC: 32 G/DL (ref 31.4–35)
MCV RBC AUTO: 100.3 FL (ref 82–102)
MONOCYTES # BLD: 0.5 K/UL (ref 0.1–1.3)
MONOCYTES NFR BLD: 7 % (ref 4–12)
NEUTS SEG # BLD: 4.8 K/UL (ref 1.7–8.2)
NEUTS SEG NFR BLD: 67 % (ref 43–78)
NRBC # BLD: 0 K/UL (ref 0–0.2)
PLATELET # BLD AUTO: 226 K/UL (ref 150–450)
PMV BLD AUTO: 10.5 FL (ref 9.4–12.3)
POTASSIUM SERPL-SCNC: 4.1 MMOL/L (ref 3.5–5.1)
PROT SERPL-MCNC: 6.9 G/DL (ref 6.3–8.2)
RBC # BLD AUTO: 3.77 M/UL (ref 4.05–5.2)
SODIUM SERPL-SCNC: 136 MMOL/L (ref 133–143)
TRIGL SERPL-MCNC: 63 MG/DL (ref 35–150)
VIT B12 SERPL-MCNC: 2903 PG/ML (ref 193–986)
VLDLC SERPL CALC-MCNC: 12.6 MG/DL (ref 6–23)
WBC # BLD AUTO: 7.3 K/UL (ref 4.3–11.1)

## 2023-05-17 PROCEDURE — 99214 OFFICE O/P EST MOD 30 MIN: CPT | Performed by: INTERNAL MEDICINE

## 2023-05-17 PROCEDURE — 1090F PRES/ABSN URINE INCON ASSESS: CPT | Performed by: INTERNAL MEDICINE

## 2023-05-17 PROCEDURE — 1036F TOBACCO NON-USER: CPT | Performed by: INTERNAL MEDICINE

## 2023-05-17 PROCEDURE — G8427 DOCREV CUR MEDS BY ELIG CLIN: HCPCS | Performed by: INTERNAL MEDICINE

## 2023-05-17 PROCEDURE — 1123F ACP DISCUSS/DSCN MKR DOCD: CPT | Performed by: INTERNAL MEDICINE

## 2023-05-17 PROCEDURE — G8420 CALC BMI NORM PARAMETERS: HCPCS | Performed by: INTERNAL MEDICINE

## 2023-05-17 PROCEDURE — G8399 PT W/DXA RESULTS DOCUMENT: HCPCS | Performed by: INTERNAL MEDICINE

## 2023-05-17 RX ORDER — UREA 10 %
1 LOTION (ML) TOPICAL NIGHTLY PRN
COMMUNITY

## 2023-05-17 ASSESSMENT — ENCOUNTER SYMPTOMS: SHORTNESS OF BREATH: 0

## 2023-05-17 NOTE — PROGRESS NOTES
Gait: Gait abnormal.   Psychiatric:         Mood and Affect: Mood normal.         Behavior: Behavior normal.         Thought Content: Thought content normal.         Judgment: Judgment normal.         Assessment/Plan:  Moses Peña was seen today for cholesterol problem. Diagnoses and all orders for this visit:    Other hyperlipidemia  -     Hepatic Function Panel; Future  -     Lipid Panel; Future    Iliotibial band syndrome affecting right lower leg    Osteopenia of multiple sites  -     DEXA BONE DENSITY AXIAL SKELETON; Future  -     Basic Metabolic Panel; Future    Vitamin D deficiency  -     Vitamin D 25 Hydroxy; Future    B12 deficiency  -     CBC with Auto Differential; Future  -     Vitamin B12; Future        Return in about 6 months (around 11/17/2023), or if symptoms worsen or fail to improve.   __  Wendie Means M.D.

## 2023-06-06 ENCOUNTER — OFFICE VISIT (OUTPATIENT)
Dept: ORTHOPEDIC SURGERY | Age: 78
End: 2023-06-06
Payer: MEDICARE

## 2023-06-06 VITALS — WEIGHT: 133 LBS | BODY MASS INDEX: 22.71 KG/M2 | HEIGHT: 64 IN

## 2023-06-06 DIAGNOSIS — M54.50 LOW BACK PAIN, UNSPECIFIED BACK PAIN LATERALITY, UNSPECIFIED CHRONICITY, UNSPECIFIED WHETHER SCIATICA PRESENT: Primary | ICD-10-CM

## 2023-06-06 DIAGNOSIS — M79.604 RIGHT LEG PAIN: ICD-10-CM

## 2023-06-06 DIAGNOSIS — M43.16 SPONDYLOLISTHESIS OF LUMBAR REGION: ICD-10-CM

## 2023-06-06 PROCEDURE — 1036F TOBACCO NON-USER: CPT | Performed by: ORTHOPAEDIC SURGERY

## 2023-06-06 PROCEDURE — 99204 OFFICE O/P NEW MOD 45 MIN: CPT | Performed by: ORTHOPAEDIC SURGERY

## 2023-06-06 PROCEDURE — G8427 DOCREV CUR MEDS BY ELIG CLIN: HCPCS | Performed by: ORTHOPAEDIC SURGERY

## 2023-06-06 PROCEDURE — G8399 PT W/DXA RESULTS DOCUMENT: HCPCS | Performed by: ORTHOPAEDIC SURGERY

## 2023-06-06 PROCEDURE — G8420 CALC BMI NORM PARAMETERS: HCPCS | Performed by: ORTHOPAEDIC SURGERY

## 2023-06-06 PROCEDURE — 1123F ACP DISCUSS/DSCN MKR DOCD: CPT | Performed by: ORTHOPAEDIC SURGERY

## 2023-06-06 PROCEDURE — 1090F PRES/ABSN URINE INCON ASSESS: CPT | Performed by: ORTHOPAEDIC SURGERY

## 2023-06-06 RX ORDER — ALPRAZOLAM 0.5 MG/1
TABLET ORAL
Qty: 2 TABLET | Refills: 0 | Status: SHIPPED | OUTPATIENT
Start: 2023-06-06 | End: 2023-06-13

## 2023-06-06 NOTE — PROGRESS NOTES
ALLERGIES:   Allergies   Allergen Reactions    Simvastatin Other (See Comments)    Levofloxacin Nausea Only    Morphine Other (See Comments)    Penicillins Hives    Sulfa Antibiotics Hives        Patient Medications    Current Outpatient Medications   Medication Sig    melatonin 1 MG tablet Take 1 tablet by mouth nightly as needed for Sleep    olmesartan (BENICAR) 20 MG tablet Take 1 tablet by mouth daily    montelukast (SINGULAIR) 10 MG tablet Take 1 tablet by mouth daily    denosumab (PROLIA) 60 MG/ML SOSY SC injection Inject 1 mL into the skin every 6 months    vitamin D3 (CHOLECALCIFEROL) 10 MCG (400 UNIT) TABS tablet Take 1 tablet by mouth daily    acetaminophen (TYLENOL) 500 MG tablet Take 1 tablet by mouth every 6 hours as needed    calcium citrate (CALCITRATE) 950 (200 Ca) MG tablet Take 1 tablet by mouth daily    Cyanocobalamin 1000 MCG CAPS Take 1 tablet by mouth daily    fexofenadine-pseudoephedrine (ALLEGRA-D 24HR) 180-240 MG per extended release tablet Take 1 tablet by mouth as needed    magnesium cl-calcium carbonate (SLOW-MAG) 71.5-119 MG TBEC tablet Take 143 mg by mouth every evening     No current facility-administered medications for this visit. Review of Systems:  A comprehensive review of systems was negative except for that written in the HPI. Physical Exam: She is elderly but otherwise normal in appearance. She walks with a limp on the right side if I hold her hands she is able go up on her toes and heels without a problem. She has very significant tenderness to palpation of the buttocks bilaterally and she also has very significant tenderness over the trochanters. Tendon reflexes are +1 in the patella and Achilles bilaterally. She has a positive straight leg raising test on the right reproducing distal leg pain. No clonus in the ankle and motor and sensory testing are normal in both lower extremities.   Right hip range of motion causes her some lateral hip pain but no

## 2023-06-07 ENCOUNTER — HOSPITAL ENCOUNTER (OUTPATIENT)
Dept: MAMMOGRAPHY | Age: 78
Discharge: HOME | End: 2023-06-10
Payer: MEDICARE

## 2023-06-07 DIAGNOSIS — M85.89 OSTEOPENIA OF MULTIPLE SITES: ICD-10-CM

## 2023-06-07 PROCEDURE — 77080 DXA BONE DENSITY AXIAL: CPT

## 2023-06-28 ENCOUNTER — OFFICE VISIT (OUTPATIENT)
Dept: ORTHOPEDIC SURGERY | Age: 78
End: 2023-06-28
Payer: MEDICARE

## 2023-06-28 DIAGNOSIS — M43.16 SPONDYLOLISTHESIS AT L4-L5 LEVEL: ICD-10-CM

## 2023-06-28 DIAGNOSIS — M48.062 SPINAL STENOSIS OF LUMBAR REGION WITH NEUROGENIC CLAUDICATION: Primary | ICD-10-CM

## 2023-06-28 PROCEDURE — G8428 CUR MEDS NOT DOCUMENT: HCPCS | Performed by: ORTHOPAEDIC SURGERY

## 2023-06-28 PROCEDURE — 1036F TOBACCO NON-USER: CPT | Performed by: ORTHOPAEDIC SURGERY

## 2023-06-28 PROCEDURE — 1123F ACP DISCUSS/DSCN MKR DOCD: CPT | Performed by: ORTHOPAEDIC SURGERY

## 2023-06-28 PROCEDURE — 99213 OFFICE O/P EST LOW 20 MIN: CPT | Performed by: ORTHOPAEDIC SURGERY

## 2023-06-28 PROCEDURE — 1090F PRES/ABSN URINE INCON ASSESS: CPT | Performed by: ORTHOPAEDIC SURGERY

## 2023-06-28 PROCEDURE — G8420 CALC BMI NORM PARAMETERS: HCPCS | Performed by: ORTHOPAEDIC SURGERY

## 2023-06-28 PROCEDURE — G8399 PT W/DXA RESULTS DOCUMENT: HCPCS | Performed by: ORTHOPAEDIC SURGERY

## 2023-07-03 ENCOUNTER — OFFICE VISIT (OUTPATIENT)
Dept: ORTHOPEDIC SURGERY | Age: 78
End: 2023-07-03

## 2023-07-03 DIAGNOSIS — M54.50 LOW BACK PAIN, UNSPECIFIED BACK PAIN LATERALITY, UNSPECIFIED CHRONICITY, UNSPECIFIED WHETHER SCIATICA PRESENT: ICD-10-CM

## 2023-07-03 DIAGNOSIS — M48.062 SPINAL STENOSIS OF LUMBAR REGION WITH NEUROGENIC CLAUDICATION: Primary | ICD-10-CM

## 2023-07-03 DIAGNOSIS — M43.16 SPONDYLOLISTHESIS AT L4-L5 LEVEL: ICD-10-CM

## 2023-07-03 RX ORDER — BETAMETHASONE SODIUM PHOSPHATE AND BETAMETHASONE ACETATE 3; 3 MG/ML; MG/ML
12 INJECTION, SUSPENSION INTRA-ARTICULAR; INTRALESIONAL; INTRAMUSCULAR; SOFT TISSUE ONCE
Status: COMPLETED | OUTPATIENT
Start: 2023-07-03 | End: 2023-07-03

## 2023-07-03 RX ADMIN — BETAMETHASONE SODIUM PHOSPHATE AND BETAMETHASONE ACETATE 12 MG: 3; 3 INJECTION, SUSPENSION INTRA-ARTICULAR; INTRALESIONAL; INTRAMUSCULAR; SOFT TISSUE at 11:31

## 2023-07-03 NOTE — PROGRESS NOTES
acetate-betamethasone sodium phosphate (CELESTONE) injection 12 mg     Injection Authorization - Spine      2. Spondylolisthesis at L4-L5 level  M43.16 XR INJ SPINE THER SUBST LUM/SAC W IMG     betamethasone acetate-betamethasone sodium phosphate (CELESTONE) injection 12 mg     Injection Authorization - Spine      3.  Low back pain, unspecified back pain laterality, unspecified chronicity, unspecified whether sciatica present  M54.50 XR INJ SPINE THER SUBST LUM/SAC W IMG     betamethasone acetate-betamethasone sodium phosphate (CELESTONE) injection 12 mg     Injection Authorization - Spine         Vira Freitas MD  07/03/23

## 2023-07-24 ENCOUNTER — OFFICE VISIT (OUTPATIENT)
Dept: ORTHOPEDIC SURGERY | Age: 78
End: 2023-07-24

## 2023-07-24 DIAGNOSIS — M48.062 SPINAL STENOSIS OF LUMBAR REGION WITH NEUROGENIC CLAUDICATION: Primary | ICD-10-CM

## 2023-07-24 RX ORDER — BETAMETHASONE SODIUM PHOSPHATE AND BETAMETHASONE ACETATE 3; 3 MG/ML; MG/ML
12 INJECTION, SUSPENSION INTRA-ARTICULAR; INTRALESIONAL; INTRAMUSCULAR; SOFT TISSUE ONCE
Status: COMPLETED | OUTPATIENT
Start: 2023-07-24 | End: 2023-07-24

## 2023-07-24 RX ADMIN — BETAMETHASONE SODIUM PHOSPHATE AND BETAMETHASONE ACETATE 12 MG: 3; 3 INJECTION, SUSPENSION INTRA-ARTICULAR; INTRALESIONAL; INTRAMUSCULAR; SOFT TISSUE at 11:34

## 2023-07-24 NOTE — PROGRESS NOTES
Name: Traci Rod  YOB: 1945  Gender: female  MRN: [id number]        Interlaminar BERNARDO Procedure Note      Procedure: L4-L5 interlaminar epidural steroid injection    Precautions: Traci Rod denies prior sensitivity to steroid, local anesthetic, iodine, or shellfish. She had increased pain after L4-5 BERNARDO #1. Consent:  Consent was obtained prior to the procedure. The procedure was discussed at length with Traci Rod. She was given the opportunity to ask questions regarding the procedure and its associated risks. In addition to the potential risks associated with the procedure itself, the patient was informed both verbally and in writing of potential side effects of the use glucocorticoids. The patient appeared to comprehend the informed consent and desired to have the procedure performed, and informed consent was signed. She was placed in a prone position on the fluoroscopy table and the skin was prepped and draped in a routine sterile fashion. The areas to be injected were each anesthetized with 1 ml of 1% Lidocaine. A 22 gauge 3.5 inch spinal needle was carefully advanced under fluoroscopic guidance to the L4-L5 interlaminar space (right paramedian). 0.5 ml of 70% of Omnipaque was injected to confirm proper needle placement and absence of subdural or vascular flow Once proper placement was confirmed, 2ml of sterile water and 12 mg of betamethasone were injected through the spinal needle. Fluoroscopic guidance was used intermittently over a 10-minute period to insure proper needle placement and her safety. A hard copy of the fluoroscopic image has been placed in her chart and is saved on the C-arm hard drive. She was monitored for 30 minutes after the procedure and discharged home in a stable fashion with a routine follow up. Procedural Diagnosis:     ICD-10-CM    1.  Spinal stenosis of lumbar region with neurogenic claudication  M48.062 XR INJ SPINE THER

## 2023-08-16 ENCOUNTER — OFFICE VISIT (OUTPATIENT)
Dept: INTERNAL MEDICINE CLINIC | Facility: CLINIC | Age: 78
End: 2023-08-16
Payer: MEDICARE

## 2023-08-16 ENCOUNTER — TELEPHONE (OUTPATIENT)
Dept: INTERNAL MEDICINE CLINIC | Facility: CLINIC | Age: 78
End: 2023-08-16

## 2023-08-16 VITALS
HEART RATE: 81 BPM | TEMPERATURE: 97.4 F | DIASTOLIC BLOOD PRESSURE: 62 MMHG | BODY MASS INDEX: 22.71 KG/M2 | WEIGHT: 133 LBS | SYSTOLIC BLOOD PRESSURE: 129 MMHG | OXYGEN SATURATION: 99 % | HEIGHT: 64 IN

## 2023-08-16 DIAGNOSIS — G60.9 IDIOPATHIC PERIPHERAL NEUROPATHY: ICD-10-CM

## 2023-08-16 DIAGNOSIS — I10 ESSENTIAL HYPERTENSION: ICD-10-CM

## 2023-08-16 DIAGNOSIS — M85.89 OSTEOPENIA OF MULTIPLE SITES: ICD-10-CM

## 2023-08-16 DIAGNOSIS — Z01.818 PRE-OP EXAM: Primary | ICD-10-CM

## 2023-08-16 DIAGNOSIS — E53.8 B12 DEFICIENCY: ICD-10-CM

## 2023-08-16 DIAGNOSIS — E78.49 OTHER HYPERLIPIDEMIA: ICD-10-CM

## 2023-08-16 DIAGNOSIS — Z78.0 MENOPAUSE: ICD-10-CM

## 2023-08-16 LAB
ALBUMIN SERPL-MCNC: 3.8 G/DL (ref 3.2–4.6)
ANION GAP SERPL CALC-SCNC: 8 MMOL/L (ref 2–11)
APPEARANCE UR: CLEAR
APTT PPP: 25.4 SEC (ref 24.5–34.2)
BASOPHILS # BLD: 0 K/UL (ref 0–0.2)
BASOPHILS NFR BLD: 1 % (ref 0–2)
BILIRUB UR QL: NEGATIVE
BUN SERPL-MCNC: 13 MG/DL (ref 8–23)
CALCIUM SERPL-MCNC: 9.6 MG/DL (ref 8.3–10.4)
CHLORIDE SERPL-SCNC: 105 MMOL/L (ref 101–110)
CO2 SERPL-SCNC: 27 MMOL/L (ref 21–32)
COLOR UR: NORMAL
CREAT SERPL-MCNC: 0.9 MG/DL (ref 0.6–1)
DIFFERENTIAL METHOD BLD: ABNORMAL
EOSINOPHIL # BLD: 0.3 K/UL (ref 0–0.8)
EOSINOPHIL NFR BLD: 5 % (ref 0.5–7.8)
ERYTHROCYTE [DISTWIDTH] IN BLOOD BY AUTOMATED COUNT: 12.7 % (ref 11.9–14.6)
GLUCOSE SERPL-MCNC: 93 MG/DL (ref 65–100)
GLUCOSE UR STRIP.AUTO-MCNC: NEGATIVE MG/DL
HCT VFR BLD AUTO: 39.1 % (ref 35.8–46.3)
HGB BLD-MCNC: 12.5 G/DL (ref 11.7–15.4)
HGB UR QL STRIP: NEGATIVE
IMM GRANULOCYTES # BLD AUTO: 0 K/UL (ref 0–0.5)
IMM GRANULOCYTES NFR BLD AUTO: 0 % (ref 0–5)
INR PPP: 0.9
IRON SATN MFR SERPL: 39 %
IRON SERPL-MCNC: 107 UG/DL (ref 35–150)
KETONES UR QL STRIP.AUTO: NEGATIVE MG/DL
LEUKOCYTE ESTERASE UR QL STRIP.AUTO: NEGATIVE
LYMPHOCYTES # BLD: 2 K/UL (ref 0.5–4.6)
LYMPHOCYTES NFR BLD: 31 % (ref 13–44)
MCH RBC QN AUTO: 32.1 PG (ref 26.1–32.9)
MCHC RBC AUTO-ENTMCNC: 32 G/DL (ref 31.4–35)
MCV RBC AUTO: 100.5 FL (ref 82–102)
MONOCYTES # BLD: 0.6 K/UL (ref 0.1–1.3)
MONOCYTES NFR BLD: 10 % (ref 4–12)
NEUTS SEG # BLD: 3.4 K/UL (ref 1.7–8.2)
NEUTS SEG NFR BLD: 53 % (ref 43–78)
NITRITE UR QL STRIP.AUTO: NEGATIVE
NRBC # BLD: 0 K/UL (ref 0–0.2)
PH UR STRIP: 7 (ref 5–9)
PLATELET # BLD AUTO: 264 K/UL (ref 150–450)
PMV BLD AUTO: 11 FL (ref 9.4–12.3)
POTASSIUM SERPL-SCNC: 4.7 MMOL/L (ref 3.5–5.1)
PREALB SERPL-MCNC: 29.3 MG/DL (ref 18–35.7)
PROT UR STRIP-MCNC: NEGATIVE MG/DL
PROTHROMBIN TIME: 12.9 SEC (ref 12.6–14.3)
RBC # BLD AUTO: 3.89 M/UL (ref 4.05–5.2)
SODIUM SERPL-SCNC: 140 MMOL/L (ref 133–143)
SP GR UR REFRACTOMETRY: 1 (ref 1–1.02)
TIBC SERPL-MCNC: 272 UG/DL (ref 250–450)
UROBILINOGEN UR QL STRIP.AUTO: 0.2 EU/DL (ref 0.2–1)
WBC # BLD AUTO: 6.4 K/UL (ref 4.3–11.1)

## 2023-08-16 PROCEDURE — 1036F TOBACCO NON-USER: CPT | Performed by: INTERNAL MEDICINE

## 2023-08-16 PROCEDURE — 3074F SYST BP LT 130 MM HG: CPT | Performed by: INTERNAL MEDICINE

## 2023-08-16 PROCEDURE — 1090F PRES/ABSN URINE INCON ASSESS: CPT | Performed by: INTERNAL MEDICINE

## 2023-08-16 PROCEDURE — 3078F DIAST BP <80 MM HG: CPT | Performed by: INTERNAL MEDICINE

## 2023-08-16 PROCEDURE — 99214 OFFICE O/P EST MOD 30 MIN: CPT | Performed by: INTERNAL MEDICINE

## 2023-08-16 PROCEDURE — 93000 ELECTROCARDIOGRAM COMPLETE: CPT | Performed by: INTERNAL MEDICINE

## 2023-08-16 PROCEDURE — G8427 DOCREV CUR MEDS BY ELIG CLIN: HCPCS | Performed by: INTERNAL MEDICINE

## 2023-08-16 PROCEDURE — G8420 CALC BMI NORM PARAMETERS: HCPCS | Performed by: INTERNAL MEDICINE

## 2023-08-16 PROCEDURE — 1123F ACP DISCUSS/DSCN MKR DOCD: CPT | Performed by: INTERNAL MEDICINE

## 2023-08-16 PROCEDURE — G8399 PT W/DXA RESULTS DOCUMENT: HCPCS | Performed by: INTERNAL MEDICINE

## 2023-08-16 RX ORDER — FAMOTIDINE 10 MG/ML
20 INJECTION, SOLUTION INTRAVENOUS
OUTPATIENT
Start: 2023-10-11

## 2023-08-16 RX ORDER — ALBUTEROL SULFATE 90 UG/1
4 AEROSOL, METERED RESPIRATORY (INHALATION) PRN
OUTPATIENT
Start: 2024-02-16

## 2023-08-16 RX ORDER — ONDANSETRON 2 MG/ML
8 INJECTION INTRAMUSCULAR; INTRAVENOUS
OUTPATIENT
Start: 2024-02-16

## 2023-08-16 RX ORDER — DIPHENHYDRAMINE HYDROCHLORIDE 50 MG/ML
50 INJECTION INTRAMUSCULAR; INTRAVENOUS
OUTPATIENT
Start: 2024-02-16

## 2023-08-16 RX ORDER — FAMOTIDINE 10 MG/ML
20 INJECTION, SOLUTION INTRAVENOUS
OUTPATIENT
Start: 2024-02-16

## 2023-08-16 RX ORDER — ACETAMINOPHEN 325 MG/1
650 TABLET ORAL
OUTPATIENT
Start: 2023-10-11

## 2023-08-16 RX ORDER — EPINEPHRINE 1 MG/ML
0.3 INJECTION, SOLUTION, CONCENTRATE INTRAVENOUS PRN
OUTPATIENT
Start: 2024-02-16

## 2023-08-16 RX ORDER — ONDANSETRON 2 MG/ML
8 INJECTION INTRAMUSCULAR; INTRAVENOUS
OUTPATIENT
Start: 2023-10-11

## 2023-08-16 RX ORDER — ACETAMINOPHEN 325 MG/1
650 TABLET ORAL
OUTPATIENT
Start: 2024-02-16

## 2023-08-16 RX ORDER — SODIUM CHLORIDE 9 MG/ML
INJECTION, SOLUTION INTRAVENOUS CONTINUOUS
OUTPATIENT
Start: 2024-02-16

## 2023-08-16 RX ORDER — DIPHENHYDRAMINE HYDROCHLORIDE 50 MG/ML
50 INJECTION INTRAMUSCULAR; INTRAVENOUS
OUTPATIENT
Start: 2023-10-11

## 2023-08-16 RX ORDER — ALBUTEROL SULFATE 90 UG/1
4 AEROSOL, METERED RESPIRATORY (INHALATION) PRN
OUTPATIENT
Start: 2023-10-11

## 2023-08-16 RX ORDER — EPINEPHRINE 1 MG/ML
0.3 INJECTION, SOLUTION, CONCENTRATE INTRAVENOUS PRN
OUTPATIENT
Start: 2023-10-11

## 2023-08-16 RX ORDER — SODIUM CHLORIDE 9 MG/ML
INJECTION, SOLUTION INTRAVENOUS CONTINUOUS
OUTPATIENT
Start: 2023-10-11

## 2023-08-16 ASSESSMENT — ENCOUNTER SYMPTOMS: SHORTNESS OF BREATH: 0

## 2023-08-16 NOTE — TELEPHONE ENCOUNTER
Patient called to request a prescription for a bedside commode. Please call patient when Rx is ready.

## 2023-08-19 LAB
BACTERIA SPEC CULT: NORMAL
SERVICE CMNT-IMP: NORMAL

## 2023-08-24 ENCOUNTER — TELEPHONE (OUTPATIENT)
Dept: INTERNAL MEDICINE CLINIC | Facility: CLINIC | Age: 78
End: 2023-08-24

## 2023-08-24 NOTE — TELEPHONE ENCOUNTER
Called Mayra Flower back with Dr Alan Duarte office and left a message letting her know of the discovery we made which is in the notes.  I encouraged her to give me a call back for any other questions and I indeed faxed over the EKG

## 2023-08-24 NOTE — TELEPHONE ENCOUNTER
Finesse Oscar with Dr. Arley Collier office called to f/u on the status of the pts pre-op visit. The pt has surgery on 9/8 with Dr. Trina Goins. I reviewed the chart and notified her that the pt had a pre-op visit on 8/16/23. She checked her computer and stated she can now see that office note, but cannot see the actual EKG or labs. I reviewed the labs. All had resulted, except the CMP. It is showing Active-Future; Not Released. I told her I would send a msg to Abelino Art MA to see if she or Dr. Rae Thomas knows anything about the CMP, and to have them fax this information once completed.      Simon (Dr. Arley Collier office) CB#: [phone]  F# [phone]

## 2023-10-24 ENCOUNTER — TELEPHONE (OUTPATIENT)
Dept: INTERNAL MEDICINE CLINIC | Facility: CLINIC | Age: 78
End: 2023-10-24

## 2023-10-24 NOTE — TELEPHONE ENCOUNTER
LM letting patient know that we are referring her to [facility].    ----- Message from Sukhi Wilburn sent at 10/12/2023  4:20 PM EDT -----  Subject: Message to Provider    QUESTIONS  Information for Provider? Patient having Prolia shot on 11/20/2023 and she   was informed that she needed to get labs within 14 days prior to that appt   please call patient to schedule those labs. Office unable to be reached by   ECC  ---------------------------------------------------------------------------  --------------  [address]  [phone]; OK to leave message on voicemail  ---------------------------------------------------------------------------  --------------  SCRIPT ANSWERS  Relationship to Patient?  Self

## 2023-10-31 ENCOUNTER — TELEPHONE (OUTPATIENT)
Dept: INTERNAL MEDICINE CLINIC | Facility: CLINIC | Age: 78
End: 2023-10-31

## 2023-10-31 NOTE — TELEPHONE ENCOUNTER
Clearance form to be faxed, giving them permission to use a previous calcium level for her Prolia infusion.

## 2023-11-12 ASSESSMENT — PATIENT HEALTH QUESTIONNAIRE - PHQ9
SUM OF ALL RESPONSES TO PHQ QUESTIONS 1-9: 2
SUM OF ALL RESPONSES TO PHQ QUESTIONS 1-9: 2
2. FEELING DOWN, DEPRESSED OR HOPELESS: SEVERAL DAYS
1. LITTLE INTEREST OR PLEASURE IN DOING THINGS: SEVERAL DAYS
2. FEELING DOWN, DEPRESSED OR HOPELESS: 1
SUM OF ALL RESPONSES TO PHQ9 QUESTIONS 1 & 2: 2
SUM OF ALL RESPONSES TO PHQ QUESTIONS 1-9: 2
SUM OF ALL RESPONSES TO PHQ9 QUESTIONS 1 & 2: 2
1. LITTLE INTEREST OR PLEASURE IN DOING THINGS: 1
SUM OF ALL RESPONSES TO PHQ QUESTIONS 1-9: 2

## 2023-11-15 ENCOUNTER — OFFICE VISIT (OUTPATIENT)
Dept: INTERNAL MEDICINE CLINIC | Facility: CLINIC | Age: 78
End: 2023-11-15
Payer: MEDICARE

## 2023-11-15 VITALS
OXYGEN SATURATION: 97 % | TEMPERATURE: 97.5 F | HEIGHT: 64 IN | SYSTOLIC BLOOD PRESSURE: 126 MMHG | BODY MASS INDEX: 23.22 KG/M2 | WEIGHT: 136 LBS | HEART RATE: 84 BPM | DIASTOLIC BLOOD PRESSURE: 59 MMHG

## 2023-11-15 DIAGNOSIS — R60.9 EDEMA, UNSPECIFIED TYPE: ICD-10-CM

## 2023-11-15 DIAGNOSIS — M85.89 OSTEOPENIA OF MULTIPLE SITES: ICD-10-CM

## 2023-11-15 DIAGNOSIS — I10 ESSENTIAL HYPERTENSION: ICD-10-CM

## 2023-11-15 DIAGNOSIS — J30.9 ALLERGIC RHINITIS, UNSPECIFIED SEASONALITY, UNSPECIFIED TRIGGER: ICD-10-CM

## 2023-11-15 DIAGNOSIS — G60.9 IDIOPATHIC PERIPHERAL NEUROPATHY: ICD-10-CM

## 2023-11-15 DIAGNOSIS — E55.9 VITAMIN D DEFICIENCY: ICD-10-CM

## 2023-11-15 DIAGNOSIS — R60.9 EDEMA, UNSPECIFIED TYPE: Primary | ICD-10-CM

## 2023-11-15 DIAGNOSIS — E78.49 OTHER HYPERLIPIDEMIA: ICD-10-CM

## 2023-11-15 DIAGNOSIS — E53.8 B12 DEFICIENCY: ICD-10-CM

## 2023-11-15 LAB
ALBUMIN SERPL-MCNC: 3.7 G/DL (ref 3.2–4.6)
ALBUMIN/GLOB SERPL: 1.1 (ref 0.4–1.6)
ALP SERPL-CCNC: 56 U/L (ref 50–136)
ALT SERPL-CCNC: 15 U/L (ref 12–65)
ANION GAP SERPL CALC-SCNC: 6 MMOL/L (ref 2–11)
AST SERPL-CCNC: 12 U/L (ref 15–37)
BILIRUB DIRECT SERPL-MCNC: <0.1 MG/DL
BILIRUB SERPL-MCNC: 0.2 MG/DL (ref 0.2–1.1)
BUN SERPL-MCNC: 25 MG/DL (ref 8–23)
CALCIUM SERPL-MCNC: 9.6 MG/DL (ref 8.3–10.4)
CHLORIDE SERPL-SCNC: 104 MMOL/L (ref 101–110)
CO2 SERPL-SCNC: 29 MMOL/L (ref 21–32)
CREAT SERPL-MCNC: 1 MG/DL (ref 0.6–1)
GLOBULIN SER CALC-MCNC: 3.4 G/DL (ref 2.8–4.5)
GLUCOSE SERPL-MCNC: 93 MG/DL (ref 65–100)
POTASSIUM SERPL-SCNC: 4.7 MMOL/L (ref 3.5–5.1)
PROT SERPL-MCNC: 7.1 G/DL (ref 6.3–8.2)
SODIUM SERPL-SCNC: 139 MMOL/L (ref 133–143)
TSH, 3RD GENERATION: 0.66 UIU/ML (ref 0.36–3.74)

## 2023-11-15 PROCEDURE — 3078F DIAST BP <80 MM HG: CPT | Performed by: INTERNAL MEDICINE

## 2023-11-15 PROCEDURE — 1036F TOBACCO NON-USER: CPT | Performed by: INTERNAL MEDICINE

## 2023-11-15 PROCEDURE — G8399 PT W/DXA RESULTS DOCUMENT: HCPCS | Performed by: INTERNAL MEDICINE

## 2023-11-15 PROCEDURE — 1090F PRES/ABSN URINE INCON ASSESS: CPT | Performed by: INTERNAL MEDICINE

## 2023-11-15 PROCEDURE — 1123F ACP DISCUSS/DSCN MKR DOCD: CPT | Performed by: INTERNAL MEDICINE

## 2023-11-15 PROCEDURE — G8427 DOCREV CUR MEDS BY ELIG CLIN: HCPCS | Performed by: INTERNAL MEDICINE

## 2023-11-15 PROCEDURE — 99214 OFFICE O/P EST MOD 30 MIN: CPT | Performed by: INTERNAL MEDICINE

## 2023-11-15 PROCEDURE — G8484 FLU IMMUNIZE NO ADMIN: HCPCS | Performed by: INTERNAL MEDICINE

## 2023-11-15 PROCEDURE — 3074F SYST BP LT 130 MM HG: CPT | Performed by: INTERNAL MEDICINE

## 2023-11-15 PROCEDURE — G8420 CALC BMI NORM PARAMETERS: HCPCS | Performed by: INTERNAL MEDICINE

## 2023-11-15 RX ORDER — MONTELUKAST SODIUM 10 MG/1
10 TABLET ORAL DAILY
Qty: 90 TABLET | Refills: 1 | Status: SHIPPED | OUTPATIENT
Start: 2023-11-15

## 2023-11-15 RX ORDER — OLMESARTAN MEDOXOMIL 20 MG/1
20 TABLET ORAL DAILY
Qty: 90 TABLET | Refills: 1 | Status: SHIPPED | OUTPATIENT
Start: 2023-11-15

## 2023-11-15 ASSESSMENT — ANXIETY QUESTIONNAIRES
GAD7 TOTAL SCORE: 0
7. FEELING AFRAID AS IF SOMETHING AWFUL MIGHT HAPPEN: 0
3. WORRYING TOO MUCH ABOUT DIFFERENT THINGS: 0
4. TROUBLE RELAXING: 0
6. BECOMING EASILY ANNOYED OR IRRITABLE: 0
IF YOU CHECKED OFF ANY PROBLEMS ON THIS QUESTIONNAIRE, HOW DIFFICULT HAVE THESE PROBLEMS MADE IT FOR YOU TO DO YOUR WORK, TAKE CARE OF THINGS AT HOME, OR GET ALONG WITH OTHER PEOPLE: NOT DIFFICULT AT ALL
1. FEELING NERVOUS, ANXIOUS, OR ON EDGE: 0
2. NOT BEING ABLE TO STOP OR CONTROL WORRYING: 0
5. BEING SO RESTLESS THAT IT IS HARD TO SIT STILL: 0

## 2023-11-15 NOTE — PROGRESS NOTES
Toy Chapman M.D. Internal Medicine  [address]  Office : [phone]  Fax : [phone]    Chief Complaint   Patient presents with    Hyperlipidemia     6 mo follow up    Foot Swelling     Feet swelling and pain for about 5 weeks        History of Present Illness:  Edwin العراقي is a 69 y.o. female. HPI    Hypertension  Patient is in for Hypertension which is stable. Diet and Lifestyle: generally follows a low sodium diet  exercises sporadically  nonsmoker  Home BP Monitoring: is not measured at home . Patient's recent blood pressures were   BP Readings from Last 3 Encounters:   11/15/23 (!) 126/59   08/16/23 129/62   05/17/23 131/80   . taking medications as instructed, no medication side effects noted, no TIA's, no chest pain on exertion, no dyspnea on exertion, no swelling of ankles. Cardiac risk factors consist of dyslipidemia and hypertension. Lab Results   Component Value Date/Time     08/16/2023 11:49 AM    K 4.7 08/16/2023 11:49 AM     08/16/2023 11:49 AM    CO2 27 08/16/2023 11:49 AM    BUN 13 08/16/2023 11:49 AM    CREATININE 0.90 08/16/2023 11:49 AM    GLUCOSE 93 08/16/2023 11:49 AM    CALCIUM 9.6 08/16/2023 11:49 AM    LABGLOM >60 08/16/2023 11:49 AM        Hyperlipidemia  Patient is in for follow-up for hyperlipidemia. Diet and Lifestyle: generally follows a low fat low cholesterol diet. Risk factors for vascular disease consist of hyperlipidemia and hypertension. Last LDL was   Lab Results   Component Value Date    LDLCALC 81.4 05/17/2023   . Last ALT was   Lab Results   Component Value Date/Time    ALT 20 05/17/2023 09:26 AM   .  No muscle aches.     Vitamin D Deficiency and Osteopenia  Last DEXA in 5/2023  Currently on Prolia therapy    Lab Results   Component Value Date/Time    VITD25 30.7 05/17/2023 09:26 AM        Lab Results   Component Value Date    CALCIUM 9.6 08/16/2023    PHOS

## 2023-12-11 ENCOUNTER — HOSPITAL ENCOUNTER (OUTPATIENT)
Dept: PHYSICAL THERAPY | Age: 78
Setting detail: RECURRING SERIES
Discharge: HOME | End: 2023-12-14
Payer: MEDICARE

## 2023-12-11 DIAGNOSIS — M62.81 MUSCLE WEAKNESS (GENERALIZED): ICD-10-CM

## 2023-12-11 DIAGNOSIS — G89.29 CHRONIC LOW BACK PAIN, UNSPECIFIED BACK PAIN LATERALITY, UNSPECIFIED WHETHER SCIATICA PRESENT: Primary | ICD-10-CM

## 2023-12-11 DIAGNOSIS — R26.2 DIFFICULTY IN WALKING, NOT ELSEWHERE CLASSIFIED: ICD-10-CM

## 2023-12-11 DIAGNOSIS — M54.50 CHRONIC LOW BACK PAIN, UNSPECIFIED BACK PAIN LATERALITY, UNSPECIFIED WHETHER SCIATICA PRESENT: Primary | ICD-10-CM

## 2023-12-11 PROCEDURE — 97110 THERAPEUTIC EXERCISES: CPT

## 2023-12-11 PROCEDURE — 97161 PT EVAL LOW COMPLEX 20 MIN: CPT

## 2023-12-11 NOTE — THERAPY EVALUATION
Independent but requires extra time. Has difficulty with bed mobility. ASSESSMENT   Initial Assessment:  Patient is a 70 y/o female who is s/p right L4-L5 for total facetectomy, partial hemilaminectomy, and foraminotomy for decompression of nerves, and transformational lumbar interbody fusion (TLIF). She exhibits generalized weakness and limited lower back motion, reduced gait independence, and core weakness. Patient is a good candidate to benefit from PT Intervention in order to facilitate optimal healing, improve her mobility and improve her activity participation. Therapy Problem List: (Impacting functional limitations):    Decreased Strength, Decreased ROM, Decreased Functional Mobility, Decreased Banks with Home Exercise Program, Decreased Posture, Decreased Body Mechanics, and Decreased Activity Tolerance/Endurance*   Therapy Prognosis:   Good     Initial Assessment Complexity:   Low Complexity       PLAN   Effective Dates: 12/11/2023 TO Plan of Care/Certification Expiration Date: 01/22/24     Frequency/Duration: Plan Frequency: 2 visits per week for 6 weeks      Interventions Planned (Treatment may consist of any combination of the following):    Home Exercise Program (HEP), Manual Therapy, Neuromuscular Re-education/Strengthening, Range of Motion (ROM), Therapeutic Activites, Therapeutic Exercise/Strengthening, and Patient/Caregiver Education & Training   Goals: (Goals have been discussed and agreed upon with patient.)  Short-Term Functional Goals: Time Frame: 3 weeks  Patient will improve hip flexion and hip abduction strength to 4+/5 bilaterally. Patient will be able to achieve functional ranges of motion for lumbar flexion and extension. Patient will report 25% improvement in symptoms. Discharge Goals: Time Frame: 6 weeks  Patient will improve score on Oswestry Low Back Questionnaire to <20% limited.   Patient will report being able to get dressed without assistance or aggravation of

## 2023-12-12 ASSESSMENT — PAIN SCALES - GENERAL: PAINLEVEL_OUTOF10: 4

## 2023-12-12 NOTE — PROGRESS NOTES
Rick Machado  : 1945  Primary: Medicare Part A And B (Medicare)  Secondary: [address] @ [address] [place] [id number]  Phone: [phone]  Fax: [phone] Plan Frequency: 2 visits per week for 6 weeks  Plan of Care/Certification Expiration Date: 24        Plan of Care/Certification Expiration Date:  Plan of Care/Certification Expiration Date: 24    Frequency/Duration: Plan Frequency: 2 visits per week for 6 weeks      Time In/Out:   Time In: 1530  Time Out: 1620      PT Visit Info:    Plan Frequency: 2 visits per week for 6 weeks      Visit Count:  1    OUTPATIENT PHYSICAL THERAPY:   Treatment Note 2023       Episode  (s/p lumbar fusion)               Treatment Diagnosis:    Chronic low back pain, unspecified back pain laterality, unspecified whether sciatica present  Difficulty in walking, not elsewhere classified  Muscle weakness (generalized)  Medical/Referring Diagnosis:    Arthrodesis status [Z98.1]    Referring Physician:  Ambar Castro MD MD Orders:  PT Eval and Treat   Return MD Appt:  Approximately 3 months   Date of Onset:  Onset Date: 23     Allergies:   Simvastatin, Levofloxacin, Morphine, Penicillins, and Sulfa antibiotics  Restrictions/Precautions:   None      Interventions Planned (Treatment may consist of any combination of the following):     See Assessment Note    Subjective Comments:   Reports that back doesn't feel too bad. Still wears back brace when driving, but instructed to remove during therapy. Initial Pain Level[de-identified]     4/10  Post Session Pain Level:        (Not rated; \"feels about the same\")/10  Medications Last Reviewed:  2023  Updated Objective Findings:  See Evaluation Note from today  Treatment     THERAPEUTIC EXERCISE: (20 minutes):    Exercises per grid below to improve mobility and strength.   Required minimal verbal cues to promote proper body alignment,

## 2023-12-13 ENCOUNTER — HOSPITAL ENCOUNTER (OUTPATIENT)
Dept: PHYSICAL THERAPY | Age: 78
Setting detail: RECURRING SERIES
Discharge: HOME | End: 2023-12-16
Payer: MEDICARE

## 2023-12-13 PROCEDURE — 97110 THERAPEUTIC EXERCISES: CPT

## 2023-12-13 ASSESSMENT — PAIN SCALES - GENERAL: PAINLEVEL_OUTOF10: 0

## 2023-12-22 ENCOUNTER — HOSPITAL ENCOUNTER (OUTPATIENT)
Dept: PHYSICAL THERAPY | Age: 78
Setting detail: RECURRING SERIES
Discharge: HOME | End: 2023-12-25
Payer: MEDICARE

## 2023-12-22 PROCEDURE — 97110 THERAPEUTIC EXERCISES: CPT

## 2023-12-22 NOTE — PROGRESS NOTES
B    Unilateral chest press, cable  Red 3 x 10 B, standing    Standing manual perturbations x 2 minutes    Reaching across body x 10 ea UE to target     PROM B LE in supine x 3 minutes to promote lumbar motion     Medbridge Access Code: [email or web address]    Time spent with patient reviewing proper muscle recruitment and technique with exercises. MANUAL THERAPY: (0 minutes):   Joint mobilization and Soft tissue mobilization was utilized and necessary because of the patient's restricted joint motion and restricted motion of soft tissue. None today    MODALITIES: (0 minutes):        None today      HEP: As above- None given today. Treatment/Session Summary:    Treatment Assessment: Good performance with trunk stabilization exercises. Challenged with clamshells without added resistance. Communication/Consultation:  None today  Equipment provided today:  None  Recommendations/Intent for next treatment session: Next visit will focus on improving trunk strength, bilateral LE strength and mobility independence.     >Total Treatment Billable Duration:  56 minutes  Time In: 1000  Time Out: 141 [facility], PT         Charge Capture  [facility] Portal  Appt Desk     Future Appointments   Date Time Provider [address]   12/27/2023  2:30 PM Henrik Mas, PT [facility]   12/29/2023  2:30 PM Sweetie Healy, PTA [facility]   1/2/2024  1:30 PM Henrik Mas, PT [facility]   1/4/2024  1:30 PM Zachariah Belling, PTA [facility]   1/9/2024  2:30 PM Zachariah Belling, PTA [facility]   1/11/2024  1:30 PM Henrik Mas, PT [facility]   1/16/2024 12:30 PM Zachariah Belling, PTA [facility]   1/18/2024  2:30 PM Henrik Mas, PT [facility]   1/23/2024  1:30 PM Zachariah Belling, PTA [facility]   1/25/2024  1:30 PM Henrik Mas, PT [facility]   5/15/2024  9:30 AM Demetris Chavez MD [facility]

## 2023-12-27 ENCOUNTER — HOSPITAL ENCOUNTER (OUTPATIENT)
Dept: PHYSICAL THERAPY | Age: 78
Setting detail: RECURRING SERIES
Discharge: HOME | End: 2023-12-30
Payer: MEDICARE

## 2023-12-27 PROCEDURE — 97110 THERAPEUTIC EXERCISES: CPT

## 2023-12-27 NOTE — PROGRESS NOTES
Elida Luis  : 1945  Primary: Medicare Part A And B (Medicare)  Secondary: [address] @ [address] [place] [address]  Phone: [phone]  Fax: [phone] Plan Frequency: 2 visits per week for 6 weeks  Plan of Care/Certification Expiration Date: 24        Plan of Care/Certification Expiration Date:  Plan of Care/Certification Expiration Date: 24    Frequency/Duration: Plan Frequency: 2 visits per week for 6 weeks      Time In/Out:   Time In: 1430  Time Out: 1525    PT Visit Info:    Plan Frequency: 2 visits per week for 6 weeks      Visit Count:  5    OUTPATIENT PHYSICAL THERAPY:   Treatment Note 2023       Episode  (s/p lumbar fusion)               Treatment Diagnosis:    Chronic low back pain, unspecified back pain laterality, unspecified whether sciatica present  Difficulty in walking, not elsewhere classified  Muscle weakness (generalized)  Medical/Referring Diagnosis:    Arthrodesis status [Z98.1]    Referring Physician:  Christina Crook MD MD Orders:  PT Eval and Treat   Return MD Appt:  Approximately 3 months   Date of Onset:  Onset Date: 23     Allergies:   Simvastatin, Levofloxacin, Morphine, Penicillins, and Sulfa antibiotics  Restrictions/Precautions: Activity to tolerance. Wear brace when driving. Interventions Planned (Treatment may consist of any combination of the following):     See Assessment Note    Subjective Comments: Back is doing well. Has some R hip pain that is consistent. Initial Pain Level[de-identified]     0/10  Post Session Pain Level:       0/10  Medications Last Reviewed:  2023  Updated Objective Findings:   None today. Treatment   THERAPEUTIC EXERCISE: (55 minutes):    Exercises per grid below to improve mobility and strength. Required minimal verbal cues to promote proper body alignment, promote proper body posture, and promote proper body mechanics.

## 2024-01-02 ENCOUNTER — HOSPITAL ENCOUNTER (OUTPATIENT)
Dept: PHYSICAL THERAPY | Age: 79
Setting detail: RECURRING SERIES
Discharge: HOME | End: 2024-01-05
Payer: MEDICARE

## 2024-01-02 PROCEDURE — 97110 THERAPEUTIC EXERCISES: CPT

## 2024-01-02 NOTE — PROGRESS NOTES
Tracy King  : 1945  Primary: Medicare Part A And B (Medicare)  Secondary: ANTHEM MEDICARE SUPP [facility] [place]  [address]  [facility] [address]  Phone: [phone]  Fax: [phone] Plan Frequency: 2 visits per week for 6 weeks  Plan of Care/Certification Expiration Date: 24        Plan of Care/Certification Expiration Date:  Plan of Care/Certification Expiration Date: 24    Frequency/Duration: Plan Frequency: 2 visits per week for 6 weeks      Time In/Out:   Time In: 1330  Time Out: 1425    PT Visit Info:    Plan Frequency: 2 visits per week for 6 weeks      Visit Count:  6    OUTPATIENT PHYSICAL THERAPY:   Treatment Note 2024       Episode  (s/p lumbar fusion)               Treatment Diagnosis:    Chronic low back pain, unspecified back pain laterality, unspecified whether sciatica present  Difficulty in walking, not elsewhere classified  Muscle weakness (generalized)  Medical/Referring Diagnosis:    Arthrodesis status [Z98.1]    Referring Physician:  Haritha Duran MD MD Orders:  PT Eval and Treat   Return MD Appt:  Approximately 3 months   Date of Onset:  Onset Date: 23     Allergies:   Simvastatin, Levofloxacin, Morphine, Penicillins, and Sulfa antibiotics  Restrictions/Precautions:   Activity to tolerance. Wear brace when driving.      Interventions Planned (Treatment may consist of any combination of the following):     See Assessment Note     >Subjective Comments:  Reports that her back is sore today. Was on her feet a lot yesterday and was walking without a cane a lot.       Initial Pain Level::    4  /10  Post Session Pain Level:        (Not rated)/10  Medications Last Reviewed:  2024  Updated Objective Findings:   None today.  Treatment   THERAPEUTIC EXERCISE: (50 minutes):    Exercises per grid below to improve mobility and strength.  Required minimal verbal cues to promote proper body alignment, promote

## 2024-01-11 ENCOUNTER — APPOINTMENT (OUTPATIENT)
Dept: PHYSICAL THERAPY | Age: 79
End: 2024-01-11
Payer: MEDICARE

## 2024-01-15 NOTE — PROGRESS NOTES
Tracy King  : 1945  Primary: Medicare Part A And B (Medicare)  Secondary: ANTHEM MEDICARE SUPP [facility] [place]  [address]  [facility] [address]  Phone: [phone]  Fax: [phone] Plan Frequency: 2 visits per week for 6 weeks  Plan of Care/Certification Expiration Date: 24        Plan of Care/Certification Expiration Date:  Plan of Care/Certification Expiration Date: 24    Frequency/Duration: Plan Frequency: 2 visits per week for 6 weeks      Time In/Out:   Time In: 1213  Time Out: 1307    PT Visit Info:    Plan Frequency: 2 visits per week for 6 weeks      Visit Count:  7    OUTPATIENT PHYSICAL THERAPY:   Treatment Note 2024       Episode  (s/p lumbar fusion)               Treatment Diagnosis:    Chronic low back pain, unspecified back pain laterality, unspecified whether sciatica present  Difficulty in walking, not elsewhere classified  Muscle weakness (generalized)  Medical/Referring Diagnosis:    Arthrodesis status [Z98.1]    Referring Physician:  Haritha Duran MD MD Orders:  PT Eval and Treat   Return MD Appt:  Approximately 3 months   Date of Onset:  Onset Date: 23     Allergies:   Simvastatin, Levofloxacin, Morphine, Penicillins, and Sulfa antibiotics  Restrictions/Precautions:   Activity to tolerance. Wear brace when driving.      Interventions Planned (Treatment may consist of any combination of the following):     See Assessment Note     >Subjective Comments: Patient reported she fell last week in the bathtub and hit the R side of her head, arm, and hip. Patient stated she went to the ER and they did imaging that cleared her from any broken bones.      Initial Pain Level::     2/10  Post Session Pain Level:       2/10  Medications Last Reviewed:  2024  Updated Objective Findings:   Palpation:  Moderate soreness and tenderness to R deltoid and hip due to the recent fall.  Treatment   THERAPEUTIC EXERCISE:

## 2024-01-16 ENCOUNTER — HOSPITAL ENCOUNTER (OUTPATIENT)
Dept: PHYSICAL THERAPY | Age: 79
Setting detail: RECURRING SERIES
Discharge: HOME | End: 2024-01-19
Payer: MEDICARE

## 2024-01-16 PROCEDURE — 97110 THERAPEUTIC EXERCISES: CPT

## 2024-01-16 PROCEDURE — 97140 MANUAL THERAPY 1/> REGIONS: CPT

## 2024-01-16 ASSESSMENT — PAIN SCALES - GENERAL: PAINLEVEL_OUTOF10: 2

## 2024-01-18 ENCOUNTER — HOSPITAL ENCOUNTER (OUTPATIENT)
Dept: PHYSICAL THERAPY | Age: 79
Setting detail: RECURRING SERIES
Discharge: HOME | End: 2024-01-21
Payer: MEDICARE

## 2024-01-18 PROCEDURE — 97110 THERAPEUTIC EXERCISES: CPT

## 2024-01-18 PROCEDURE — 97140 MANUAL THERAPY 1/> REGIONS: CPT

## 2024-01-18 ASSESSMENT — PAIN SCALES - GENERAL: PAINLEVEL_OUTOF10: 3

## 2024-01-18 NOTE — PROGRESS NOTES
Tracy King  : 1945  Primary: Medicare Part A And B (Medicare)  Secondary: ANTHEM MEDICARE SUPP [facility] [place]  [address]  [facility] A  [facility] [address]  Phone: [phone]  Fax: [phone] Plan Frequency: 2 visits per week for 6 weeks  Plan of Care/Certification Expiration Date: 24        Plan of Care/Certification Expiration Date:  Plan of Care/Certification Expiration Date: 24    Frequency/Duration: Plan Frequency: 2 visits per week for 6 weeks      Time In/Out:   Time In: 1430  Time Out: 1535    PT Visit Info:    Plan Frequency: 2 visits per week for 6 weeks      Visit Count:  8    OUTPATIENT PHYSICAL THERAPY:   Treatment Note 2024       Episode  (s/p lumbar fusion)               Treatment Diagnosis:    Chronic low back pain, unspecified back pain laterality, unspecified whether sciatica present  Difficulty in walking, not elsewhere classified  Muscle weakness (generalized)  Medical/Referring Diagnosis:    Arthrodesis status [Z98.1]    Referring Physician:  Haritha Duran MD MD Orders:  PT Eval and Treat   Return MD Appt:  Approximately 3 months   Date of Onset:  Onset Date: 23     Allergies:   Simvastatin, Levofloxacin, Morphine, Penicillins, and Sulfa antibiotics  Restrictions/Precautions:   Activity to tolerance. Wear brace when driving.      Interventions Planned (Treatment may consist of any combination of the following):     See Assessment Note     >Subjective Comments:   Pt. reported right hip pain today and no low back pain   Initial Pain Level::     3/10  Post Session Pain Level:       0/10  Medications Last Reviewed:  2024  Updated Objective Findings:   Palpation:  Pt. Continues to have sensitivity at incision site with manual palpation.  Treatment   THERAPEUTIC EXERCISE: (40 minutes):    Exercises per grid below to improve mobility and strength.  Required minimal verbal cues to promote proper body alignment,

## 2024-01-23 ENCOUNTER — HOSPITAL ENCOUNTER (OUTPATIENT)
Dept: PHYSICAL THERAPY | Age: 79
Setting detail: RECURRING SERIES
Discharge: HOME | End: 2024-01-26
Payer: MEDICARE

## 2024-01-23 PROCEDURE — 97110 THERAPEUTIC EXERCISES: CPT

## 2024-01-23 PROCEDURE — 97140 MANUAL THERAPY 1/> REGIONS: CPT

## 2024-01-23 ASSESSMENT — PAIN SCALES - GENERAL: PAINLEVEL_OUTOF10: 2

## 2024-01-23 NOTE — PROGRESS NOTES
Tracy King  : 1945  Primary: Medicare Part A And B (Medicare)  Secondary: ANTHEM MEDICARE SUPP [facility] [place]  [address]  [facility] [address]  Phone: [phone]  Fax: [phone] Plan Frequency: 2 visits per week for 6 weeks  Plan of Care/Certification Expiration Date: 24        Plan of Care/Certification Expiration Date:  Plan of Care/Certification Expiration Date: 24    Frequency/Duration: Plan Frequency: 2 visits per week for 6 weeks      Time In/Out:   Time In: 1320  Time Out: 1415    PT Visit Info:    Plan Frequency: 2 visits per week for 6 weeks      Visit Count:  9    OUTPATIENT PHYSICAL THERAPY:   Treatment Note 2024       Episode  (s/p lumbar fusion)               Treatment Diagnosis:    Chronic low back pain, unspecified back pain laterality, unspecified whether sciatica present  Difficulty in walking, not elsewhere classified  Muscle weakness (generalized)  Medical/Referring Diagnosis:    Arthrodesis status [Z98.1]    Referring Physician:  Haritha Duran MD MD Orders:  PT Eval and Treat   Return MD Appt:  Approximately 3 months   Date of Onset:  Onset Date: 23     Allergies:   Simvastatin, Levofloxacin, Morphine, Penicillins, and Sulfa antibiotics  Restrictions/Precautions:   Activity to tolerance. Wear brace when driving.      Interventions Planned (Treatment may consist of any combination of the following):     See Assessment Note     >Subjective Comments: Patient reported her last therapy session seemed to \"really help\".      Initial Pain Level::     10  Post Session Pain Level:       10  Medications Last Reviewed:  2024  Updated Objective Findings:   Palpation: Mild soreness and tenderness to R ITB and glute muscles.  Treatment   THERAPEUTIC EXERCISE: (42 minutes):    Exercises per grid below to improve mobility and strength.  Required minimal verbal cues to promote proper body alignment, promote

## 2024-01-25 ENCOUNTER — HOSPITAL ENCOUNTER (OUTPATIENT)
Dept: PHYSICAL THERAPY | Age: 79
Setting detail: RECURRING SERIES
Discharge: HOME | End: 2024-01-28
Payer: MEDICARE

## 2024-01-25 PROCEDURE — 97110 THERAPEUTIC EXERCISES: CPT

## 2024-01-25 ASSESSMENT — PAIN SCALES - GENERAL: PAINLEVEL_OUTOF10: 3

## 2024-01-25 NOTE — THERAPY RECERTIFICATION
Tracy King  : 1945  Primary: Medicare Part A And B (Medicare)  Secondary: ANTHEM MEDICARE SUPP [facility] [place]  [address]  [address]  [facility] [address]  Phone: [phone]  Fax: [phone] Plan Frequency: 2 visits for 1 week    Plan of Care/Certification Expiration Date: 24        Plan of Care/Certification Expiration Date:  Plan of Care/Certification Expiration Date: 24    Frequency/Duration: Plan Frequency: 2 visits for 1 week      Time In/Out:   Time In: 1330  Time Out: 1424      PT Visit Info:    Plan Frequency: 2 visits for 1 week      Visit Count:  10                OUTPATIENT PHYSICAL THERAPY:             Recertification 2024               Episode (s/p lumbar fusion)         Treatment Diagnosis:     No data found  Medical/Referring Diagnosis:    Arthrodesis status [Z98.1]    Referring Physician:  Haritha Duran MD MD Orders:  PT Eval and Treat   Return MD Appt:  Approximately 3 months  Date of Onset:  Onset Date: 23     Allergies:  Simvastatin, Levofloxacin, Morphine, Penicillins, and Sulfa antibiotics  Restrictions/Precautions:    None      Medications Last Reviewed:  2024         OBJECTIVE     Patient denies any LE paresthesia. Patient denies any increase of symptoms with cough, sneeze or valsalva. Patient denies any saddle paresthesia or bowel/bladder deficits.   Patient denies any headaches, changes in vision, dizziness, vertigo, nausea, drop attacks, black outs, tinnitus, dysphagia, dysarthria, LE symptoms or bowel/bladder dysfunction.    Observation/Orthostatic Postural Assessment:          Ambulates with single point cane without evidence of antalgic gait pattern. Incisions at lower back are healed. Iliac crests, and greater trochanters are level in standing. In supine, iliac crests, ASIS and medial malleoli are symmetrical.     Palpation:          Mild tightness to bilateral lumbar paraspinals.     ROM:

## 2024-01-25 NOTE — PROGRESS NOTES
Tracy King  : 1945  Primary: Medicare Part A And B (Medicare)  Secondary: ANTHEM MEDICARE SUPP [facility] [place]  [address]  [facility] [address]  Phone: [phone]  Fax: [phone] Plan Frequency: 2 visits per week for 6 weeks  Plan of Care/Certification Expiration Date: 24        Plan of Care/Certification Expiration Date:  Plan of Care/Certification Expiration Date: 24    Frequency/Duration: Plan Frequency: 2 visits per week for 6 weeks      Time In/Out:   Time In: 1330  Time Out: 1424    PT Visit Info:    Plan Frequency: 2 visits per week for 6 weeks      Visit Count:  10    OUTPATIENT PHYSICAL THERAPY:   Treatment Note 2024       Episode  (s/p lumbar fusion)               Treatment Diagnosis:    Chronic low back pain, unspecified back pain laterality, unspecified whether sciatica present  Difficulty in walking, not elsewhere classified  Muscle weakness (generalized)  Medical/Referring Diagnosis:    Arthrodesis status [Z98.1]    Referring Physician:  Haritha Duran MD MD Orders:  PT Eval and Treat   Return MD Appt:  Approximately 3 months   Date of Onset:  Onset Date: 23     Allergies:   Simvastatin, Levofloxacin, Morphine, Penicillins, and Sulfa antibiotics  Restrictions/Precautions:   Activity to tolerance. Wear brace when driving.      Interventions Planned (Treatment may consist of any combination of the following):     See Assessment Note     >Subjective Comments: Patient  continues to have a lot of tenderness to R hip/ITB and glute muscles. States that the soft tissue work Echo did was very painful while being done, but did help her a lot the next day or so. Feels that overall therapy has helped her since her back surgery.      Initial Pain Level::     3/10  Post Session Pain Level:        (Not rated)10  Medications Last Reviewed:  2024  Updated Objective Findings:   See Recertification note   Treatment

## 2024-01-29 ENCOUNTER — HOSPITAL ENCOUNTER (OUTPATIENT)
Dept: PHYSICAL THERAPY | Age: 79
Setting detail: RECURRING SERIES
Discharge: HOME | End: 2024-02-01
Payer: MEDICARE

## 2024-01-29 PROCEDURE — 97110 THERAPEUTIC EXERCISES: CPT

## 2024-01-29 PROCEDURE — 97140 MANUAL THERAPY 1/> REGIONS: CPT

## 2024-01-29 ASSESSMENT — PAIN SCALES - GENERAL: PAINLEVEL_OUTOF10: 2

## 2024-01-29 NOTE — PROGRESS NOTES
(15 minutes):   Joint mobilization and Soft tissue mobilization was utilized and necessary because of the patient's restricted joint motion and restricted motion of soft tissue.   Pt. Received soft tissue mobilization to right hip with and without suction cup to decrease pain and tightness from scar tissue    MODALITIES: (8 minutes):        Pt. Received ice pack to right hip in left sidelying to decrease pain in hip       HEP: As above- None given today.    Treatment/Session Summary:    Treatment Assessment: Pt. Was compliant with all exercises and demonstrated better gait after session    Communication/Consultation:  None today  Equipment provided today:  None  Recommendations/Intent for next treatment session: Next visit will focus on improving trunk strength, bilateral LE strength and mobility independence.    >Total Treatment Billable Duration:  55 minutes  Time In: 1430  Time Out: 1540    AVNI BECKWITH PTA         Charge Capture  [employer] Portal  Appt Desk     Future Appointments   Date Time Provider Department Center   2/2/2024 10:00 AM Fernando Kulkarni PT [facility]   5/15/2024  9:30 AM Mark Gimenez MD [facility]

## 2024-02-02 ENCOUNTER — HOSPITAL ENCOUNTER (OUTPATIENT)
Dept: PHYSICAL THERAPY | Age: 79
Setting detail: RECURRING SERIES
Discharge: HOME | End: 2024-02-05
Payer: MEDICARE

## 2024-02-02 PROCEDURE — 97110 THERAPEUTIC EXERCISES: CPT

## 2024-02-02 ASSESSMENT — PAIN SCALES - GENERAL: PAINLEVEL_OUTOF10: 3

## 2024-02-02 NOTE — PROGRESS NOTES
Tracy King  : 1945  Primary: Medicare Part A And B (Medicare)  Secondary: ANTHEM MEDICARE SUPP [facility] [place]  [address]  [facility] [address]  [facility] [address]  Phone: [phone]  Fax: [phone] Plan Frequency: 2 visits for 1 week  Plan of Care/Certification Expiration Date: 24        Plan of Care/Certification Expiration Date:  Plan of Care/Certification Expiration Date: 24    Frequency/Duration: Plan Frequency: 2 visits for 1 week      Time In/Out:   Time In: 1005  Time Out: 1050    PT Visit Info:    Plan Frequency: 2 visits for 1 week      Visit Count:  12    OUTPATIENT PHYSICAL THERAPY:   Treatment Note 2024       Episode  (s/p lumbar fusion)               Treatment Diagnosis:    Chronic low back pain, unspecified back pain laterality, unspecified whether sciatica present  Difficulty in walking, not elsewhere classified  Muscle weakness (generalized)  Medical/Referring Diagnosis:    Arthrodesis status [Z98.1]    Referring Physician:  Haritha Duran MD MD Orders:  PT Eval and Treat   Return MD Appt:  Approximately 3 months   Date of Onset:  Onset Date: 23     Allergies:   Simvastatin, Levofloxacin, Morphine, Penicillins, and Sulfa antibiotics  Restrictions/Precautions:   None .      Interventions Planned (Treatment may consist of any combination of the following):     See Assessment Note     >Subjective Comments: Patient reports a little bit of discomfort today but is ready for today to be her last day of therapy.        Initial Pain Level::     3/10  Post Session Pain Level:        (Not rated)10  Medications Last Reviewed:  2024  Updated Objective Findings:   See Discharge Summary  Treatment   THERAPEUTIC EXERCISE: (45 minutes):    Exercises per grid below to improve mobility and strength.  Required minimal verbal cues to promote proper body alignment, promote proper body posture, and promote proper body mechanics.  Progressed

## 2024-02-27 ENCOUNTER — APPOINTMENT (OUTPATIENT)
Dept: CT IMAGING | Age: 79
End: 2024-02-27
Payer: MEDICARE

## 2024-02-27 ENCOUNTER — HOSPITAL ENCOUNTER (INPATIENT)
Age: 79
LOS: 4 days | Discharge: HOME | End: 2024-03-02
Attending: EMERGENCY MEDICINE | Admitting: FAMILY MEDICINE
Payer: MEDICARE

## 2024-02-27 DIAGNOSIS — E87.1 HYPONATREMIA: ICD-10-CM

## 2024-02-27 DIAGNOSIS — I95.89 HYPOTENSION DUE TO HYPOVOLEMIA: ICD-10-CM

## 2024-02-27 DIAGNOSIS — E86.1 HYPOTENSION DUE TO HYPOVOLEMIA: ICD-10-CM

## 2024-02-27 DIAGNOSIS — R11.2 NAUSEA VOMITING AND DIARRHEA: Primary | ICD-10-CM

## 2024-02-27 DIAGNOSIS — R19.7 NAUSEA VOMITING AND DIARRHEA: Primary | ICD-10-CM

## 2024-02-27 PROBLEM — N17.9 AKI (ACUTE KIDNEY INJURY) (HCC): Status: ACTIVE | Noted: 2024-02-27

## 2024-02-27 PROBLEM — E87.20 METABOLIC ACIDOSIS: Status: ACTIVE | Noted: 2024-02-27

## 2024-02-27 PROBLEM — A08.4 VIRAL GASTROENTERITIS: Status: ACTIVE | Noted: 2024-02-27

## 2024-02-27 PROBLEM — I95.9 HYPOTENSION: Status: ACTIVE | Noted: 2024-02-27

## 2024-02-27 LAB
ALBUMIN SERPL-MCNC: 3.6 G/DL (ref 3.2–4.6)
ALBUMIN/GLOB SERPL: 0.9 (ref 0.4–1.6)
ALP SERPL-CCNC: 47 U/L (ref 50–136)
ALT SERPL-CCNC: 19 U/L (ref 12–65)
AMORPH CRY URNS QL MICRO: ABNORMAL
ANION GAP SERPL CALC-SCNC: 10 MMOL/L (ref 2–11)
APPEARANCE UR: CLEAR
AST SERPL-CCNC: 29 U/L (ref 15–37)
BACTERIA URNS QL MICRO: 0 /HPF
BACTERIA URNS QL MICRO: ABNORMAL /HPF
BASOPHILS # BLD: 0 K/UL (ref 0–0.2)
BASOPHILS NFR BLD: 0 % (ref 0–2)
BILIRUB SERPL-MCNC: 0.4 MG/DL (ref 0.2–1.1)
BILIRUB UR QL: NEGATIVE
BUN SERPL-MCNC: 27 MG/DL (ref 8–23)
C. DIFFICILE TOXIN MOLECULAR: NEGATIVE
CALCIUM SERPL-MCNC: 7.1 MG/DL (ref 8.3–10.4)
CASTS URNS QL MICRO: 0 /LPF
CASTS URNS QL MICRO: 0 /LPF
CHLORIDE SERPL-SCNC: 90 MMOL/L (ref 103–113)
CO2 SERPL-SCNC: 17 MMOL/L (ref 21–32)
COLOR UR: ABNORMAL
CREAT SERPL-MCNC: 1.3 MG/DL (ref 0.6–1)
CRYSTALS URNS QL MICRO: 0 /LPF
CRYSTALS URNS QL MICRO: 0 /LPF
DIFFERENTIAL METHOD BLD: NORMAL
EOSINOPHIL # BLD: 0.1 K/UL (ref 0–0.8)
EOSINOPHIL NFR BLD: 3 % (ref 0.5–7.8)
EPI CELLS #/AREA URNS HPF: ABNORMAL /HPF
EPI CELLS #/AREA URNS HPF: ABNORMAL /HPF
ERYTHROCYTE [DISTWIDTH] IN BLOOD BY AUTOMATED COUNT: 12.1 % (ref 11.9–14.6)
FLUAV RNA SPEC QL NAA+PROBE: NOT DETECTED
FLUBV RNA SPEC QL NAA+PROBE: NOT DETECTED
GLOBULIN SER CALC-MCNC: 3.8 G/DL (ref 2.8–4.5)
GLUCOSE SERPL-MCNC: 109 MG/DL (ref 65–100)
GLUCOSE UR STRIP.AUTO-MCNC: NEGATIVE MG/DL
HCT VFR BLD AUTO: 37 % (ref 35.8–46.3)
HGB BLD-MCNC: 12.9 G/DL (ref 11.7–15.4)
HGB UR QL STRIP: NEGATIVE
IMM GRANULOCYTES # BLD AUTO: 0 K/UL (ref 0–0.5)
IMM GRANULOCYTES NFR BLD AUTO: 0 % (ref 0–5)
KETONES UR QL STRIP.AUTO: ABNORMAL MG/DL
LACTATE SERPL-SCNC: 1.6 MMOL/L (ref 0.4–2)
LEUKOCYTE ESTERASE UR QL STRIP.AUTO: ABNORMAL
LIPASE SERPL-CCNC: 290 U/L (ref 73–393)
LYMPHOCYTES # BLD: 0.9 K/UL (ref 0.5–4.6)
LYMPHOCYTES NFR BLD: 20 % (ref 13–44)
MAGNESIUM SERPL-MCNC: 2.9 MG/DL (ref 1.8–2.4)
MCH RBC QN AUTO: 31.2 PG (ref 26.1–32.9)
MCHC RBC AUTO-ENTMCNC: 34.9 G/DL (ref 31.4–35)
MCV RBC AUTO: 89.6 FL (ref 82–102)
MONOCYTES # BLD: 0.5 K/UL (ref 0.1–1.3)
MONOCYTES NFR BLD: 12 % (ref 4–12)
MUCOUS THREADS URNS QL MICRO: 0 /LPF
MUCOUS THREADS URNS QL MICRO: 0 /LPF
NEUTS SEG # BLD: 3 K/UL (ref 1.7–8.2)
NEUTS SEG NFR BLD: 65 % (ref 43–78)
NITRITE UR QL STRIP.AUTO: NEGATIVE
NRBC # BLD: 0 K/UL (ref 0–0.2)
OSMOLALITY SERPL: 256 MOSM/KG H2O (ref 280–301)
OSMOLALITY UR: 288 MOSM/KG H2O (ref 50–1400)
OTHER OBSERVATIONS: ABNORMAL
OTHER OBSERVATIONS: ABNORMAL
PH UR STRIP: 5.5 (ref 5–9)
PLATELET # BLD AUTO: 279 K/UL (ref 150–450)
PMV BLD AUTO: 9.5 FL (ref 9.4–12.3)
POTASSIUM SERPL-SCNC: 3.9 MMOL/L (ref 3.5–5.1)
PROCALCITONIN SERPL-MCNC: 0.14 NG/ML (ref 0–0.49)
PROT SERPL-MCNC: 7.4 G/DL (ref 6.3–8.2)
PROT UR STRIP-MCNC: NEGATIVE MG/DL
RBC # BLD AUTO: 4.13 M/UL (ref 4.05–5.2)
RBC #/AREA URNS HPF: ABNORMAL /HPF
RBC #/AREA URNS HPF: ABNORMAL /HPF
SARS-COV-2 RDRP RESP QL NAA+PROBE: NOT DETECTED
SODIUM SERPL-SCNC: 117 MMOL/L (ref 136–146)
SODIUM SERPL-SCNC: 124 MMOL/L (ref 136–146)
SODIUM UR-SCNC: 13 MMOL/L
SOURCE: NORMAL
SP GR UR REFRACTOMETRY: 1.03 (ref 1–1.02)
TSH W FREE THYROID IF ABNORMAL: 0.74 UIU/ML (ref 0.36–3.74)
URINE CULTURE IF INDICATED: ABNORMAL
UROBILINOGEN UR QL STRIP.AUTO: 0.2 EU/DL (ref 0.2–1)
WBC # BLD AUTO: 4.6 K/UL (ref 4.3–11.1)
WBC URNS QL MICRO: ABNORMAL /HPF
WBC URNS QL MICRO: ABNORMAL /HPF

## 2024-02-27 PROCEDURE — 74177 CT ABD & PELVIS W/CONTRAST: CPT

## 2024-02-27 PROCEDURE — 84443 ASSAY THYROID STIM HORMONE: CPT

## 2024-02-27 PROCEDURE — A4216 STERILE WATER/SALINE, 10 ML: HCPCS | Performed by: FAMILY MEDICINE

## 2024-02-27 PROCEDURE — 84145 PROCALCITONIN (PCT): CPT

## 2024-02-27 PROCEDURE — 83735 ASSAY OF MAGNESIUM: CPT

## 2024-02-27 PROCEDURE — 87045 FECES CULTURE AEROBIC BACT: CPT

## 2024-02-27 PROCEDURE — 1100000003 HC PRIVATE W/ TELEMETRY

## 2024-02-27 PROCEDURE — 36415 COLL VENOUS BLD VENIPUNCTURE: CPT

## 2024-02-27 PROCEDURE — 83935 ASSAY OF URINE OSMOLALITY: CPT

## 2024-02-27 PROCEDURE — 83930 ASSAY OF BLOOD OSMOLALITY: CPT

## 2024-02-27 PROCEDURE — 99285 EMERGENCY DEPT VISIT HI MDM: CPT

## 2024-02-27 PROCEDURE — 81015 MICROSCOPIC EXAM OF URINE: CPT

## 2024-02-27 PROCEDURE — 80053 COMPREHEN METABOLIC PANEL: CPT

## 2024-02-27 PROCEDURE — 2580000003 HC RX 258: Performed by: FAMILY MEDICINE

## 2024-02-27 PROCEDURE — 87427 SHIGA-LIKE TOXIN AG IA: CPT

## 2024-02-27 PROCEDURE — 87635 SARS-COV-2 COVID-19 AMP PRB: CPT

## 2024-02-27 PROCEDURE — 87493 C DIFF AMPLIFIED PROBE: CPT

## 2024-02-27 PROCEDURE — 87046 STOOL CULTR AEROBIC BACT EA: CPT

## 2024-02-27 PROCEDURE — 6370000000 HC RX 637 (ALT 250 FOR IP): Performed by: FAMILY MEDICINE

## 2024-02-27 PROCEDURE — 2580000003 HC RX 258: Performed by: EMERGENCY MEDICINE

## 2024-02-27 PROCEDURE — 6360000002 HC RX W HCPCS: Performed by: FAMILY MEDICINE

## 2024-02-27 PROCEDURE — 87086 URINE CULTURE/COLONY COUNT: CPT

## 2024-02-27 PROCEDURE — C9113 INJ PANTOPRAZOLE SODIUM, VIA: HCPCS | Performed by: FAMILY MEDICINE

## 2024-02-27 PROCEDURE — 87040 BLOOD CULTURE FOR BACTERIA: CPT

## 2024-02-27 PROCEDURE — 87088 URINE BACTERIA CULTURE: CPT

## 2024-02-27 PROCEDURE — 83690 ASSAY OF LIPASE: CPT

## 2024-02-27 PROCEDURE — 84295 ASSAY OF SERUM SODIUM: CPT

## 2024-02-27 PROCEDURE — 81001 URINALYSIS AUTO W/SCOPE: CPT

## 2024-02-27 PROCEDURE — 83605 ASSAY OF LACTIC ACID: CPT

## 2024-02-27 PROCEDURE — 87502 INFLUENZA DNA AMP PROBE: CPT

## 2024-02-27 PROCEDURE — 94762 N-INVAS EAR/PLS OXIMTRY CONT: CPT

## 2024-02-27 PROCEDURE — 84300 ASSAY OF URINE SODIUM: CPT

## 2024-02-27 PROCEDURE — 6360000004 HC RX CONTRAST MEDICATION: Performed by: FAMILY MEDICINE

## 2024-02-27 PROCEDURE — 85025 COMPLETE CBC W/AUTO DIFF WBC: CPT

## 2024-02-27 PROCEDURE — 87899 AGENT NOS ASSAY W/OPTIC: CPT

## 2024-02-27 PROCEDURE — 87186 SC STD MICRODIL/AGAR DIL: CPT

## 2024-02-27 PROCEDURE — 96360 HYDRATION IV INFUSION INIT: CPT

## 2024-02-27 PROCEDURE — 2500000003 HC RX 250 WO HCPCS: Performed by: FAMILY MEDICINE

## 2024-02-27 RX ORDER — SODIUM CHLORIDE, SODIUM LACTATE, POTASSIUM CHLORIDE, AND CALCIUM CHLORIDE .6; .31; .03; .02 G/100ML; G/100ML; G/100ML; G/100ML
1000 INJECTION, SOLUTION INTRAVENOUS ONCE
Status: COMPLETED | OUTPATIENT
Start: 2024-02-27 | End: 2024-02-27

## 2024-02-27 RX ORDER — CALCIUM CARBONATE 500 MG/1
500 TABLET, CHEWABLE ORAL DAILY
Status: DISCONTINUED | OUTPATIENT
Start: 2024-02-28 | End: 2024-02-28

## 2024-02-27 RX ORDER — POLYETHYLENE GLYCOL 3350 17 G/17G
17 POWDER, FOR SOLUTION ORAL DAILY PRN
Status: DISCONTINUED | OUTPATIENT
Start: 2024-02-27 | End: 2024-03-02 | Stop reason: HOSPADM

## 2024-02-27 RX ORDER — HEPARIN SODIUM 5000 [USP'U]/ML
5000 INJECTION, SOLUTION INTRAVENOUS; SUBCUTANEOUS EVERY 8 HOURS SCHEDULED
Status: DISCONTINUED | OUTPATIENT
Start: 2024-02-27 | End: 2024-03-02 | Stop reason: HOSPADM

## 2024-02-27 RX ORDER — IBUPROFEN 200 MG
200 CAPSULE ORAL DAILY
Status: DISCONTINUED | OUTPATIENT
Start: 2024-02-27 | End: 2024-02-27

## 2024-02-27 RX ORDER — SODIUM CHLORIDE 9 MG/ML
INJECTION, SOLUTION INTRAVENOUS CONTINUOUS
Status: DISCONTINUED | OUTPATIENT
Start: 2024-02-27 | End: 2024-02-28

## 2024-02-27 RX ORDER — ACETAMINOPHEN 650 MG/1
650 SUPPOSITORY RECTAL EVERY 6 HOURS PRN
Status: DISCONTINUED | OUTPATIENT
Start: 2024-02-27 | End: 2024-03-02 | Stop reason: HOSPADM

## 2024-02-27 RX ORDER — SODIUM CHLORIDE 0.9 % (FLUSH) 0.9 %
5-40 SYRINGE (ML) INJECTION PRN
Status: DISCONTINUED | OUTPATIENT
Start: 2024-02-27 | End: 2024-03-02 | Stop reason: HOSPADM

## 2024-02-27 RX ORDER — ONDANSETRON 4 MG/1
4 TABLET, ORALLY DISINTEGRATING ORAL EVERY 8 HOURS PRN
Status: DISCONTINUED | OUTPATIENT
Start: 2024-02-27 | End: 2024-03-02 | Stop reason: HOSPADM

## 2024-02-27 RX ORDER — MONTELUKAST SODIUM 10 MG/1
10 TABLET ORAL DAILY
Status: DISCONTINUED | OUTPATIENT
Start: 2024-02-28 | End: 2024-03-02 | Stop reason: HOSPADM

## 2024-02-27 RX ORDER — POTASSIUM CHLORIDE 20 MEQ/1
40 TABLET, EXTENDED RELEASE ORAL PRN
Status: DISCONTINUED | OUTPATIENT
Start: 2024-02-27 | End: 2024-03-02 | Stop reason: HOSPADM

## 2024-02-27 RX ORDER — MAGNESIUM SULFATE IN WATER 40 MG/ML
2000 INJECTION, SOLUTION INTRAVENOUS PRN
Status: DISCONTINUED | OUTPATIENT
Start: 2024-02-27 | End: 2024-03-02 | Stop reason: HOSPADM

## 2024-02-27 RX ORDER — ACETAMINOPHEN 325 MG/1
650 TABLET ORAL EVERY 6 HOURS PRN
Status: DISCONTINUED | OUTPATIENT
Start: 2024-02-27 | End: 2024-03-02 | Stop reason: HOSPADM

## 2024-02-27 RX ORDER — SODIUM CHLORIDE 0.9 % (FLUSH) 0.9 %
5-40 SYRINGE (ML) INJECTION EVERY 12 HOURS SCHEDULED
Status: DISCONTINUED | OUTPATIENT
Start: 2024-02-27 | End: 2024-03-02 | Stop reason: HOSPADM

## 2024-02-27 RX ORDER — ONDANSETRON 2 MG/ML
4 INJECTION INTRAMUSCULAR; INTRAVENOUS EVERY 6 HOURS PRN
Status: DISCONTINUED | OUTPATIENT
Start: 2024-02-27 | End: 2024-03-02 | Stop reason: HOSPADM

## 2024-02-27 RX ORDER — SODIUM CHLORIDE 9 MG/ML
INJECTION, SOLUTION INTRAVENOUS PRN
Status: DISCONTINUED | OUTPATIENT
Start: 2024-02-27 | End: 2024-03-02 | Stop reason: HOSPADM

## 2024-02-27 RX ORDER — POTASSIUM CHLORIDE 7.45 MG/ML
10 INJECTION INTRAVENOUS PRN
Status: DISCONTINUED | OUTPATIENT
Start: 2024-02-27 | End: 2024-03-02 | Stop reason: HOSPADM

## 2024-02-27 RX ORDER — LACTOBACILLUS RHAMNOSUS GG 10B CELL
1 CAPSULE ORAL
Status: DISCONTINUED | OUTPATIENT
Start: 2024-02-28 | End: 2024-03-02 | Stop reason: HOSPADM

## 2024-02-27 RX ADMIN — SODIUM CHLORIDE 40 MG: 9 INJECTION INTRAMUSCULAR; INTRAVENOUS; SUBCUTANEOUS at 17:49

## 2024-02-27 RX ADMIN — TUBERCULIN PURIFIED PROTEIN DERIVATIVE 5 UNITS: 5 INJECTION, SOLUTION INTRADERMAL at 17:46

## 2024-02-27 RX ADMIN — SODIUM CHLORIDE, PRESERVATIVE FREE 10 ML: 5 INJECTION INTRAVENOUS at 21:18

## 2024-02-27 RX ADMIN — SODIUM CHLORIDE: 9 INJECTION, SOLUTION INTRAVENOUS at 16:34

## 2024-02-27 RX ADMIN — SODIUM CHLORIDE, POTASSIUM CHLORIDE, SODIUM LACTATE AND CALCIUM CHLORIDE 1000 ML: 600; 310; 30; 20 INJECTION, SOLUTION INTRAVENOUS at 13:50

## 2024-02-27 RX ADMIN — HEPARIN SODIUM 5000 UNITS: 5000 INJECTION INTRAVENOUS; SUBCUTANEOUS at 21:55

## 2024-02-27 RX ADMIN — DIATRIZOATE MEGLUMINE AND DIATRIZOATE SODIUM 15 ML: 660; 100 LIQUID ORAL; RECTAL at 17:47

## 2024-02-27 RX ADMIN — IOPAMIDOL 100 ML: 755 INJECTION, SOLUTION INTRAVENOUS at 19:17

## 2024-02-27 RX ADMIN — ONDANSETRON 4 MG: 4 TABLET, ORALLY DISINTEGRATING ORAL at 20:29

## 2024-02-27 ASSESSMENT — ENCOUNTER SYMPTOMS
NAUSEA: 1
SHORTNESS OF BREATH: 0
BLOOD IN STOOL: 0
DIARRHEA: 1
COUGH: 0
VOMITING: 1
ABDOMINAL PAIN: 1

## 2024-02-27 ASSESSMENT — PAIN SCALES - GENERAL
PAINLEVEL_OUTOF10: 0
PAINLEVEL_OUTOF10: 5

## 2024-02-27 ASSESSMENT — PAIN DESCRIPTION - LOCATION: LOCATION: ABDOMEN

## 2024-02-27 NOTE — PROGRESS NOTES
TRANSFER - IN REPORT:    Verbal report received from AMILCAR Hardin on Tracy King  being received from ER for routine progression of patient care      Report consisted of patient’s Situation, Background, Assessment and   Recommendations(SBAR).     Information from the following report(s) ED SBAR was reviewed with the receiving nurse.    Opportunity for questions and clarification was provided.

## 2024-02-27 NOTE — ED PROVIDER NOTES
Range    Source NASAL      SARS-CoV-2, Rapid Not detected NOTD     Influenza A/B, Molecular    Specimen: Nasopharyngeal   Result Value Ref Range    Influenza A, LÁZARO Not detected NOTD      Influenza B, LÁZARO Not detected NOTD     CBC with Auto Differential   Result Value Ref Range    WBC 4.6 4.3 - 11.1 K/uL    RBC 4.13 4.05 - 5.2 M/uL    Hemoglobin 12.9 11.7 - 15.4 g/dL    Hematocrit 37.0 35.8 - 46.3 %    MCV 89.6 82 - 102 FL    MCH 31.2 26.1 - 32.9 PG    MCHC 34.9 31.4 - 35.0 g/dL    RDW 12.1 11.9 - 14.6 %    Platelets 279 150 - 450 K/uL    MPV 9.5 9.4 - 12.3 FL    nRBC 0.00 0.0 - 0.2 K/uL    Differential Type AUTOMATED      Neutrophils % 65 43 - 78 %    Lymphocytes % 20 13 - 44 %    Monocytes % 12 4.0 - 12.0 %    Eosinophils % 3 0.5 - 7.8 %    Basophils % 0 0.0 - 2.0 %    Immature Granulocytes 0 0.0 - 5.0 %    Neutrophils Absolute 3.0 1.7 - 8.2 K/UL    Lymphocytes Absolute 0.9 0.5 - 4.6 K/UL    Monocytes Absolute 0.5 0.1 - 1.3 K/UL    Eosinophils Absolute 0.1 0.0 - 0.8 K/UL    Basophils Absolute 0.0 0.0 - 0.2 K/UL    Absolute Immature Granulocyte 0.0 0.0 - 0.5 K/UL   Comprehensive Metabolic Panel   Result Value Ref Range    Sodium 117 (LL) 136 - 146 mmol/L    Potassium 3.9 3.5 - 5.1 mmol/L    Chloride 90 (L) 103 - 113 mmol/L    CO2 17 (L) 21 - 32 mmol/L    Anion Gap 10 2 - 11 mmol/L    Glucose 109 (H) 65 - 100 mg/dL    BUN 27 (H) 8 - 23 MG/DL    Creatinine 1.30 (H) 0.6 - 1.0 MG/DL    Est, Glom Filt Rate 42 (L) >60 ml/min/1.73m2    Calcium 7.1 (L) 8.3 - 10.4 MG/DL    Total Bilirubin 0.4 0.2 - 1.1 MG/DL    ALT 19 12 - 65 U/L    AST 29 15 - 37 U/L    Alk Phosphatase 47 (L) 50 - 136 U/L    Total Protein 7.4 6.3 - 8.2 g/dL    Albumin 3.6 3.2 - 4.6 g/dL    Globulin 3.8 2.8 - 4.5 g/dL    Albumin/Globulin Ratio 0.9 0.4 - 1.6     Lipase   Result Value Ref Range    Lipase 290 73 - 393 U/L   Magnesium   Result Value Ref Range    Magnesium 2.9 (H) 1.8 - 2.4 mg/dL   Procalcitonin   Result Value Ref Range    Procalcitonin 0.14  0.00 - 0.49 ng/mL   Lactic Acid   Result Value Ref Range    Lactic Acid, Plasma 1.6 0.4 - 2.0 MMOL/L   Urinalysis, Micro   Result Value Ref Range    WBC, UA 10-20 0 /hpf    RBC, UA 0-3 0 /hpf    Epithelial Cells UA 0-3 0 /hpf    BACTERIA, URINE 2+ (H) 0 /hpf    Casts 0 0 /lpf    Crystals 0 0 /LPF    Amorphous Crystal TRACE 0      Mucus, UA 0 0 /lpf    Other observations RESULTS VERIFIED MANUALLY     Urinalysis with Reflex to Culture    Specimen: Urine   Result Value Ref Range    Color, UA YELLOW/STRAW      Appearance CLEAR      Specific Gravity, UA 1.032 (H) 1.001 - 1.023      pH, Urine 5.5 5.0 - 9.0      Protein, UA Negative NEG mg/dL    Glucose, UA Negative mg/dL    Ketones, Urine TRACE (A) NEG mg/dL    Bilirubin Urine Negative NEG      Blood, Urine Negative NEG      Urobilinogen, Urine 0.2 0.2 - 1.0 EU/dL    Nitrite, Urine Negative NEG      Leukocyte Esterase, Urine TRACE (A) NEG      WBC, UA 0-3 0 /hpf    RBC, UA 0-3 0 /hpf    BACTERIA, URINE 0 0 /hpf    Urine Culture if Indicated CULTURE NOT INDICATED BY UA RESULT      Epithelial Cells UA 0-3 0 /hpf    Casts 0 0 /lpf    Crystals 0 0 /LPF    Mucus, UA 0 0 /lpf    Other observations RESULTS VERIFIED MANUALLY     Sodium, urine, random   Result Value Ref Range    SODIUM, RANDOM URINE 13 MMOL/L   Osmolality   Result Value Ref Range    Serum Osmolality 256 (L) 280 - 301 MOSM/kg H2O   TSH with Reflex   Result Value Ref Range    TSH w Free Thyroid if Abnormal 0.74 0.358 - 3.740 UIU/ML         CT ABDOMEN PELVIS W IV CONTRAST Additional Contrast? Radiologist Recommendation    (Results Pending)                Recent Labs     02/27/24  1521   COVID19 Not detected       Voice dictation software was used during the making of this note.  This software is not perfect and grammatical and other typographical errors may be present.  This note has not been completely proofread for errors.     Troy Srivastava MD  02/27/24 6141

## 2024-02-27 NOTE — PROGRESS NOTES
Patient arrived to floor from ER. No needs voiced by patient. Respirations even and unlabored. No signs of distress noted.

## 2024-02-27 NOTE — ED NOTES
TRANSFER - OUT REPORT:    Verbal report given to aguila FITZGERALD on Tracy King  being transferred to 8th floor for routine progression of patient care       Report consisted of patient's Situation, Background, Assessment and   Recommendations(SBAR).     Information from the following report(s) ED SBAR was reviewed with the receiving nurse.    [place] Fall Assessment:                           Lines:   Peripheral IV 02/27/24 Left Antecubital (Active)        Opportunity for questions and clarification was provided.      Patient transported with:  Lashawn Epps RN  02/27/24 9195     Diarrhea

## 2024-02-27 NOTE — PROGRESS NOTES
CT department notified of oral contrast completed. Respirations even and unlabored. No s/sx distress. No needs at present.

## 2024-02-27 NOTE — ED TRIAGE NOTES
Pt ambulatory with walker to triage. Pt complains of abdominal pain, n/v/d since Thursday. Denies urinary pain. Denies fevers.

## 2024-02-27 NOTE — H&P
Hospitalist History and Physical   Admit Date:  2024  1:33 PM   Name:  Tracy King   Age:  78 y.o.  Sex:  female  :  1945   MRN:  [id number]   [facility]    Presenting/Chief Complaint: Abdominal Pain and Emesis     Reason(s) for Admission: No admission diagnoses are documented for this encounter.     History of Present Illness:   Tracy King is a 78 y.o. female with medical history of chronic pain who presented with nausea without vomiting associated with diarrhea of 5-6 day duration. Symptoms started out with nausea, which has improved but diarrhea has stayed persistent and worsened up to >4x/day, watery but nonbloody. She went to the urgent care on  and was given Lomotil without any improvement.  She denies any recent traveling or changes in her diet.  She cook for her family the night prior to symptoms started and nobody has been having similar symptoms.  This is never happened before.  Reported chills and lower quadrant abdominal discomfort but denies fever, SOB, chest pain.    In ED, initially hypotensive 87/62.  Na 117. Creatinine 1.3 (baseline <1). Pt was given 1L bolus in ED.      Assessment & Plan:     Hyponatremia, hypovolemic  Na on admission 117 2/2 GI loss and decrease po intake  Accurate I&O's  MIVF with NS   Goal of correction 4-6mEq/L with maximum 8mEq/L in 24h to avoid ODS  If overcorrection, then relowering with D5 water or reducing the urinary dilutional capability with DDAVP  Monitor Na q6h  Obtain UA, serum osmol, urine osmol, urine sodium, TSH, AM cortisol  Consult Nephrology, appreciate recs    JOSE CARLOS  Creatinine 1.3 admission (baseline <1).  Most likely due to GI loss and hypovolemia  Avoid nephrotoxic meds  Accurate I's and O's, daily weights  Start maintenance IV fluids  CTM BMP    Hypotension  Hold home ARB  Cont miVF    Hypochloremic metabolic acidosis  Secondary to GI loss  Cont miVF  Recheck BMP    Viral gastroenteritis  Suspected contributing factors to  David

## 2024-02-28 PROBLEM — E83.51 HYPOCALCEMIA: Status: ACTIVE | Noted: 2024-02-28

## 2024-02-28 PROBLEM — E44.0 MODERATE PROTEIN-CALORIE MALNUTRITION (HCC): Status: ACTIVE | Noted: 2024-02-28

## 2024-02-28 LAB
ANION GAP SERPL CALC-SCNC: 7 MMOL/L (ref 2–11)
BASOPHILS # BLD: 0 K/UL (ref 0–0.2)
BASOPHILS NFR BLD: 1 % (ref 0–2)
BUN SERPL-MCNC: 15 MG/DL (ref 8–23)
CALCIUM SERPL-MCNC: 6.9 MG/DL (ref 8.3–10.4)
CHLORIDE SERPL-SCNC: 105 MMOL/L (ref 103–113)
CO2 SERPL-SCNC: 20 MMOL/L (ref 21–32)
CREAT SERPL-MCNC: 0.9 MG/DL (ref 0.6–1)
DIFFERENTIAL METHOD BLD: ABNORMAL
EOSINOPHIL # BLD: 0.2 K/UL (ref 0–0.8)
EOSINOPHIL NFR BLD: 4 % (ref 0.5–7.8)
ERYTHROCYTE [DISTWIDTH] IN BLOOD BY AUTOMATED COUNT: 12 % (ref 11.9–14.6)
GLUCOSE SERPL-MCNC: 84 MG/DL (ref 65–100)
HCT VFR BLD AUTO: 31.3 % (ref 35.8–46.3)
HGB BLD-MCNC: 11.3 G/DL (ref 11.7–15.4)
IMM GRANULOCYTES # BLD AUTO: 0 K/UL (ref 0–0.5)
IMM GRANULOCYTES NFR BLD AUTO: 0 % (ref 0–5)
LYMPHOCYTES # BLD: 1 K/UL (ref 0.5–4.6)
LYMPHOCYTES NFR BLD: 27 % (ref 13–44)
MCH RBC QN AUTO: 32 PG (ref 26.1–32.9)
MCHC RBC AUTO-ENTMCNC: 36.1 G/DL (ref 31.4–35)
MCV RBC AUTO: 88.7 FL (ref 82–102)
MM INDURATION, POC: 0 MM (ref 0–5)
MONOCYTES # BLD: 0.5 K/UL (ref 0.1–1.3)
MONOCYTES NFR BLD: 13 % (ref 4–12)
NEUTS SEG # BLD: 2.1 K/UL (ref 1.7–8.2)
NEUTS SEG NFR BLD: 55 % (ref 43–78)
NRBC # BLD: 0 K/UL (ref 0–0.2)
PLATELET # BLD AUTO: 224 K/UL (ref 150–450)
PLATELET COMMENT: ADEQUATE
PMV BLD AUTO: 9.6 FL (ref 9.4–12.3)
POTASSIUM SERPL-SCNC: 3.6 MMOL/L (ref 3.5–5.1)
PPD, POC: NEGATIVE
RBC # BLD AUTO: 3.53 M/UL (ref 4.05–5.2)
RBC MORPH BLD: ABNORMAL
SODIUM SERPL-SCNC: 130 MMOL/L (ref 136–146)
SODIUM SERPL-SCNC: 132 MMOL/L (ref 136–146)
SODIUM SERPL-SCNC: 135 MMOL/L (ref 136–146)
WBC # BLD AUTO: 3.8 K/UL (ref 4.3–11.1)
WBC MORPH BLD: ABNORMAL

## 2024-02-28 PROCEDURE — 2500000003 HC RX 250 WO HCPCS: Performed by: STUDENT IN AN ORGANIZED HEALTH CARE EDUCATION/TRAINING PROGRAM

## 2024-02-28 PROCEDURE — 6360000002 HC RX W HCPCS: Performed by: NURSE PRACTITIONER

## 2024-02-28 PROCEDURE — 2580000003 HC RX 258: Performed by: STUDENT IN AN ORGANIZED HEALTH CARE EDUCATION/TRAINING PROGRAM

## 2024-02-28 PROCEDURE — 97165 OT EVAL LOW COMPLEX 30 MIN: CPT

## 2024-02-28 PROCEDURE — 85025 COMPLETE CBC W/AUTO DIFF WBC: CPT

## 2024-02-28 PROCEDURE — 2580000003 HC RX 258: Performed by: NURSE PRACTITIONER

## 2024-02-28 PROCEDURE — 6360000002 HC RX W HCPCS: Performed by: FAMILY MEDICINE

## 2024-02-28 PROCEDURE — 1100000003 HC PRIVATE W/ TELEMETRY

## 2024-02-28 PROCEDURE — C9113 INJ PANTOPRAZOLE SODIUM, VIA: HCPCS | Performed by: FAMILY MEDICINE

## 2024-02-28 PROCEDURE — 97535 SELF CARE MNGMENT TRAINING: CPT

## 2024-02-28 PROCEDURE — 84295 ASSAY OF SERUM SODIUM: CPT

## 2024-02-28 PROCEDURE — 36415 COLL VENOUS BLD VENIPUNCTURE: CPT

## 2024-02-28 PROCEDURE — 6370000000 HC RX 637 (ALT 250 FOR IP): Performed by: FAMILY MEDICINE

## 2024-02-28 PROCEDURE — 2580000003 HC RX 258: Performed by: FAMILY MEDICINE

## 2024-02-28 PROCEDURE — 87209 SMEAR COMPLEX STAIN: CPT

## 2024-02-28 PROCEDURE — A4216 STERILE WATER/SALINE, 10 ML: HCPCS | Performed by: FAMILY MEDICINE

## 2024-02-28 PROCEDURE — 87177 OVA AND PARASITES SMEARS: CPT

## 2024-02-28 PROCEDURE — 97530 THERAPEUTIC ACTIVITIES: CPT

## 2024-02-28 PROCEDURE — 6370000000 HC RX 637 (ALT 250 FOR IP): Performed by: STUDENT IN AN ORGANIZED HEALTH CARE EDUCATION/TRAINING PROGRAM

## 2024-02-28 PROCEDURE — 87507 IADNA-DNA/RNA PROBE TQ 12-25: CPT

## 2024-02-28 PROCEDURE — 80048 BASIC METABOLIC PNL TOTAL CA: CPT

## 2024-02-28 PROCEDURE — 97161 PT EVAL LOW COMPLEX 20 MIN: CPT

## 2024-02-28 RX ORDER — DIMETHICONE, CAMPHOR (SYNTHETIC), MENTHOL, AND PHENOL 1.1; .5; .625; .5 G/100G; G/100G; G/100G; G/100G
OINTMENT TOPICAL PRN
Status: DISCONTINUED | OUTPATIENT
Start: 2024-02-28 | End: 2024-03-02 | Stop reason: HOSPADM

## 2024-02-28 RX ORDER — LOPERAMIDE HYDROCHLORIDE 2 MG/1
4 CAPSULE ORAL 4 TIMES DAILY PRN
Status: DISCONTINUED | OUTPATIENT
Start: 2024-02-28 | End: 2024-03-02 | Stop reason: HOSPADM

## 2024-02-28 RX ORDER — DESMOPRESSIN ACETATE 4 UG/ML
1 INJECTION, SOLUTION INTRAVENOUS; SUBCUTANEOUS ONCE
Status: COMPLETED | OUTPATIENT
Start: 2024-02-28 | End: 2024-02-28

## 2024-02-28 RX ORDER — CALCIUM GLUCONATE 20 MG/ML
1000 INJECTION, SOLUTION INTRAVENOUS ONCE
Status: COMPLETED | OUTPATIENT
Start: 2024-02-28 | End: 2024-02-28

## 2024-02-28 RX ORDER — DEXTROSE MONOHYDRATE 50 MG/ML
INJECTION, SOLUTION INTRAVENOUS CONTINUOUS
Status: DISCONTINUED | OUTPATIENT
Start: 2024-02-28 | End: 2024-02-29

## 2024-02-28 RX ADMIN — Medication: at 17:16

## 2024-02-28 RX ADMIN — HEPARIN SODIUM 5000 UNITS: 5000 INJECTION INTRAVENOUS; SUBCUTANEOUS at 14:10

## 2024-02-28 RX ADMIN — LOPERAMIDE HYDROCHLORIDE 4 MG: 2 CAPSULE ORAL at 22:23

## 2024-02-28 RX ADMIN — SODIUM CHLORIDE, PRESERVATIVE FREE 10 ML: 5 INJECTION INTRAVENOUS at 08:12

## 2024-02-28 RX ADMIN — LOPERAMIDE HYDROCHLORIDE 4 MG: 2 CAPSULE ORAL at 17:16

## 2024-02-28 RX ADMIN — Medication 1 CAPSULE: at 08:07

## 2024-02-28 RX ADMIN — HEPARIN SODIUM 5000 UNITS: 5000 INJECTION INTRAVENOUS; SUBCUTANEOUS at 22:23

## 2024-02-28 RX ADMIN — DEXTROSE MONOHYDRATE: 50 INJECTION, SOLUTION INTRAVENOUS at 08:13

## 2024-02-28 RX ADMIN — MONTELUKAST 10 MG: 10 TABLET, FILM COATED ORAL at 08:07

## 2024-02-28 RX ADMIN — SODIUM CHLORIDE 40 MG: 9 INJECTION INTRAMUSCULAR; INTRAVENOUS; SUBCUTANEOUS at 08:07

## 2024-02-28 RX ADMIN — SODIUM CHLORIDE, PRESERVATIVE FREE 10 ML: 5 INJECTION INTRAVENOUS at 21:06

## 2024-02-28 RX ADMIN — CALCIUM CARBONATE (ANTACID) CHEW TAB 500 MG 500 MG: 500 CHEW TAB at 08:07

## 2024-02-28 RX ADMIN — DESMOPRESSIN ACETATE 1 MCG: 4 SOLUTION INTRAVENOUS at 11:29

## 2024-02-28 RX ADMIN — SODIUM CHLORIDE: 9 INJECTION, SOLUTION INTRAVENOUS at 02:35

## 2024-02-28 RX ADMIN — DEXTROSE MONOHYDRATE: 50 INJECTION, SOLUTION INTRAVENOUS at 17:20

## 2024-02-28 RX ADMIN — HEPARIN SODIUM 5000 UNITS: 5000 INJECTION INTRAVENOUS; SUBCUTANEOUS at 06:02

## 2024-02-28 RX ADMIN — Medication 400 UNITS: at 08:07

## 2024-02-28 RX ADMIN — LOPERAMIDE HYDROCHLORIDE 4 MG: 2 CAPSULE ORAL at 10:29

## 2024-02-28 RX ADMIN — CALCIUM GLUCONATE 1000 MG: 20 INJECTION, SOLUTION INTRAVENOUS at 17:18

## 2024-02-28 NOTE — PROGRESS NOTES
Pt with complains of increased nausea. Nausea medication requested. PRN Zofran PO given, per pt request.

## 2024-02-28 NOTE — SIGNIFICANT EVENT
Sodium now 135. Currently on D5W infusion. Give DDAVP x 1.  Nephrology on board and agrees with plan.    Signed: Emani GODOY-BC

## 2024-02-28 NOTE — CARE COORDINATION
02/28/24 0928   Service Assessment   Patient Orientation Alert and Oriented   Cognition Alert   History Provided By Patient   Primary Caregiver Self   Accompanied By/Relationship daughter at bedside   Support Systems Spouse/Significant Other;Children   Patient's Healthcare Decision Maker is: Named in Scanned ACP Document   PCP Verified by CM Yes   Last Visit to PCP Within last 3 months   Prior Functional Level Assistance with the following:;Mobility   Current Functional Level Assistance with the following:;Mobility   Can patient return to prior living arrangement Unknown at present   Ability to make needs known: Good   Family able to assist with home care needs: Yes   Would you like for me to discuss the discharge plan with any other family members/significant others, and if so, who? Yes   Financial Resources Medicare   Community Resources None   Social/Functional History   Lives With Spouse   Type of Home House   Home Equipment Walker, standard;Cane   Receives Help From Family   ADL Assistance Independent   Homemaking Assistance Independent   Homemaking Responsibilities No   Ambulation Assistance Needs assistance   Transfer Assistance Independent   Active  Yes   Mode of Transportation Car   Occupation Retired   Discharge Planning   Type of Residence House   Living Arrangements Spouse/Significant Other   Current Services Prior To Admission Durable Medical Equipment   Current DME Prior to Arrival Bedside Commode;Walker;Cane   Potential Assistance Needed N/A   DME Ordered? No   Potential Assistance Purchasing Medications No   Type of Home Care Services None   Patient expects to be discharged to: House   History of falls? 0     Patient lives with her spouse. Daughter at bedside. Confirmed patient has a pcp. Uses a cane or walker or walker to ambulate. Drives. DME: BSC, cane, walker History of HH and rehab.   CM following for discharge needs.

## 2024-02-28 NOTE — CONSULTS
Nephrology consult    Admission Date:  2/27/2024    Admission Diagnosis  Hyponatremia [E87.1]  Nausea vomiting and diarrhea [R11.2, R19.7]  Hypotension due to hypovolemia [I95.89, E86.1]    We are asked by Maritza Mayorga DO     History of Present Illness: Ms. King is a 78 y.o female with PMH significant for HLD, OA, vertigo, breast and colon cancer reportedly admitted with complaints of nausea, vomiting, diarrhea and poor appetite for a week. Found with hyponatremia Sna 117, she was started on IVFs and has Sna over correction IVFs changed to D5W. Sna up to 135 this AM. No hx of hyponatremia, not on diuretic or SSRI.     Pt seen and examined in room daughter at the bedside, pt reports nausea, vomiting and diarrhea better today, tolerating clear liquids this AM. Denies falls, excessive thirst or drinking lots of water, She has been trying to drink Gatorade with the N/V/D and poor intake symptoms she was having. C/O sore throat with cough this AM.       Past Medical History:   Diagnosis Date    Allergic eye reaction 6/19/2013    Arthritis     shoulders    Claustrophobia     Elevated blood pressure (not hypertension) 4/30/2015    Environmental allergies     FH: breast cancer 6/19/2013    FH: colon cancer 6/19/2013    [place] (hard of hearing)     has bilateral hearing aids    Hyperlipidemia 6/19/2013    diet controlled    Left shoulder pain 6/19/2013    Menopause 6/19/2013    Nausea & vomiting     post op nausea and vomiting in past    Neuropathy     in toes    Osteopenia     Trigger finger 6/19/2013    left thumb    Unspecified adverse effect of anesthesia     usually takes until next day to wake fully, OK with last procedure (colonoscopy 2012)    Vertigo 6/19/2013    Vitamin B12 deficiency     Vitamin D deficiency 8/9/2016      Past Surgical History:   Procedure Laterality Date    APPENDECTOMY      CHOLECYSTECTOMY      GI  last 2012    colonoscopy (at least 5-6)    HYSTERECTOMY (CERVIX STATUS UNKNOWN)      no bso

## 2024-02-28 NOTE — SIGNIFICANT EVENT
Na 117 on presentation 2/27/24 at 13:37  Na increased to 124 2/27/24 at 21:04 and then to 132 2/28/24 at 4:29.  D/C normal saline infusion and initiate D5W at this time to re-lower sodium due to risk of ODS with rapid overcorrection. No documentation overnight regarding provider notification of these abnormal labs.    Spoke with primary RN and notified of new orders.    Signed: Emani GODOY-BC

## 2024-02-28 NOTE — THERAPY EVALUATION
ACUTE OCCUPATIONAL THERAPY GOALS:   (Developed with and agreed upon by patient and/or caregiver.)  (1.) Tracy King will complete toileting with SUPERVISION.  (2.) Tracy King will complete functional mobility of household distance with SUPERVISION and adaptive equipment as needed.   (3.) Tracy King will complete functional ADL task standing at sink SUPERVISION and adaptive equipment as needed.     Timeframe: 1 visit       OCCUPATIONAL THERAPY Initial Assessment, Daily Note, and Discharge       OT Visit Days: 1  Acknowledge Orders  Time  OT Charge Capture  Rehab Caseload Tracker      Tracy King is a 78 y.o. female   PRIMARY DIAGNOSIS: Hyponatremia  Hyponatremia [E87.1]  Nausea vomiting and diarrhea [R11.2, R19.7]  Hypotension due to hypovolemia [I95.89, E86.1]       Reason for Referral: Generalized Muscle Weakness (M62.81)  Inpatient: Payor: MEDICARE / Plan: MEDICARE PART A AND B / Product Type: *No Product type* /     ASSESSMENT:     REHAB RECOMMENDATIONS:   Recommendation to date pending progress:  Setting:  No further skilled occupational therapy after discharge from hospital    Equipment:    None     ASSESSMENT:  Ms. King is a 77 y/o female who presents with n/v/d and admitted with hyponatremia and hypotension. PMHx of a back surgery in [place] in September of 2023 and has been doing well since. Pt states she was doing OP PT and had met all those goals and recently began trying to get back to the gym. At baseline pt lives with her , is independent with ADLs, and uses a RW or cane for mobility as needed (typically none when feeling well).     This date, pt presented up in the bathroom and agreeable to session. Pt overall SBA progressing to supervision for functional transfers and mobility of community distance with RW. Pt also completed grooming task standing at sink with supervision. Pt left seated in bedside chair with all needs met. Today pt presents functioning at her  hard of hearing   Cognition  [x]     Perception []       MOBILITY: I Mod I S SBA CGA Min Mod Max Total  NT x2 Comments:   Bed Mobility    Rolling [] [] [] [] [] [] [] [] [] [x] []    Supine to Sit [] [] [] [] [] [] [] [] [] [x] []    Scooting [] [] [] [] [] [] [] [] [] [x] []    Sit to Supine [] [] [] [] [] [] [] [] [] [x] []    Transfers    Sit to Stand [] [] [x] [x] [] [] [] [] [] [] []    Bed to Chair [] [] [] [] [] [] [] [] [] [x] []    Stand to Sit [] [] [x] [x] [] [] [] [] [] [] []    Tub/Shower [] [] [] [] [] [] [] [] [] [x] []     Toilet [] [] [x] [] [] [] [] [] [] [] []      [] [] [] [] [] [] [] [] [] [] []    I=Independent, Mod I=Modified Independent, S=Supervision/Setup, SBA=Standby Assistance, CGA=Contact Guard Assistance, Min=Minimal Assistance, Mod=Moderate Assistance, Max=Maximal Assistance, Total=Total Assistance, NT=Not Tested    ACTIVITIES OF DAILY LIVING: I Mod I S SBA CGA Min Mod Max Total NT Comments   BASIC ADLs:              Upper Body Bathing  [] [] [] [] [] [] [] [] [] [x]     Lower Body Bathing [] [] [] [] [] [] [] [] [] [x]     Toileting [] [] [x] [] [] [] [] [] [] [] Seated hygiene   Upper Body Dressing [] [] [] [] [] [] [] [] [] [x]    Lower Body Dressing [] [] [] [] [] [] [] [] [] [x]    Feeding [] [] [] [] [] [] [] [] [] [x]    Grooming [] [] [x] [] [] [] [] [] [] [] Standing at sink, hand hygiene, oral care, washed face   Personal Device Care [] [] [] [] [] [] [] [] [] [x]    Functional Mobility [] [] [x] [x] [] [] [] [] [] [] RW, community distance   I=Independent, Mod I=Modified Independent, S=Supervision/Setup, SBA=Standby Assistance, CGA=Contact Guard Assistance, Min=Minimal Assistance, Mod=Moderate Assistance, Max=Maximal Assistance, Total=Total Assistance, NT=Not Tested    PLAN:   FREQUENCY/DURATION   OT Plan of Care:  (eval and d/c) for duration of hospital stay or until stated goals are met, whichever comes first.    PROBLEM LIST:   (Skilled intervention is medically necessary

## 2024-02-28 NOTE — PROGRESS NOTES
Comprehensive Nutrition Assessment    Type and Reason for Visit: Initial  Malnutrition Screening Tool: Malnutrition Screen  Have you recently lost weight without trying?: Unsure of amount of weight loss (2 points)  Have you been eating poorly because of a decreased appetite?: Yes (1 point)  Malnutrition Screening Tool Score: 3    Nutrition Recommendations/Plan:   Meals and Snacks:  Diet: Initiate Full Liquids per discussion with provider Emani Vivar NP  Nutrition Supplement Therapy:  Medical food supplement therapy:  Initiate Magic Cup three times per day (this provides 290 kcal and 9 grams protein per serving)     Malnutrition Assessment:  Malnutrition Status: Moderate malnutrition  Context: Acute Illness  Findings of clinical characteristics of malnutrition:   Energy Intake:  50% or less of estimated energy requirements for 5 or more days (pt reports only consuming liqiud for 6+ days pta)  Weight Loss:  No significant weight loss (possibly skewed 2/2 bed weight measures this admission)     Body Fat Loss:  Mild body fat loss Orbital, Fat Overlying Ribs, Buccal region   Muscle Mass Loss:  Mild muscle mass loss Temples (temporalis), Clavicles (pectoralis & deltoids)  Fluid Accumulation:  No significant fluid accumulation     Strength:  Not Performed    Nutrition Assessment:  Nutrition History: Pt reports that her appetite was low for 6 days prior to assessment. Unable to tolerate solids, only consuming liquids in these days leading up to admission due to significant nausea/vomiting (gatorade, broth, etc). At baseline, she eats three meals per day. Pt endorses weight loss over the past week, but is unable to quantify.     Do You Have Any Cultural, Episcopal, or Ethnic Food Preferences?: No   Weight History: 11/15/23: 136# (IM), 8/16/23: 133#, 5/17/23: 133#, 3/22/23: 133#  CBW of 134# (2/28 bed weight). Weight overall appears stable over the past year. Current weight possible falsely elevated 2/2 bed scale  malnutrition related to inadequate protein-energy intake as evidenced by Criteria as identified in malnutrition assessment     Nutrition Interventions:   Food and/or Nutrient Delivery: Modify Current Diet, Modify Oral Nutrition Supplement     Coordination of Nutrition Care: Continue to monitor while inpatient    Goals:      Active Goal: PO intake 50% or greater, by next RD assessment       Nutrition Monitoring and Evaluation:      Food/Nutrient Intake Outcomes: Food and Nutrient Intake, Supplement Intake, Diet Advancement/Tolerance  Physical Signs/Symptoms Outcomes: Biochemical Data, GI Status, Meal Time Behavior, Weight    Discharge Planning:    Too soon to determine    Isamar Gallardo RD

## 2024-02-28 NOTE — PROGRESS NOTES
ACUTE PHYSICAL THERAPY GOALS:   (Developed with and agreed upon by patient and/or caregiver.)  LTG:  (1.)Pt will ambulate with INDEPENDENCE for 200 feet with the least restrictive device within 7 treatment day(s).  MET 2/28/24    PHYSICAL THERAPY Initial Assessment, Discharge, and AM  (Link to Caseload Tracking: PT Visit Days : 1  Acknowledge Orders  Time In/Out  PT Charge Capture  Rehab Caseload Tracker    Tracy King is a 78 y.o. female   PRIMARY DIAGNOSIS: Hyponatremia  Hyponatremia [E87.1]  Nausea vomiting and diarrhea [R11.2, R19.7]  Hypotension due to hypovolemia [I95.89, E86.1]       Reason for Referral: Generalized Muscle Weakness (M62.81)  Inpatient: Payor: MEDICARE / Plan: MEDICARE PART A AND B / Product Type: *No Product type* /     ASSESSMENT:     REHAB RECOMMENDATIONS:   Recommendation to date pending progress:  Setting:  No further skilled physical therapy after discharge from hospital    Equipment:    None     ASSESSMENT:  Ms. King was admitted to the hospital with c/o nausea, vomiting and diarrhea.  She presents to PT with WFL AROM and WFL strength in B LEs.  Pt was able to stand today with supervision and RW demonstrating good-fair standing balance.  She also ambulated in alvarez with supervision-Barbie using RW.  Pt demonstrated decreased mirela but otherwise no loss of balance.  Ms. King appears to be functioning close to her baseline with no needs at this time.      [facility] AM-PAC™ “6 Clicks” Basic Mobility Inpatient Short Form  AM-PAC Basic Mobility - Inpatient   How much help is needed turning from your back to your side while in a flat bed without using bedrails?: None  How much help is needed moving from lying on your back to sitting on the side of a flat bed without using bedrails?: None  How much help is needed moving to and from a bed to a chair?: None  How much help is needed standing up from a chair using your arms?: None  How much help is needed walking in hospital room?:

## 2024-02-28 NOTE — PROGRESS NOTES
0.2 K/UL    Absolute Immature Granulocyte 0.0 0.0 - 0.5 K/UL   Comprehensive Metabolic Panel    Collection Time: 02/27/24  1:37 PM   Result Value Ref Range    Sodium 117 (LL) 136 - 146 mmol/L    Potassium 3.9 3.5 - 5.1 mmol/L    Chloride 90 (L) 103 - 113 mmol/L    CO2 17 (L) 21 - 32 mmol/L    Anion Gap 10 2 - 11 mmol/L    Glucose 109 (H) 65 - 100 mg/dL    BUN 27 (H) 8 - 23 MG/DL    Creatinine 1.30 (H) 0.6 - 1.0 MG/DL    Est, Glom Filt Rate 42 (L) >60 ml/min/1.73m2    Calcium 7.1 (L) 8.3 - 10.4 MG/DL    Total Bilirubin 0.4 0.2 - 1.1 MG/DL    ALT 19 12 - 65 U/L    AST 29 15 - 37 U/L    Alk Phosphatase 47 (L) 50 - 136 U/L    Total Protein 7.4 6.3 - 8.2 g/dL    Albumin 3.6 3.2 - 4.6 g/dL    Globulin 3.8 2.8 - 4.5 g/dL    Albumin/Globulin Ratio 0.9 0.4 - 1.6     Lipase    Collection Time: 02/27/24  1:37 PM   Result Value Ref Range    Lipase 290 73 - 393 U/L   Magnesium    Collection Time: 02/27/24  1:37 PM   Result Value Ref Range    Magnesium 2.9 (H) 1.8 - 2.4 mg/dL   Urinalysis, Micro    Collection Time: 02/27/24  2:38 PM   Result Value Ref Range    WBC, UA 10-20 0 /hpf    RBC, UA 0-3 0 /hpf    Epithelial Cells UA 0-3 0 /hpf    BACTERIA, URINE 2+ (H) 0 /hpf    Casts 0 0 /lpf    Crystals 0 0 /LPF    Amorphous Crystal TRACE 0      Mucus, UA 0 0 /lpf    Other observations RESULTS VERIFIED MANUALLY     Culture, Urine    Collection Time: 02/27/24  2:38 PM    Specimen: Urine, clean catch   Result Value Ref Range    Special Requests NO SPECIAL REQUESTS      Culture        No growth after short period of incubation. Further results to follow after overnight incubation.   Lactic Acid    Collection Time: 02/27/24  2:57 PM   Result Value Ref Range    Lactic Acid, Plasma 1.6 0.4 - 2.0 MMOL/L   COVID-19, Rapid    Collection Time: 02/27/24  3:21 PM    Specimen: Nasopharyngeal   Result Value Ref Range    Source NASAL      SARS-CoV-2, Rapid Not detected NOTD     Influenza A/B, Molecular    Collection Time: 02/27/24  3:21 PM     Range    Sodium 124 (L) 136 - 146 mmol/L   Basic Metabolic Panel w/ Reflex to MG    Collection Time: 02/28/24  4:29 AM   Result Value Ref Range    Sodium 132 (L) 136 - 146 mmol/L    Potassium 3.6 3.5 - 5.1 mmol/L    Chloride 105 103 - 113 mmol/L    CO2 20 (L) 21 - 32 mmol/L    Anion Gap 7 2 - 11 mmol/L    Glucose 84 65 - 100 mg/dL    BUN 15 8 - 23 MG/DL    Creatinine 0.90 0.6 - 1.0 MG/DL    Est, Glom Filt Rate >60 >60 ml/min/1.73m2    Calcium 6.9 (L) 8.3 - 10.4 MG/DL   CBC with Auto Differential    Collection Time: 02/28/24  4:29 AM   Result Value Ref Range    WBC 3.8 (L) 4.3 - 11.1 K/uL    RBC 3.53 (L) 4.05 - 5.2 M/uL    Hemoglobin 11.3 (L) 11.7 - 15.4 g/dL    Hematocrit 31.3 (L) 35.8 - 46.3 %    MCV 88.7 82 - 102 FL    MCH 32.0 26.1 - 32.9 PG    MCHC 36.1 (H) 31.4 - 35.0 g/dL    RDW 12.0 11.9 - 14.6 %    Platelets 224 150 - 450 K/uL    MPV 9.6 9.4 - 12.3 FL    nRBC 0.00 0.0 - 0.2 K/uL    Neutrophils % 55 43 - 78 %    Lymphocytes % 27 13 - 44 %    Monocytes % 13 (H) 4.0 - 12.0 %    Eosinophils % 4 0.5 - 7.8 %    Basophils % 1 0.0 - 2.0 %    Immature Granulocytes 0 0.0 - 5.0 %    Neutrophils Absolute 2.1 1.7 - 8.2 K/UL    Lymphocytes Absolute 1.0 0.5 - 4.6 K/UL    Monocytes Absolute 0.5 0.1 - 1.3 K/UL    Eosinophils Absolute 0.2 0.0 - 0.8 K/UL    Basophils Absolute 0.0 0.0 - 0.2 K/UL    Absolute Immature Granulocyte 0.0 0.0 - 0.5 K/UL    RBC Comment NORMOCYTIC/NORMOCHROMIC      WBC Comment RARE      Platelet Comment ADEQUATE      Differential Type AUTOMATED     Sodium    Collection Time: 02/28/24  8:39 AM   Result Value Ref Range    Sodium 135 (L) 136 - 146 mmol/L   Gastrointestinal Panel, Molecular    Collection Time: 02/28/24  1:59 PM    Specimen: Miscellaneous sample   Result Value Ref Range    Gastrointestinal Pathogen Panel PENDING        Recent Labs     02/27/24  1521   COVID19 Not detected       Current Meds:  Current Facility-Administered Medications   Medication Dose Route Frequency    dextrose 5 % solution

## 2024-02-29 LAB
25(OH)D3 SERPL-MCNC: 27.1 NG/ML (ref 30–100)
ANION GAP SERPL CALC-SCNC: 8 MMOL/L (ref 2–11)
BASOPHILS # BLD: 0 K/UL (ref 0–0.2)
BASOPHILS NFR BLD: 1 % (ref 0–2)
BUN SERPL-MCNC: 6 MG/DL (ref 8–23)
CA-I BLD-MCNC: 3.92 MG/DL (ref 4–5.2)
CALCIUM SERPL-MCNC: 6.8 MG/DL (ref 8.3–10.4)
CALCIUM SERPL-MCNC: 6.9 MG/DL (ref 8.3–10.4)
CHLORIDE SERPL-SCNC: 97 MMOL/L (ref 103–113)
CO2 SERPL-SCNC: 21 MMOL/L (ref 21–32)
CREAT SERPL-MCNC: 0.7 MG/DL (ref 0.6–1)
DIFFERENTIAL METHOD BLD: ABNORMAL
EOSINOPHIL # BLD: 0.4 K/UL (ref 0–0.8)
EOSINOPHIL NFR BLD: 6 % (ref 0.5–7.8)
ERYTHROCYTE [DISTWIDTH] IN BLOOD BY AUTOMATED COUNT: 12.1 % (ref 11.9–14.6)
GASTROINTESTINAL PATHOGEN PANEL: NORMAL
GLUCOSE SERPL-MCNC: 98 MG/DL (ref 65–100)
HCT VFR BLD AUTO: 29.7 % (ref 35.8–46.3)
HGB BLD-MCNC: 10.5 G/DL (ref 11.7–15.4)
IMM GRANULOCYTES # BLD AUTO: 0 K/UL (ref 0–0.5)
IMM GRANULOCYTES NFR BLD AUTO: 0 % (ref 0–5)
LYMPHOCYTES # BLD: 1.7 K/UL (ref 0.5–4.6)
LYMPHOCYTES NFR BLD: 26 % (ref 13–44)
MAGNESIUM SERPL-MCNC: 2.3 MG/DL (ref 1.8–2.4)
MCH RBC QN AUTO: 31.3 PG (ref 26.1–32.9)
MCHC RBC AUTO-ENTMCNC: 35.4 G/DL (ref 31.4–35)
MCV RBC AUTO: 88.4 FL (ref 82–102)
MM INDURATION, POC: 0 MM (ref 0–5)
MONOCYTES # BLD: 0.6 K/UL (ref 0.1–1.3)
MONOCYTES NFR BLD: 9 % (ref 4–12)
NEUTS SEG # BLD: 3.8 K/UL (ref 1.7–8.2)
NEUTS SEG NFR BLD: 58 % (ref 43–78)
NRBC # BLD: 0 K/UL (ref 0–0.2)
PLATELET # BLD AUTO: 218 K/UL (ref 150–450)
PMV BLD AUTO: 10 FL (ref 9.4–12.3)
POTASSIUM SERPL-SCNC: 3.2 MMOL/L (ref 3.5–5.1)
PPD, POC: NEGATIVE
PTH-INTACT SERPL-MCNC: 200.9 PG/ML (ref 18.5–88)
RBC # BLD AUTO: 3.36 M/UL (ref 4.05–5.2)
SODIUM SERPL-SCNC: 122 MMOL/L (ref 136–146)
SODIUM SERPL-SCNC: 123 MMOL/L (ref 136–146)
SODIUM SERPL-SCNC: 126 MMOL/L (ref 136–146)
WBC # BLD AUTO: 6.5 K/UL (ref 4.3–11.1)

## 2024-02-29 PROCEDURE — 82306 VITAMIN D 25 HYDROXY: CPT

## 2024-02-29 PROCEDURE — 6360000002 HC RX W HCPCS: Performed by: FAMILY MEDICINE

## 2024-02-29 PROCEDURE — 80048 BASIC METABOLIC PNL TOTAL CA: CPT

## 2024-02-29 PROCEDURE — C9113 INJ PANTOPRAZOLE SODIUM, VIA: HCPCS | Performed by: FAMILY MEDICINE

## 2024-02-29 PROCEDURE — 6360000002 HC RX W HCPCS: Performed by: PHYSICIAN ASSISTANT

## 2024-02-29 PROCEDURE — 82330 ASSAY OF CALCIUM: CPT

## 2024-02-29 PROCEDURE — 6370000000 HC RX 637 (ALT 250 FOR IP): Performed by: FAMILY MEDICINE

## 2024-02-29 PROCEDURE — 83970 ASSAY OF PARATHORMONE: CPT

## 2024-02-29 PROCEDURE — 83735 ASSAY OF MAGNESIUM: CPT

## 2024-02-29 PROCEDURE — A4216 STERILE WATER/SALINE, 10 ML: HCPCS | Performed by: FAMILY MEDICINE

## 2024-02-29 PROCEDURE — 2580000003 HC RX 258: Performed by: FAMILY MEDICINE

## 2024-02-29 PROCEDURE — 2580000003 HC RX 258: Performed by: NURSE PRACTITIONER

## 2024-02-29 PROCEDURE — 85025 COMPLETE CBC W/AUTO DIFF WBC: CPT

## 2024-02-29 PROCEDURE — 1100000003 HC PRIVATE W/ TELEMETRY

## 2024-02-29 PROCEDURE — 36415 COLL VENOUS BLD VENIPUNCTURE: CPT

## 2024-02-29 PROCEDURE — 84295 ASSAY OF SERUM SODIUM: CPT

## 2024-02-29 RX ORDER — PROCHLORPERAZINE EDISYLATE 5 MG/ML
2.5 INJECTION INTRAMUSCULAR; INTRAVENOUS EVERY 4 HOURS PRN
Status: DISCONTINUED | OUTPATIENT
Start: 2024-02-29 | End: 2024-03-02 | Stop reason: HOSPADM

## 2024-02-29 RX ORDER — POTASSIUM CHLORIDE 20 MEQ/1
40 TABLET, EXTENDED RELEASE ORAL ONCE
Status: DISCONTINUED | OUTPATIENT
Start: 2024-02-29 | End: 2024-02-29

## 2024-02-29 RX ADMIN — ONDANSETRON 4 MG: 4 TABLET, ORALLY DISINTEGRATING ORAL at 11:29

## 2024-02-29 RX ADMIN — HEPARIN SODIUM 5000 UNITS: 5000 INJECTION INTRAVENOUS; SUBCUTANEOUS at 21:45

## 2024-02-29 RX ADMIN — Medication 400 UNITS: at 10:02

## 2024-02-29 RX ADMIN — PROCHLORPERAZINE EDISYLATE 2.5 MG: 5 INJECTION INTRAMUSCULAR; INTRAVENOUS at 14:42

## 2024-02-29 RX ADMIN — SODIUM CHLORIDE, PRESERVATIVE FREE 10 ML: 5 INJECTION INTRAVENOUS at 08:15

## 2024-02-29 RX ADMIN — MONTELUKAST 10 MG: 10 TABLET, FILM COATED ORAL at 08:14

## 2024-02-29 RX ADMIN — HEPARIN SODIUM 5000 UNITS: 5000 INJECTION INTRAVENOUS; SUBCUTANEOUS at 06:03

## 2024-02-29 RX ADMIN — ONDANSETRON 4 MG: 4 TABLET, ORALLY DISINTEGRATING ORAL at 19:07

## 2024-02-29 RX ADMIN — SODIUM CHLORIDE, PRESERVATIVE FREE 10 ML: 5 INJECTION INTRAVENOUS at 20:58

## 2024-02-29 RX ADMIN — DEXTROSE MONOHYDRATE: 50 INJECTION, SOLUTION INTRAVENOUS at 03:52

## 2024-02-29 RX ADMIN — POTASSIUM BICARBONATE 40 MEQ: 782 TABLET, EFFERVESCENT ORAL at 09:13

## 2024-02-29 RX ADMIN — Medication 1 CAPSULE: at 08:14

## 2024-02-29 RX ADMIN — SODIUM CHLORIDE 40 MG: 9 INJECTION INTRAMUSCULAR; INTRAVENOUS; SUBCUTANEOUS at 09:15

## 2024-02-29 RX ADMIN — HEPARIN SODIUM 5000 UNITS: 5000 INJECTION INTRAVENOUS; SUBCUTANEOUS at 13:54

## 2024-02-29 NOTE — PROGRESS NOTES
Pt with BP 88/76, MAP 80, HR 64. Pt with no symptoms at this time. MD notified. Orders to monitor at this time.

## 2024-02-29 NOTE — PROGRESS NOTES
Tracy King  Admission Date: 2/27/2024         [facility] Progress Note: 2/29/2024    Follow-up for: hyponatremia    The patient's chart is reviewed and the patient is discussed with the staff.    Subjective:     Patient seen and examined. Reports eating a normal breakfast this am and normal po intake of fluids. Still having nausea    ROS:  Gen - no fever, no chills, appetite unchanged  CV - no chest pain, no palpitation  Lung - no shortness of breath, no cough  Abd - no tenderness, no nausea/vomiting, no diarrhea  Ext - no edema    Current Facility-Administered Medications   Medication Dose Route Frequency    dextrose 5 % solution   IntraVENous Continuous    loperamide (IMODIUM) capsule 4 mg  4 mg Oral 4x Daily PRN    medicated lip ointment (BLISTEX)   Topical PRN    montelukast (SINGULAIR) tablet 10 mg  10 mg Oral Daily    cholecalciferol (VITAMIN D3) tablet TABS 400 Units  400 Units Oral Daily    sodium chloride flush 0.9 % injection 5-40 mL  5-40 mL IntraVENous 2 times per day    sodium chloride flush 0.9 % injection 5-40 mL  5-40 mL IntraVENous PRN    0.9 % sodium chloride infusion   IntraVENous PRN    potassium chloride (KLOR-CON M) extended release tablet 40 mEq  40 mEq Oral PRN    Or    potassium bicarb-citric acid (EFFER-K) effervescent tablet 40 mEq  40 mEq Oral PRN    Or    potassium chloride 10 mEq/100 mL IVPB (Peripheral Line)  10 mEq IntraVENous PRN    magnesium sulfate 2000 mg in 50 mL IVPB premix  2,000 mg IntraVENous PRN    ondansetron (ZOFRAN-ODT) disintegrating tablet 4 mg  4 mg Oral Q8H PRN    Or    ondansetron (ZOFRAN) injection 4 mg  4 mg IntraVENous Q6H PRN    polyethylene glycol (GLYCOLAX) packet 17 g  17 g Oral Daily PRN    acetaminophen (TYLENOL) tablet 650 mg  650 mg Oral Q6H PRN    Or    acetaminophen (TYLENOL) suppository 650 mg  650 mg Rectal Q6H PRN    heparin (porcine) injection 5,000 Units  5,000 Units SubCUTAneous 3 times per day    pantoprazole

## 2024-02-29 NOTE — PROGRESS NOTES
0007 - BP 94/48, MAP 63  0049 - BP 93/60, MAP 71 (checked by RN)    Pt reports no symptoms at this time.

## 2024-02-29 NOTE — PROGRESS NOTES
Hospitalist Progress Note   Admit Date:  2024  1:33 PM   Name:  Tracy King   Age:  78 y.o.  Sex:  female  :  1945   MRN:  [id number]   Room:  4/    Presenting/Chief Complaint: Abdominal Pain and Emesis     Reason(s) for Admission: Hyponatremia [E87.1]  Nausea vomiting and diarrhea [R11.2, R19.7]  Hypotension due to hypovolemia [I95.89, E86.1]     Hospital Course:   Tracy King is a 78 y.o. female with medical history of chronic pain who presented with nausea without vomiting associated with diarrhea of 5-6 day duration. Symptoms started out with nausea, which has improved but diarrhea has stayed persistent and worsened up to >4x/day, watery but nonbloody. She went to the urgent care on  and was given Lomotil without any improvement.  She denies any recent traveling or changes in her diet.  She cooked for her family the night prior to symptoms started and nobody has been having similar symptoms.  This has never happened before.  Reported chills and lower quadrant abdominal discomfort but denies fever, SOB, chest pain.     In ED, initially hypotensive 87/62.   Labs notable for Na 117 CO2 17 BUN 27 Cr 1.30 Ca 7.1 (normal albumin)  Pt was given 1L bolus in ED. Hospitalist admission requested.      Subjective & 24hr Events:   Overcorrection of Na yesterday, given DDAVP x 1 and D5W, which has now been discontinued by nephrology. It seems she is drinking enough on her own.    The patient complains of nausea today. No vomiting. She is tolerating PO diet and fluids. IV hydration stopped per nephrology. No chest pain or shortness of breath. No abdominal pain. She denies dizziness or lightheadedness. Weakness improved somewhat. She does complain of slight ache/pains in bilateral legs at the hips. No edema or redness. No cramps in the feet or calves. No new back pain, but has mild chronic back pain since surgery on the back 5 months ago. No saddle anesthesia.. No issue  - 32 mmol/L    Anion Gap 8 2 - 11 mmol/L    Glucose 98 65 - 100 mg/dL    BUN 6 (L) 8 - 23 MG/DL    Creatinine 0.70 0.6 - 1.0 MG/DL    Est, Glom Filt Rate >60 >60 ml/min/1.73m2    Calcium 6.8 (L) 8.3 - 10.4 MG/DL   CBC with Auto Differential    Collection Time: 02/29/24  4:07 AM   Result Value Ref Range    WBC 6.5 4.3 - 11.1 K/uL    RBC 3.36 (L) 4.05 - 5.2 M/uL    Hemoglobin 10.5 (L) 11.7 - 15.4 g/dL    Hematocrit 29.7 (L) 35.8 - 46.3 %    MCV 88.4 82 - 102 FL    MCH 31.3 26.1 - 32.9 PG    MCHC 35.4 (H) 31.4 - 35.0 g/dL    RDW 12.1 11.9 - 14.6 %    Platelets 218 150 - 450 K/uL    MPV 10.0 9.4 - 12.3 FL    nRBC 0.00 0.0 - 0.2 K/uL    Differential Type AUTOMATED      Neutrophils % 58 43 - 78 %    Lymphocytes % 26 13 - 44 %    Monocytes % 9 4.0 - 12.0 %    Eosinophils % 6 0.5 - 7.8 %    Basophils % 1 0.0 - 2.0 %    Immature Granulocytes 0 0.0 - 5.0 %    Neutrophils Absolute 3.8 1.7 - 8.2 K/UL    Lymphocytes Absolute 1.7 0.5 - 4.6 K/UL    Monocytes Absolute 0.6 0.1 - 1.3 K/UL    Eosinophils Absolute 0.4 0.0 - 0.8 K/UL    Basophils Absolute 0.0 0.0 - 0.2 K/UL    Absolute Immature Granulocyte 0.0 0.0 - 0.5 K/UL   Vitamin D 25 Hydroxy    Collection Time: 02/29/24  4:07 AM   Result Value Ref Range    Vit D, 25-Hydroxy 27.1 (L) 30.0 - 100.0 ng/mL   Calcium, Ionized    Collection Time: 02/29/24  4:07 AM   Result Value Ref Range    Calcium, Ionized 3.92 (L) 4.0 - 5.2 mg/dL   Magnesium    Collection Time: 02/29/24  4:07 AM   Result Value Ref Range    Magnesium 2.3 1.8 - 2.4 mg/dL   Sodium    Collection Time: 02/29/24  9:23 AM   Result Value Ref Range    Sodium 123 (L) 136 - 146 mmol/L       Recent Labs     02/27/24  1521   COVID19 Not detected       Current Meds:  Current Facility-Administered Medications   Medication Dose Route Frequency    loperamide (IMODIUM) capsule 4 mg  4 mg Oral 4x Daily PRN    medicated lip ointment (BLISTEX)   Topical PRN    montelukast (SINGULAIR) tablet 10 mg  10 mg Oral Daily    cholecalciferol

## 2024-03-01 LAB
ANION GAP SERPL CALC-SCNC: 5 MMOL/L (ref 2–11)
BACTERIA SPEC CULT: NORMAL
BASOPHILS # BLD: 0 K/UL (ref 0–0.2)
BASOPHILS NFR BLD: 1 % (ref 0–2)
BUN SERPL-MCNC: 3 MG/DL (ref 8–23)
CALCIUM SERPL-MCNC: 7.5 MG/DL (ref 8.3–10.4)
CHLORIDE SERPL-SCNC: 98 MMOL/L (ref 103–113)
CO2 SERPL-SCNC: 24 MMOL/L (ref 21–32)
CREAT SERPL-MCNC: 0.7 MG/DL (ref 0.6–1)
DIFFERENTIAL METHOD BLD: ABNORMAL
EOSINOPHIL # BLD: 0.4 K/UL (ref 0–0.8)
EOSINOPHIL NFR BLD: 6 % (ref 0.5–7.8)
ERYTHROCYTE [DISTWIDTH] IN BLOOD BY AUTOMATED COUNT: 11.9 % (ref 11.9–14.6)
GLUCOSE SERPL-MCNC: 96 MG/DL (ref 65–100)
HCT VFR BLD AUTO: 29.1 % (ref 35.8–46.3)
HGB BLD-MCNC: 10.9 G/DL (ref 11.7–15.4)
IMM GRANULOCYTES # BLD AUTO: 0 K/UL (ref 0–0.5)
IMM GRANULOCYTES NFR BLD AUTO: 0 % (ref 0–5)
LYMPHOCYTES # BLD: 1.6 K/UL (ref 0.5–4.6)
LYMPHOCYTES NFR BLD: 28 % (ref 13–44)
MCH RBC QN AUTO: 31.9 PG (ref 26.1–32.9)
MCHC RBC AUTO-ENTMCNC: 37.5 G/DL (ref 31.4–35)
MCV RBC AUTO: 85.1 FL (ref 82–102)
MONOCYTES # BLD: 0.6 K/UL (ref 0.1–1.3)
MONOCYTES NFR BLD: 10 % (ref 4–12)
NEUTS SEG # BLD: 3.1 K/UL (ref 1.7–8.2)
NEUTS SEG NFR BLD: 55 % (ref 43–78)
NRBC # BLD: 0 K/UL (ref 0–0.2)
O+P SPEC MICRO: NORMAL
O+P STL CONC: NORMAL
PLATELET # BLD AUTO: 274 K/UL (ref 150–450)
PMV BLD AUTO: 10.3 FL (ref 9.4–12.3)
POTASSIUM SERPL-SCNC: 4.1 MMOL/L (ref 3.5–5.1)
RBC # BLD AUTO: 3.42 M/UL (ref 4.05–5.2)
SERVICE CMNT-IMP: NORMAL
SODIUM SERPL-SCNC: 127 MMOL/L (ref 136–146)
SODIUM SERPL-SCNC: 132 MMOL/L (ref 136–146)
SPECIMEN SOURCE: NORMAL
WBC # BLD AUTO: 5.6 K/UL (ref 4.3–11.1)

## 2024-03-01 PROCEDURE — 36415 COLL VENOUS BLD VENIPUNCTURE: CPT

## 2024-03-01 PROCEDURE — 80048 BASIC METABOLIC PNL TOTAL CA: CPT

## 2024-03-01 PROCEDURE — A4216 STERILE WATER/SALINE, 10 ML: HCPCS | Performed by: FAMILY MEDICINE

## 2024-03-01 PROCEDURE — 6370000000 HC RX 637 (ALT 250 FOR IP): Performed by: INTERNAL MEDICINE

## 2024-03-01 PROCEDURE — 6370000000 HC RX 637 (ALT 250 FOR IP): Performed by: FAMILY MEDICINE

## 2024-03-01 PROCEDURE — 85025 COMPLETE CBC W/AUTO DIFF WBC: CPT

## 2024-03-01 PROCEDURE — 2580000003 HC RX 258: Performed by: FAMILY MEDICINE

## 2024-03-01 PROCEDURE — C9113 INJ PANTOPRAZOLE SODIUM, VIA: HCPCS | Performed by: FAMILY MEDICINE

## 2024-03-01 PROCEDURE — 84295 ASSAY OF SERUM SODIUM: CPT

## 2024-03-01 PROCEDURE — 6360000002 HC RX W HCPCS: Performed by: FAMILY MEDICINE

## 2024-03-01 PROCEDURE — 2580000003 HC RX 258: Performed by: INTERNAL MEDICINE

## 2024-03-01 PROCEDURE — 1100000003 HC PRIVATE W/ TELEMETRY

## 2024-03-01 RX ORDER — SODIUM CHLORIDE 9 MG/ML
INJECTION, SOLUTION INTRAVENOUS CONTINUOUS
Status: DISCONTINUED | OUTPATIENT
Start: 2024-03-01 | End: 2024-03-02 | Stop reason: HOSPADM

## 2024-03-01 RX ORDER — VITAMIN B COMPLEX
2000 TABLET ORAL DAILY
Status: DISCONTINUED | OUTPATIENT
Start: 2024-03-01 | End: 2024-03-02 | Stop reason: HOSPADM

## 2024-03-01 RX ADMIN — SODIUM CHLORIDE 40 MG: 9 INJECTION INTRAMUSCULAR; INTRAVENOUS; SUBCUTANEOUS at 09:24

## 2024-03-01 RX ADMIN — SODIUM CHLORIDE, PRESERVATIVE FREE 5 ML: 5 INJECTION INTRAVENOUS at 09:31

## 2024-03-01 RX ADMIN — SODIUM CHLORIDE, PRESERVATIVE FREE 10 ML: 5 INJECTION INTRAVENOUS at 21:38

## 2024-03-01 RX ADMIN — HEPARIN SODIUM 5000 UNITS: 5000 INJECTION INTRAVENOUS; SUBCUTANEOUS at 14:43

## 2024-03-01 RX ADMIN — SODIUM CHLORIDE: 9 INJECTION, SOLUTION INTRAVENOUS at 19:15

## 2024-03-01 RX ADMIN — HEPARIN SODIUM 5000 UNITS: 5000 INJECTION INTRAVENOUS; SUBCUTANEOUS at 21:38

## 2024-03-01 RX ADMIN — VITAMIN D, TAB 1000IU (100/BT) 2000 UNITS: 25 TAB at 19:12

## 2024-03-01 RX ADMIN — Medication 400 UNITS: at 09:30

## 2024-03-01 RX ADMIN — Medication 1 CAPSULE: at 09:23

## 2024-03-01 RX ADMIN — HEPARIN SODIUM 5000 UNITS: 5000 INJECTION INTRAVENOUS; SUBCUTANEOUS at 06:07

## 2024-03-01 RX ADMIN — MONTELUKAST 10 MG: 10 TABLET, FILM COATED ORAL at 09:23

## 2024-03-01 ASSESSMENT — PAIN SCALES - GENERAL
PAINLEVEL_OUTOF10: 0

## 2024-03-01 NOTE — PROGRESS NOTES
Tracy King  Admission Date: 2/27/2024         [facility] Progress Note: 3/1/2024    Follow-up for: hyponatremia    The patient's chart is reviewed and the patient is discussed with the staff.    Subjective:     Patient seen and examined. Reports eating a normal breakfast this am and normal po intake of fluids. Nausea has resolved, no acute complaints    ROS:  Gen - no fever, no chills, appetite unchanged  CV - no chest pain, no palpitation  Lung - no shortness of breath, no cough  Abd - no tenderness, no nausea/vomiting, no diarrhea  Ext - no edema    Current Facility-Administered Medications   Medication Dose Route Frequency    prochlorperazine (COMPAZINE) injection 2.5 mg  2.5 mg IntraVENous Q4H PRN    loperamide (IMODIUM) capsule 4 mg  4 mg Oral 4x Daily PRN    medicated lip ointment (BLISTEX)   Topical PRN    montelukast (SINGULAIR) tablet 10 mg  10 mg Oral Daily    cholecalciferol (VITAMIN D3) tablet TABS 400 Units  400 Units Oral Daily    sodium chloride flush 0.9 % injection 5-40 mL  5-40 mL IntraVENous 2 times per day    sodium chloride flush 0.9 % injection 5-40 mL  5-40 mL IntraVENous PRN    0.9 % sodium chloride infusion   IntraVENous PRN    potassium chloride (KLOR-CON M) extended release tablet 40 mEq  40 mEq Oral PRN    Or    potassium bicarb-citric acid (EFFER-K) effervescent tablet 40 mEq  40 mEq Oral PRN    Or    potassium chloride 10 mEq/100 mL IVPB (Peripheral Line)  10 mEq IntraVENous PRN    magnesium sulfate 2000 mg in 50 mL IVPB premix  2,000 mg IntraVENous PRN    ondansetron (ZOFRAN-ODT) disintegrating tablet 4 mg  4 mg Oral Q8H PRN    Or    ondansetron (ZOFRAN) injection 4 mg  4 mg IntraVENous Q6H PRN    polyethylene glycol (GLYCOLAX) packet 17 g  17 g Oral Daily PRN    acetaminophen (TYLENOL) tablet 650 mg  650 mg Oral Q6H PRN    Or    acetaminophen (TYLENOL) suppository 650 mg  650 mg Rectal Q6H PRN    heparin (porcine) injection 5,000 Units  5,000 Units        No results for input(s): \"PH\", \"PCO2\", \"PO2\", \"HCO3\" in the last 72 hours.      Plan:  (Medical Decision Making)     Hyponatremia- due to hypovolemia in the setting of N/V/D. Overcorrection with isotonic fluids. S/p D5W 2/28 and one dose of DDAVP given, IVF stopped 2/29. Serum Na up to 127 3/1, will continue to correct on its own. No further intervention needed  -urine Na 13, urine osm 288, TSH 0.74  JOSE CARLOS- pre renal, resolved  N/V/D- CT no acute findings, continue anti emetics. Diarrhea better  Hypokalemia- replete    Avani Shrestha, APRN - CNP  [facility]

## 2024-03-01 NOTE — PROGRESS NOTES
Comprehensive Nutrition Assessment    Type and Reason for Visit: Reassess  Malnutrition Screening Tool: Malnutrition Screen  Have you recently lost weight without trying?: Unsure of amount of weight loss (2 points)  Have you been eating poorly because of a decreased appetite?: Yes (1 point)  Malnutrition Screening Tool Score: 3    Nutrition Recommendations/Plan:   Meals and Snacks:  Diet: Liberalize to GI Soft per Discussion with Dr. Olsen  Nutrition Supplement Therapy:  Medical food supplement therapy:  Change to Ensure Clear three times per day (this provides 240 kcal and 8 grams protein per bottle) - mixed berry     Malnutrition Assessment:  Malnutrition Status: Moderate malnutrition  Context: Acute Illness  Findings of clinical characteristics of malnutrition:   Energy Intake:  50% or less of estimated energy requirements for 5 or more days (pt reports only consuming liqiud for 6+ days pta)  Weight Loss:  No significant weight loss (possibly skewed 2/2 bed weight measures this admission)     Body Fat Loss:  Mild body fat loss Orbital, Fat Overlying Ribs, Buccal region   Muscle Mass Loss:  Mild muscle mass loss Temples (temporalis), Clavicles (pectoralis & deltoids)  Fluid Accumulation:  No significant fluid accumulation     Strength:  Not Performed    Nutrition Assessment:  Nutrition History: Pt reports that her appetite was low for 6 days prior to assessment. Unable to tolerate solids, only consuming liquids in these days leading up to admission due to significant nausea/vomiting (gatorade, broth, etc). At baseline, she eats three meals per day. Pt endorses weight loss over the past week, but is unable to quantify.     Do You Have Any Cultural, Zoroastrian, or Ethnic Food Preferences?: No   Weight History: 11/15/23: 136# (IM), 8/16/23: 133#, 5/17/23: 133#, 3/22/23: 133#  CBW of 134# (2/28 bed weight). Weight overall appears stable over the past year. Current weight possible falsely elevated 2/2 bed scale  (1-1.25 g/kg) Weight Used: (Admission) 61 kg  Fluid (ml/day):   (1 ml/kcal)    Nutrition Diagnosis:   Inadequate oral intake related to altered GI function as evidenced by NPO or clear liquid status due to medical condition, nausea, diarrhea, vomiting  Moderate malnutrition related to inadequate protein-energy intake as evidenced by Criteria as identified in malnutrition assessment     Nutrition Interventions:   Food and/or Nutrient Delivery: Continue Current Diet, Modify Oral Nutrition Supplement     Coordination of Nutrition Care: Continue to monitor while inpatient    Goals:      Active Goal: PO intake 50% or greater, by next RD assessment       Nutrition Monitoring and Evaluation:      Food/Nutrient Intake Outcomes: Food and Nutrient Intake, Supplement Intake, Diet Advancement/Tolerance  Physical Signs/Symptoms Outcomes: Biochemical Data, Meal Time Behavior, Weight, GI Status    Discharge Planning:    Too soon to determine    Isamar Gallardo RD

## 2024-03-01 NOTE — PROGRESS NOTES
Shift report received from previous RN. No needs voiced by patient. At bedside, with patient eating breakfast without difficulty. Respirations even and unlabored. No signs of distress noted. Bed alarm, side rail up, bed low, locked, call light and personal items within reach in place.

## 2024-03-02 VITALS
HEIGHT: 64 IN | HEART RATE: 71 BPM | RESPIRATION RATE: 18 BRPM | WEIGHT: 131.2 LBS | SYSTOLIC BLOOD PRESSURE: 106 MMHG | DIASTOLIC BLOOD PRESSURE: 63 MMHG | OXYGEN SATURATION: 98 % | BODY MASS INDEX: 22.4 KG/M2 | TEMPERATURE: 97.9 F

## 2024-03-02 LAB
ALBUMIN SERPL-MCNC: 2.8 G/DL (ref 3.2–4.6)
ALBUMIN/GLOB SERPL: 0.8 (ref 0.4–1.6)
ALP SERPL-CCNC: 46 U/L (ref 50–136)
ALT SERPL-CCNC: 18 U/L (ref 12–65)
ANION GAP SERPL CALC-SCNC: 5 MMOL/L (ref 2–11)
AST SERPL-CCNC: 26 U/L (ref 15–37)
BACTERIA SPEC CULT: ABNORMAL
BACTERIA SPEC CULT: ABNORMAL
BASOPHILS # BLD: 0 K/UL (ref 0–0.2)
BASOPHILS NFR BLD: 1 % (ref 0–2)
BILIRUB SERPL-MCNC: 0.3 MG/DL (ref 0.2–1.1)
BUN SERPL-MCNC: 10 MG/DL (ref 8–23)
CALCIUM SERPL-MCNC: 8.2 MG/DL (ref 8.3–10.4)
CHLORIDE SERPL-SCNC: 108 MMOL/L (ref 103–113)
CO2 SERPL-SCNC: 26 MMOL/L (ref 21–32)
CREAT SERPL-MCNC: 0.8 MG/DL (ref 0.6–1)
DIFFERENTIAL METHOD BLD: ABNORMAL
EOSINOPHIL # BLD: 0.3 K/UL (ref 0–0.8)
EOSINOPHIL NFR BLD: 6 % (ref 0.5–7.8)
ERYTHROCYTE [DISTWIDTH] IN BLOOD BY AUTOMATED COUNT: 12.7 % (ref 11.9–14.6)
GLOBULIN SER CALC-MCNC: 3.4 G/DL (ref 2.8–4.5)
GLUCOSE SERPL-MCNC: 94 MG/DL (ref 65–100)
HCT VFR BLD AUTO: 32.7 % (ref 35.8–46.3)
HGB BLD-MCNC: 11.1 G/DL (ref 11.7–15.4)
IMM GRANULOCYTES # BLD AUTO: 0 K/UL (ref 0–0.5)
IMM GRANULOCYTES NFR BLD AUTO: 0 % (ref 0–5)
LYMPHOCYTES # BLD: 2 K/UL (ref 0.5–4.6)
LYMPHOCYTES NFR BLD: 36 % (ref 13–44)
MCH RBC QN AUTO: 31.3 PG (ref 26.1–32.9)
MCHC RBC AUTO-ENTMCNC: 33.9 G/DL (ref 31.4–35)
MCV RBC AUTO: 92.1 FL (ref 82–102)
MONOCYTES # BLD: 0.7 K/UL (ref 0.1–1.3)
MONOCYTES NFR BLD: 12 % (ref 4–12)
NEUTS SEG # BLD: 2.6 K/UL (ref 1.7–8.2)
NEUTS SEG NFR BLD: 46 % (ref 43–78)
NRBC # BLD: 0 K/UL (ref 0–0.2)
PLATELET # BLD AUTO: 244 K/UL (ref 150–450)
PMV BLD AUTO: 9.5 FL (ref 9.4–12.3)
POTASSIUM SERPL-SCNC: 4 MMOL/L (ref 3.5–5.1)
PROT SERPL-MCNC: 6.2 G/DL (ref 6.3–8.2)
RBC # BLD AUTO: 3.55 M/UL (ref 4.05–5.2)
SERVICE CMNT-IMP: ABNORMAL
SODIUM SERPL-SCNC: 139 MMOL/L (ref 136–146)
WBC # BLD AUTO: 5.6 K/UL (ref 4.3–11.1)

## 2024-03-02 PROCEDURE — 85025 COMPLETE CBC W/AUTO DIFF WBC: CPT

## 2024-03-02 PROCEDURE — 80053 COMPREHEN METABOLIC PANEL: CPT

## 2024-03-02 PROCEDURE — 2580000003 HC RX 258: Performed by: FAMILY MEDICINE

## 2024-03-02 PROCEDURE — 6370000000 HC RX 637 (ALT 250 FOR IP): Performed by: INTERNAL MEDICINE

## 2024-03-02 PROCEDURE — 6370000000 HC RX 637 (ALT 250 FOR IP): Performed by: FAMILY MEDICINE

## 2024-03-02 PROCEDURE — A4216 STERILE WATER/SALINE, 10 ML: HCPCS | Performed by: FAMILY MEDICINE

## 2024-03-02 PROCEDURE — 36415 COLL VENOUS BLD VENIPUNCTURE: CPT

## 2024-03-02 PROCEDURE — 6360000002 HC RX W HCPCS: Performed by: FAMILY MEDICINE

## 2024-03-02 PROCEDURE — C9113 INJ PANTOPRAZOLE SODIUM, VIA: HCPCS | Performed by: FAMILY MEDICINE

## 2024-03-02 RX ADMIN — HEPARIN SODIUM 5000 UNITS: 5000 INJECTION INTRAVENOUS; SUBCUTANEOUS at 06:09

## 2024-03-02 RX ADMIN — SODIUM CHLORIDE 40 MG: 9 INJECTION INTRAMUSCULAR; INTRAVENOUS; SUBCUTANEOUS at 08:36

## 2024-03-02 RX ADMIN — Medication 1 CAPSULE: at 08:35

## 2024-03-02 RX ADMIN — VITAMIN D, TAB 1000IU (100/BT) 2000 UNITS: 25 TAB at 08:35

## 2024-03-02 RX ADMIN — SODIUM CHLORIDE, PRESERVATIVE FREE 10 ML: 5 INJECTION INTRAVENOUS at 08:37

## 2024-03-02 RX ADMIN — MONTELUKAST 10 MG: 10 TABLET, FILM COATED ORAL at 08:36

## 2024-03-02 ASSESSMENT — PAIN SCALES - GENERAL: PAINLEVEL_OUTOF10: 0

## 2024-03-02 NOTE — PROGRESS NOTES
Patient resting in bed, alert and oriented, cooperative with care. Patient denies pain or distress, safety measures in place, call light within reach.

## 2024-03-02 NOTE — CARE COORDINATION
CM reviewed / screen patient for discharge today.  CM reviewed medical chart / discharge summary: Disposition:  Home with Home Health and CM weekend report: Saturday, no needs.  CM met with patient and discussed HH order.  Patient was agreeable and has no preference for HH.  Referral made to [facility].  Family will transport patient home.  Patient has met all treatment goals / milestones.  CM will continue to follow and remain available for any needs, concerns or questions that may arise.         02/28/24 0928   Service Assessment   Patient Orientation Alert and Oriented   Cognition Alert   History Provided By Patient   Primary Caregiver Self   Accompanied By/Relationship daughter at bedside   Support Systems Spouse/Significant Other;Children   Patient's Healthcare Decision Maker is: Named in Scanned ACP Document   PCP Verified by CM Yes   Last Visit to PCP Within last 3 months   Prior Functional Level Assistance with the following:;Mobility   Current Functional Level Assistance with the following:;Mobility   Can patient return to prior living arrangement Unknown at present   Ability to make needs known: Good   Family able to assist with home care needs: Yes   Would you like for me to discuss the discharge plan with any other family members/significant others, and if so, who? Yes   Financial Resources Medicare   Community Resources None   Social/Functional History   Lives With Spouse   Type of Home House   Home Equipment Walker, standard;Cane   Receives Help From Family   ADL Assistance Independent   Homemaking Assistance Independent   Homemaking Responsibilities No   Ambulation Assistance Needs assistance   Transfer Assistance Independent   Active  Yes   Mode of Transportation Car   Occupation Retired   Discharge Planning   Type of Residence House   Living Arrangements Spouse/Significant Other   Current Services Prior To Admission Durable Medical Equipment   Current DME Prior to Arrival Bedside

## 2024-03-02 NOTE — PROGRESS NOTES
Discharge instructions reviewed with pt and pt . Both verbalized understanding of all instructions and home medications. . Pt sent home with all personal belongings. pt to f/u with PCP in 1 week.  No issues or concerns verbalized at this time.

## 2024-03-02 NOTE — FLOWSHEET NOTE
03/02/24 0952   AVS Reviewed   AVS & discharge instructions reviewed with patient and/or representative? Yes   Reviewed instructions with Patient   Level of Understanding Questions answered;Verbalized understanding

## 2024-03-02 NOTE — DISCHARGE INSTRUCTIONS
CALL PRIMARY CARE MONDAY 3/ 4 TO ARRANGE APPT NEXT ONE WEEK     Follow up labs/diagnostics (ultimately defer to outpatient provider):  BMP CBC and magnesium level-within the next 3 to 5 days.  Home health may assist

## 2024-03-02 NOTE — DISCHARGE SUMMARY
Hospitalist Discharge Summary   Admit Date:  2024  1:33 PM   DC Note date: 3/2/2024  Name:  Tracy King   Age:  78 y.o.  Sex:  female  :  1945   MRN:  [id number]   Room:  [facility]  PCP:  Mark Gimenez MD    Presenting Complaint: Abdominal Pain and Emesis     Initial Admission Diagnosis: Hyponatremia [E87.1]  Nausea vomiting and diarrhea [R11.2, R19.7]  Hypotension due to hypovolemia [I95.89, E86.1]     Problem List for this Hospitalization (present on admission):    Principal Problem:    Hyponatremia  Active Problems:    Vitamin D deficiency    JOSE CARLOS (acute kidney injury) (HCC)    Hypotension    Metabolic acidosis    Viral gastroenteritis    Moderate protein-calorie malnutrition (HCC)    Hypocalcemia  Resolved Problems:    * No resolved hospital problems. *      Hospital Course:  78-year-old female past history of chronic pain presented with nausea vomiting diarrhea 5 to 6 days.  Hypotensive on presentation with systolic blood pressure 87.  Serum sodium 117 on presentation.  She did receive DDAVP during hospital stay because of concern about to rapid correction hyponatremia.  Off IV fluids -received low-dose gentle IV hydration with saline overnight prior to discharge because of residual relative hypotension.  BP is normotensive less than 110 systolic on day of discharge and her Benicar has been held.  We have also held magnesium at discharge because of recent gastroenteritis.    Her GI symptoms appear improved and she is suspected to have viral gastroenteritis.  She had a urinalysis dipstick in the ER which showed pyuria 10-20 WBC but repeat hours later showed no WBCs.  Unfortunately reflex culture sent and grew Enterococcus.  I suspect this is contaminant/poor specimen and repeat urinalysis was negative for pyuria and she was not treated with antibiotics.  However-caution regarding any urinary symptoms-low threshold for urinalysis and urine culture and sensitivity.  She appears stable  0733 97.9 °F (36.6 °C) 71 18 106/63 98 %   03/02/24 0356 99 °F (37.2 °C) 74 17 118/68 96 %   03/01/24 2322 98.6 °F (37 °C) 71 17 (!) 95/56 98 %   03/01/24 1930 98.1 °F (36.7 °C) 76 16 (!) 111/57 95 %   03/01/24 1513 98.1 °F (36.7 °C) 69 18 90/76 98 %   03/01/24 1146 -- -- -- (!) 98/50 --   03/01/24 1118 97.3 °F (36.3 °C) 69 18 (!) 89/49 100 %       Oxygen Therapy  SpO2: 98 %  Pulse via Oximetry: 66 beats per minute  O2 Device: None (Room air)    Estimated body mass index is 22.51 kg/m² as calculated from the following:    Height as of this encounter: 1.626 m (5' 4.02\").    Weight as of this encounter: 59.5 kg (131 lb 3.2 oz).    Intake/Output Summary (Last 24 hours) at 3/2/2024 0906  Last data filed at 3/2/2024 0630  Gross per 24 hour   Intake 835.51 ml   Output 1800 ml   Net -964.49 ml         Physical Exam:    General:    Well nourished.  No overt distress  Head:  Normocephalic, atraumatic  Eyes:  Sclerae appear normal.  Pupils equally round.    HENT:  Nares appear normal, no drainage.  Moist mucous membranes  Neck:  No restricted ROM.  Trachea midline  CV:   RRR.  No m/r/g.  No JVD  Lungs:   CTAB.  No wheezing, rhonchi, or rales.  Respirations even, unlabored  Abdomen:   Soft, nontender, nondistended.    Extremities: Warm and dry.   No edema.    Skin:     No rashes.  Normal coloration  Neuro:  CN II-XII grossly intact.  Psych:  Normal mood and affect.    Signed:  Maco Olsen DO    Part of this note may have been written by using a voice dictation software.  The note has been proof read but may still contain some grammatical/other typographical errors.

## 2024-03-03 LAB
BACTERIA SPEC CULT: NORMAL
BACTERIA SPEC CULT: NORMAL
SERVICE CMNT-IMP: NORMAL
SERVICE CMNT-IMP: NORMAL

## 2024-03-04 ENCOUNTER — CARE COORDINATION (OUTPATIENT)
Dept: CARE COORDINATION | Facility: CLINIC | Age: 79
End: 2024-03-04

## 2024-03-04 DIAGNOSIS — E87.1 HYPONATREMIA: Primary | ICD-10-CM

## 2024-03-04 NOTE — CARE COORDINATION
no    Non-face-to-face services provided:  Scheduled appointment with PCP-on 3/5/24  Scheduled appointment with Specialist-Dr. Duran on 3/6/24  Obtained and reviewed discharge summary and/or continuity of care documents  Education of patient/family/caregiver/guardian to support self-management-symptoms management, medication management, adequate fluids and proper nutrition and nutritional supplements, safety precautions, and follow up appointment. [facility] was [facility] with [facility] and patient states  staff is scheduled to come out today. Patient is aware of upcoming appointment and her spouse will  her. Patient denies any question, concerns, or needs at this time.     Offered patient enrollment in the Remote Patient Monitoring (RPM) program for in-home monitoring:  no .  Care Transitions 24 Hour Call    Schedule Follow Up Appointment with PCP: Completed  Do you have a copy of your discharge instructions?: Yes  Do you have all of your prescriptions and are they filled?: Yes  Have you been contacted by a [facility] Pharmacist?: No  Have you scheduled your follow up appointment?: Yes  How are you going to get to your appointment?: Car - family or friend to transport  Do you have support at home?: Partner/Spouse/SO  Do you feel like you have everything you need to keep you well at home?: Yes  Are you an active caregiver in your home?: No  Care Transitions Interventions     Discussed follow-up appointments. If no appointment was previously scheduled, appointment scheduling offered: Yes.   Is follow up appointment scheduled within 7 days of discharge? Yes.    Follow Up  Future Appointments   Date Time Provider Department Center   3/5/2024 11:30 AM [facility]   5/15/2024  9:30 AM Mark Gimenez MD [facility]     Care Transition Nurse provided contact information.   Care Coordinator will follow up next week  based on severity of symptoms and risk factors.  Plan for next

## 2024-03-05 ENCOUNTER — OFFICE VISIT (OUTPATIENT)
Dept: INTERNAL MEDICINE CLINIC | Facility: CLINIC | Age: 79
End: 2024-03-05

## 2024-03-05 VITALS
HEART RATE: 78 BPM | WEIGHT: 128 LBS | BODY MASS INDEX: 21.85 KG/M2 | HEIGHT: 64 IN | OXYGEN SATURATION: 99 % | DIASTOLIC BLOOD PRESSURE: 58 MMHG | TEMPERATURE: 97.2 F | SYSTOLIC BLOOD PRESSURE: 134 MMHG

## 2024-03-05 DIAGNOSIS — R30.0 DYSURIA: Primary | ICD-10-CM

## 2024-03-05 DIAGNOSIS — Z09 HOSPITAL DISCHARGE FOLLOW-UP: ICD-10-CM

## 2024-03-05 DIAGNOSIS — R53.1 WEAKNESS: ICD-10-CM

## 2024-03-05 DIAGNOSIS — I10 ESSENTIAL HYPERTENSION: ICD-10-CM

## 2024-03-05 DIAGNOSIS — R26.9 GAIT ABNORMALITY: ICD-10-CM

## 2024-03-05 LAB
APPEARANCE UR: CLEAR
BACTERIA URNS QL MICRO: 0 /HPF
BILIRUB UR QL: NEGATIVE
COLOR UR: ABNORMAL
EPI CELLS #/AREA URNS HPF: ABNORMAL /HPF
GLUCOSE UR STRIP.AUTO-MCNC: NEGATIVE MG/DL
HGB UR QL STRIP: NEGATIVE
KETONES UR QL STRIP.AUTO: NEGATIVE MG/DL
LEUKOCYTE ESTERASE UR QL STRIP.AUTO: ABNORMAL
NITRITE UR QL STRIP.AUTO: NEGATIVE
OTHER OBSERVATIONS: ABNORMAL
PH UR STRIP: 6 (ref 5–9)
PROT UR STRIP-MCNC: NEGATIVE MG/DL
SP GR UR REFRACTOMETRY: 1.01 (ref 1–1.02)
UROBILINOGEN UR QL STRIP.AUTO: 0.2 EU/DL (ref 0.2–1)
WBC URNS QL MICRO: ABNORMAL /HPF

## 2024-03-05 RX ORDER — NITROFURANTOIN 25; 75 MG/1; MG/1
100 CAPSULE ORAL 2 TIMES DAILY
Qty: 14 CAPSULE | Refills: 0 | Status: SHIPPED | OUTPATIENT
Start: 2024-03-05 | End: 2024-03-12

## 2024-03-05 ASSESSMENT — ENCOUNTER SYMPTOMS: SHORTNESS OF BREATH: 0

## 2024-03-08 LAB
BACTERIA SPEC CULT: NORMAL
BACTERIA SPEC CULT: NORMAL
SERVICE CMNT-IMP: NORMAL

## 2024-03-11 ENCOUNTER — CARE COORDINATION (OUTPATIENT)
Dept: CARE COORDINATION | Facility: CLINIC | Age: 79
End: 2024-03-11

## 2024-03-11 NOTE — CARE COORDINATION
Care Transitions Outreach Attempt      Attempted outreach follow up without success, left message. Unable to reach patient.  Per chart review patient attended pcp visit as scheduled.  Will attempt next outreach in 7 days.    Patient: Tracy King Patient : 1945 MRN: [id number]    Last Discharge Facility       Date Complaint Diagnosis Description Type Department Provider    24 Abdominal Pain; Emesis Nausea vomiting and diarrhea ... ED to Hosp-Admission (Discharged) (ADMITTED) [id number] Maco Olsen, ; Graham Srivastava...            Noted following upcoming appointments from  chart review:   [facility] follow up appointment(s):   Future Appointments   Date Time Provider Department Center   5/15/2024  9:30 AM [facility]

## 2024-03-18 ENCOUNTER — CARE COORDINATION (OUTPATIENT)
Dept: CARE COORDINATION | Facility: CLINIC | Age: 79
End: 2024-03-18

## 2024-03-18 NOTE — CARE COORDINATION
Care Transitions Follow Up Call    Patient Current Location:  Home: [address]  [place] [address]    [facility] Care Coordinator contacted the patient by telephone to follow up after admission on 24.  Verified name and  with patient as identifiers.    Patient: Tracy King  Patient : 1945   MRN: [id number]  Reason for Admission: Hyponatremia   Discharge Date: 3/2/24 RARS: Readmission Risk Score: 10.7      Needs to be reviewed by the provider   Additional needs identified to be addressed with provider: No  none             Method of communication with provider: none.    Patient reports she is progressing well, \"I'm feeling really good today\".  [facility] remains active.  Patient has attended follow up as scheduled.  Denies new needs or concerns.    Addressed changes since last contact:  none  Discussed follow-up appointments. If no appointment was previously scheduled, appointment scheduling offered: Yes.   Is follow up appointment scheduled within 7 days of discharge? Yes.    Follow Up  Future Appointments   Date Time Provider Department Center   5/15/2024  9:30 AM Mark Gimenez MD [facility] Care Coordinator reviewed medical action plan with patient and discussed any barriers to care and/or understanding of plan of care after discharge. Discussed appropriate site of care based on symptoms and resources available to patient including: PCP  Home health  When to call 911. The patient agrees to contact the PCP office for questions related to their healthcare.     Advance Care Planning:   reviewed and current.     Patients top risk factors for readmission:  comorbidities  Interventions to address risk factors:  continued compliance with all plans of care    Offered patient enrollment in the Remote Patient Monitoring (RPM) program for in-home monitoring:  na .     Care Transitions Subsequent and Final Call    Schedule Follow Up Appointment with PCP: Completed  Subsequent and Final

## 2024-05-09 ENCOUNTER — TELEPHONE (OUTPATIENT)
Dept: INTERNAL MEDICINE CLINIC | Facility: CLINIC | Age: 79
End: 2024-05-09

## 2024-05-09 NOTE — TELEPHONE ENCOUNTER
Piedad from [facility] is faxing over some missing  orders from the beginning of March

## 2024-05-12 SDOH — ECONOMIC STABILITY: FOOD INSECURITY: WITHIN THE PAST 12 MONTHS, THE FOOD YOU BOUGHT JUST DIDN'T LAST AND YOU DIDN'T HAVE MONEY TO GET MORE.: NEVER TRUE

## 2024-05-12 SDOH — ECONOMIC STABILITY: FOOD INSECURITY: WITHIN THE PAST 12 MONTHS, YOU WORRIED THAT YOUR FOOD WOULD RUN OUT BEFORE YOU GOT MONEY TO BUY MORE.: NEVER TRUE

## 2024-05-12 SDOH — ECONOMIC STABILITY: INCOME INSECURITY: HOW HARD IS IT FOR YOU TO PAY FOR THE VERY BASICS LIKE FOOD, HOUSING, MEDICAL CARE, AND HEATING?: NOT VERY HARD

## 2024-05-12 ASSESSMENT — PATIENT HEALTH QUESTIONNAIRE - PHQ9
2. FEELING DOWN, DEPRESSED OR HOPELESS: NOT AT ALL
1. LITTLE INTEREST OR PLEASURE IN DOING THINGS: NOT AT ALL
SUM OF ALL RESPONSES TO PHQ9 QUESTIONS 1 & 2: 0
SUM OF ALL RESPONSES TO PHQ QUESTIONS 1-9: 0
SUM OF ALL RESPONSES TO PHQ QUESTIONS 1-9: 0
SUM OF ALL RESPONSES TO PHQ9 QUESTIONS 1 & 2: 0
1. LITTLE INTEREST OR PLEASURE IN DOING THINGS: NOT AT ALL
SUM OF ALL RESPONSES TO PHQ QUESTIONS 1-9: 0
SUM OF ALL RESPONSES TO PHQ QUESTIONS 1-9: 0
2. FEELING DOWN, DEPRESSED OR HOPELESS: NOT AT ALL

## 2024-05-15 ENCOUNTER — OFFICE VISIT (OUTPATIENT)
Dept: INTERNAL MEDICINE CLINIC | Facility: CLINIC | Age: 79
End: 2024-05-15
Payer: MEDICARE

## 2024-05-15 VITALS
HEART RATE: 82 BPM | WEIGHT: 126 LBS | TEMPERATURE: 98.1 F | DIASTOLIC BLOOD PRESSURE: 87 MMHG | OXYGEN SATURATION: 97 % | HEIGHT: 64 IN | BODY MASS INDEX: 21.51 KG/M2 | SYSTOLIC BLOOD PRESSURE: 136 MMHG

## 2024-05-15 DIAGNOSIS — E55.9 VITAMIN D DEFICIENCY: ICD-10-CM

## 2024-05-15 DIAGNOSIS — Z00.00 MEDICARE ANNUAL WELLNESS VISIT, SUBSEQUENT: ICD-10-CM

## 2024-05-15 DIAGNOSIS — J30.9 ALLERGIC RHINITIS, UNSPECIFIED SEASONALITY, UNSPECIFIED TRIGGER: ICD-10-CM

## 2024-05-15 DIAGNOSIS — E78.49 OTHER HYPERLIPIDEMIA: Primary | ICD-10-CM

## 2024-05-15 DIAGNOSIS — G60.9 IDIOPATHIC PERIPHERAL NEUROPATHY: ICD-10-CM

## 2024-05-15 DIAGNOSIS — E78.49 OTHER HYPERLIPIDEMIA: ICD-10-CM

## 2024-05-15 DIAGNOSIS — E53.8 B12 DEFICIENCY: ICD-10-CM

## 2024-05-15 LAB
25(OH)D3 SERPL-MCNC: 38.6 NG/ML (ref 30–100)
ALBUMIN SERPL-MCNC: 4.2 G/DL (ref 3.2–4.6)
ALBUMIN/GLOB SERPL: 1.3 (ref 1–1.9)
ALP SERPL-CCNC: 48 U/L (ref 35–104)
ALT SERPL-CCNC: 10 U/L (ref 12–65)
ANION GAP SERPL CALC-SCNC: 9 MMOL/L (ref 9–18)
AST SERPL-CCNC: 24 U/L (ref 15–37)
BASOPHILS # BLD: 0.1 K/UL (ref 0–0.2)
BASOPHILS NFR BLD: 1 % (ref 0–2)
BILIRUB DIRECT SERPL-MCNC: <0.2 MG/DL (ref 0–0.4)
BILIRUB SERPL-MCNC: 0.3 MG/DL (ref 0–1.2)
BUN SERPL-MCNC: 13 MG/DL (ref 8–23)
CALCIUM SERPL-MCNC: 9.1 MG/DL (ref 8.8–10.2)
CHLORIDE SERPL-SCNC: 104 MMOL/L (ref 98–107)
CHOLEST SERPL-MCNC: 215 MG/DL (ref 0–200)
CO2 SERPL-SCNC: 26 MMOL/L (ref 20–28)
CREAT SERPL-MCNC: 0.89 MG/DL (ref 0.6–1.1)
DIFFERENTIAL METHOD BLD: ABNORMAL
EOSINOPHIL # BLD: 0.4 K/UL (ref 0–0.8)
EOSINOPHIL NFR BLD: 6 % (ref 0.5–7.8)
ERYTHROCYTE [DISTWIDTH] IN BLOOD BY AUTOMATED COUNT: 13.3 % (ref 11.9–14.6)
GLOBULIN SER CALC-MCNC: 3.2 G/DL (ref 2.3–3.5)
GLUCOSE SERPL-MCNC: 97 MG/DL (ref 70–99)
HCT VFR BLD AUTO: 39.7 % (ref 35.8–46.3)
HDLC SERPL-MCNC: 100 MG/DL (ref 40–60)
HDLC SERPL: 2.2 (ref 0–5)
HGB BLD-MCNC: 12.9 G/DL (ref 11.7–15.4)
IMM GRANULOCYTES # BLD AUTO: 0 K/UL (ref 0–0.5)
IMM GRANULOCYTES NFR BLD AUTO: 0 % (ref 0–5)
LDLC SERPL CALC-MCNC: 104 MG/DL (ref 0–100)
LYMPHOCYTES # BLD: 1.8 K/UL (ref 0.5–4.6)
LYMPHOCYTES NFR BLD: 26 % (ref 13–44)
MCH RBC QN AUTO: 32 PG (ref 26.1–32.9)
MCHC RBC AUTO-ENTMCNC: 32.5 G/DL (ref 31.4–35)
MCV RBC AUTO: 98.5 FL (ref 82–102)
MONOCYTES # BLD: 0.5 K/UL (ref 0.1–1.3)
MONOCYTES NFR BLD: 7 % (ref 4–12)
NEUTS SEG # BLD: 4 K/UL (ref 1.7–8.2)
NEUTS SEG NFR BLD: 60 % (ref 43–78)
NRBC # BLD: 0 K/UL (ref 0–0.2)
PLATELET # BLD AUTO: 277 K/UL (ref 150–450)
PMV BLD AUTO: 10.4 FL (ref 9.4–12.3)
POTASSIUM SERPL-SCNC: 4.4 MMOL/L (ref 3.5–5.1)
PROT SERPL-MCNC: 7.4 G/DL (ref 6.3–8.2)
RBC # BLD AUTO: 4.03 M/UL (ref 4.05–5.2)
SODIUM SERPL-SCNC: 140 MMOL/L (ref 136–145)
TRIGL SERPL-MCNC: 58 MG/DL (ref 0–150)
VIT B12 SERPL-MCNC: 1845 PG/ML (ref 193–986)
VLDLC SERPL CALC-MCNC: 12 MG/DL (ref 6–23)
WBC # BLD AUTO: 6.7 K/UL (ref 4.3–11.1)

## 2024-05-15 PROCEDURE — 99214 OFFICE O/P EST MOD 30 MIN: CPT | Performed by: INTERNAL MEDICINE

## 2024-05-15 PROCEDURE — 1036F TOBACCO NON-USER: CPT | Performed by: INTERNAL MEDICINE

## 2024-05-15 PROCEDURE — G8427 DOCREV CUR MEDS BY ELIG CLIN: HCPCS | Performed by: INTERNAL MEDICINE

## 2024-05-15 PROCEDURE — 1123F ACP DISCUSS/DSCN MKR DOCD: CPT | Performed by: INTERNAL MEDICINE

## 2024-05-15 PROCEDURE — 1090F PRES/ABSN URINE INCON ASSESS: CPT | Performed by: INTERNAL MEDICINE

## 2024-05-15 PROCEDURE — G8399 PT W/DXA RESULTS DOCUMENT: HCPCS | Performed by: INTERNAL MEDICINE

## 2024-05-15 PROCEDURE — G8420 CALC BMI NORM PARAMETERS: HCPCS | Performed by: INTERNAL MEDICINE

## 2024-05-15 PROCEDURE — G0439 PPPS, SUBSEQ VISIT: HCPCS | Performed by: INTERNAL MEDICINE

## 2024-05-15 RX ORDER — FLUTICASONE PROPIONATE 50 MCG
1 SPRAY, SUSPENSION (ML) NASAL PRN
COMMUNITY

## 2024-05-15 ASSESSMENT — LIFESTYLE VARIABLES
HOW MANY STANDARD DRINKS CONTAINING ALCOHOL DO YOU HAVE ON A TYPICAL DAY: PATIENT DOES NOT DRINK
HOW OFTEN DO YOU HAVE A DRINK CONTAINING ALCOHOL: NEVER

## 2024-05-15 ASSESSMENT — PATIENT HEALTH QUESTIONNAIRE - PHQ9
2. FEELING DOWN, DEPRESSED OR HOPELESS: NOT AT ALL
SUM OF ALL RESPONSES TO PHQ QUESTIONS 1-9: 0
SUM OF ALL RESPONSES TO PHQ QUESTIONS 1-9: 0
SUM OF ALL RESPONSES TO PHQ9 QUESTIONS 1 & 2: 0
SUM OF ALL RESPONSES TO PHQ QUESTIONS 1-9: 0
SUM OF ALL RESPONSES TO PHQ QUESTIONS 1-9: 0
1. LITTLE INTEREST OR PLEASURE IN DOING THINGS: NOT AT ALL

## 2024-05-15 ASSESSMENT — ENCOUNTER SYMPTOMS: SHORTNESS OF BREATH: 0

## 2024-05-15 NOTE — PROGRESS NOTES
[facility]  Mark Gimenez M.D.  Internal Medicine  [address] [place] [address]  Office : [phone]  Fax : [phone]    CHIEF COMPLAINT  Chief Complaint   Patient presents with    Medicare AWV     Sub awv, no new concerns         Medicare Annual Wellness Visit    Tracy King is here for Medicare AWV (Sub awv, no new concerns)    Assessment & Plan   Medicare annual wellness visit, subsequent  Allergic rhinitis, unspecified seasonality, unspecified trigger    Recommendations for Preventive Services Due: see orders and patient instructions/AVS.  Recommended screening schedule for the next 5-10 years is provided to the patient in written form: see Patient Instructions/AVS.     Return in about 6 months (around 11/15/2024), or if symptoms worsen or fail to improve.     Subjective       Patient's complete Health Risk Assessment and screening values have been reviewed and are found in Flowsheets. The following problems were reviewed today and where indicated follow up appointments were made and/or referrals ordered.    Positive Risk Factor Screenings with Interventions:    Fall Risk:  Do you feel unsteady or are you worried about falling? : (!) yes (Uses a cane)  2 or more falls in past year?: no  Fall with injury in past year?: no     Interventions:    Recommended Assisted Device                  ADL's:   Patient reports needing help with:  Select all that apply: (!) Housekeeping  Interventions:  Granddaughter helps with this                  Objective   Vitals:    05/15/24 0936   BP: 136/87   Site: Right Upper Arm   Pulse: 82   Temp: 98.1 °F (36.7 °C)   TempSrc: Temporal   SpO2: 97%   Weight: 57.2 kg (126 lb)   Height: 1.626 m (5' 4\")      Body mass index is 21.63 kg/m².               Allergies   Allergen Reactions    Simvastatin Other (See Comments)    Levofloxacin Nausea Only    Morphine Other (See Comments)    Penicillins Hives    Sulfa Antibiotics

## 2024-05-15 NOTE — PROGRESS NOTES
[facility]  Mark Gimenez M.D.  Internal Medicine  [address] [place] [address]  Office : [phone]  Fax : [phone]    Chief Complaint   Patient presents with    Cholesterol Problem       History of Present Illness:  Tracy King is a 78 y.o. female.  HPI      Hyperlipidemia  Patient is in for follow-up for hyperlipidemia.  Diet and Lifestyle: generally follows a low fat low cholesterol diet. Risk factors for vascular disease consist of hyperlipidemia. Last LDL was   Lab Results   Component Value Date    LDL 81.4 05/17/2023   . Last ALT was   Lab Results   Component Value Date/Time    ALT 18 03/02/2024 05:39 AM   .  No muscle aches.      Vitamin D Deficiency and Osteopenia  Last DEXA in 5/2023  Currently on Prolia therapy    Lab Results   Component Value Date/Time    VITD25 27.1 02/29/2024 04:07 AM        Lab Results   Component Value Date    IPTH 200.9 (H) 02/29/2024    CALCIUM 8.2 (L) 03/02/2024    CAION 3.92 (L) 02/29/2024    PHOS 3.8 (H) 10/04/2021         Peripheral Neuropathy with B12 deficiency  Taking OTC Vitamin B12  Lab Results   Component Value Date    QNFWURLV82 2903 (H) 05/17/2023     Allergic Rhinitis  Now is controlled with just Flonase after cutting down several trees      Past Medical History:  Past Medical History:   Diagnosis Date    Allergic eye reaction 6/19/2013    Arthritis     shoulders    Claustrophobia     Elevated blood pressure (not hypertension) 4/30/2015    Environmental allergies     FH: breast cancer 6/19/2013    FH: colon cancer 6/19/2013    [place] (hard of hearing)     has bilateral hearing aids    Hyperlipidemia 6/19/2013    diet controlled    Left shoulder pain 6/19/2013    Menopause 6/19/2013    Nausea & vomiting     post op nausea and vomiting in past    Neuropathy     in toes    Osteopenia     Trigger finger 6/19/2013    left thumb    Unspecified adverse effect of anesthesia     usually takes until next day to

## 2024-11-20 ENCOUNTER — OFFICE VISIT (OUTPATIENT)
Dept: INTERNAL MEDICINE CLINIC | Facility: CLINIC | Age: 79
End: 2024-11-20
Payer: MEDICARE

## 2024-11-20 VITALS
BODY MASS INDEX: 21.34 KG/M2 | TEMPERATURE: 97.8 F | SYSTOLIC BLOOD PRESSURE: 140 MMHG | WEIGHT: 125 LBS | HEIGHT: 64 IN | HEART RATE: 78 BPM | OXYGEN SATURATION: 98 % | DIASTOLIC BLOOD PRESSURE: 59 MMHG

## 2024-11-20 DIAGNOSIS — M85.80 OSTEOPENIA AFTER MENOPAUSE: ICD-10-CM

## 2024-11-20 DIAGNOSIS — G60.9 IDIOPATHIC PERIPHERAL NEUROPATHY: ICD-10-CM

## 2024-11-20 DIAGNOSIS — J30.9 ALLERGIC RHINITIS, UNSPECIFIED SEASONALITY, UNSPECIFIED TRIGGER: ICD-10-CM

## 2024-11-20 DIAGNOSIS — Z78.0 OSTEOPENIA AFTER MENOPAUSE: ICD-10-CM

## 2024-11-20 DIAGNOSIS — R26.9 GAIT ABNORMALITY: ICD-10-CM

## 2024-11-20 DIAGNOSIS — E53.8 B12 DEFICIENCY: ICD-10-CM

## 2024-11-20 DIAGNOSIS — E78.49 OTHER HYPERLIPIDEMIA: Primary | ICD-10-CM

## 2024-11-20 DIAGNOSIS — E55.9 VITAMIN D DEFICIENCY: ICD-10-CM

## 2024-11-20 PROCEDURE — G8399 PT W/DXA RESULTS DOCUMENT: HCPCS | Performed by: INTERNAL MEDICINE

## 2024-11-20 PROCEDURE — G8484 FLU IMMUNIZE NO ADMIN: HCPCS | Performed by: INTERNAL MEDICINE

## 2024-11-20 PROCEDURE — 1090F PRES/ABSN URINE INCON ASSESS: CPT | Performed by: INTERNAL MEDICINE

## 2024-11-20 PROCEDURE — 1036F TOBACCO NON-USER: CPT | Performed by: INTERNAL MEDICINE

## 2024-11-20 PROCEDURE — 99212 OFFICE O/P EST SF 10 MIN: CPT | Performed by: INTERNAL MEDICINE

## 2024-11-20 PROCEDURE — 1123F ACP DISCUSS/DSCN MKR DOCD: CPT | Performed by: INTERNAL MEDICINE

## 2024-11-20 PROCEDURE — G8427 DOCREV CUR MEDS BY ELIG CLIN: HCPCS | Performed by: INTERNAL MEDICINE

## 2024-11-20 PROCEDURE — 1159F MED LIST DOCD IN RCRD: CPT | Performed by: INTERNAL MEDICINE

## 2024-11-20 PROCEDURE — 1160F RVW MEDS BY RX/DR IN RCRD: CPT | Performed by: INTERNAL MEDICINE

## 2024-11-20 PROCEDURE — G8420 CALC BMI NORM PARAMETERS: HCPCS | Performed by: INTERNAL MEDICINE

## 2024-11-20 SDOH — ECONOMIC STABILITY: FOOD INSECURITY: WITHIN THE PAST 12 MONTHS, YOU WORRIED THAT YOUR FOOD WOULD RUN OUT BEFORE YOU GOT MONEY TO BUY MORE.: NEVER TRUE

## 2024-11-20 SDOH — ECONOMIC STABILITY: FOOD INSECURITY: WITHIN THE PAST 12 MONTHS, THE FOOD YOU BOUGHT JUST DIDN'T LAST AND YOU DIDN'T HAVE MONEY TO GET MORE.: NEVER TRUE

## 2024-11-20 SDOH — ECONOMIC STABILITY: INCOME INSECURITY: HOW HARD IS IT FOR YOU TO PAY FOR THE VERY BASICS LIKE FOOD, HOUSING, MEDICAL CARE, AND HEATING?: NOT HARD AT ALL

## 2024-11-20 ASSESSMENT — PATIENT HEALTH QUESTIONNAIRE - PHQ9
SUM OF ALL RESPONSES TO PHQ QUESTIONS 1-9: 0
2. FEELING DOWN, DEPRESSED OR HOPELESS: NOT AT ALL
SUM OF ALL RESPONSES TO PHQ QUESTIONS 1-9: 0
SUM OF ALL RESPONSES TO PHQ QUESTIONS 1-9: 0
1. LITTLE INTEREST OR PLEASURE IN DOING THINGS: NOT AT ALL
SUM OF ALL RESPONSES TO PHQ QUESTIONS 1-9: 0
SUM OF ALL RESPONSES TO PHQ9 QUESTIONS 1 & 2: 0

## 2024-11-20 ASSESSMENT — ANXIETY QUESTIONNAIRES
3. WORRYING TOO MUCH ABOUT DIFFERENT THINGS: NOT AT ALL
6. BECOMING EASILY ANNOYED OR IRRITABLE: NOT AT ALL
1. FEELING NERVOUS, ANXIOUS, OR ON EDGE: NOT AT ALL
IF YOU CHECKED OFF ANY PROBLEMS ON THIS QUESTIONNAIRE, HOW DIFFICULT HAVE THESE PROBLEMS MADE IT FOR YOU TO DO YOUR WORK, TAKE CARE OF THINGS AT HOME, OR GET ALONG WITH OTHER PEOPLE: NOT DIFFICULT AT ALL
GAD7 TOTAL SCORE: 0
7. FEELING AFRAID AS IF SOMETHING AWFUL MIGHT HAPPEN: NOT AT ALL
2. NOT BEING ABLE TO STOP OR CONTROL WORRYING: NOT AT ALL
4. TROUBLE RELAXING: NOT AT ALL
5. BEING SO RESTLESS THAT IT IS HARD TO SIT STILL: NOT AT ALL

## 2024-11-20 ASSESSMENT — ENCOUNTER SYMPTOMS: SHORTNESS OF BREATH: 0

## 2024-12-13 DIAGNOSIS — Z78.0 OSTEOPENIA AFTER MENOPAUSE: ICD-10-CM

## 2024-12-13 DIAGNOSIS — M85.80 OSTEOPENIA AFTER MENOPAUSE: ICD-10-CM

## 2024-12-13 LAB
ANION GAP SERPL CALC-SCNC: 9 MMOL/L (ref 7–16)
BUN SERPL-MCNC: 13 MG/DL (ref 8–23)
CALCIUM SERPL-MCNC: 8.8 MG/DL (ref 8.8–10.2)
CHLORIDE SERPL-SCNC: 107 MMOL/L (ref 98–107)
CO2 SERPL-SCNC: 27 MMOL/L (ref 20–29)
CREAT SERPL-MCNC: 0.93 MG/DL (ref 0.6–1.1)
GLUCOSE SERPL-MCNC: 95 MG/DL (ref 70–99)
POTASSIUM SERPL-SCNC: 4.5 MMOL/L (ref 3.5–5.1)
SODIUM SERPL-SCNC: 142 MMOL/L (ref 136–145)

## 2025-02-17 ENCOUNTER — OFFICE VISIT (OUTPATIENT)
Dept: INTERNAL MEDICINE CLINIC | Facility: CLINIC | Age: 80
End: 2025-02-17
Payer: MEDICARE

## 2025-02-17 VITALS
DIASTOLIC BLOOD PRESSURE: 67 MMHG | SYSTOLIC BLOOD PRESSURE: 137 MMHG | WEIGHT: 121 LBS | HEIGHT: 64 IN | HEART RATE: 76 BPM | BODY MASS INDEX: 20.66 KG/M2 | OXYGEN SATURATION: 96 % | TEMPERATURE: 97.5 F

## 2025-02-17 DIAGNOSIS — J06.9 UPPER RESPIRATORY TRACT INFECTION, UNSPECIFIED TYPE: Primary | ICD-10-CM

## 2025-02-17 PROCEDURE — G8399 PT W/DXA RESULTS DOCUMENT: HCPCS | Performed by: INTERNAL MEDICINE

## 2025-02-17 PROCEDURE — 1123F ACP DISCUSS/DSCN MKR DOCD: CPT | Performed by: INTERNAL MEDICINE

## 2025-02-17 PROCEDURE — 1036F TOBACCO NON-USER: CPT | Performed by: INTERNAL MEDICINE

## 2025-02-17 PROCEDURE — G8427 DOCREV CUR MEDS BY ELIG CLIN: HCPCS | Performed by: INTERNAL MEDICINE

## 2025-02-17 PROCEDURE — 1160F RVW MEDS BY RX/DR IN RCRD: CPT | Performed by: INTERNAL MEDICINE

## 2025-02-17 PROCEDURE — 99213 OFFICE O/P EST LOW 20 MIN: CPT | Performed by: INTERNAL MEDICINE

## 2025-02-17 PROCEDURE — G8420 CALC BMI NORM PARAMETERS: HCPCS | Performed by: INTERNAL MEDICINE

## 2025-02-17 PROCEDURE — 1159F MED LIST DOCD IN RCRD: CPT | Performed by: INTERNAL MEDICINE

## 2025-02-17 PROCEDURE — 1090F PRES/ABSN URINE INCON ASSESS: CPT | Performed by: INTERNAL MEDICINE

## 2025-02-17 RX ORDER — DOXYCYCLINE HYCLATE 100 MG
100 TABLET ORAL 2 TIMES DAILY
Qty: 28 TABLET | Refills: 0 | Status: SHIPPED | OUTPATIENT
Start: 2025-02-17 | End: 2025-03-03

## 2025-02-17 RX ORDER — PREDNISONE 10 MG/1
10 TABLET ORAL DAILY
Qty: 10 TABLET | Refills: 0 | Status: SHIPPED | OUTPATIENT
Start: 2025-02-17 | End: 2025-02-27

## 2025-02-17 ASSESSMENT — ENCOUNTER SYMPTOMS
WHEEZING: 1
SORE THROAT: 0
SHORTNESS OF BREATH: 1
COUGH: 1

## 2025-02-17 NOTE — PROGRESS NOTES
[facility]  Mark Gimenez M.D.  Internal Medicine  [address] [place] [address]  Office : [phone]  Fax : [phone]    Chief Complaint   Patient presents with    Cough     Cough and SOB since December on and off        History of Present Illness:  Tracy King is a 79 y.o. female.  Cough  This is a recurrent problem. The current episode started more than 1 month ago. The problem has been unchanged. The problem occurs every few minutes. The cough is Non-productive. Associated symptoms include nasal congestion, postnasal drip, shortness of breath and wheezing. Pertinent negatives include no chills, ear pain, fever, sore throat or sweats. Nothing aggravates the symptoms. She has tried OTC cough suppressant for the symptoms. The treatment provided mild relief. Her past medical history is significant for environmental allergies.     Seen at  and tested for influenza, RSV and Covid which were all negative      Past Medical History:  Past Medical History:   Diagnosis Date    Allergic eye reaction 6/19/2013    Anxiety     Arthritis     shoulders    Claustrophobia     Elevated blood pressure (not hypertension) 4/30/2015    Environmental allergies     FH: breast cancer 6/19/2013    FH: colon cancer 6/19/2013    [place] (hard of hearing)     has bilateral hearing aids    Hyperlipidemia 6/19/2013    diet controlled    Left shoulder pain 6/19/2013    Menopause 6/19/2013    Nausea & vomiting     post op nausea and vomiting in past    Neuropathy     in toes    Osteopenia     Trigger finger 6/19/2013    left thumb    Unspecified adverse effect of anesthesia     usually takes until next day to wake fully, OK with last procedure (colonoscopy 2012)    Vertigo 6/19/2013    Vitamin B12 deficiency     Vitamin D deficiency 8/9/2016     Past Surgical History:  Past Surgical History:   Procedure Laterality Date    APPENDECTOMY      CHOLECYSTECTOMY      GI  last 2012

## 2025-03-28 SDOH — ECONOMIC STABILITY: FOOD INSECURITY: WITHIN THE PAST 12 MONTHS, THE FOOD YOU BOUGHT JUST DIDN'T LAST AND YOU DIDN'T HAVE MONEY TO GET MORE.: NEVER TRUE

## 2025-03-28 SDOH — ECONOMIC STABILITY: INCOME INSECURITY: IN THE LAST 12 MONTHS, WAS THERE A TIME WHEN YOU WERE NOT ABLE TO PAY THE MORTGAGE OR RENT ON TIME?: NO

## 2025-03-28 SDOH — ECONOMIC STABILITY: FOOD INSECURITY: WITHIN THE PAST 12 MONTHS, YOU WORRIED THAT YOUR FOOD WOULD RUN OUT BEFORE YOU GOT MONEY TO BUY MORE.: NEVER TRUE

## 2025-03-28 SDOH — ECONOMIC STABILITY: TRANSPORTATION INSECURITY
IN THE PAST 12 MONTHS, HAS THE LACK OF TRANSPORTATION KEPT YOU FROM MEDICAL APPOINTMENTS OR FROM GETTING MEDICATIONS?: NO

## 2025-03-28 ASSESSMENT — PATIENT HEALTH QUESTIONNAIRE - PHQ9
1. LITTLE INTEREST OR PLEASURE IN DOING THINGS: NOT AT ALL
SUM OF ALL RESPONSES TO PHQ QUESTIONS 1-9: 0
SUM OF ALL RESPONSES TO PHQ9 QUESTIONS 1 & 2: 0
SUM OF ALL RESPONSES TO PHQ QUESTIONS 1-9: 0
2. FEELING DOWN, DEPRESSED OR HOPELESS: NOT AT ALL
SUM OF ALL RESPONSES TO PHQ QUESTIONS 1-9: 0
SUM OF ALL RESPONSES TO PHQ QUESTIONS 1-9: 0
1. LITTLE INTEREST OR PLEASURE IN DOING THINGS: NOT AT ALL
2. FEELING DOWN, DEPRESSED OR HOPELESS: NOT AT ALL

## 2025-03-31 ENCOUNTER — OFFICE VISIT (OUTPATIENT)
Dept: INTERNAL MEDICINE CLINIC | Facility: CLINIC | Age: 80
End: 2025-03-31
Payer: MEDICARE

## 2025-03-31 VITALS
BODY MASS INDEX: 21 KG/M2 | OXYGEN SATURATION: 97 % | DIASTOLIC BLOOD PRESSURE: 70 MMHG | SYSTOLIC BLOOD PRESSURE: 131 MMHG | HEIGHT: 64 IN | HEART RATE: 83 BPM | TEMPERATURE: 97.5 F | WEIGHT: 123 LBS

## 2025-03-31 DIAGNOSIS — H91.91 UNILATERAL HEARING LOSS, RIGHT: Primary | ICD-10-CM

## 2025-03-31 PROCEDURE — 99212 OFFICE O/P EST SF 10 MIN: CPT | Performed by: INTERNAL MEDICINE

## 2025-03-31 PROCEDURE — 1036F TOBACCO NON-USER: CPT | Performed by: INTERNAL MEDICINE

## 2025-03-31 PROCEDURE — G8427 DOCREV CUR MEDS BY ELIG CLIN: HCPCS | Performed by: INTERNAL MEDICINE

## 2025-03-31 PROCEDURE — 1090F PRES/ABSN URINE INCON ASSESS: CPT | Performed by: INTERNAL MEDICINE

## 2025-03-31 PROCEDURE — 1160F RVW MEDS BY RX/DR IN RCRD: CPT | Performed by: INTERNAL MEDICINE

## 2025-03-31 PROCEDURE — 1123F ACP DISCUSS/DSCN MKR DOCD: CPT | Performed by: INTERNAL MEDICINE

## 2025-03-31 PROCEDURE — G8420 CALC BMI NORM PARAMETERS: HCPCS | Performed by: INTERNAL MEDICINE

## 2025-03-31 PROCEDURE — G8399 PT W/DXA RESULTS DOCUMENT: HCPCS | Performed by: INTERNAL MEDICINE

## 2025-03-31 PROCEDURE — 1159F MED LIST DOCD IN RCRD: CPT | Performed by: INTERNAL MEDICINE

## 2025-03-31 ASSESSMENT — ANXIETY QUESTIONNAIRES
3. WORRYING TOO MUCH ABOUT DIFFERENT THINGS: NOT AT ALL
2. NOT BEING ABLE TO STOP OR CONTROL WORRYING: NOT AT ALL
6. BECOMING EASILY ANNOYED OR IRRITABLE: NOT AT ALL
5. BEING SO RESTLESS THAT IT IS HARD TO SIT STILL: NOT AT ALL
GAD7 TOTAL SCORE: 0
4. TROUBLE RELAXING: NOT AT ALL
1. FEELING NERVOUS, ANXIOUS, OR ON EDGE: NOT AT ALL
7. FEELING AFRAID AS IF SOMETHING AWFUL MIGHT HAPPEN: NOT AT ALL
IF YOU CHECKED OFF ANY PROBLEMS ON THIS QUESTIONNAIRE, HOW DIFFICULT HAVE THESE PROBLEMS MADE IT FOR YOU TO DO YOUR WORK, TAKE CARE OF THINGS AT HOME, OR GET ALONG WITH OTHER PEOPLE: NOT DIFFICULT AT ALL

## 2025-03-31 NOTE — PROGRESS NOTES
[facility]  Mark Gimenez M.D.  Internal Medicine  [address] [place] [address]  Office : [phone]  Fax : [phone]    Chief Complaint   Patient presents with    Hearing Problem     Hearing problems worsening wants ENT referral        History of Present Illness:  Tracy King is a 79 y.o. female.  HPI    Hearing Loss  Has hearing aids that do not work on the right ear.  Left ear has some hearing, but the right side has none despite $6000 hearing aids.  Consulted audiologist several times without any improvement.  Would like an opinion from a ENT    Past Medical History:  Past Medical History:   Diagnosis Date    Allergic eye reaction 6/19/2013    Anxiety     Arthritis     shoulders    Claustrophobia     Elevated blood pressure (not hypertension) 4/30/2015    Environmental allergies     FH: breast cancer 6/19/2013    FH: colon cancer 6/19/2013    [place] (hard of hearing)     has bilateral hearing aids    Hyperlipidemia 6/19/2013    diet controlled    Left shoulder pain 6/19/2013    Menopause 6/19/2013    Nausea & vomiting     post op nausea and vomiting in past    Neuropathy     in toes    Osteopenia     Trigger finger 6/19/2013    left thumb    Unspecified adverse effect of anesthesia     usually takes until next day to wake fully, OK with last procedure (colonoscopy 2012)    Vertigo 6/19/2013    Vitamin B12 deficiency     Vitamin D deficiency 8/9/2016     Past Surgical History:  Past Surgical History:   Procedure Laterality Date    APPENDECTOMY      CHOLECYSTECTOMY      GI  last 2012    colonoscopy (at least 5-6)    HYSTERECTOMY (CERVIX STATUS UNKNOWN)      no bso    JOINT REPLACEMENT Right 04/26/2022    2nd hip replacement    JOINT REPLACEMENT Right 05/26/2021    1st hip replacement    MOHS SURGERY  1982    right arm    ORTHOPEDIC SURGERY Right 05/26/2021    hip replacement    ROTATOR CUFF REPAIR Bilateral     TUBAL LIGATION  1971     Allergies:

## 2025-05-15 SDOH — HEALTH STABILITY: PHYSICAL HEALTH: ON AVERAGE, HOW MANY DAYS PER WEEK DO YOU ENGAGE IN MODERATE TO STRENUOUS EXERCISE (LIKE A BRISK WALK)?: 3 DAYS

## 2025-05-15 SDOH — HEALTH STABILITY: PHYSICAL HEALTH: ON AVERAGE, HOW MANY MINUTES DO YOU ENGAGE IN EXERCISE AT THIS LEVEL?: 50 MIN

## 2025-05-15 ASSESSMENT — LIFESTYLE VARIABLES
HOW OFTEN DO YOU HAVE SIX OR MORE DRINKS ON ONE OCCASION: 1
HOW OFTEN DO YOU HAVE A DRINK CONTAINING ALCOHOL: NEVER
HOW MANY STANDARD DRINKS CONTAINING ALCOHOL DO YOU HAVE ON A TYPICAL DAY: 0
HOW MANY STANDARD DRINKS CONTAINING ALCOHOL DO YOU HAVE ON A TYPICAL DAY: PATIENT DOES NOT DRINK
HOW OFTEN DO YOU HAVE A DRINK CONTAINING ALCOHOL: 1

## 2025-05-15 ASSESSMENT — PATIENT HEALTH QUESTIONNAIRE - PHQ9
2. FEELING DOWN, DEPRESSED OR HOPELESS: NOT AT ALL
SUM OF ALL RESPONSES TO PHQ QUESTIONS 1-9: 0
1. LITTLE INTEREST OR PLEASURE IN DOING THINGS: NOT AT ALL

## 2025-05-20 ENCOUNTER — OFFICE VISIT (OUTPATIENT)
Dept: AUDIOLOGY | Age: 80
End: 2025-05-20
Payer: MEDICARE

## 2025-05-20 DIAGNOSIS — H90.3 SENSORINEURAL HEARING LOSS, BILATERAL: Primary | ICD-10-CM

## 2025-05-20 PROCEDURE — 92557 COMPREHENSIVE HEARING TEST: CPT | Performed by: AUDIOLOGIST

## 2025-05-20 PROCEDURE — 92567 TYMPANOMETRY: CPT | Performed by: AUDIOLOGIST

## 2025-05-22 ENCOUNTER — OFFICE VISIT (OUTPATIENT)
Dept: INTERNAL MEDICINE CLINIC | Facility: CLINIC | Age: 80
End: 2025-05-22

## 2025-05-22 VITALS
BODY MASS INDEX: 21.05 KG/M2 | TEMPERATURE: 97.3 F | SYSTOLIC BLOOD PRESSURE: 140 MMHG | DIASTOLIC BLOOD PRESSURE: 67 MMHG | HEART RATE: 77 BPM | WEIGHT: 123.3 LBS | OXYGEN SATURATION: 96 % | HEIGHT: 64 IN

## 2025-05-22 DIAGNOSIS — G60.9 IDIOPATHIC PERIPHERAL NEUROPATHY: ICD-10-CM

## 2025-05-22 DIAGNOSIS — J30.9 ALLERGIC RHINITIS, UNSPECIFIED SEASONALITY, UNSPECIFIED TRIGGER: ICD-10-CM

## 2025-05-22 DIAGNOSIS — E78.49 OTHER HYPERLIPIDEMIA: ICD-10-CM

## 2025-05-22 DIAGNOSIS — E53.8 B12 DEFICIENCY: ICD-10-CM

## 2025-05-22 DIAGNOSIS — E55.9 VITAMIN D DEFICIENCY: ICD-10-CM

## 2025-05-22 DIAGNOSIS — M81.0 OSTEOPOROSIS, UNSPECIFIED OSTEOPOROSIS TYPE, UNSPECIFIED PATHOLOGICAL FRACTURE PRESENCE: ICD-10-CM

## 2025-05-22 DIAGNOSIS — Z00.00 MEDICARE ANNUAL WELLNESS VISIT, SUBSEQUENT: ICD-10-CM

## 2025-05-22 DIAGNOSIS — E78.49 OTHER HYPERLIPIDEMIA: Primary | ICD-10-CM

## 2025-05-22 LAB
25(OH)D3 SERPL-MCNC: 58.3 NG/ML (ref 30–100)
ALBUMIN SERPL-MCNC: 3.7 G/DL (ref 3.2–4.6)
ALBUMIN/GLOB SERPL: 1.1 (ref 1–1.9)
ALP SERPL-CCNC: 52 U/L (ref 35–104)
ALT SERPL-CCNC: 7 U/L (ref 8–45)
ANION GAP SERPL CALC-SCNC: 9 MMOL/L (ref 7–16)
AST SERPL-CCNC: 29 U/L (ref 15–37)
BASOPHILS # BLD: 0.08 K/UL (ref 0–0.2)
BASOPHILS NFR BLD: 1.1 % (ref 0–2)
BILIRUB DIRECT SERPL-MCNC: 0.1 MG/DL (ref 0–0.3)
BILIRUB SERPL-MCNC: 0.3 MG/DL (ref 0–1.2)
BUN SERPL-MCNC: 12 MG/DL (ref 8–23)
CALCIUM SERPL-MCNC: 9.2 MG/DL (ref 8.8–10.2)
CALCIUM SERPL-MCNC: 9.3 MG/DL (ref 8.8–10.2)
CHLORIDE SERPL-SCNC: 98 MMOL/L (ref 98–107)
CHOLEST SERPL-MCNC: 178 MG/DL (ref 0–200)
CO2 SERPL-SCNC: 26 MMOL/L (ref 20–29)
CREAT SERPL-MCNC: 0.84 MG/DL (ref 0.6–1.1)
DIFFERENTIAL METHOD BLD: ABNORMAL
EOSINOPHIL # BLD: 0.58 K/UL (ref 0–0.8)
EOSINOPHIL NFR BLD: 8.2 % (ref 0.5–7.8)
ERYTHROCYTE [DISTWIDTH] IN BLOOD BY AUTOMATED COUNT: 12.9 % (ref 11.9–14.6)
GLOBULIN SER CALC-MCNC: 3.2 G/DL (ref 2.3–3.5)
GLUCOSE SERPL-MCNC: 89 MG/DL (ref 70–99)
HCT VFR BLD AUTO: 37.6 % (ref 35.8–46.3)
HDLC SERPL-MCNC: 84 MG/DL (ref 40–60)
HDLC SERPL: 2.1 (ref 0–5)
HGB BLD-MCNC: 12.3 G/DL (ref 11.7–15.4)
IMM GRANULOCYTES # BLD AUTO: 0.02 K/UL (ref 0–0.5)
IMM GRANULOCYTES NFR BLD AUTO: 0.3 % (ref 0–5)
LDLC SERPL CALC-MCNC: 79 MG/DL (ref 0–100)
LYMPHOCYTES # BLD: 2.23 K/UL (ref 0.5–4.6)
LYMPHOCYTES NFR BLD: 31.5 % (ref 13–44)
MCH RBC QN AUTO: 31.9 PG (ref 26.1–32.9)
MCHC RBC AUTO-ENTMCNC: 32.7 G/DL (ref 31.4–35)
MCV RBC AUTO: 97.4 FL (ref 82–102)
MONOCYTES # BLD: 0.65 K/UL (ref 0.1–1.3)
MONOCYTES NFR BLD: 9.2 % (ref 4–12)
NEUTS SEG # BLD: 3.52 K/UL (ref 1.7–8.2)
NEUTS SEG NFR BLD: 49.7 % (ref 43–78)
NRBC # BLD: 0 K/UL (ref 0–0.2)
PLATELET # BLD AUTO: 287 K/UL (ref 150–450)
PMV BLD AUTO: 9.9 FL (ref 9.4–12.3)
POTASSIUM SERPL-SCNC: 4.2 MMOL/L (ref 3.5–5.1)
PROT SERPL-MCNC: 6.9 G/DL (ref 6.3–8.2)
PTH-INTACT SERPL-MCNC: 26.6 PG/ML (ref 15–65)
RBC # BLD AUTO: 3.86 M/UL (ref 4.05–5.2)
SODIUM SERPL-SCNC: 133 MMOL/L (ref 136–145)
TRIGL SERPL-MCNC: 75 MG/DL (ref 0–150)
VIT B12 SERPL-MCNC: 2787 PG/ML (ref 193–986)
VLDLC SERPL CALC-MCNC: 15 MG/DL (ref 6–23)
WBC # BLD AUTO: 7.1 K/UL (ref 4.3–11.1)

## 2025-05-22 RX ORDER — LEVOCETIRIZINE DIHYDROCHLORIDE 5 MG/1
5 TABLET, FILM COATED ORAL NIGHTLY
COMMUNITY

## 2025-05-22 ASSESSMENT — ENCOUNTER SYMPTOMS: SHORTNESS OF BREATH: 0

## 2025-05-22 NOTE — PROGRESS NOTES
[facility]  Mark Gimenez M.D.  Internal Medicine  [address] [place] [address]  Office : [phone]  Fax : [phone]    Chief Complaint   Patient presents with    Hyperlipidemia     6 month follow up    Medicare AW       History of Present Illness:  Tracy King is a 79 y.o. female.  HPI    Hyperlipidemia  Patient is in for follow-up for hyperlipidemia.  Diet and Lifestyle: generally follows a low fat low cholesterol diet. Risk factors for vascular disease consist of hyperlipidemia. Last LDL was   Lab Results   Component Value Date     (H) 05/15/2024   . Last ALT was   Lab Results   Component Value Date/Time    ALT 10 05/15/2024 10:08 AM   .  No muscle aches.    Vitamin D Deficiency and Osteopenia  Last DEXA in 5/2023  Currently on Prolia therapy    Lab Results   Component Value Date/Time    VITD25 38.6 05/15/2024 10:08 AM        Lab Results   Component Value Date    IPTH 200.9 (H) 02/29/2024    CALCIUM 8.8 12/13/2024    CAION 3.92 (L) 02/29/2024    PHOS 3.8 (H) 10/04/2021         Peripheral Neuropathy with B12 deficiency  Taking OTC Vitamin B12  Lab Results   Component Value Date    SGQLMJES90 1845 (H) 05/15/2024         Allergic Rhinitis  Using xyzal and flonase for the past 4-5 days      Past Medical History:  Past Medical History:   Diagnosis Date    Allergic eye reaction 6/19/2013    Anxiety     Arthritis     shoulders    Claustrophobia     Elevated blood pressure (not hypertension) 4/30/2015    Environmental allergies     FH: breast cancer 6/19/2013    FH: colon cancer 6/19/2013    [place] (hard of hearing)     has bilateral hearing aids    Hyperlipidemia 6/19/2013    diet controlled    Left shoulder pain 6/19/2013    Menopause 6/19/2013    Nausea & vomiting     post op nausea and vomiting in past    Neuropathy     in toes    Osteopenia     Trigger finger 6/19/2013    left thumb    Unspecified adverse effect of anesthesia     usually

## 2025-05-22 NOTE — PROGRESS NOTES
[facility]  Mark Gimenez M.D.  Internal Medicine  [address] [place] [address]  Office : [phone]  Fax : [phone]    CHIEF COMPLAINT  Chief Complaint   Patient presents with    Hyperlipidemia     6 month follow up    Medicare AW         Medicare Annual Wellness Visit    Tracy King is here for Hyperlipidemia (6 month follow up) and Medicare AW    Assessment & Plan   Medicare annual wellness visit, subsequent     No follow-ups on file.     Subjective       Patient's complete Health Risk Assessment and screening values have been reviewed and are found in Flowsheets. The following problems were reviewed today and where indicated follow up appointments were made and/or referrals ordered.    Positive Risk Factor Screenings with Interventions:    Fall Risk:  Do you feel unsteady or are you worried about falling? : (!) (Patient-Rptd) yes  2 or more falls in past year?: (Patient-Rptd) no  Fall with injury in past year?: (Patient-Rptd) no     Interventions:    Reviewed medications, home hazards, visual acuity, and co-morbidities that can increase risk for falls  Recommended Assisted Device                 Hearing Screen:  Do you or your family notice any trouble with your hearing that hasn't been managed with hearing aids?: (!) (Patient-Rptd) Yes    Interventions:  Referred to ENT     Safety:  Do you have any tripping hazards - loose or unsecured carpets or rugs?: (!) (Patient-Rptd) Yes  Interventions:  Reviewed mitigation    ADL's:   Patient reports needing help with:  Select all that apply: (!) (Patient-Rptd) Housekeeping, Telephone Use  Interventions:  Ramana previously  helped.  She has a robot vacuum    Advanced Directives:  Do you have a Living Will?: (!) (Patient-Rptd) No    Intervention:  has NO advanced directive - information provided             Mini-Cog cognitive screening and clock test were performed and patient answered 3/3 words

## 2025-05-23 ENCOUNTER — RESULTS FOLLOW-UP (OUTPATIENT)
Dept: INTERNAL MEDICINE CLINIC | Facility: CLINIC | Age: 80
End: 2025-05-23

## 2025-06-20 ENCOUNTER — OFFICE VISIT (OUTPATIENT)
Dept: ENT CLINIC | Age: 80
End: 2025-06-20

## 2025-06-20 VITALS — BODY MASS INDEX: 20.69 KG/M2 | RESPIRATION RATE: 17 BRPM | HEIGHT: 64 IN | WEIGHT: 121.2 LBS

## 2025-06-20 DIAGNOSIS — H91.90 PROFOUND HEARING LOSS: Primary | ICD-10-CM

## 2025-06-20 DIAGNOSIS — H90.3 SENSORINEURAL HEARING LOSS (SNHL) OF BOTH EARS: ICD-10-CM

## 2025-06-20 NOTE — PROGRESS NOTES
HPI:    Tracy King is a 79 y.o. female seen New    Chief Complaint   Patient presents with    Hearing Problem       History of Present Illness  79-year-old female presents for evaluation of hearing loss, accompanied by her daughter/.    Experiencing progressive bilateral hearing loss, unresponsive to hearing aids ([employer], Nu[employer]ar). Initial improvement with aids was not sustained. No history of head trauma, falls, or recent vertigo (last episode 2 years ago). Significant decline in hearing noted by daughter, affecting conversation participation. Considering surgical intervention. Noticed significant decrease in right-sided hearing post-cataract surgery (12/2024).    PAST SURGICAL HISTORY:  - Cataract surgery: 12/2024  - Hip replacement: 05/2021        Past Medical History, Past Surgical History, Family history, Social History, and Medications were all reviewed with the patient today and updated as necessary.     Allergies   Allergen Reactions    Morphine Other (See Comments) and Shortness Of Breath     Other Reaction(s): face turns red, swells    Family states facial swelling and difficulty breathing with morphine    Simvastatin Other (See Comments) and Rash    Levofloxacin Nausea Only and Rash     Other Reaction(s): GI intolerance    Took with phenergen and was ok taking it.    Morphine And Codeine Other (See Comments) and Rash    Penicillins Hives and Rash    Sulfa Antibiotics Hives and Rash       Patient Active Problem List   Diagnosis    Vertigo    B12 deficiency    Idiopathic peripheral neuropathy    Superior glenoid labrum lesion    Hyperlipidemia    Hip fracture requiring operative repair (HCC)    Osteopenia of multiple sites    Fracture of neck of right femur (HCC)    Menopause    Chondromalacia    Vitamin D deficiency    Arthritis of right hip    Hyponatremia    JOSE CARLOS (acute kidney injury)    Hypotension    Metabolic acidosis    Viral gastroenteritis    Moderate protein-calorie malnutrition

## (undated) DEVICE — HANDPIECE SET WITH COAXIAL HIGH FLOW TIP AND SUCTION TUBE: Brand: INTERPULSE

## (undated) DEVICE — GAUZE,SPONGE,4"X4",16PLY,STRL,LF,10/TRAY: Brand: MEDLINE

## (undated) DEVICE — SOL INJ SOD CL 0.9% 250ML BG --

## (undated) DEVICE — DRAPE,U/SHT,SPLIT,FILM,60X84,STERILE: Brand: MEDLINE

## (undated) DEVICE — DRAPE,HIP,W/POUCHES,STERILE: Brand: MEDLINE

## (undated) DEVICE — SUTURE STRATAFIX SPRL SZ 3-0 L9IN ABSRB VLT FS L26MM 3/8 SXPD2B419

## (undated) DEVICE — SUTURE ETHBND EXCEL SZ 5 L30IN NONABSORBABLE GRN L40MM V-37 MB66G

## (undated) DEVICE — SOL IRR SOD CL 0.9% 3000ML --

## (undated) DEVICE — HIP HEMIARTHROPLASTY: Brand: MEDLINE INDUSTRIES, INC.

## (undated) DEVICE — SUTURE VCRL SZ 1 L36IN ABSRB UD CTX L48MM 1/2 CIR J977H

## (undated) DEVICE — DRAPE SHT 3 QTR PROXIMA 53X77 --

## (undated) DEVICE — PAD,ABDOMINAL,5"X9",ST,LF,25/BX: Brand: MEDLINE INDUSTRIES, INC.

## (undated) DEVICE — SUTURE MCRYL SZ 2-0 L27IN ABSRB UD CP-1 1 L36MM 1/2 CIR REV Y266H

## (undated) DEVICE — SUTURE PDS II SZ 1 L96IN ABSRB VLT TP-1 L65MM 1/2 CIR Z880G

## (undated) DEVICE — YANKAUER,FLEXIBLE HANDLE,REGLR CAPACITY: Brand: MEDLINE INDUSTRIES, INC.

## (undated) DEVICE — 3000CC GUARDIAN II: Brand: GUARDIAN

## (undated) DEVICE — SYR 50ML LR LCK 1ML GRAD NSAF --

## (undated) DEVICE — BIPOLAR SEALER 23-112-1 AQM 6.0: Brand: AQUAMANTYS ®

## (undated) DEVICE — STRYKER PERFORMANCE SERIES SAGITTAL BLADE: Brand: STRYKER PERFORMANCE SERIES

## (undated) DEVICE — BLADE SAW LG BNE 90X19X1.27 MM PARL STRYKR NS EZ BLADE DISP

## (undated) DEVICE — Device

## (undated) DEVICE — SOLUTION IRRIG 3000ML 0.9% SOD CHL USP UROMATIC PLAS CONT

## (undated) DEVICE — REM POLYHESIVE ADULT PATIENT RETURN ELECTRODE: Brand: VALLEYLAB

## (undated) DEVICE — PREP SKN CHLRAPRP APL 26ML STR --

## (undated) DEVICE — 7 DAY SILVER-COATED ANTIMICROBIAL BARRIER DRESSING: Brand: ACTICOAT 7  4" X 5"

## (undated) DEVICE — HOOD: Brand: FLYTE

## (undated) DEVICE — SKIN PREP TRAY 4 COMPARTM TRAY: Brand: MEDLINE INDUSTRIES, INC.

## (undated) DEVICE — TOTAL HIP DR KAVOLUS: Brand: MEDLINE INDUSTRIES, INC.

## (undated) DEVICE — SUTURE PDS II SZ 1 L36IN ABSRB VLT L48MM CTX 1/2 CIR Z371T

## (undated) DEVICE — 3M™ IOBAN™ 2 ANTIMICROBIAL INCISE DRAPE 6650EZ: Brand: IOBAN™ 2

## (undated) DEVICE — Z DISCONTINUED PER MEDLINE USE 2741944 DRESSING AQUACEL 12 IN SURG W9XL30CM SIL CVR WTRPRF VIR BACT BARR ANTIMIC

## (undated) DEVICE — SUTURE STRATAFIX SPRL SZ 1 L14IN ABSRB VLT L48CM CTX 1/2 SXPD2B405